# Patient Record
Sex: FEMALE | Race: WHITE | NOT HISPANIC OR LATINO | Employment: OTHER | URBAN - METROPOLITAN AREA
[De-identification: names, ages, dates, MRNs, and addresses within clinical notes are randomized per-mention and may not be internally consistent; named-entity substitution may affect disease eponyms.]

---

## 2017-02-01 ENCOUNTER — TELEPHONE (OUTPATIENT)
Dept: RHEUMATOLOGY | Facility: CLINIC | Age: 72
End: 2017-02-01

## 2017-02-01 NOTE — TELEPHONE ENCOUNTER
----- Message from Loli Escobar sent at 1/30/2017  4:27 PM CST -----  Contact:  call [phone]    Placed a call to the  Pod // pt is calling  Back  To   Speak to  Louisa /please call     2-1-17 Spoke to  and explained that we have faxed Xray orders to [facility] twice- but they have not gone through. Will try faxing again and informed  [facility] will call with appointment. AKILA

## 2017-02-09 ENCOUNTER — OFFICE VISIT (OUTPATIENT)
Dept: RHEUMATOLOGY | Facility: CLINIC | Age: 72
End: 2017-02-09
Payer: MEDICARE

## 2017-02-09 VITALS
HEIGHT: 62 IN | SYSTOLIC BLOOD PRESSURE: 130 MMHG | HEART RATE: 72 BPM | DIASTOLIC BLOOD PRESSURE: 80 MMHG | WEIGHT: 139.13 LBS | BODY MASS INDEX: 25.6 KG/M2

## 2017-02-09 DIAGNOSIS — M05.741 RHEUMATOID ARTHRITIS INVOLVING BOTH HANDS WITH POSITIVE RHEUMATOID FACTOR: Primary | ICD-10-CM

## 2017-02-09 DIAGNOSIS — E03.9 HYPOTHYROIDISM, ADULT: ICD-10-CM

## 2017-02-09 DIAGNOSIS — M06.9 RHEUMATOID ARTHRITIS OF HAND, UNSPECIFIED LATERALITY, UNSPECIFIED RHEUMATOID FACTOR PRESENCE: ICD-10-CM

## 2017-02-09 DIAGNOSIS — D50.9 IRON DEFICIENCY ANEMIA, UNSPECIFIED IRON DEFICIENCY ANEMIA TYPE: ICD-10-CM

## 2017-02-09 DIAGNOSIS — M47.25 OSTEOARTHRITIS OF SPINE WITH RADICULOPATHY, THORACOLUMBAR REGION: ICD-10-CM

## 2017-02-09 DIAGNOSIS — M05.742 RHEUMATOID ARTHRITIS INVOLVING BOTH HANDS WITH POSITIVE RHEUMATOID FACTOR: Primary | ICD-10-CM

## 2017-02-09 PROCEDURE — 99999 PR PBB SHADOW E&M-EST. PATIENT-LVL III: CPT | Mod: PBBFAC,,, | Performed by: INTERNAL MEDICINE

## 2017-02-09 PROCEDURE — 1157F ADVNC CARE PLAN IN RCRD: CPT | Mod: S$GLB,,, | Performed by: INTERNAL MEDICINE

## 2017-02-09 PROCEDURE — 99214 OFFICE O/P EST MOD 30 MIN: CPT | Mod: S$GLB,,, | Performed by: INTERNAL MEDICINE

## 2017-02-09 PROCEDURE — 1126F AMNT PAIN NOTED NONE PRSNT: CPT | Mod: S$GLB,,, | Performed by: INTERNAL MEDICINE

## 2017-02-09 PROCEDURE — 1159F MED LIST DOCD IN RCRD: CPT | Mod: S$GLB,,, | Performed by: INTERNAL MEDICINE

## 2017-02-09 PROCEDURE — 1160F RVW MEDS BY RX/DR IN RCRD: CPT | Mod: S$GLB,,, | Performed by: INTERNAL MEDICINE

## 2017-02-09 RX ORDER — FOLIC ACID 1 MG/1
2 TABLET ORAL DAILY
Qty: 90 TABLET | Refills: 3 | Status: SHIPPED | OUTPATIENT
Start: 2017-02-09 | End: 2017-02-09 | Stop reason: SDUPTHER

## 2017-02-09 RX ORDER — METHOTREXATE 2.5 MG/1
7.5 TABLET ORAL
Qty: 48 TABLET | Refills: 3 | Status: SHIPPED | OUTPATIENT
Start: 2017-02-09 | End: 2017-02-09 | Stop reason: SDUPTHER

## 2017-02-09 RX ORDER — METHOTREXATE 2.5 MG/1
7.5 TABLET ORAL
Qty: 36 TABLET | Refills: 3 | Status: SHIPPED | OUTPATIENT
Start: 2017-02-09 | End: 2017-06-08 | Stop reason: SDUPTHER

## 2017-02-09 RX ORDER — FOLIC ACID 1 MG/1
2 TABLET ORAL DAILY
Qty: 180 TABLET | Refills: 3 | Status: SHIPPED | OUTPATIENT
Start: 2017-02-09 | End: 2018-03-12 | Stop reason: SDUPTHER

## 2017-02-09 ASSESSMENT — ROUTINE ASSESSMENT OF PATIENT INDEX DATA (RAPID3)
PAIN SCORE: 1.5
MDHAQ FUNCTION SCORE: .2
PATIENT GLOBAL ASSESSMENT SCORE: 1.5
PSYCHOLOGICAL DISTRESS SCORE: 0
TOTAL RAPID3 SCORE: 1.22

## 2017-02-09 NOTE — PROGRESS NOTES
Subjective:          Chief Complaint: Fabby Guerrero is a 71 y.o. female who had concerns including Disease Management.    HPI:      Rheumatoid Arthritis  Patient is an 70 y.o. female here for follow up seropostive Rheumatoid Arthritis. Serologies are: RF+, CCP low titer +.   Symptoms have been present for several years. Onset was sudden. Symptoms include eye symptoms, joint pain, joint swelling, sleep difficulties and weakness of hands and are of moderate severity. Morning stiffness: 25 minute Patient denies joint pain, joint swelling and morning stiffness. Symptoms are made worse by: movement, overuse and resting.  Symptoms are helped by arthritis medications.  Associated symptoms include arthralgia and fatigue and secondary Sjogrens. Previously on Restasis, now systane/Refresh. Diclofenac for 1 year with decreased urination and stable creatinine; now off. Starting Using aleve PRN no ASE helping in AM, Tylenol in PM   Seen with Ortho, Dr. Agudelo,  received Euflexxa  Bilateral this did help   Started using some herbals: coconut oil, wintergreen, maxi freeze, aspircream this seems to help  Uses Hyrdocodone only occasionally   Patient having increased anxiety.  She is taking gabapentin only to sleep and not every night. Noting tingling in legs and fingers.   Having some oral ulcerations  Doing well with Tumeric. [facility].   Current medications  Methotrexate - Problems: none 3 tabs weekly doing ok.  Prednisone 5mg PRN flares has not taken any prednisone in 1 month.   Hydrocodone rarely for pain  Aleve PRN-previously using diclofenac but urinary hesitance CReatinine normalized.   Most recent labs WNL from [facility]. 2/2017    Labs from 2-2017: Sedimentation rate 22 normal no leukopenia, anemia.  C-reactive protein normal liver enzymes normal.  [facility] x-ray right hand: No joint space narrowing identified at the MP joints no marginal erosions no articular erosions.  No soft tissue abnormalities.  Currently the left hand was  not done but I did order.  Right foot no erosions noted no joint space narrowing in the MP joints IP joints tarsometatarsal joints or intertarsal joints.  Left foot no bony erosions identified.      REVIEW OF SYSTEMS:    Review of Systems   Constitutional: Positive for malaise/fatigue. Negative for fever and weight loss.   HENT: Negative for sore throat.    Eyes: Positive for discharge and redness. Negative for double vision and photophobia.   Respiratory: Negative for cough, shortness of breath and wheezing.    Cardiovascular: Negative for chest pain, palpitations and orthopnea.   Gastrointestinal: Negative for abdominal pain, constipation and diarrhea.   Genitourinary: Negative for dysuria, hematuria and urgency.   Musculoskeletal: Positive for joint pain. Negative for back pain and myalgias.   Skin: Negative for rash.   Neurological: Negative for dizziness, tingling, focal weakness and headaches.   Endo/Heme/Allergies: Does not bruise/bleed easily.   Psychiatric/Behavioral: Negative for depression, hallucinations and suicidal ideas.               Objective:            Past Medical History   Diagnosis Date    Thyroid disease      hypothyroidism     Family History   Problem Relation Age of Onset    Cancer Mother      lung    Cancer Father      colon and lung    Cancer Daughter      melanoma     Social History   Substance Use Topics    Smoking status: Former Smoker     Quit date: 4/13/1976    Smokeless tobacco: Never Used    Alcohol use 1.8 oz/week     3 Glasses of wine per week         Current Outpatient Prescriptions on File Prior to Visit   Medication Sig Dispense Refill    acetaminophen (TYLENOL ARTHRITIS PAIN) 650 MG TbSR Take 650 mg by mouth every 8 (eight) hours as needed.       acidophilus-pectin, citrus (ACIDOPHILUS PROBIOTIC) 100 million cell-10 mg Cap Take by mouth.      docusate sodium (COLACE) 100 MG capsule Take 100 mg by mouth every evening.      fish oil-omega-3 fatty acids 300-1,000 mg  capsule Take 2 g by mouth once daily.      FLUZONE HIGH-DOSE 2016-17, PF, 180 mcg/0.5 mL Syrg       folic acid (FOLVITE) 1 MG tablet Take 1 tablet (1 mg total) by mouth once daily. 90 tablet 3    GINSENG ORAL Take by mouth.      glucosamine-chondroitin 500-400 mg tablet Take 2 tablets by mouth once daily.       hydrocodone-acetaminophen 10-325mg (NORCO)  mg Tab Take 1 tablet by mouth 3 (three) times daily as needed. 90 tablet 0    levothyroxine (SYNTHROID) 88 MCG tablet Take 1 tablet (88 mcg total) by mouth before breakfast. 90 tablet 3    melatonin 5 mg Cap Take by mouth as needed.       methotrexate 2.5 MG Tab Take 4 tablets (10 mg total) by mouth every 7 days. 48 tablet 3    multivitamin (ONE DAILY MULTIVITAMIN) per tablet Take 1 tablet by mouth once daily.      naproxen (NAPROSYN) 250 MG tablet Take 250 mg by mouth 2 (two) times daily with meals. OTC      omeprazole (PRILOSEC) 20 MG capsule Take 20 mg by mouth as needed.       TURMERIC (CURCUMIN MISC) 750 mg by Misc.(Non-Drug; Combo Route) route 2 (two) times daily.      alprazolam (XANAX) 0.25 MG tablet Take 1 tablet (0.25 mg total) by mouth daily as needed for Anxiety. 20 tablet 2    gabapentin (NEURONTIN) 100 MG capsule Take 1 capsule (100 mg total) by mouth every evening. (Patient taking differently: Take 100 mg by mouth as needed. ) 90 capsule 2    predniSONE (DELTASONE) 5 MG tablet 1 to 2 tabs po qam prn swelling 60 tablet 3     No current facility-administered medications on file prior to visit.        Vitals:    02/09/17 0857   BP: 130/80   Pulse: 72       Physical Exam:    Physical Exam   Constitutional: She appears well-developed and well-nourished.   HENT:   Nose: No septal deviation.   Mouth/Throat: Mucous membranes are normal. No oral lesions.   Eyes: Pupils are equal, round, and reactive to light. Right conjunctiva is not injected. Left conjunctiva is not injected.   Neck: No JVD present. No thyroid mass and no thyromegaly  present.   Cardiovascular: Normal rate, regular rhythm and normal pulses.    No edema   Pulmonary/Chest: Effort normal and breath sounds normal.   Abdominal: Soft. Normal appearance.   Musculoskeletal:        Right shoulder: She exhibits normal range of motion, no tenderness and no swelling.        Left shoulder: She exhibits normal range of motion, no tenderness and no swelling.        Right elbow: She exhibits normal range of motion and no swelling. No tenderness found.        Left elbow: She exhibits normal range of motion and no swelling. No tenderness found.        Right wrist: She exhibits decreased range of motion and tenderness. She exhibits no swelling.        Left wrist: She exhibits decreased range of motion and tenderness. She exhibits no swelling.        Right hip: She exhibits normal range of motion, normal strength and no swelling.        Left hip: She exhibits normal range of motion, no tenderness and no swelling.        Right knee: She exhibits normal range of motion and no swelling. No tenderness found.        Left knee: She exhibits normal range of motion and no swelling. No tenderness found.        Right ankle: She exhibits normal range of motion and no swelling. No tenderness.        Left ankle: She exhibits normal range of motion and no swelling. No tenderness.        Right hand: She exhibits decreased range of motion and tenderness.        Left hand: She exhibits decreased range of motion and tenderness.   5/5 upper and lower extremity bilateral muscle strength   Lymphadenopathy:     She has no cervical adenopathy.     She has no axillary adenopathy.   Neurological: She has normal strength and normal reflexes.   Skin: Skin is dry and intact.   Psychiatric: She has a normal mood and affect.             Assessment:       Encounter Diagnoses   Name Primary?    Rheumatoid arthritis involving both hands with positive rheumatoid factor Yes    Iron deficiency anemia, unspecified iron deficiency  anemia type     Rheumatoid arthritis of hand, unspecified laterality, unspecified rheumatoid factor presence     Osteoarthritis of spine with radiculopathy, thoracolumbar region     Hypothyroidism, adult           Plan:        Rheumatoid arthritis involving both hands with positive rheumatoid factor    Iron deficiency anemia, unspecified iron deficiency anemia type    Rheumatoid arthritis of hand, unspecified laterality, unspecified rheumatoid factor presence  -   -     CBC auto differential; Future; Expected date: 2/9/17  -     Comprehensive metabolic panel; Future; Expected date: 2/9/17  -     Sedimentation rate, manual; Future; Expected date: 2/9/17  -     C-reactive protein; Future; Expected date: 2/9/17  -     methotrexate 2.5 MG Tab; Take 3 tablets (7.5 mg total) by mouth every 7 days.  Dispense: 36 tablet; Refill: 3  -     folic acid (FOLVITE) 1 MG tablet; Take 2 tablets (2 mg total) by mouth once daily.  Dispense: 180 tablet; Refill: 3    Osteoarthritis of spine with radiculopathy, thoracolumbar region  - -     methotrexate 2.5 MG Tab; Take 3 tablets (7.5 mg total) by mouth every 7 days.  Dispense: 36 tablet; Refill: 3  -     folic acid (FOLVITE) 1 MG tablet; Take 2 tablets (2 mg total) by mouth once daily.  Dispense: 180 tablet; Refill: 3    Hypothyroidism, adult  -     Discontinue: methotrexate 2.5 MG Tab; Take 3 tablets (7.5 mg total) by mouth every 7 days.  Dispense: 48 tablet; Refill: 3  -     methotrexate 2.5 MG Tab; Take 3 tablets (7.5 mg total) by mouth every 7 days.  Dispense: 36 tablet; Refill: 3    Continue MTX 3 tabs weekly  Increase folic acid to 2mg daily  Continue new supplement [facility]Med (Tumeric)  Off all Prednisone  Return in about 4 months (around 6/9/2017).          30min consultation with greater than 50% spent in counseling, chart review and coordination of care. All questions answered.

## 2017-03-29 ENCOUNTER — DOCUMENTATION ONLY (OUTPATIENT)
Dept: RHEUMATOLOGY | Facility: CLINIC | Age: 72
End: 2017-03-29

## 2017-03-29 NOTE — PROGRESS NOTES
I have reviewed [facility] with patient at our office visit.  Labs dated 2/3/2017.      sedimentation rate normal, CBC without any anemia or leukopenia.  Creatinine is normal.  CRP is normal no change to medication.

## 2017-05-08 ENCOUNTER — TELEPHONE (OUTPATIENT)
Dept: RHEUMATOLOGY | Facility: CLINIC | Age: 72
End: 2017-05-08

## 2017-05-08 NOTE — TELEPHONE ENCOUNTER
----- Message from Dari Nieto sent at 5/8/2017  9:16 AM CDT -----  Contact: self   [phone]  Patient called asking for orders for labs, patient goes to [facility]. Fax-[phone]. Office [phone].Thanks!    5-8-17 Spoke to patient and let her know lab orders are [facility] to [facility]. AKILA

## 2017-06-08 ENCOUNTER — OFFICE VISIT (OUTPATIENT)
Dept: RHEUMATOLOGY | Facility: CLINIC | Age: 72
End: 2017-06-08
Payer: MEDICARE

## 2017-06-08 VITALS
WEIGHT: 140.63 LBS | BODY MASS INDEX: 25.88 KG/M2 | HEART RATE: 65 BPM | HEIGHT: 62 IN | SYSTOLIC BLOOD PRESSURE: 107 MMHG | DIASTOLIC BLOOD PRESSURE: 66 MMHG

## 2017-06-08 DIAGNOSIS — M75.52 ACUTE SHOULDER BURSITIS, LEFT: Primary | ICD-10-CM

## 2017-06-08 DIAGNOSIS — M65.341 TRIGGER RING FINGER OF RIGHT HAND: ICD-10-CM

## 2017-06-08 DIAGNOSIS — E03.9 HYPOTHYROIDISM, ADULT: ICD-10-CM

## 2017-06-08 DIAGNOSIS — M47.25 OSTEOARTHRITIS OF SPINE WITH RADICULOPATHY, THORACOLUMBAR REGION: ICD-10-CM

## 2017-06-08 DIAGNOSIS — M47.812 SPONDYLOSIS OF CERVICAL REGION WITHOUT MYELOPATHY OR RADICULOPATHY: ICD-10-CM

## 2017-06-08 DIAGNOSIS — M06.9 RHEUMATOID ARTHRITIS OF HAND, UNSPECIFIED LATERALITY, UNSPECIFIED RHEUMATOID FACTOR PRESENCE: ICD-10-CM

## 2017-06-08 PROCEDURE — 1159F MED LIST DOCD IN RCRD: CPT | Mod: S$GLB,,, | Performed by: INTERNAL MEDICINE

## 2017-06-08 PROCEDURE — 20610 DRAIN/INJ JOINT/BURSA W/O US: CPT | Mod: S$GLB,,, | Performed by: INTERNAL MEDICINE

## 2017-06-08 PROCEDURE — 1125F AMNT PAIN NOTED PAIN PRSNT: CPT | Mod: S$GLB,,, | Performed by: INTERNAL MEDICINE

## 2017-06-08 PROCEDURE — 20550 NJX 1 TENDON SHEATH/LIGAMENT: CPT | Mod: 59,S$GLB,, | Performed by: INTERNAL MEDICINE

## 2017-06-08 PROCEDURE — 99214 OFFICE O/P EST MOD 30 MIN: CPT | Mod: 25,S$GLB,, | Performed by: INTERNAL MEDICINE

## 2017-06-08 PROCEDURE — 99999 PR PBB SHADOW E&M-EST. PATIENT-LVL III: CPT | Mod: PBBFAC,,, | Performed by: INTERNAL MEDICINE

## 2017-06-08 RX ORDER — CHOLECALCIFEROL (VITAMIN D3) 125 MCG
CAPSULE ORAL
COMMUNITY
Start: 2017-05-23 | End: 2017-06-08

## 2017-06-08 RX ORDER — HYDROCODONE BITARTRATE AND ACETAMINOPHEN 10; 325 MG/1; MG/1
1 TABLET ORAL 3 TIMES DAILY PRN
Qty: 90 TABLET | Refills: 0 | Status: SHIPPED | OUTPATIENT
Start: 2017-06-08 | End: 2018-04-13 | Stop reason: SDUPTHER

## 2017-06-08 RX ORDER — ACETAMINOPHEN 500MG/15ML
LIQUID (ML) ORAL
COMMUNITY
Start: 2017-05-23 | End: 2017-06-08

## 2017-06-08 RX ORDER — NEOMYCIN/POLYMYXIN B/PRAMOXINE 3.5-10K-1
CREAM (GRAM) TOPICAL
COMMUNITY
Start: 2017-05-23 | End: 2017-06-08

## 2017-06-08 RX ORDER — METHOTREXATE 2.5 MG/1
10 TABLET ORAL
Qty: 48 TABLET | Refills: 3 | Status: SHIPPED | OUTPATIENT
Start: 2017-06-08 | End: 2017-06-08

## 2017-06-08 RX ORDER — METHOTREXATE 2.5 MG/1
10 TABLET ORAL
Qty: 48 TABLET | Refills: 3 | Status: SHIPPED | OUTPATIENT
Start: 2017-06-08 | End: 2017-09-26

## 2017-06-08 RX ADMIN — METHYLPREDNISOLONE ACETATE 40 MG: 40 INJECTION, SUSPENSION INTRA-ARTICULAR; INTRALESIONAL; INTRAMUSCULAR; SOFT TISSUE at 08:06

## 2017-06-08 ASSESSMENT — ROUTINE ASSESSMENT OF PATIENT INDEX DATA (RAPID3)
TOTAL RAPID3 SCORE: 2.83
MDHAQ FUNCTION SCORE: .6
PAIN SCORE: 3.5
PSYCHOLOGICAL DISTRESS SCORE: 1.1
PATIENT GLOBAL ASSESSMENT SCORE: 3

## 2017-06-08 NOTE — PROGRESS NOTES
Subjective:          Chief Complaint: Fabby Guerrero is a 71 y.o. female who had concerns including 4 month follow up.    HPI:      Rheumatoid Arthritis  Patient is an 70 y.o. female here for follow up seropostive Rheumatoid Arthritis. Serologies are: RF+, CCP low titer +.   Symptoms have been present for several years. Onset was sudden. Symptoms include eye symptoms, joint pain, joint swelling, sleep difficulties and weakness of hands and are of moderate severity. Morning stiffness: 25 minute Patient denies joint pain, joint swelling and morning stiffness. Symptoms are made worse by: movement, overuse and resting.  Symptoms are helped by arthritis medications.  Associated symptoms include arthralgia and fatigue and secondary Sjogrens. Previously on Restasis, now systane/Refresh. Diclofenac for 1 year with decreased urination and stable creatinine; now off. Starting Using aleve PRN no ASE helping in AM, Tylenol in PM   Seen with Ortho, Dr. Agudelo,  received Euflexxa  Bilateral this did help   Started using some herbals: coconut oil, wintergreen, maxi freeze, aspircream this seems to help  Uses Hyrdocodone only occasionally   Patient having increased anxiety.  She is taking gabapentin only to sleep and not every night. Noting tingling in legs and fingers.   Having metarsalgia in the AM, shoulders, wrist and fingers more stiff, triggering right 4th, and left 2nd finger. Overal   Doing well with Tumeric. [facility].   Current medications  Methotrexate - Problems: none 3 tabs weekly doing ok.  Prednisone 5mg PRN flares has not taken any prednisone in 1 month.   Hydrocodone rarely for pain  Aleve PRN-previously using diclofenac but urinary hesitance CReatinine normalized.   Most recent labs WNL from [facility]. 2/2017  No hx of HCQ-  Labs from 2-2017: Sedimentation rate 22 normal no leukopenia, anemia.  C-reactive protein normal liver enzymes normal.  [facility] x-ray right hand: No joint space narrowing identified at the MP  joints no marginal erosions no articular erosions.  No soft tissue abnormalities.  Currently the left hand was not done but I did order.  Right foot no erosions noted no joint space narrowing in the MP joints IP joints tarsometatarsal joints or intertarsal joints.  Left foot no bony erosions identified.      REVIEW OF SYSTEMS:    Review of Systems   Constitutional: Positive for malaise/fatigue. Negative for fever and weight loss.   HENT: Negative for sore throat.    Eyes: Positive for discharge and redness. Negative for double vision and photophobia.   Respiratory: Negative for cough, shortness of breath and wheezing.    Cardiovascular: Negative for chest pain, palpitations and orthopnea.   Gastrointestinal: Negative for abdominal pain, constipation and diarrhea.   Genitourinary: Negative for dysuria, hematuria and urgency.   Musculoskeletal: Positive for joint pain. Negative for back pain and myalgias.   Skin: Negative for rash.   Neurological: Negative for dizziness, tingling, focal weakness and headaches.   Endo/Heme/Allergies: Does not bruise/bleed easily.   Psychiatric/Behavioral: Negative for depression, hallucinations and suicidal ideas.               Objective:            Past Medical History:   Diagnosis Date    Thyroid disease     hypothyroidism     Family History   Problem Relation Age of Onset    Cancer Mother      lung    Cancer Father      colon and lung    Cancer Daughter      melanoma     Social History   Substance Use Topics    Smoking status: Former Smoker     Quit date: 4/13/1976    Smokeless tobacco: Never Used    Alcohol use 1.8 oz/week     3 Glasses of wine per week         Current Outpatient Prescriptions on File Prior to Visit   Medication Sig Dispense Refill    acetaminophen (TYLENOL ARTHRITIS PAIN) 650 MG TbSR Take 650 mg by mouth every 8 (eight) hours as needed.       docusate sodium (COLACE) 100 MG capsule Take 100 mg by mouth every evening.      fish oil-omega-3 fatty acids  300-1,000 mg capsule Take 2 g by mouth once daily.      folic acid (FOLVITE) 1 MG tablet Take 2 tablets (2 mg total) by mouth once daily. 180 tablet 3    glucosamine-chondroitin 500-400 mg tablet Take 2 tablets by mouth once daily.       levothyroxine (SYNTHROID) 88 MCG tablet Take 1 tablet (88 mcg total) by mouth before breakfast. 90 tablet 3    melatonin 5 mg Cap Take by mouth as needed.       methotrexate 2.5 MG Tab Take 3 tablets (7.5 mg total) by mouth every 7 days. 36 tablet 3    naproxen (NAPROSYN) 250 MG tablet Take 250 mg by mouth 2 (two) times daily with meals. OTC      omeprazole (PRILOSEC) 20 MG capsule Take 20 mg by mouth as needed.       predniSONE (DELTASONE) 5 MG tablet 1 to 2 tabs po qam prn swelling 60 tablet 3    TURMERIC (CURCUMIN MISC) 750 mg by Misc.(Non-Drug; Combo Route) route 2 (two) times daily.      FLUZONE HIGH-DOSE 2016-17, PF, 180 mcg/0.5 mL Syrg       gabapentin (NEURONTIN) 100 MG capsule Take 1 capsule (100 mg total) by mouth every evening. (Patient taking differently: Take 100 mg by mouth as needed. ) 90 capsule 2    hydrocodone-acetaminophen 10-325mg (NORCO)  mg Tab Take 1 tablet by mouth 3 (three) times daily as needed. 90 tablet 0    multivitamin (ONE DAILY MULTIVITAMIN) per tablet Take 1 tablet by mouth once daily.      [DISCONTINUED] acidophilus-pectin, citrus (ACIDOPHILUS PROBIOTIC) 100 million cell-10 mg Cap Take by mouth.      [DISCONTINUED] alprazolam (XANAX) 0.25 MG tablet Take 1 tablet (0.25 mg total) by mouth daily as needed for Anxiety. 20 tablet 2    [DISCONTINUED] GINSENG ORAL Take by mouth.       No current facility-administered medications on file prior to visit.        Vitals:    06/08/17 1000   BP: 107/66   Pulse: 65       Physical Exam:    Physical Exam   Constitutional: She appears well-developed and well-nourished.   HENT:   Nose: No septal deviation.   Mouth/Throat: Mucous membranes are normal. No oral lesions.   Eyes: Pupils are equal,  round, and reactive to light. Right conjunctiva is not injected. Left conjunctiva is not injected.   Neck: No JVD present. No thyroid mass and no thyromegaly present.   Cardiovascular: Normal rate, regular rhythm and normal pulses.    No edema   Pulmonary/Chest: Effort normal and breath sounds normal.   Abdominal: Soft. Normal appearance.   Musculoskeletal:        Right shoulder: She exhibits normal range of motion, no tenderness and no swelling.        Left shoulder: She exhibits normal range of motion, no tenderness and no swelling.        Right elbow: She exhibits normal range of motion and no swelling. No tenderness found.        Left elbow: She exhibits normal range of motion and no swelling. No tenderness found.        Right wrist: She exhibits decreased range of motion and tenderness. She exhibits no swelling.        Left wrist: She exhibits decreased range of motion and tenderness. She exhibits no swelling.        Right hip: She exhibits normal range of motion, normal strength and no swelling.        Left hip: She exhibits normal range of motion, no tenderness and no swelling.        Right knee: She exhibits normal range of motion and no swelling. No tenderness found.        Left knee: She exhibits normal range of motion and no swelling. No tenderness found.        Right ankle: She exhibits normal range of motion and no swelling. No tenderness.        Left ankle: She exhibits normal range of motion and no swelling. No tenderness.        Right hand: She exhibits decreased range of motion and tenderness.        Left hand: She exhibits decreased range of motion and tenderness.   5/5 upper and lower extremity bilateral muscle strength   Lymphadenopathy:     She has no cervical adenopathy.     She has no axillary adenopathy.   Neurological: She has normal strength and normal reflexes.   Skin: Skin is dry and intact.   Psychiatric: She has a normal mood and affect.             Assessment:       Encounter Diagnoses    Name Primary?    Osteoarthritis of spine with radiculopathy, thoracolumbar region     Spondylosis of cervical region without myelopathy or radiculopathy     Rheumatoid arthritis of hand, unspecified laterality, unspecified rheumatoid factor presence     Hypothyroidism, adult           Plan:        Rheumatoid arthritis involving both hands with positive rheumatoid factor    Iron deficiency anemia, unspecified iron deficiency anemia type    Rheumatoid arthritis of hand, unspecified laterality, unspecified rheumatoid factor presence  -   -     CBC auto differential; Future; Expected date: 2/9/17  -     Comprehensive metabolic panel; Future; Expected date: 2/9/17  -     Sedimentation rate, manual; Future; Expected date: 2/9/17  -     C-reactive protein; Future; Expected date: 2/9/17  -     methotrexate 2.5 MG Tab; Take 3 tablets (7.5 mg total) by mouth every 7 days.  Dispense: 36 tablet; Refill: 3  -     folic acid (FOLVITE) 1 MG tablet; Take 2 tablets (2 mg total) by mouth once daily.  Dispense: 180 tablet; Refill: 3    Osteoarthritis of spine with radiculopathy, thoracolumbar region  - -     methotrexate 2.5 MG Tab; Take 3 tablets (7.5 mg total) by mouth every 7 days.  Dispense: 36 tablet; Refill: 3  -     folic acid (FOLVITE) 1 MG tablet; Take 2 tablets (2 mg total) by mouth once daily.  Dispense: 180 tablet; Refill: 3    Hypothyroidism, adult  -     Discontinue: methotrexate 2.5 MG Tab; Take 3 tablets (7.5 mg total) by mouth every 7 days.  Dispense: 48 tablet; Refill: 3  -     methotrexate 2.5 MG Tab; Take 3 tablets (7.5 mg total) by mouth every 7 days.  Dispense: 36 tablet; Refill: 3    Increase MTX 4 tabs weekly  Increase folic acid to 2mg daily  Continue new supplement [facility] (Tumeric)  Off all Prednisone-ok for PRN 5mg  Ok for Gabapentin PRN at night.  Inject right 4th trigger  Inject left shoulder    May need to consider biologic in the future.     Return in about 3 months (around 9/8/2017).           30min consultation with greater than 50% spent in counseling, chart review and coordination of care. All questions answered.

## 2017-06-19 RX ORDER — METHYLPREDNISOLONE ACETATE 40 MG/ML
40 INJECTION, SUSPENSION INTRA-ARTICULAR; INTRALESIONAL; INTRAMUSCULAR; SOFT TISSUE
Status: DISCONTINUED | OUTPATIENT
Start: 2017-06-08 | End: 2017-06-19 | Stop reason: HOSPADM

## 2017-06-20 NOTE — PROCEDURES
Large Joint Aspiration/Injection  Date/Time: 6/8/2017 8:00 PM  Performed by: VILMA GOODWIN  Authorized by: VILMA GOODWIN.     Consent Done?:  Yes (Verbal)    Location:  Shoulder  Site:  L subacromial bursa  Needle size:  25 G  Approach:  Posterior  Medications:  40 mg methylPREDNISolone acetate 40 mg/mL  Patient tolerance:  Patient tolerated the procedure well with no immediate complications    Additional Comments: Discussed risks, benefits and side effects to CS injection of the shoulder including but not limited to infection, muscle or skin atrophy and ineffectiveness. Patient agreed to proceed with Procedure.

## 2017-06-20 NOTE — PROCEDURES
Tendon Sheath  Date/Time: 6/8/2017 8:04 PM  Performed by: VILMA GOODWIN  Authorized by: VILMA GOODWIN     Consent Done?:  Yes (Verbal)  Timeout: prior to procedure the correct patient, procedure, and site was verified    Indications:  Pain  Site marked: the procedure site was marked    Timeout: prior to procedure the correct patient, procedure, and site was verified    Location:  Ring finger  Site:  R ring MCP  Prep: patient was prepped and draped in usual sterile fashion    Ultrasonic guidance for needle placement?: No    Needle gauge: 30ga 1/2 inche.  Medications:  40 mg methylPREDNISolone acetate 40 mg/mL  Patient tolerance:  Patient tolerated the procedure well with no immediate complications   Discussed risks, benefits and side effects to CS injection of the shoulder including but not limited to infection, muscle or skin atrophy and ineffectiveness. Patient agreed to proceed with Procedure.

## 2017-09-05 ENCOUNTER — TELEPHONE (OUTPATIENT)
Dept: RHEUMATOLOGY | Facility: CLINIC | Age: 72
End: 2017-09-05

## 2017-09-05 NOTE — TELEPHONE ENCOUNTER
----- Message from Nayla Dimas sent at 9/5/2017 10:43 AM CDT -----  Contact: patient  Patient calling in regards to requesting her lab work orders in that she usually have done before her annual appt. She also wants to speak with the Nurse about a possible Xray. Please advise.  Call back  or   Thanks!    Spoke to the patient. Orders faxed to [facility] as requested. Patient will see podiatrist soon for foot pain. Patient is wanting foot xrays . CG

## 2017-09-26 ENCOUNTER — OFFICE VISIT (OUTPATIENT)
Dept: RHEUMATOLOGY | Facility: CLINIC | Age: 72
End: 2017-09-26
Payer: MEDICARE

## 2017-09-26 VITALS
DIASTOLIC BLOOD PRESSURE: 70 MMHG | WEIGHT: 138.44 LBS | SYSTOLIC BLOOD PRESSURE: 120 MMHG | BODY MASS INDEX: 25.48 KG/M2 | HEART RATE: 80 BPM | HEIGHT: 62 IN

## 2017-09-26 DIAGNOSIS — M05.741 RHEUMATOID ARTHRITIS INVOLVING RIGHT HAND WITH POSITIVE RHEUMATOID FACTOR: Primary | ICD-10-CM

## 2017-09-26 DIAGNOSIS — R25.2 LEG CRAMP: ICD-10-CM

## 2017-09-26 DIAGNOSIS — R06.02 SHORTNESS OF BREATH: ICD-10-CM

## 2017-09-26 PROCEDURE — 99214 OFFICE O/P EST MOD 30 MIN: CPT | Mod: S$GLB,,, | Performed by: INTERNAL MEDICINE

## 2017-09-26 PROCEDURE — 99999 PR PBB SHADOW E&M-EST. PATIENT-LVL III: CPT | Mod: PBBFAC,,, | Performed by: INTERNAL MEDICINE

## 2017-09-26 PROCEDURE — 1159F MED LIST DOCD IN RCRD: CPT | Mod: S$GLB,,, | Performed by: INTERNAL MEDICINE

## 2017-09-26 PROCEDURE — 3008F BODY MASS INDEX DOCD: CPT | Mod: S$GLB,,, | Performed by: INTERNAL MEDICINE

## 2017-09-26 PROCEDURE — 1125F AMNT PAIN NOTED PAIN PRSNT: CPT | Mod: S$GLB,,, | Performed by: INTERNAL MEDICINE

## 2017-09-26 RX ORDER — LEFLUNOMIDE 10 MG/1
10 TABLET ORAL DAILY
Qty: 30 TABLET | Refills: 3 | Status: SHIPPED | OUTPATIENT
Start: 2017-09-26 | End: 2018-04-26

## 2017-09-26 NOTE — PROGRESS NOTES
Subjective:          Chief Complaint: Fabby Guerrero is a 71 y.o. female who had concerns including Disease Management.    HPI:    Right foot injections with Dr Arceo and medrol for heel pain. Right foot better but still pain in the left foot. Shoulders and shoulder pain across upper back. Fingers better. Injection of trigger finger right 4th doing great but the 2nd finger having increasing pain.   Night time with dry cough, seen with ENT in past attributed to GERD. Does have PPI uses intermittently. Some ZULUAGA some but can still walk to get trash on long drive. No cough during daytime.     Rheumatoid Arthritis  Patient is an 70 y.o. female here for follow up seropostive Rheumatoid Arthritis. Serologies are: RF+, CCP low titer +.   Symptoms have been present for several years. Onset was sudden. Symptoms include eye symptoms, joint pain, joint swelling, sleep difficulties and weakness of hands and are of moderate severity. Morning stiffness: 25 minute Patient denies joint pain, joint swelling and morning stiffness. Symptoms are made worse by: movement, overuse and resting.  Symptoms are helped by arthritis medications.  Associated symptoms include arthralgia and fatigue and secondary Sjogrens. Previously on Restasis, now systane/Refresh. Diclofenac for 1 year with decreased urination and stable creatinine; now off. Starting Using aleve PRN no ASE helping in AM, Tylenol in PM   Seen with Ortho, Dr. Agudelo,  received Euflexxa  Bilateral this did help   Started using some herbals: coconut oil, wintergreen, maxi freeze, aspircream this seems to help  Uses Hyrdocodone only occasionally   Patient having increased anxiety.  She is taking gabapentin only to sleep and not every night. Noting tingling in legs and fingers.   Having metarsalgia in the AM, shoulders, wrist and fingers more stiff, triggering right 4th, and left 2nd finger. Overal   Doing well with Tumeric. [facility].   Current medications  Methotrexate - Problems:  none 3 tabs weekly doing ok.  Did have ulcer increased folic acid 2mg still with some ulcers.   Prednisone 5mg PRN flares has not taken any prednisone in 1 month.   Hydrocodone rarely for pain  Aleve PRN-previously using diclofenac but urinary hesitance CReatinine normalized.   Most recent labs WNL from [facility]. 2/2017  No hx of HCQ-  Labs from 2-2017: Sedimentation rate 22 normal no leukopenia, anemia.  C-reactive protein normal liver enzymes normal.  DIS x-ray right hand: No joint space narrowing identified at the MP joints no marginal erosions no articular erosions.  No soft tissue abnormalities.  Currently the left hand was not done but I did order.  Right foot no erosions noted no joint space narrowing in the MP joints IP joints tarsometatarsal joints or intertarsal joints.  Left foot no bony erosions identified.      REVIEW OF SYSTEMS:    Review of Systems   Constitutional: Positive for malaise/fatigue. Negative for fever and weight loss.   HENT: Negative for sore throat.    Eyes: Positive for discharge and redness. Negative for double vision and photophobia.   Respiratory: Negative for cough, shortness of breath and wheezing.    Cardiovascular: Negative for chest pain, palpitations and orthopnea.   Gastrointestinal: Negative for abdominal pain, constipation and diarrhea.   Genitourinary: Negative for dysuria, hematuria and urgency.   Musculoskeletal: Positive for joint pain. Negative for back pain and myalgias.   Skin: Negative for rash.   Neurological: Negative for dizziness, tingling, focal weakness and headaches.   Endo/Heme/Allergies: Does not bruise/bleed easily.   Psychiatric/Behavioral: Negative for depression, hallucinations and suicidal ideas.               Objective:            Past Medical History:   Diagnosis Date    Thyroid disease     hypothyroidism     Family History   Problem Relation Age of Onset    Cancer Mother      lung    Cancer Father      colon and lung    Cancer Daughter       melanoma     Social History   Substance Use Topics    Smoking status: Former Smoker     Quit date: 4/13/1976    Smokeless tobacco: Never Used    Alcohol use 1.8 oz/week     3 Glasses of wine per week         Current Outpatient Prescriptions on File Prior to Visit   Medication Sig Dispense Refill    acetaminophen (TYLENOL ARTHRITIS PAIN) 650 MG TbSR Take 650 mg by mouth every 8 (eight) hours as needed.       docusate sodium (COLACE) 100 MG capsule Take 100 mg by mouth every evening.      fish oil-omega-3 fatty acids 300-1,000 mg capsule Take 2 g by mouth once daily.      FLUZONE HIGH-DOSE 2016-17, PF, 180 mcg/0.5 mL Syrg       folic acid (FOLVITE) 1 MG tablet Take 2 tablets (2 mg total) by mouth once daily. 180 tablet 3    glucosamine-chondroitin 500-400 mg tablet Take 2 tablets by mouth once daily.       levothyroxine (SYNTHROID) 88 MCG tablet Take 1 tablet (88 mcg total) by mouth before breakfast. 90 tablet 3    melatonin 5 mg Cap Take by mouth as needed.       methotrexate 2.5 MG Tab Take 4 tablets (10 mg total) by mouth every 7 days. 48 tablet 3    multivitamin (ONE DAILY MULTIVITAMIN) per tablet Take 1 tablet by mouth once daily.      naproxen (NAPROSYN) 250 MG tablet Take 250 mg by mouth 2 (two) times daily with meals. OTC      omeprazole (PRILOSEC) 20 MG capsule Take 20 mg by mouth as needed.       predniSONE (DELTASONE) 5 MG tablet 1 to 2 tabs po qam prn swelling 60 tablet 3    TURMERIC (CURCUMIN MISC) 750 mg by Misc.(Non-Drug; Combo Route) route 2 (two) times daily.      gabapentin (NEURONTIN) 100 MG capsule Take 1 capsule (100 mg total) by mouth every evening. (Patient taking differently: Take 100 mg by mouth as needed. ) 90 capsule 2    hydrocodone-acetaminophen 10-325mg (NORCO)  mg Tab Take 1 tablet by mouth 3 (three) times daily as needed. 90 tablet 0     No current facility-administered medications on file prior to visit.        Vitals:    09/26/17 1423   BP: 120/70   Pulse: 80        Physical Exam:    Physical Exam   Constitutional: She appears well-developed and well-nourished.   HENT:   Nose: No septal deviation.   Mouth/Throat: Mucous membranes are normal. No oral lesions.   Eyes: Pupils are equal, round, and reactive to light. Right conjunctiva is not injected. Left conjunctiva is not injected.   Neck: No JVD present. No thyroid mass and no thyromegaly present.   Cardiovascular: Normal rate, regular rhythm and normal pulses.    No edema   Pulmonary/Chest: Effort normal and breath sounds normal.   Abdominal: Soft. Normal appearance.   Musculoskeletal:        Right shoulder: She exhibits normal range of motion, no tenderness and no swelling.        Left shoulder: She exhibits normal range of motion, no tenderness and no swelling.        Right elbow: She exhibits normal range of motion and no swelling. No tenderness found.        Left elbow: She exhibits normal range of motion and no swelling. No tenderness found.        Right wrist: She exhibits decreased range of motion and tenderness. She exhibits no swelling.        Left wrist: She exhibits decreased range of motion and tenderness. She exhibits no swelling.        Right hip: She exhibits normal range of motion, normal strength and no swelling.        Left hip: She exhibits normal range of motion, no tenderness and no swelling.        Right knee: She exhibits normal range of motion and no swelling. No tenderness found.        Left knee: She exhibits normal range of motion and no swelling. No tenderness found.        Right ankle: She exhibits normal range of motion and no swelling. No tenderness.        Left ankle: She exhibits normal range of motion and no swelling. No tenderness.        Right hand: She exhibits decreased range of motion and tenderness.        Left hand: She exhibits decreased range of motion and tenderness.   5/5 upper and lower extremity bilateral muscle strength   Lymphadenopathy:     She has no cervical adenopathy.      She has no axillary adenopathy.   Neurological: She has normal strength and normal reflexes.   Skin: Skin is dry and intact.   Psychiatric: She has a normal mood and affect.             Assessment:       Encounter Diagnoses   Name Primary?    Rheumatoid arthritis involving right hand with positive rheumatoid factor Yes    Shortness of breath     Leg cramp           Plan:        Rheumatoid arthritis involving both hands with positive rheumatoid factor    Iron deficiency anemia, unspecified iron deficiency anemia type    Rheumatoid arthritis of hand, unspecified laterality, unspecified rheumatoid factor presence  -   -     CBC auto differential; Future; Expected date: 2/9/17  -     Comprehensive metabolic panel; Future; Expected date: 2/9/17  -     Sedimentation rate, manual; Future; Expected date: 2/9/17  -     C-reactive protein; Future; Expected date: 2/9/17  -     methotrexate 2.5 MG Tab; Take 4 tablets (10 mg total) by mouth every 7 days.  Dispense: 36 tablet; Refill: 3  -     folic acid (FOLVITE) 1 MG tablet; Take 2 tablets (2 mg total) by mouth once daily.  Dispense: 180 tablet; Refill: 3    Osteoarthritis of spine with radiculopathy, thoracolumbar region    Hypothyroidism, adult  -       DC MTX 4 tabs weekly  LFA 10mg daily to start this sunday  CXR  Continue new supplement [facility] (Tumeric)  Off all Prednisone-ok for PRN 5mg  Ok for Gabapentin PRN at night.      May need to consider biologic in the future. Very hesitant    Return in about 4 months (around 1/26/2018).          30min consultation with greater than 50% spent in counseling, chart review and coordination of care. All questions answered.

## 2017-10-18 ENCOUNTER — TELEPHONE (OUTPATIENT)
Dept: RHEUMATOLOGY | Facility: CLINIC | Age: 72
End: 2017-10-18

## 2017-10-18 NOTE — TELEPHONE ENCOUNTER
----- Message from Jazmyne Olivera DO sent at 10/12/2017  6:41 AM CDT -----  Please let patient know CXR negative

## 2017-10-18 NOTE — TELEPHONE ENCOUNTER
----- Message from Kori Chambers sent at 10/18/2017  9:19 AM CDT -----  Contact: patient  Patient, Fabby Guerrero, [phone], [phone] (cell) is returning the call from the office.  I called the office, but received no answer.  Patient believes the call is regarding her test results and is a little unhappy that she has not heard back from them.  Patient is going out of town tomorrow 10/18.  Please advise.  Thanks!

## 2018-03-12 DIAGNOSIS — M06.00 RHEUMATOID ARTHRITIS WITH NEGATIVE RHEUMATOID FACTOR, INVOLVING UNSPECIFIED SITE: ICD-10-CM

## 2018-03-12 DIAGNOSIS — M06.9 RHEUMATOID ARTHRITIS OF HAND, UNSPECIFIED LATERALITY, UNSPECIFIED RHEUMATOID FACTOR PRESENCE: ICD-10-CM

## 2018-03-12 DIAGNOSIS — E03.9 HYPOTHYROIDISM, UNSPECIFIED TYPE: ICD-10-CM

## 2018-03-12 DIAGNOSIS — M47.815 SPONDYLOSIS OF THORACOLUMBAR REGION WITHOUT MYELOPATHY OR RADICULOPATHY: ICD-10-CM

## 2018-03-12 DIAGNOSIS — M47.25 OSTEOARTHRITIS OF SPINE WITH RADICULOPATHY, THORACOLUMBAR REGION: ICD-10-CM

## 2018-03-12 RX ORDER — FOLIC ACID 1 MG/1
2 TABLET ORAL DAILY
Qty: 180 TABLET | Refills: 3 | Status: SHIPPED | OUTPATIENT
Start: 2018-03-12 | End: 2018-04-26 | Stop reason: SDUPTHER

## 2018-03-12 RX ORDER — PREDNISONE 5 MG/1
TABLET ORAL
Qty: 60 TABLET | Refills: 3 | Status: SHIPPED | OUTPATIENT
Start: 2018-03-12 | End: 2018-04-26 | Stop reason: SDUPTHER

## 2018-03-12 NOTE — TELEPHONE ENCOUNTER
Take Prednisone 20mg on day #1  Prednisone 15mg on day 2  Prednisone 10mg on day 3  5mg for days 4,5,6 then stop    Dr. LEVIN

## 2018-03-12 NOTE — TELEPHONE ENCOUNTER
"----- Message from Joyce Bermudez sent at 3/12/2018 12:34 PM CDT -----  Contact: self  Patient requesting call back regarding folic acid (FOLVITE) 1 MG tablet   Please call [phone]   She is going out of town Thursday.    [facility]  [id number]  [place] [id number] [phone]   [phone] fax    Spoke to the patient. She states that she is having an extreme flare in both hands and her right hand is "locking up". Left hand is throbbing. Patient will be out of town caring for family in [place] until 3-21-18. Patient does have prednisone 5 mg on hand. Please advise. CG    "

## 2018-04-09 ENCOUNTER — TELEPHONE (OUTPATIENT)
Dept: RHEUMATOLOGY | Facility: CLINIC | Age: 73
End: 2018-04-09

## 2018-04-09 NOTE — TELEPHONE ENCOUNTER
Spoke to pt, advises she is having issue and was waiting on nurse louisa about possible injections and wants to discuss her pain and medications with Louisa. 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 Advised Louisa was out today, but would be back in clinic tomorrow and would have Louisa give her a call. Pt verbalized understanding.

## 2018-04-09 NOTE — TELEPHONE ENCOUNTER
----- Message from Liset Johnson sent at 4/9/2018 10:56 AM CDT -----  Contact: self  Patient requesting a call back from office regarding pain. Please contact her at [phone].    Thanks!

## 2018-04-12 DIAGNOSIS — M47.25 OSTEOARTHRITIS OF SPINE WITH RADICULOPATHY, THORACOLUMBAR REGION: ICD-10-CM

## 2018-04-12 DIAGNOSIS — M47.812 SPONDYLOSIS OF CERVICAL REGION WITHOUT MYELOPATHY OR RADICULOPATHY: ICD-10-CM

## 2018-04-12 DIAGNOSIS — M06.9 RHEUMATOID ARTHRITIS OF HAND, UNSPECIFIED LATERALITY, UNSPECIFIED RHEUMATOID FACTOR PRESENCE: ICD-10-CM

## 2018-04-12 NOTE — TELEPHONE ENCOUNTER
----- Message from Zonia Cook sent at 4/12/2018 10:15 AM CDT -----  Contact: self  Patient called regarding scheduling an appt for injection, lab orders and also questions she has regarding her medications. Stating she left a message previously no response. Please contact [phone]    Patient having a flare of pain and states Arava not as good as MTX. Patient encouraged to take a three day burst of 5 mg Prednisone. Patient asking for lab orders to be faxed and will get recent eye doctor report sent. Patient asking for sooner appointment if possible.    LM to call Ana on 4-13-18 with name of external lab. AKILA

## 2018-04-13 DIAGNOSIS — M06.9 RHEUMATOID ARTHRITIS, INVOLVING UNSPECIFIED SITE, UNSPECIFIED RHEUMATOID FACTOR PRESENCE: Primary | ICD-10-CM

## 2018-04-13 NOTE — TELEPHONE ENCOUNTER
"----- Message from Ashley Wong sent at 4/13/2018  9:12 AM CDT -----  Contact: Fabby Mccrary was told by Louisa to call this morning and speak with Ana regarding "everything". Please call [phone]. Thanks!  "

## 2018-04-13 NOTE — TELEPHONE ENCOUNTER
[facility] lab order to bill ibarra. Patient also asking for hyrdrocodone refill. Patient scheduled for sooner appt with Dr. Olivera due to medication questions.

## 2018-04-13 NOTE — TELEPHONE ENCOUNTER
"----- Message from Ashley Wong sent at 4/13/2018  9:12 AM CDT -----  Contact: Fabby Mccrary was told by Louisa to call this morning and speak with Ana regarding "everything". Please call [phone]. Thanks!  "

## 2018-04-15 RX ORDER — HYDROCODONE BITARTRATE AND ACETAMINOPHEN 10; 325 MG/1; MG/1
1 TABLET ORAL 3 TIMES DAILY PRN
Qty: 90 TABLET | Refills: 0 | Status: SHIPPED | OUTPATIENT
Start: 2018-04-15 | End: 2018-04-26 | Stop reason: SDUPTHER

## 2018-04-19 LAB
ALBUMIN SERPL-MCNC: 4 G/DL (ref 3.6–5.1)
ALBUMIN/GLOB SERPL: 1.5 (CALC) (ref 1–2.5)
ALP SERPL-CCNC: 57 U/L (ref 33–130)
ALT SERPL-CCNC: 15 U/L (ref 6–29)
AST SERPL-CCNC: 19 U/L (ref 10–35)
BASOPHILS # BLD AUTO: 31 CELLS/UL (ref 0–200)
BASOPHILS NFR BLD AUTO: 0.5 %
BILIRUB SERPL-MCNC: 0.3 MG/DL (ref 0.2–1.2)
BUN SERPL-MCNC: 13 MG/DL (ref 7–25)
BUN/CREAT SERPL: ABNORMAL (CALC) (ref 6–22)
CALCIUM SERPL-MCNC: 9.4 MG/DL (ref 8.6–10.4)
CCP IGG SERPL-ACNC: >250 UNITS
CHLORIDE SERPL-SCNC: 102 MMOL/L (ref 98–110)
CO2 SERPL-SCNC: 28 MMOL/L (ref 20–31)
CREAT SERPL-MCNC: 0.66 MG/DL (ref 0.6–0.93)
CRP SERPL-MCNC: 1.4 MG/L
EOSINOPHIL # BLD AUTO: 124 CELLS/UL (ref 15–500)
EOSINOPHIL NFR BLD AUTO: 2 %
ERYTHROCYTE [DISTWIDTH] IN BLOOD BY AUTOMATED COUNT: 12 % (ref 11–15)
ERYTHROCYTE [SEDIMENTATION RATE] IN BLOOD BY WESTERGREN METHOD: 11 MM/H
GFR SERPL CREATININE-BSD FRML MDRD: 88 ML/MIN/1.73M2
GLOBULIN SER CALC-MCNC: 2.7 G/DL (CALC) (ref 1.9–3.7)
GLUCOSE SERPL-MCNC: 139 MG/DL (ref 65–139)
HCT VFR BLD AUTO: 40 % (ref 35–45)
HGB BLD-MCNC: 13.7 G/DL (ref 11.7–15.5)
LYMPHOCYTES # BLD AUTO: 2170 CELLS/UL (ref 850–3900)
LYMPHOCYTES NFR BLD AUTO: 35 %
MCH RBC QN AUTO: 30 PG (ref 27–33)
MCHC RBC AUTO-ENTMCNC: 34.3 G/DL (ref 32–36)
MCV RBC AUTO: 87.7 FL (ref 80–100)
MONOCYTES # BLD AUTO: 577 CELLS/UL (ref 200–950)
MONOCYTES NFR BLD AUTO: 9.3 %
NEUTROPHILS # BLD AUTO: 3298 CELLS/UL (ref 1500–7800)
NEUTROPHILS NFR BLD AUTO: 53.2 %
PLATELET # BLD AUTO: 388 THOUSAND/UL (ref 140–400)
PMV BLD REES-ECKER: 10.1 FL (ref 7.5–12.5)
POTASSIUM SERPL-SCNC: 4.2 MMOL/L (ref 3.5–5.3)
PROT SERPL-MCNC: 6.7 G/DL (ref 6.1–8.1)
RBC # BLD AUTO: 4.56 MILLION/UL (ref 3.8–5.1)
RHEUMATOID FACT SERPL-ACNC: 128 IU/ML
SODIUM SERPL-SCNC: 134 MMOL/L (ref 135–146)
WBC # BLD AUTO: 6.2 THOUSAND/UL (ref 3.8–10.8)

## 2018-04-26 ENCOUNTER — OFFICE VISIT (OUTPATIENT)
Dept: RHEUMATOLOGY | Facility: CLINIC | Age: 73
End: 2018-04-26
Payer: MEDICARE

## 2018-04-26 VITALS
WEIGHT: 132.38 LBS | HEART RATE: 64 BPM | HEIGHT: 62 IN | SYSTOLIC BLOOD PRESSURE: 110 MMHG | BODY MASS INDEX: 24.36 KG/M2 | DIASTOLIC BLOOD PRESSURE: 80 MMHG

## 2018-04-26 DIAGNOSIS — E03.9 HYPOTHYROIDISM, UNSPECIFIED TYPE: ICD-10-CM

## 2018-04-26 DIAGNOSIS — Z79.899 HIGH RISK MEDICATIONS (NOT ANTICOAGULANTS) LONG-TERM USE: Primary | ICD-10-CM

## 2018-04-26 DIAGNOSIS — M47.815 SPONDYLOSIS OF THORACOLUMBAR REGION WITHOUT MYELOPATHY OR RADICULOPATHY: ICD-10-CM

## 2018-04-26 DIAGNOSIS — M06.9 RHEUMATOID ARTHRITIS OF HAND, UNSPECIFIED LATERALITY, UNSPECIFIED RHEUMATOID FACTOR PRESENCE: ICD-10-CM

## 2018-04-26 DIAGNOSIS — M47.812 SPONDYLOSIS OF CERVICAL REGION WITHOUT MYELOPATHY OR RADICULOPATHY: ICD-10-CM

## 2018-04-26 DIAGNOSIS — R53.83 MALAISE AND FATIGUE: ICD-10-CM

## 2018-04-26 DIAGNOSIS — R53.81 MALAISE AND FATIGUE: ICD-10-CM

## 2018-04-26 DIAGNOSIS — M47.25 OSTEOARTHRITIS OF SPINE WITH RADICULOPATHY, THORACOLUMBAR REGION: ICD-10-CM

## 2018-04-26 PROCEDURE — 99214 OFFICE O/P EST MOD 30 MIN: CPT | Mod: 25,S$GLB,, | Performed by: INTERNAL MEDICINE

## 2018-04-26 PROCEDURE — 96372 THER/PROPH/DIAG INJ SC/IM: CPT | Mod: S$GLB,,, | Performed by: INTERNAL MEDICINE

## 2018-04-26 PROCEDURE — 99999 PR PBB SHADOW E&M-EST. PATIENT-LVL III: CPT | Mod: PBBFAC,,, | Performed by: INTERNAL MEDICINE

## 2018-04-26 RX ORDER — METHOTREXATE 2.5 MG/1
10 TABLET ORAL
Qty: 48 TABLET | Refills: 1 | Status: SHIPPED | OUTPATIENT
Start: 2018-04-26 | End: 2018-09-07 | Stop reason: SDUPTHER

## 2018-04-26 RX ORDER — CHOLESTYRAMINE 4 G/9G
4 POWDER, FOR SUSPENSION ORAL
Qty: 33 PACKET | Refills: 0 | Status: SHIPPED | OUTPATIENT
Start: 2018-04-26 | End: 2018-09-21

## 2018-04-26 RX ORDER — HYDROCODONE BITARTRATE AND ACETAMINOPHEN 10; 325 MG/1; MG/1
1 TABLET ORAL 3 TIMES DAILY PRN
Qty: 90 TABLET | Refills: 0 | Status: SHIPPED | OUTPATIENT
Start: 2018-04-26 | End: 2018-09-17 | Stop reason: SDUPTHER

## 2018-04-26 RX ORDER — METHYLPREDNISOLONE ACETATE 80 MG/ML
80 INJECTION, SUSPENSION INTRA-ARTICULAR; INTRALESIONAL; INTRAMUSCULAR; SOFT TISSUE
Status: COMPLETED | OUTPATIENT
Start: 2018-04-26 | End: 2018-04-26

## 2018-04-26 RX ORDER — FOLIC ACID 1 MG/1
2 TABLET ORAL DAILY
Qty: 180 TABLET | Refills: 3 | Status: SHIPPED | OUTPATIENT
Start: 2018-04-26 | End: 2019-02-25 | Stop reason: SDUPTHER

## 2018-04-26 RX ORDER — PREDNISONE 5 MG/1
TABLET ORAL
Qty: 90 TABLET | Refills: 3 | Status: SHIPPED | OUTPATIENT
Start: 2018-04-26 | End: 2019-07-22 | Stop reason: SDUPTHER

## 2018-04-26 RX ADMIN — METHYLPREDNISOLONE ACETATE 80 MG: 80 INJECTION, SUSPENSION INTRA-ARTICULAR; INTRALESIONAL; INTRAMUSCULAR; SOFT TISSUE at 03:04

## 2018-04-26 NOTE — PROGRESS NOTES
Administered 80mg of 80/ml of Depo Medrol to left upper outer quadrant of the gluteus garrett. Patient tolerated well. No acute reaction noted. Instructed patient to report S/S. Patient verbalized understanding.

## 2018-04-26 NOTE — PROGRESS NOTES
Subjective:          Chief Complaint: Fabby Guerrero is a 72 y.o. female who had concerns including Disease Management.    HPI:    Visit we discontinued methotrexate she was still having joint stiffness and pain.  We added Arava 10 mg it seemed to only increase her pain.  Continue with dryness of the mouth with her additional history of probably reflux as well as LPR.    Night time with dry cough, seen with ENT in past attributed to GERD. Does have PPI uses intermittently. Some ZULUAGA some but can still walk to get trash on long drive. No cough during daytime.     Asians having pain in the hands, feet, knees and shoulders  Using prednisone 5 mg daily    Rheumatoid Arthritis  Patient is an 70 y.o. female here for follow up seropostive Rheumatoid Arthritis. Serologies are: RF+, CCP HIGH titer +.     Symptoms have been present for several years. Onset was sudden. Symptoms include eye symptoms, joint pain, joint swelling, sleep difficulties and weakness of hands and are of moderate severity. Morning stiffness: 25 minute Patient denies joint pain, joint swelling and morning stiffness. Symptoms are made worse by: movement, overuse and resting.  Symptoms are helped by arthritis medications.  Associated symptoms include arthralgia and fatigue and secondary Sjogrens. Previously on Restasis, now systane/Refresh. Diclofenac for 1 year with decreased urination and stable creatinine; now off. Starting Using aleve PRN no ASE helping in AM, Tylenol in PM   Seen with Ortho, Dr. Agudelo,  received Euflexxa  Bilateral this did help   Started using some herbals: coconut oil, wintergreen, maxi freeze, aspircream this seems to help  Uses Hyrdocodone only occasionally   Patient having increased anxiety.  She is taking gabapentin only to sleep and not every night. Noting tingling in legs and fingers.   Having metarsalgia in the AM, shoulders, wrist and fingers more stiff, triggering right 4th, and left 2nd finger. Overal   Doing well with  [facility]. [facility].   Current medications  Methotrexate - Problems: none 3 tabs weekly doing ok.  Did have ulcer increased folic acid 2mg still with some ulcers.   Prednisone 5mg PRN flares has not taken any prednisone in 1 month.   Hydrocodone rarely for pain  Aleve PRN-previously using diclofenac but urinary hesitance CReatinine normalized.   Most recent labs WNL from [facility]. 2/2017  No hx of HCQ-  Labs from 2-2017: Sedimentation rate 22 normal no leukopenia, anemia.  C-reactive protein normal liver enzymes normal.  DIS x-ray right hand: No joint space narrowing identified at the MP joints no marginal erosions no articular erosions.  No soft tissue abnormalities.  Currently the left hand was not done but I did order.  Right foot no erosions noted no joint space narrowing in the MP joints IP joints tarsometatarsal joints or intertarsal joints.  Left foot no bony erosions identified.    No personal hx of malignancy.       Component      Latest Ref Rng & Units 4/17/2018 5/26/2015   Anti Sm/RNP Antibody      0.00 - 19.99 EU  0.35   Anti-Sm/RNP Interpretation      Negative  Negative   B27 Testing Date        06/16/2015 11:48 AM   HLA B27 Result        Negative   ds DNA Ab      Negative 1:10  Negative 1:10   CHON Screen      Negative <1:160  Negative <1:160   Rheumatoid Factor      <14 IU/mL 128 (H) 34.0 (H)   CCP Antibodies      <5.0 U/mL  6.1 (H)   Sed Rate      < OR = 30 mm/h 11    Cyclic Citrullinated Peptide (CCP) Ab (IgG)      UNITS >250 (H)    CRP      <8.0 mg/L 1.4      REVIEW OF SYSTEMS:    Review of Systems   Constitutional: Positive for malaise/fatigue. Negative for fever and weight loss.   HENT: Negative for sore throat.    Eyes: Positive for discharge and redness. Negative for double vision and photophobia.   Respiratory: Negative for cough, shortness of breath and wheezing.    Cardiovascular: Negative for chest pain, palpitations and orthopnea.   Gastrointestinal: Negative for abdominal pain, constipation and  diarrhea.   Genitourinary: Negative for dysuria, hematuria and urgency.   Musculoskeletal: Positive for joint pain. Negative for back pain and myalgias.   Skin: Negative for rash.   Neurological: Negative for dizziness, tingling, focal weakness and headaches.   Endo/Heme/Allergies: Does not bruise/bleed easily.   Psychiatric/Behavioral: Negative for depression, hallucinations and suicidal ideas.               Objective:            Past Medical History:   Diagnosis Date    Thyroid disease     hypothyroidism     Family History   Problem Relation Age of Onset    Cancer Mother      lung    Cancer Father      colon and lung    Cancer Daughter      melanoma     Social History   Substance Use Topics    Smoking status: Former Smoker     Quit date: 4/13/1976    Smokeless tobacco: Never Used    Alcohol use 1.8 oz/week     3 Glasses of wine per week         Current Outpatient Prescriptions on File Prior to Visit   Medication Sig Dispense Refill    acetaminophen (TYLENOL ARTHRITIS PAIN) 650 MG TbSR Take 650 mg by mouth every 8 (eight) hours as needed.       docusate sodium (COLACE) 100 MG capsule Take 100 mg by mouth every evening.      fish oil-omega-3 fatty acids 300-1,000 mg capsule Take 2 g by mouth once daily.      FLUZONE HIGH-DOSE 2016-17, PF, 180 mcg/0.5 mL Syrg       folic acid (FOLVITE) 1 MG tablet Take 2 tablets (2 mg total) by mouth once daily. 180 tablet 3    glucosamine-chondroitin 500-400 mg tablet Take 2 tablets by mouth once daily.       hydrocodone-acetaminophen 10-325mg (NORCO)  mg Tab Take 1 tablet by mouth 3 (three) times daily as needed. 90 tablet 0    leflunomide (ARAVA) 10 MG Tab Take 1 tablet (10 mg total) by mouth once daily. 30 tablet 3    levothyroxine (SYNTHROID) 88 MCG tablet Take 1 tablet (88 mcg total) by mouth before breakfast. 90 tablet 3    melatonin 5 mg Cap Take by mouth as needed.       multivitamin (ONE DAILY MULTIVITAMIN) per tablet Take 1 tablet by mouth once  daily.      naproxen (NAPROSYN) 250 MG tablet Take 250 mg by mouth 2 (two) times daily with meals. OTC      omeprazole (PRILOSEC) 20 MG capsule Take 20 mg by mouth as needed.       predniSONE (DELTASONE) 5 MG tablet 1 to 2 tabs po qam prn swelling 60 tablet 3    TURMERIC (CURCUMIN MISC) 750 mg by Misc.(Non-Drug; Combo Route) route 2 (two) times daily.      gabapentin (NEURONTIN) 100 MG capsule Take 1 capsule (100 mg total) by mouth every evening. (Patient taking differently: Take 100 mg by mouth as needed. ) 90 capsule 2     No current facility-administered medications on file prior to visit.        Vitals:    04/26/18 1135   BP: 110/80   Pulse: 64       Physical Exam:    Physical Exam   Constitutional: She appears well-developed and well-nourished.   HENT:   Nose: No septal deviation.   Mouth/Throat: Mucous membranes are normal. No oral lesions.   Eyes: Pupils are equal, round, and reactive to light. Right conjunctiva is not injected. Left conjunctiva is not injected.   Neck: No JVD present. No thyroid mass and no thyromegaly present.   Cardiovascular: Normal rate, regular rhythm and normal pulses.    No edema   Pulmonary/Chest: Effort normal and breath sounds normal.   Abdominal: Soft. Normal appearance.   Musculoskeletal:        Right shoulder: She exhibits normal range of motion, no tenderness and no swelling.        Left shoulder: She exhibits normal range of motion, no tenderness and no swelling.        Right elbow: She exhibits normal range of motion and no swelling. No tenderness found.        Left elbow: She exhibits normal range of motion and no swelling. No tenderness found.        Right wrist: She exhibits decreased range of motion and tenderness. She exhibits no swelling.        Left wrist: She exhibits decreased range of motion and tenderness. She exhibits no swelling.        Right hip: She exhibits normal range of motion, normal strength and no swelling.        Left hip: She exhibits normal range  of motion, no tenderness and no swelling.        Right knee: She exhibits normal range of motion and no swelling. No tenderness found.        Left knee: She exhibits normal range of motion and no swelling. No tenderness found.        Right ankle: She exhibits normal range of motion and no swelling. No tenderness.        Left ankle: She exhibits normal range of motion and no swelling. No tenderness.        Right hand: She exhibits decreased range of motion and tenderness.        Left hand: She exhibits decreased range of motion and tenderness.   5/5 upper and lower extremity bilateral muscle strength   Lymphadenopathy:     She has no cervical adenopathy.     She has no axillary adenopathy.   Neurological: She has normal strength and normal reflexes.   Skin: Skin is dry and intact.   Psychiatric: She has a normal mood and affect.             Assessment:       Encounter Diagnoses   Name Primary?    Rheumatoid arthritis of hand, unspecified laterality, unspecified rheumatoid factor presence     Osteoarthritis of spine with radiculopathy, thoracolumbar region     Spondylosis of cervical region without myelopathy or radiculopathy     Hypothyroidism, unspecified type     Spondylosis of thoracolumbar region without myelopathy or radiculopathy           Plan:        Rheumatoid arthritis involving both hands with positive rheumatoid factor    Iron deficiency anemia, unspecified iron deficiency anemia type    Rheumatoid arthritis of hand, unspecified laterality, unspecified rheumatoid factor presence  -   -     CBC auto differential; Future; Expected date: 2/9/17  -     Comprehensive metabolic panel; Future; Expected date: 2/9/17  -     Sedimentation rate, manual; Future; Expected date: 2/9/17  -     C-reactive protein; Future; Expected date: 2/9/17  -     methotrexate 2.5 MG Tab; Take 4 tablets (10 mg total) by mouth every 7 days.  Dispense: 36 tablet; Refill: 3  -     folic acid (FOLVITE) 1 MG tablet; Take 2 tablets (2  mg total) by mouth once daily.  Dispense: 180 tablet; Refill: 3    Osteoarthritis of spine with radiculopathy, thoracolumbar region    Hypothyroidism, adult  -       MTX restart 4 tabs weekly   Folic acid  DC LFA 10mg   depomedrol 80mg IM  Prednisone 5-10-mg daily PRN  If right 4th trigger finger not improving ok to book for lunch injection anytime.   Labs with [facility] in 12 weeks  [facility] washout    May need to consider biologic in the future. Very hesitant    Follow-up in about 4 months (around 8/26/2018).          30min consultation with greater than 50% spent in counseling, chart review and coordination of care. All questions answered.

## 2018-08-09 LAB
ALBUMIN SERPL-MCNC: 4.3 G/DL (ref 3.6–5.1)
ALBUMIN/GLOB SERPL: 1.8 (CALC) (ref 1–2.5)
ALP SERPL-CCNC: 58 U/L (ref 33–130)
ALT SERPL-CCNC: 18 U/L (ref 6–29)
AST SERPL-CCNC: 20 U/L (ref 10–35)
BASOPHILS # BLD AUTO: 51 CELLS/UL (ref 0–200)
BASOPHILS NFR BLD AUTO: 0.8 %
BILIRUB SERPL-MCNC: 0.5 MG/DL (ref 0.2–1.2)
BUN SERPL-MCNC: 13 MG/DL (ref 7–25)
BUN/CREAT SERPL: NORMAL (CALC) (ref 6–22)
CALCIUM SERPL-MCNC: 9.4 MG/DL (ref 8.6–10.4)
CHLORIDE SERPL-SCNC: 102 MMOL/L (ref 98–110)
CO2 SERPL-SCNC: 25 MMOL/L (ref 20–32)
CREAT SERPL-MCNC: 0.78 MG/DL (ref 0.6–0.93)
CRP SERPL-MCNC: 0.5 MG/L
EOSINOPHIL # BLD AUTO: 128 CELLS/UL (ref 15–500)
EOSINOPHIL NFR BLD AUTO: 2 %
ERYTHROCYTE [DISTWIDTH] IN BLOOD BY AUTOMATED COUNT: 13.1 % (ref 11–15)
ERYTHROCYTE [SEDIMENTATION RATE] IN BLOOD BY WESTERGREN METHOD: 9 MM/H
GFR SERPL CREATININE-BSD FRML MDRD: 75 ML/MIN/1.73M2
GLOBULIN SER CALC-MCNC: 2.4 G/DL (CALC) (ref 1.9–3.7)
GLUCOSE SERPL-MCNC: 110 MG/DL (ref 65–139)
HCT VFR BLD AUTO: 40.2 % (ref 35–45)
HGB BLD-MCNC: 13.7 G/DL (ref 11.7–15.5)
LYMPHOCYTES # BLD AUTO: 2093 CELLS/UL (ref 850–3900)
LYMPHOCYTES NFR BLD AUTO: 32.7 %
MCH RBC QN AUTO: 31.9 PG (ref 27–33)
MCHC RBC AUTO-ENTMCNC: 34.1 G/DL (ref 32–36)
MCV RBC AUTO: 93.5 FL (ref 80–100)
MONOCYTES # BLD AUTO: 403 CELLS/UL (ref 200–950)
MONOCYTES NFR BLD AUTO: 6.3 %
NEUTROPHILS # BLD AUTO: 3725 CELLS/UL (ref 1500–7800)
NEUTROPHILS NFR BLD AUTO: 58.2 %
PLATELET # BLD AUTO: 352 THOUSAND/UL (ref 140–400)
PMV BLD REES-ECKER: 10 FL (ref 7.5–12.5)
POTASSIUM SERPL-SCNC: 4.2 MMOL/L (ref 3.5–5.3)
PROT SERPL-MCNC: 6.7 G/DL (ref 6.1–8.1)
RBC # BLD AUTO: 4.3 MILLION/UL (ref 3.8–5.1)
SODIUM SERPL-SCNC: 137 MMOL/L (ref 135–146)
WBC # BLD AUTO: 6.4 THOUSAND/UL (ref 3.8–10.8)

## 2018-08-11 LAB
M TB IFN-G CD4+ BCKGRND COR BLD-ACNC: 0.02 IU/ML
M TB IFN-G CD4+ T-CELLS BLD-ACNC: 0.08 IU/ML
M TB TUBERC IFN-G BLD QL: NEGATIVE
M TB TUBERC IGNF/MITOGEN IGNF CONTROL: 6.72 IU/ML

## 2018-08-28 ENCOUNTER — TELEPHONE (OUTPATIENT)
Dept: RHEUMATOLOGY | Facility: CLINIC | Age: 73
End: 2018-08-28

## 2018-08-28 NOTE — TELEPHONE ENCOUNTER
8-28-18 Labs from [facility] from two weeks ago are mailed to the patient with instructions from Dr. Olivera:no change in therapy. CG

## 2018-09-10 ENCOUNTER — TELEPHONE (OUTPATIENT)
Dept: RHEUMATOLOGY | Facility: CLINIC | Age: 73
End: 2018-09-10

## 2018-09-10 RX ORDER — METHOTREXATE 2.5 MG/1
TABLET ORAL
Qty: 48 TABLET | Refills: 3 | Status: SHIPPED | OUTPATIENT
Start: 2018-09-10 | End: 2019-06-17 | Stop reason: SDUPTHER

## 2018-09-10 NOTE — TELEPHONE ENCOUNTER
Called patient to reschedule from 9-10-18 to 9-21-18. Patient is asking for a nurse visit for her pain flare. On 4-7-18 she received 40 mg depo-Medrol and patient states this really helped. Please advise. CG

## 2018-09-17 DIAGNOSIS — M06.9 RHEUMATOID ARTHRITIS OF HAND, UNSPECIFIED LATERALITY, UNSPECIFIED RHEUMATOID FACTOR PRESENCE: ICD-10-CM

## 2018-09-17 DIAGNOSIS — M47.25 OSTEOARTHRITIS OF SPINE WITH RADICULOPATHY, THORACOLUMBAR REGION: ICD-10-CM

## 2018-09-17 DIAGNOSIS — M47.812 SPONDYLOSIS OF CERVICAL REGION WITHOUT MYELOPATHY OR RADICULOPATHY: ICD-10-CM

## 2018-09-21 ENCOUNTER — OFFICE VISIT (OUTPATIENT)
Dept: RHEUMATOLOGY | Facility: CLINIC | Age: 73
End: 2018-09-21
Payer: MEDICARE

## 2018-09-21 VITALS
SYSTOLIC BLOOD PRESSURE: 125 MMHG | HEART RATE: 73 BPM | DIASTOLIC BLOOD PRESSURE: 76 MMHG | HEIGHT: 62 IN | BODY MASS INDEX: 26.37 KG/M2 | WEIGHT: 143.31 LBS

## 2018-09-21 DIAGNOSIS — M47.812 SPONDYLOSIS OF CERVICAL REGION WITHOUT MYELOPATHY OR RADICULOPATHY: ICD-10-CM

## 2018-09-21 DIAGNOSIS — M70.61 GREATER TROCHANTERIC BURSITIS OF BOTH HIPS: ICD-10-CM

## 2018-09-21 DIAGNOSIS — M05.741 RHEUMATOID ARTHRITIS INVOLVING RIGHT HAND WITH POSITIVE RHEUMATOID FACTOR: Primary | ICD-10-CM

## 2018-09-21 DIAGNOSIS — M71.9 BURSITIS, UNSPECIFIED SITE: ICD-10-CM

## 2018-09-21 DIAGNOSIS — M70.62 GREATER TROCHANTERIC BURSITIS OF BOTH HIPS: ICD-10-CM

## 2018-09-21 DIAGNOSIS — R41.3 MEMORY CHANGE: ICD-10-CM

## 2018-09-21 PROCEDURE — 99215 OFFICE O/P EST HI 40 MIN: CPT | Mod: S$PBB,,, | Performed by: INTERNAL MEDICINE

## 2018-09-21 PROCEDURE — 96372 THER/PROPH/DIAG INJ SC/IM: CPT | Mod: PBBFAC,PO

## 2018-09-21 PROCEDURE — 99213 OFFICE O/P EST LOW 20 MIN: CPT | Mod: PBBFAC,PO | Performed by: INTERNAL MEDICINE

## 2018-09-21 PROCEDURE — 99999 PR PBB SHADOW E&M-EST. PATIENT-LVL III: CPT | Mod: PBBFAC,,, | Performed by: INTERNAL MEDICINE

## 2018-09-21 RX ORDER — SCOLOPAMINE TRANSDERMAL SYSTEM 1 MG/1
1 PATCH, EXTENDED RELEASE TRANSDERMAL
Qty: 4 PATCH | Refills: 0 | Status: SHIPPED | OUTPATIENT
Start: 2018-09-21 | End: 2018-09-23

## 2018-09-21 RX ORDER — MULTIVITAMIN/IRON/FOLIC ACID 18MG-0.4MG
TABLET ORAL DAILY
COMMUNITY

## 2018-09-21 RX ORDER — HYDROCODONE BITARTRATE AND ACETAMINOPHEN 10; 325 MG/1; MG/1
1 TABLET ORAL 3 TIMES DAILY PRN
Qty: 90 TABLET | Refills: 0 | Status: SHIPPED | OUTPATIENT
Start: 2018-09-21 | End: 2019-01-15 | Stop reason: SDUPTHER

## 2018-09-21 RX ORDER — DICLOFENAC SODIUM 10 MG/G
2 GEL TOPICAL 2 TIMES DAILY
Qty: 100 G | Refills: 3 | Status: SHIPPED | OUTPATIENT
Start: 2018-09-21 | End: 2020-10-07

## 2018-09-21 RX ORDER — METHYLPREDNISOLONE ACETATE 80 MG/ML
80 INJECTION, SUSPENSION INTRA-ARTICULAR; INTRALESIONAL; INTRAMUSCULAR; SOFT TISSUE
Status: COMPLETED | OUTPATIENT
Start: 2018-09-21 | End: 2018-09-21

## 2018-09-21 RX ADMIN — METHYLPREDNISOLONE ACETATE 80 MG: 80 INJECTION, SUSPENSION INTRA-ARTICULAR; INTRALESIONAL; INTRAMUSCULAR; SOFT TISSUE at 01:09

## 2018-09-21 NOTE — PROGRESS NOTES
Subjective:          Chief Complaint: Fabby Guerrero is a 72 y.o. female who had concerns including Rheumatoid Arthritis (4 month).    HPI:    Last visit we discontinued leflunomide restarted her methotrexate she is taking 3 tabs weekly as well as prednisone 5 mg p.r.n..    Doing better with her joints. Was trying to use lowest effective dose.   Concerned with some memory changes, but mostly with details not regularly used. No getting lost not forgetting family member names.     She is having pain in the hands, feet,cervical spine       Having cervical stiffness:  In addition with calves cramping. Uses Gabapentin 100mg  PRN at HS.   No radiation UE or LE. No weakness. Left >right with occasion spasm through the SCM.   Has benefitted from PT in past.   No imaging to date.     Rheumatoid Arthritis  Patient is an 70 y.o. female here for follow up seropostive Rheumatoid Arthritis. Serologies are: RF+, CCP HIGH titer +.     Symptoms have been present for several years. Onset was sudden. Symptoms include eye symptoms, joint pain, joint swelling, sleep difficulties and weakness of hands and are of moderate severity. Morning stiffness: 25 minute Patient denies joint pain, joint swelling and morning stiffness. Symptoms are made worse by: movement, overuse and resting.  Symptoms are helped by arthritis medications.  Associated symptoms include arthralgia and fatigue and secondary Sjogrens. Previously on Restasis, now systane/Refresh. Diclofenac for 1 year with decreased urination and stable creatinine; now off. Starting Using aleve PRN no ASE helping in AM, Tylenol in PM   Seen with Ortho, Dr. Agudelo,  received Euflexxa  Bilateral this did help   Started using some herbals: coconut oil, wintergreen, maxi freeze, aspircream this seems to help  Uses Hyrdocodone only occasionally   Patient having increased anxiety.  She is taking gabapentin only to sleep and not every night. Noting tingling in legs and fingers.   Having  metarsalgia in the AM, shoulders, wrist and fingers more stiff, triggering right 4th, and left 2nd finger. Overal   Doing well with [facility]. [facility].   Current medications  Methotrexate - Problems: none 3 tabs weekly doing ok.  Did have ulcer increased folic acid 2mg still with some ulcers.   Prednisone 5mg PRN flares has not taken any prednisone in 1 month.   Hydrocodone rarely for pain  Aleve PRN-previously using diclofenac but urinary hesitance CReatinine normalized.   Most recent labs WNL from [facility]. 2/2017  No hx of HCQ-  Labs from 2-2017: Sedimentation rate 22 normal no leukopenia, anemia.  C-reactive protein normal liver enzymes normal.  DIS x-ray right hand: No joint space narrowing identified at the MP joints no marginal erosions no articular erosions.  No soft tissue abnormalities.  Currently the left hand was not done but I did order.  Right foot no erosions noted no joint space narrowing in the MP joints IP joints tarsometatarsal joints or intertarsal joints.  Left foot no bony erosions identified.    No personal hx of malignancy.       Component      Latest Ref Rng & Units 4/17/2018 5/26/2015   Anti Sm/RNP Antibody      0.00 - 19.99 EU  0.35   Anti-Sm/RNP Interpretation      Negative  Negative   B27 Testing Date        06/16/2015 11:48 AM   HLA B27 Result        Negative   ds DNA Ab      Negative 1:10  Negative 1:10   CHON Screen      Negative <1:160  Negative <1:160   Rheumatoid Factor      <14 IU/mL 128 (H) 34.0 (H)   CCP Antibodies      <5.0 U/mL  6.1 (H)   Sed Rate      < OR = 30 mm/h 11    Cyclic Citrullinated Peptide (CCP) Ab (IgG)      UNITS >250 (H)    CRP      <8.0 mg/L 1.4      REVIEW OF SYSTEMS:    Review of Systems   Constitutional: Positive for malaise/fatigue. Negative for fever and weight loss.   HENT: Negative for sore throat.    Eyes: Positive for discharge and redness. Negative for double vision and photophobia.   Respiratory: Negative for cough, shortness of breath and wheezing.     Cardiovascular: Negative for chest pain, palpitations and orthopnea.   Gastrointestinal: Negative for abdominal pain, constipation and diarrhea.   Genitourinary: Negative for dysuria, hematuria and urgency.   Musculoskeletal: Positive for joint pain. Negative for back pain and myalgias.   Skin: Negative for rash.   Neurological: Negative for dizziness, tingling, focal weakness and headaches.   Endo/Heme/Allergies: Does not bruise/bleed easily.   Psychiatric/Behavioral: Negative for depression, hallucinations and suicidal ideas.               Objective:            Past Medical History:   Diagnosis Date    Thyroid disease     hypothyroidism     Family History   Problem Relation Age of Onset    Cancer Mother         lung    Cancer Father         colon and lung    Cancer Daughter         melanoma     Social History     Tobacco Use    Smoking status: Former Smoker     Last attempt to quit: 1976     Years since quittin.4    Smokeless tobacco: Never Used   Substance Use Topics    Alcohol use: Yes     Alcohol/week: 1.8 oz     Types: 3 Glasses of wine per week    Drug use: No         Current Outpatient Medications on File Prior to Visit   Medication Sig Dispense Refill    acetaminophen (TYLENOL ARTHRITIS PAIN) 650 MG TbSR Take 650 mg by mouth every 8 (eight) hours as needed.       docusate sodium (COLACE) 100 MG capsule Take 100 mg by mouth every evening.      fish oil-omega-3 fatty acids 300-1,000 mg capsule Take 2 g by mouth once daily.      folic acid (FOLVITE) 1 MG tablet Take 2 tablets (2 mg total) by mouth once daily. 180 tablet 3    glucosamine-chondroitin 500-400 mg tablet Take 2 tablets by mouth once daily.       Lactobacillus acidophilus Cap Take by mouth.      levothyroxine (SYNTHROID) 88 MCG tablet Take 1 tablet (88 mcg total) by mouth before breakfast. 90 tablet 3    melatonin 5 mg Cap Take by mouth as needed.       methotrexate 2.5 MG Tab TAKE 4 TABLETS (10MG) EVERY 7 DAYS (Patient  taking differently: patient taking 3 tablets every 7 days) 48 tablet 3    multivitamin (ONE DAILY MULTIVITAMIN) per tablet Take 1 tablet by mouth once daily.      omeprazole (PRILOSEC) 20 MG capsule Take 20 mg by mouth as needed.       predniSONE (DELTASONE) 5 MG tablet 1 to 2 tabs po qam prn swelling 90 tablet 3    TURMERIC (CURCUMIN MISC) 750 mg by Misc.(Non-Drug; Combo Route) route 2 (two) times daily.      VITAMIN A-C-D3-COD LIVER OIL ORAL Take by mouth.      vitamin B complex-folic acid 0.4 mg Tab Take by mouth.      FLUZONE HIGH-DOSE 2016-17, PF, 180 mcg/0.5 mL Syrg       gabapentin (NEURONTIN) 100 MG capsule Take 1 capsule (100 mg total) by mouth every evening. (Patient taking differently: Take 100 mg by mouth as needed. ) 90 capsule 2    hydrocodone-acetaminophen 10-325mg (NORCO)  mg Tab Take 1 tablet by mouth 3 (three) times daily as needed. 90 tablet 0    [DISCONTINUED] cholestyramine (QUESTRAN) 4 gram packet Take 1 packet (4 g total) by mouth 3 (three) times daily with meals. 33 packet 0    [DISCONTINUED] naproxen (NAPROSYN) 250 MG tablet Take 250 mg by mouth 2 (two) times daily with meals. OTC       No current facility-administered medications on file prior to visit.        Vitals:    09/21/18 1137   BP: 125/76   Pulse: 73       Physical Exam:    Physical Exam   Constitutional: She appears well-developed and well-nourished.   HENT:   Nose: No septal deviation.   Mouth/Throat: Mucous membranes are normal. No oral lesions.   Eyes: Pupils are equal, round, and reactive to light. Right conjunctiva is not injected. Left conjunctiva is not injected.   Neck: No JVD present. No thyroid mass and no thyromegaly present.   Cardiovascular: Normal rate, regular rhythm and normal pulses.   No edema   Pulmonary/Chest: Effort normal and breath sounds normal.   Abdominal: Soft. Normal appearance.   Musculoskeletal:        Right shoulder: She exhibits normal range of motion, no tenderness and no swelling.         Left shoulder: She exhibits normal range of motion, no tenderness and no swelling.        Right elbow: She exhibits normal range of motion and no swelling. No tenderness found.        Left elbow: She exhibits normal range of motion and no swelling. No tenderness found.        Right wrist: She exhibits decreased range of motion and tenderness. She exhibits no swelling.        Left wrist: She exhibits decreased range of motion and tenderness. She exhibits no swelling.        Right hip: She exhibits normal range of motion, normal strength and no swelling.        Left hip: She exhibits normal range of motion, no tenderness and no swelling.        Right knee: She exhibits normal range of motion and no swelling. No tenderness found.        Left knee: She exhibits normal range of motion and no swelling. No tenderness found.        Right ankle: She exhibits normal range of motion and no swelling. No tenderness.        Left ankle: She exhibits normal range of motion and no swelling. No tenderness.        Right hand: She exhibits decreased range of motion and tenderness.        Left hand: She exhibits decreased range of motion and tenderness.   5/5 upper and lower extremity bilateral muscle strength   Lymphadenopathy:     She has no cervical adenopathy.     She has no axillary adenopathy.   Neurological: She has normal strength and normal reflexes.   Skin: Skin is dry and intact.   Psychiatric: She has a normal mood and affect.             Assessment:       Encounter Diagnoses   Name Primary?    Rheumatoid arthritis involving right hand with positive rheumatoid factor Yes    Spondylosis of cervical region without myelopathy or radiculopathy     Greater trochanteric bursitis of both hips     Bursitis, unspecified site     Memory change           Plan:        Rheumatoid arthritis involving both hands with positive rheumatoid factor    Osteoarthritis of spine with radiculopathy, cervical spine.    Greater  trochanteric bursitis  Memory changes:   -       MTX ok continue 4 tabs weekly   Folic acid    depomedrol 80mg IM  Prednisone 5-10-mg daily PRN  If right 4th trigger finger not improving ok to book for lunch injection anytime.   Labs with [facility] in 12 weeks  May need to consider biologic in the future. Very hesitant    Cervical DJD: get xray when back from [place], call it wants PT ([facility]).   Scopolamine for cruise   Memory changes: will monitor for now, did note increase urinary frequency. Minor memory loss but would keep NPH in differential. Gait is stable as of today.    -I am not finding significantly worrisome memory deficit today. Reassurance and we will follow with PCP   -cc note to Dr. AGUILAR.    Follow-up in about 4 months (around 1/21/2019).          40min consultation with greater than 50% spent in counseling, chart review and coordination of care. All questions answered.

## 2018-12-12 ENCOUNTER — TELEPHONE (OUTPATIENT)
Dept: RHEUMATOLOGY | Facility: CLINIC | Age: 73
End: 2018-12-12

## 2018-12-12 NOTE — TELEPHONE ENCOUNTER
----- Message from Gudelia Canchola sent at 12/12/2018 10:25 AM CST -----  Contact: [phone]  Patient is requesting a call back from the nurse inquiring about flu shot and is it safe with her condition?    Please call the patient upon request at phone number [phone].      Flu shot is fine per Dr. Olivera, but not a live virus. Patient tolerated high dose vaccine in 2016. CG

## 2019-01-15 DIAGNOSIS — M47.25 OSTEOARTHRITIS OF SPINE WITH RADICULOPATHY, THORACOLUMBAR REGION: ICD-10-CM

## 2019-01-15 DIAGNOSIS — M06.9 RHEUMATOID ARTHRITIS OF HAND, UNSPECIFIED LATERALITY, UNSPECIFIED RHEUMATOID FACTOR PRESENCE: ICD-10-CM

## 2019-01-15 DIAGNOSIS — M47.812 SPONDYLOSIS OF CERVICAL REGION WITHOUT MYELOPATHY OR RADICULOPATHY: ICD-10-CM

## 2019-01-15 NOTE — TELEPHONE ENCOUNTER
----- Message from Nayla Dimas sent at 1/15/2019  9:21 AM CST -----  Contact: Patient  Type:  RX Refill Request    Who Called:  Patient  Refill or New Rx:  Refill ( it is )  RX Name and Strength: HYDROcodone-acetaminophen (NORCO)  mg per tablet   How is the patient currently taking it? (ex. 1XDay): Take 1 tablet by mouth 3 (three) times daily as needed. - Oral  Is this a 30 day or 90 day RX:  90 tablets  Preferred Pharmacy with phone number:    [facility] [address] [place], [place] - [address]  [address] [facility] [address]  Phone: [phone] Fax: [phone]  Local or Mail Order: Local  Ordering Provider:  Dr Jazmyne London Call Back Number:  [phone]  Additional Information:  Calling to speak with Louisa about how she needs her medication tomorrow. Please advise.     Patient has been in pain more than usual. PCP and ortho have given her injections and she is also taking prednisone orally. AKILA

## 2019-01-16 NOTE — TELEPHONE ENCOUNTER
----- Message from Nayla Dimas sent at 1/16/2019  8:53 AM CST -----  Contact: Patient  Type: Needs Medical Advice    Who Called:  Patient  Best Call Back Number:  or   Additional Information: Calling to speak with Louisa. She said she spoke with Louisa yesterday and the HYDROcodone-acetaminophen (NORCO)  mg per tablet was going to be called in. It wasn't called into the [facility]. Please advise.

## 2019-01-17 ENCOUNTER — TELEPHONE (OUTPATIENT)
Dept: RHEUMATOLOGY | Facility: CLINIC | Age: 74
End: 2019-01-17

## 2019-01-17 RX ORDER — HYDROCODONE BITARTRATE AND ACETAMINOPHEN 10; 325 MG/1; MG/1
1 TABLET ORAL 3 TIMES DAILY PRN
Qty: 90 TABLET | Refills: 0 | Status: SHIPPED | OUTPATIENT
Start: 2019-01-17 | End: 2019-02-19 | Stop reason: SDUPTHER

## 2019-01-17 NOTE — TELEPHONE ENCOUNTER
----- Message from Ashley Foster sent at 1/17/2019  2:48 PM CST -----  Contact: [facility]  Lisa with [facility] [phone] is needing the Diagnosis and start date of therapy for Norco/please advise

## 2019-01-17 NOTE — TELEPHONE ENCOUNTER
----- Message from Ami Hernandez sent at 2019 12:39 PM CST -----  Contact: Patient  Type: Needs Medical Advice    Who Called:  Patient  Pharmacy name and phone #:    [id number] - [place] - [address]  [address]  [place] [address]  Phone: [phone] Fax: [phone]    Best Call Back Number:   Additional Information: Patient is calling to get her HYDROcodone-acetaminophen (NORCO)  mg per tablet () filled and she has been calling all week and it has not been sent in yet. Please call back and advise.

## 2019-01-17 NOTE — TELEPHONE ENCOUNTER
checked. Patient last filled 09/26/2018. Refill request for hydrocodone forwarded to Dr. Olivera.

## 2019-01-24 ENCOUNTER — OFFICE VISIT (OUTPATIENT)
Dept: SPINE | Facility: CLINIC | Age: 74
End: 2019-01-24
Payer: MEDICARE

## 2019-01-24 VITALS
BODY MASS INDEX: 26.21 KG/M2 | WEIGHT: 142.44 LBS | HEIGHT: 62 IN | DIASTOLIC BLOOD PRESSURE: 65 MMHG | HEART RATE: 72 BPM | SYSTOLIC BLOOD PRESSURE: 115 MMHG

## 2019-01-24 DIAGNOSIS — M54.2 CERVICALGIA: Primary | ICD-10-CM

## 2019-01-24 DIAGNOSIS — M54.12 CERVICAL RADICULOPATHY: ICD-10-CM

## 2019-01-24 DIAGNOSIS — M25.511 ACUTE PAIN OF RIGHT SHOULDER: ICD-10-CM

## 2019-01-24 PROCEDURE — 1101F PT FALLS ASSESS-DOCD LE1/YR: CPT | Mod: CPTII,S$GLB,, | Performed by: PHYSICIAN ASSISTANT

## 2019-01-24 PROCEDURE — 99203 PR OFFICE/OUTPT VISIT, NEW, LEVL III, 30-44 MIN: ICD-10-PCS | Mod: S$GLB,,, | Performed by: PHYSICIAN ASSISTANT

## 2019-01-24 PROCEDURE — 99999 PR PBB SHADOW E&M-EST. PATIENT-LVL IV: ICD-10-PCS | Mod: PBBFAC,,, | Performed by: PHYSICIAN ASSISTANT

## 2019-01-24 PROCEDURE — 1101F PR PT FALLS ASSESS DOC 0-1 FALLS W/OUT INJ PAST YR: ICD-10-PCS | Mod: CPTII,S$GLB,, | Performed by: PHYSICIAN ASSISTANT

## 2019-01-24 PROCEDURE — 99999 PR PBB SHADOW E&M-EST. PATIENT-LVL IV: CPT | Mod: PBBFAC,,, | Performed by: PHYSICIAN ASSISTANT

## 2019-01-24 PROCEDURE — 99203 OFFICE O/P NEW LOW 30 MIN: CPT | Mod: S$GLB,,, | Performed by: PHYSICIAN ASSISTANT

## 2019-01-24 RX ORDER — WITCH HAZEL 50 %
2000 PADS, MEDICATED (EA) TOPICAL DAILY
COMMUNITY
End: 2020-10-07

## 2019-01-24 RX ORDER — TIZANIDINE 4 MG/1
4 TABLET ORAL EVERY 8 HOURS PRN
Qty: 40 TABLET | Refills: 0 | Status: SHIPPED | OUTPATIENT
Start: 2019-01-24 | End: 2019-02-03

## 2019-01-24 NOTE — LETTER
January 25, 2019      Konrad Kebede MD  [address] [facility]  [facility] [address]           [facility]  [address] [facility] [address]  [facility] [address]  Phone: [phone]  Fax: [phone]          Patient: Fabby Guerrero   MR Number: [id number]   YOB: 1945   Date of Visit: 1/24/2019       Dear Dr. Konrad Kebede:    Thank you for referring Fabby Guerrero to me for evaluation. Attached you will find relevant portions of my assessment and plan of care.    If you have questions, please do not hesitate to call me. I look forward to following Fabby Guerrero along with you.    Sincerely,    MICH Shahosure  CC:  No Recipients    If you would like to receive this communication electronically, please contact [email or web address] or [phone] to request more information on [employer] access.    For providers and/or their staff who would like to refer a patient to [facility], please contact us through our one-stop-shop provider referral line, [facility], at [phone].    If you feel you have received this communication in error or would no longer like to receive these types of communications, please e-mail [email or web address]

## 2019-01-25 NOTE — PROGRESS NOTES
Neurosurgery History & Physical    Patient ID: Fabby Guerrero is a 73 y.o. female.    Chief Complaint   Patient presents with    Neck Pain     She has had neck pain for months and it has gotten worse in the last 2 weeks.The pain is constant. Pain radiates down into her right shoulder blade, right arm and her right hand is numb. The pain is severe. Hydrocodone helps and muscle relaxers at night helps pain. Lying down makes pain better. Sitting up makes pain worse.       Review of Systems   Constitutional: Negative for activity change, appetite change, chills, fever and unexpected weight change.   HENT: Negative for tinnitus, trouble swallowing and voice change.    Respiratory: Negative for apnea, cough, chest tightness and shortness of breath.    Cardiovascular: Negative for chest pain and palpitations.   Gastrointestinal: Negative for constipation, diarrhea, nausea and vomiting.   Genitourinary: Negative for difficulty urinating, dysuria, frequency and urgency.   Musculoskeletal: Positive for arthralgias, myalgias and neck pain. Negative for back pain, gait problem and neck stiffness.   Skin: Negative for wound.   Neurological: Positive for numbness. Negative for dizziness, tremors, seizures, facial asymmetry, speech difficulty, weakness, light-headedness and headaches.   Psychiatric/Behavioral: Negative for confusion and decreased concentration.       Past Medical History:   Diagnosis Date    Chronic neck pain     Chronic shoulder pain     Rheumatoid arthritis     Thyroid disease     hypothyroidism     Social History     Socioeconomic History    Marital status:      Spouse name: Not on file    Number of children: Not on file    Years of education: Not on file    Highest education level: Not on file   Social Needs    Financial resource strain: Not on file    Food insecurity - worry: Not on file    Food insecurity - inability: Not on file    Transportation needs - medical: Not on file     Transportation needs - non-medical: Not on file   Occupational History    Not on file   Tobacco Use    Smoking status: Former Smoker     Last attempt to quit: 1976     Years since quittin.8    Smokeless tobacco: Never Used   Substance and Sexual Activity    Alcohol use: Yes     Alcohol/week: 1.8 oz     Types: 3 Glasses of wine per week    Drug use: No    Sexual activity: Yes     Partners: Male   Other Topics Concern    Not on file   Social History Narrative    Not on file     Family History   Problem Relation Age of Onset    Cancer Mother         lung    Cancer Father         colon and lung    Cancer Daughter         melanoma     Review of patient's allergies indicates:  No Known Allergies    Current Outpatient Medications:     acetaminophen (TYLENOL ARTHRITIS PAIN) 650 MG TbSR, Take 650 mg by mouth every 8 (eight) hours as needed. , Disp: , Rfl:     cyanocobalamin 2000 MCG tablet, Take 2,000 mcg by mouth once daily., Disp: , Rfl:     docusate sodium (COLACE) 100 MG capsule, Take 100 mg by mouth every evening., Disp: , Rfl:     fish oil-omega-3 fatty acids 300-1,000 mg capsule, Take 2 g by mouth once daily., Disp: , Rfl:     FLUZONE HIGH-DOSE , PF, 180 mcg/0.5 mL Syrg, , Disp: , Rfl:     folic acid (FOLVITE) 1 MG tablet, Take 2 tablets (2 mg total) by mouth once daily., Disp: 180 tablet, Rfl: 3    gabapentin (NEURONTIN) 100 MG capsule, Take 1 capsule (100 mg total) by mouth every evening. After taking 1 cap every night for 7 nights; Then take 2 caps every night for 7 nights until see neurosurgeon for 21 doses, Disp: 21 capsule, Rfl: 0    glucosamine-chondroitin 500-400 mg tablet, Take 2 tablets by mouth once daily. , Disp: , Rfl:     HYDROcodone-acetaminophen (NORCO)  mg per tablet, Take 1 tablet by mouth 3 (three) times daily as needed., Disp: 90 tablet, Rfl: 0    levothyroxine (SYNTHROID) 88 MCG tablet, Take 1 tablet (88 mcg total) by mouth before breakfast., Disp: 90  "tablet, Rfl: 3    melatonin 5 mg Cap, Take by mouth as needed. , Disp: , Rfl:     methotrexate 2.5 MG Tab, TAKE 4 TABLETS (10MG) EVERY 7 DAYS (Patient taking differently: patient taking 3 tablets every 7 days), Disp: 48 tablet, Rfl: 3    omeprazole (PRILOSEC) 20 MG capsule, Take 20 mg by mouth as needed. , Disp: , Rfl:     predniSONE (DELTASONE) 5 MG tablet, 1 to 2 tabs po qam prn swelling, Disp: 90 tablet, Rfl: 3    tiZANidine (ZANAFLEX) 4 MG tablet, Take 1 tablet (4 mg total) by mouth every 8 (eight) hours as needed (muscle spasms)., Disp: 40 tablet, Rfl: 0    TURMERIC (CURCUMIN MISC), 750 mg by Misc.(Non-Drug; Combo Route) route 2 (two) times daily., Disp: , Rfl:     VITAMIN A-C-D3-COD LIVER OIL ORAL, Take by mouth., Disp: , Rfl:     vitamin B complex-folic acid 0.4 mg Tab, Take by mouth., Disp: , Rfl:     diclofenac sodium (VOLTAREN) 1 % Gel, Apply 2 g topically 2 (two) times daily., Disp: 100 g, Rfl: 3    Lactobacillus acidophilus Cap, Take by mouth., Disp: , Rfl:     multivitamin (ONE DAILY MULTIVITAMIN) per tablet, Take 1 tablet by mouth once daily., Disp: , Rfl:     Vitals:    01/24/19 1410   BP: 115/65   BP Location: Left arm   Patient Position: Sitting   BP Method: Medium (Automatic)   Pulse: 72   Weight: 64.6 kg (142 lb 6.7 oz)   Height: 5' 2" (1.575 m)       Physical Exam   Constitutional: She is oriented to person, place, and time. She appears well-developed and well-nourished.   HENT:   Head: Normocephalic and atraumatic.   Eyes: Pupils are equal, round, and reactive to light.   Neck: Normal range of motion. Neck supple.   Cardiovascular: Normal rate.   Pulmonary/Chest: Effort normal.   Musculoskeletal: She exhibits no edema.        Right shoulder: She exhibits decreased range of motion.   Pain with internal and external rotation of the right shoulder.   Neurological: She is alert and oriented to person, place, and time. She has a normal Finger-Nose-Finger Test, a normal Heel to Shin Test, a " normal Romberg Test and a normal Tandem Gait Test. Gait normal.   Reflex Scores:       Tricep reflexes are 2+ on the right side and 2+ on the left side.       Bicep reflexes are 2+ on the right side and 2+ on the left side.       Brachioradialis reflexes are 2+ on the right side and 2+ on the left side.       Patellar reflexes are 2+ on the right side and 2+ on the left side.       Achilles reflexes are 2+ on the right side and 2+ on the left side.  Skin: Skin is warm, dry and intact.   Psychiatric: She has a normal mood and affect. Her speech is normal and behavior is normal. Judgment and thought content normal.   Nursing note and vitals reviewed.      Neurologic Exam     Mental Status   Oriented to person, place, and time.   Oriented to person.   Oriented to place.   Oriented to time.   Follows 3 step commands.   Attention: normal. Concentration: normal.   Speech: speech is normal   Level of consciousness: alert  Knowledge: consistent with education.   Able to name object. Able to read. Able to repeat. Able to write. Normal comprehension.     Cranial Nerves     CN II   Visual acuity: normal  Right visual field deficit: none  Left visual field deficit: none     CN III, IV, VI   Pupils are equal, round, and reactive to light.  Right pupil: Size: 3 mm. Shape: regular. Reactivity: brisk. Consensual response: intact.   Left pupil: Size: 3 mm. Shape: regular. Reactivity: brisk. Consensual response: intact.   CN III: no CN III palsy  CN VI: no CN VI palsy  Nystagmus: none   Diplopia: none  Ophthalmoparesis: none  Conjugate gaze: present    CN V   Right facial sensation deficit: none  Left facial sensation deficit: none    CN VII   Right facial weakness: none  Left facial weakness: none    CN VIII   Hearing: intact    CN IX, X   CN IX normal.   CN X normal.     CN XI   Right sternocleidomastoid strength: normal  Left sternocleidomastoid strength: normal  Right trapezius strength: normal  Left trapezius strength:  normal    CN XII   Fasciculations: absent  Tongue deviation: none    Motor Exam   Muscle bulk: normal  Overall muscle tone: normal  Right arm pronator drift: absent  Left arm pronator drift: absent    Strength   Right neck flexion: 5/5  Left neck flexion: 5/5  Right neck extension: 5/5  Left neck extension: 5/5  Right deltoid: 5/5  Left deltoid: 5/5  Right biceps: 5/5  Left biceps: 5/5  Right triceps: 5/5  Left triceps: 5/5  Right wrist flexion: 5/5  Left wrist flexion: 5/5  Right wrist extension: 5/5  Left wrist extension: 5/5  Right interossei: 5/5  Left interossei: 5/5  Right abdominals: 5/5  Left abdominals: 5/5  Right iliopsoas: 5/  Left iliopsoas:   Right quadriceps: 5/5  Left quadriceps: 5/  Right hamstrin/5  Left hamstrin/5  Right glutei:   Left glutei:   Right anterior tibial:   Left anterior tibial:   Right posterior tibial:   Left posterior tibial: 5  Right peroneal: 55  Left peroneal: 5/  Right gastroc: 5/  Left gastroc:     Sensory Exam   Right arm light touch: normal  Left arm light touch: normal  Right leg light touch: normal  Left leg light touch: normal  Right arm vibration: normal  Left arm vibration: normal  Right arm pinprick: normal  Left arm pinprick: normal    Gait, Coordination, and Reflexes     Gait  Gait: normal    Coordination   Romberg: negative  Finger to nose coordination: normal  Heel to shin coordination: normal  Tandem walking coordination: normal    Tremor   Resting tremor: absent  Intention tremor: absent  Action tremor: absent    Reflexes   Right brachioradialis: 2+  Left brachioradialis: 2+  Right biceps: 2+  Left biceps: 2+  Right triceps: 2+  Left triceps: 2+  Right patellar: 2+  Left patellar: 2+  Right achilles: 2+  Left achilles: 2+  Right El: absent  Left El: absent  Right ankle clonus: absent  Left ankle clonus: absent      Provider dictation:  73 year old female with rheumatoid arthritis and chronic neck pain is referred by  Dr. Kebede for evaluation of possible cervical radiculopathy.  She has had prior bilateral carpal tunnel release.  She describes chronic intermittent neck pain with limited range of motion at times.  For a few months, particularly the last 2 weeks, she has had pain in the right interscapular region and shoulder radiating to the arm and hand.  She feels numbness in the right 4th and 5th digit.  She has difficulty and pain with rotation of the right arm at the shoulder.  She has been taking hydrocodone (prescribed for RA), tizanidine, tylenol and gabapentin for symtoms.  She has seen a neurosurgeon and orthopedist already, who ordered MRIs of the cervical spine and right shoulder.  She has not had these tests yet and has not followed up with any of the prior physicians because she was confused on what she needed to do.  NDI:  30%.  PHQ:  11.    She has pain with and limited range of motion with internal and external rotation of the right shoulder.  She has full 5/5 strength in the upper and lower extremities, but she does express pain with right shoulder abduction testing.  She has 2+ muscle stretch reflexes throughout.    She has not had any imaging thus far, but as stated above MRI of the cervical spine and shoulder has been ordered.    Ms. Guerrero has right rotator cuff tendonitis vs cervicalgia and right arm radiculopathy in a C7 distribution.  We will schedule her for the MRI's that were previously ordered.  I will see her back in clinic after both studies are complete and we will be able to determine the cause of pain - neck or shoulder.  She voiced understanding of the plan today. I will also send in a refill of tizanidine to her pharmacy as well.    Visit Diagnosis:  Cervicalgia  -     tiZANidine (ZANAFLEX) 4 MG tablet; Take 1 tablet (4 mg total) by mouth every 8 (eight) hours as needed (muscle spasms).  Dispense: 40 tablet; Refill: 0    Cervical radiculopathy  -     tiZANidine (ZANAFLEX) 4 MG tablet; Take  1 tablet (4 mg total) by mouth every 8 (eight) hours as needed (muscle spasms).  Dispense: 40 tablet; Refill: 0    Acute pain of right shoulder  -     tiZANidine (ZANAFLEX) 4 MG tablet; Take 1 tablet (4 mg total) by mouth every 8 (eight) hours as needed (muscle spasms).  Dispense: 40 tablet; Refill: 0        Total time spent counseling greater than fifty percent of total visit time.  Counseling included discussion regarding imaging findings, diagnosis possibilities, treatment options, risks and benefits.   The patient had many questions regarding the options and long-term effects.

## 2019-01-30 ENCOUNTER — TELEPHONE (OUTPATIENT)
Dept: NEUROSURGERY | Facility: CLINIC | Age: 74
End: 2019-01-30

## 2019-01-30 NOTE — TELEPHONE ENCOUNTER
Patient called requesting appointment with Dr. Cary. Mirnauled at this time. Date, time, and location confirmed.

## 2019-02-05 ENCOUNTER — TELEPHONE (OUTPATIENT)
Dept: RHEUMATOLOGY | Facility: CLINIC | Age: 74
End: 2019-02-05

## 2019-02-05 NOTE — TELEPHONE ENCOUNTER
Patient arrives in [facility] to describe her struggles with shoulder pain. She has been to multiple(5) doctors and the ER lately and now on Gabapentin 300 mg 2-3 times a day. She continues with PT three times a week. Patient has had MRIs on 2-1-19. Patient is taking Norco 3 times a day and feels she could take a fourth except she would run out. Patient asking for next fill to be 120 tablets to cover this extra pain.     Dr. Huizar did inject her shoulder with steroid two weeks ago. She will have her 4 month follow up 2-19-19. Please advise. CG

## 2019-02-19 ENCOUNTER — OFFICE VISIT (OUTPATIENT)
Dept: RHEUMATOLOGY | Facility: CLINIC | Age: 74
End: 2019-02-19
Payer: MEDICARE

## 2019-02-19 VITALS
DIASTOLIC BLOOD PRESSURE: 67 MMHG | HEIGHT: 62 IN | BODY MASS INDEX: 26.25 KG/M2 | WEIGHT: 142.63 LBS | HEART RATE: 70 BPM | SYSTOLIC BLOOD PRESSURE: 109 MMHG

## 2019-02-19 DIAGNOSIS — M47.25 OSTEOARTHRITIS OF SPINE WITH RADICULOPATHY, THORACOLUMBAR REGION: ICD-10-CM

## 2019-02-19 DIAGNOSIS — M06.9 RHEUMATOID ARTHRITIS OF HAND, UNSPECIFIED LATERALITY, UNSPECIFIED RHEUMATOID FACTOR PRESENCE: ICD-10-CM

## 2019-02-19 DIAGNOSIS — M05.742 RHEUMATOID ARTHRITIS INVOLVING BOTH HANDS WITH POSITIVE RHEUMATOID FACTOR: Primary | ICD-10-CM

## 2019-02-19 DIAGNOSIS — M54.10 RADICULOPATHY, UNSPECIFIED SPINAL REGION: ICD-10-CM

## 2019-02-19 DIAGNOSIS — M05.741 RHEUMATOID ARTHRITIS INVOLVING BOTH HANDS WITH POSITIVE RHEUMATOID FACTOR: Primary | ICD-10-CM

## 2019-02-19 DIAGNOSIS — M47.812 SPONDYLOSIS OF CERVICAL REGION WITHOUT MYELOPATHY OR RADICULOPATHY: ICD-10-CM

## 2019-02-19 PROCEDURE — 99999 PR PBB SHADOW E&M-EST. PATIENT-LVL III: ICD-10-PCS | Mod: PBBFAC,,, | Performed by: INTERNAL MEDICINE

## 2019-02-19 PROCEDURE — 99999 PR PBB SHADOW E&M-EST. PATIENT-LVL III: CPT | Mod: PBBFAC,,, | Performed by: INTERNAL MEDICINE

## 2019-02-19 PROCEDURE — 1101F PT FALLS ASSESS-DOCD LE1/YR: CPT | Mod: CPTII,S$GLB,, | Performed by: INTERNAL MEDICINE

## 2019-02-19 PROCEDURE — 99215 OFFICE O/P EST HI 40 MIN: CPT | Mod: S$GLB,,, | Performed by: INTERNAL MEDICINE

## 2019-02-19 PROCEDURE — 99215 PR OFFICE/OUTPT VISIT, EST, LEVL V, 40-54 MIN: ICD-10-PCS | Mod: S$GLB,,, | Performed by: INTERNAL MEDICINE

## 2019-02-19 PROCEDURE — 1101F PR PT FALLS ASSESS DOC 0-1 FALLS W/OUT INJ PAST YR: ICD-10-PCS | Mod: CPTII,S$GLB,, | Performed by: INTERNAL MEDICINE

## 2019-02-19 RX ORDER — CELECOXIB 100 MG/1
CAPSULE ORAL
COMMUNITY
Start: 2019-02-12 | End: 2020-03-05 | Stop reason: SDUPTHER

## 2019-02-19 RX ORDER — HYDROCODONE BITARTRATE AND ACETAMINOPHEN 10; 325 MG/1; MG/1
1 TABLET ORAL EVERY 6 HOURS PRN
Qty: 120 TABLET | Refills: 0 | Status: SHIPPED | OUTPATIENT
Start: 2019-02-19 | End: 2019-07-22 | Stop reason: SDUPTHER

## 2019-02-19 NOTE — Clinical Note
I printed lab order for MTX next due 3 months not sure if she has new rx so may have to mail these.

## 2019-02-19 NOTE — PROGRESS NOTES
Subjective:          Chief Complaint: Fabby Guerrero is a 73 y.o. female who had concerns including Rheumatoid Arthritis (4 month).    HPI:      Rheumatoid Arthritis  Patient is an 70 y.o. female here for follow up seropostive Rheumatoid Arthritis. Serologies are: RF+, CCP HIGH titer +.         Symptoms have been present for several years. Onset was sudden. Symptoms include eye symptoms, joint pain, joint swelling, sleep difficulties and weakness of hands and are of moderate severity. Morning stiffness: 25 minute Patient denies joint pain, joint swelling and morning stiffness. Symptoms are made worse by: movement, overuse and resting.  Symptoms are helped by arthritis medications.  Associated symptoms include arthralgia and fatigue and secondary Sjogrens. Previously on Restasis, now systane/Refresh. Diclofenac for 1 year with decreased urination and stable creatinine; now off. Starting Using aleve PRN no ASE helping in AM, Tylenol in PM   Seen with Ortho, Dr. Agudelo,  received Euflexxa  Bilateral this did help   Started using some herbals: coconut oil, wintergreen, maxi freeze, aspircream this seems to help  Uses Hyrdocodone only occasionally   Patient having increased anxiety.  She is taking gabapentin only to sleep and not every night. Noting tingling in legs and fingers.   Having metarsalgia in the AM, shoulders, wrist and fingers more stiff, triggering right 4th, and left 2nd finger. Overal   Doing well with [facility]. [facility].   Current medications  Methotrexate - Problems: none 4 tabs weekly doing ok.  Did have ulcer increased folic acid 2mg still with some ulcers.   Prednisone 5mg PRN flares has not taken any prednisone in 1 month.   Hydrocodone rarely for pain-  Aleve PRN-previously using diclofenac but urinary hesitance     Started Clelebrex 100mg BID.   Using Gabapentin for cervical radiculopathy.  No hx of HCQ-  No personal hx of malignancy.    C/o feet /toes painful more but improved with recent celebrex.      Labs dated 02/15/2019 at [facility] for methotrexate monitoring show everything is within normal limits.    Interval events:   Patient interval events with exacerbation of cervical pain-hx of cervical arthritis that flared significantly 1 months ago. Pain down the right arm, electric, stinging, constant, feels some numbness and tingling in arms.    she did have a shoulder injection a few weeks ago with Orthopedics persistent pain   had no evaluation and MRI of the cervical spine.   [facility]    MRI of the shoulder some musculature is of the rotator cuff is grossly intact mild tendinopathy subacromial and glenohumeral narrowing but no active bursitis was noticed she has some hypertrophic changes at the AC joint    MRI of her cervical  spine however showed multilevel foraminal stenosis most prominent at C5-6 moderate to severe left > than right.  She also had a few disc osteophyte complexes which could even Shanice rate Um a facet mediated pain causing muscle spasms radiating along the shoulder girdle in the upper trapezius region.  Patient presented to my office asking questions about this extensive workup she had with other providers which i reviewed all her imaging.   She has started with PT which is helping and plans for injection with Mac Edmonds from pain management    Component      Latest Ref Rng & Units 4/17/2018 5/26/2015   Anti Sm/RNP Antibody      0.00 - 19.99 EU  0.35   Anti-Sm/RNP Interpretation      Negative  Negative   B27 Testing Date        06/16/2015 11:48 AM   HLA B27 Result        Negative   ds DNA Ab      Negative 1:10  Negative 1:10   CHON Screen      Negative <1:160  Negative <1:160   Rheumatoid Factor      <14 IU/mL 128 (H) 34.0 (H)   CCP Antibodies      <5.0 U/mL  6.1 (H)   Sed Rate      < OR = 30 mm/h 11    Cyclic Citrullinated Peptide (CCP) Ab (IgG)      UNITS >250 (H)    CRP      <8.0 mg/L 1.4      REVIEW OF SYSTEMS:    Review of Systems   Constitutional: Positive  for malaise/fatigue. Negative for fever and weight loss.   HENT: Negative for sore throat.    Eyes: Positive for discharge and redness. Negative for double vision and photophobia.   Respiratory: Negative for cough, shortness of breath and wheezing.    Cardiovascular: Negative for chest pain, palpitations and orthopnea.   Gastrointestinal: Negative for abdominal pain, constipation and diarrhea.   Genitourinary: Negative for dysuria, hematuria and urgency.   Musculoskeletal: Positive for joint pain. Negative for back pain and myalgias.   Skin: Negative for rash.   Neurological: Negative for dizziness, tingling, focal weakness and headaches.   Endo/Heme/Allergies: Does not bruise/bleed easily.   Psychiatric/Behavioral: Negative for depression, hallucinations and suicidal ideas.               Objective:            Past Medical History:   Diagnosis Date    Chronic neck pain     Chronic shoulder pain     Rheumatoid arthritis     Thyroid disease     hypothyroidism     Family History   Problem Relation Age of Onset    Cancer Mother         lung    Cancer Father         colon and lung    Cancer Daughter         melanoma     Social History     Tobacco Use    Smoking status: Former Smoker     Last attempt to quit: 1976     Years since quittin.8    Smokeless tobacco: Never Used   Substance Use Topics    Alcohol use: Yes     Alcohol/week: 1.8 oz     Types: 3 Glasses of wine per week    Drug use: No         Current Outpatient Medications on File Prior to Visit   Medication Sig Dispense Refill    acetaminophen (TYLENOL ARTHRITIS PAIN) 650 MG TbSR Take 650 mg by mouth every 8 (eight) hours as needed.       celecoxib (CELEBREX) 100 MG capsule       cyanocobalamin 2000 MCG tablet Take 2,000 mcg by mouth once daily.      docusate sodium (COLACE) 100 MG capsule Take 100 mg by mouth every evening.      fish oil-omega-3 fatty acids 300-1,000 mg capsule Take 2 g by mouth once daily.      folic acid (FOLVITE) 1  MG tablet Take 2 tablets (2 mg total) by mouth once daily. 180 tablet 3    glucosamine-chondroitin 500-400 mg tablet Take 2 tablets by mouth once daily.       levothyroxine (SYNTHROID) 88 MCG tablet Take 1 tablet (88 mcg total) by mouth before breakfast. 90 tablet 3    melatonin 5 mg Cap Take by mouth as needed.       methotrexate 2.5 MG Tab TAKE 4 TABLETS (10MG) EVERY 7 DAYS (Patient taking differently: patient taking 4 tablets every 7 days) 48 tablet 3    multivitamin (ONE DAILY MULTIVITAMIN) per tablet Take 1 tablet by mouth once daily.      TURMERIC (CURCUMIN MISC) 750 mg by Misc.(Non-Drug; Combo Route) route 2 (two) times daily.      VITAMIN A-C-D3-COD LIVER OIL ORAL Take by mouth.      vitamin B complex-folic acid 0.4 mg Tab Take by mouth.      diclofenac sodium (VOLTAREN) 1 % Gel Apply 2 g topically 2 (two) times daily. 100 g 3    FLUZONE HIGH-DOSE 2016-17, PF, 180 mcg/0.5 mL Syrg       gabapentin (NEURONTIN) 100 MG capsule Take 1 capsule (100 mg total) by mouth every evening. After taking 1 cap every night for 7 nights; Then take 2 caps every night for 7 nights until see neurosurgeon for 21 doses 21 capsule 0    HYDROcodone-acetaminophen (NORCO)  mg per tablet Take 1 tablet by mouth 3 (three) times daily as needed. 90 tablet 0    Lactobacillus acidophilus Cap Take by mouth.      omeprazole (PRILOSEC) 20 MG capsule Take 20 mg by mouth as needed.       predniSONE (DELTASONE) 5 MG tablet 1 to 2 tabs po qam prn swelling 90 tablet 3     No current facility-administered medications on file prior to visit.        Vitals:    02/19/19 1023   BP: 109/67   Pulse: 70       Physical Exam:    Physical Exam   Constitutional: She appears well-developed and well-nourished.   HENT:   Nose: No septal deviation.   Mouth/Throat: Mucous membranes are normal. No oral lesions.   Eyes: Pupils are equal, round, and reactive to light. Right conjunctiva is not injected. Left conjunctiva is not injected.   Neck: No  JVD present. No thyroid mass and no thyromegaly present.   Cardiovascular: Normal rate, regular rhythm and normal pulses.   No edema   Pulmonary/Chest: Effort normal and breath sounds normal.   Abdominal: Soft. Normal appearance.   Musculoskeletal:        Right shoulder: She exhibits normal range of motion, no tenderness and no swelling.        Left shoulder: She exhibits normal range of motion, no tenderness and no swelling.        Right elbow: She exhibits normal range of motion and no swelling. No tenderness found.        Left elbow: She exhibits normal range of motion and no swelling. No tenderness found.        Right wrist: She exhibits decreased range of motion and tenderness. She exhibits no swelling.        Left wrist: She exhibits decreased range of motion and tenderness. She exhibits no swelling.        Right hip: She exhibits normal range of motion, normal strength and no swelling.        Left hip: She exhibits normal range of motion, no tenderness and no swelling.        Right knee: She exhibits normal range of motion and no swelling. No tenderness found.        Left knee: She exhibits normal range of motion and no swelling. No tenderness found.        Right ankle: She exhibits normal range of motion and no swelling. No tenderness.        Left ankle: She exhibits normal range of motion and no swelling. No tenderness.        Right hand: She exhibits decreased range of motion and tenderness.        Left hand: She exhibits decreased range of motion and tenderness.   5/5 upper and lower extremity bilateral muscle strength   Lymphadenopathy:     She has no cervical adenopathy.     She has no axillary adenopathy.   Neurological: She has normal strength and normal reflexes.   Skin: Skin is dry and intact.   Psychiatric: She has a normal mood and affect.             Assessment:       Encounter Diagnoses   Name Primary?    Rheumatoid arthritis involving both hands with positive rheumatoid factor Yes     Osteoarthritis of spine with radiculopathy, thoracolumbar region     Radiculopathy, unspecified spinal region     Spondylosis of cervical region without myelopathy or radiculopathy     Rheumatoid arthritis of hand, unspecified laterality, unspecified rheumatoid factor presence           Plan:        Rheumatoid arthritis involving both hands with positive rheumatoid factor    Osteoarthritis of spine with radiculopathy, cervical spine.      MTX ok continue 4 tabs weekly   Folic acid  Celebrex 100mg BID will likely help with her joints as well-I see very little synovitis at this time and suspect this is residual damage that is causing bulk of pain in toes.   Prednisone 5-10-mg daily PRN    Labs with [facility] every 12 weeks-printed new order.   May need to consider biologic in the future. patient Very hesitant. I am not seeing much acitve synovitis.     Cervical DJD:    Working with Dr. Edmonds.       Follow-up in about 4 months (around 6/19/2019).          40min consultation with greater than 50% spent in counseling, chart review and coordination of care. All questions answered.

## 2019-02-20 ENCOUNTER — TELEPHONE (OUTPATIENT)
Dept: RHEUMATOLOGY | Facility: CLINIC | Age: 74
End: 2019-02-20

## 2019-02-20 NOTE — TELEPHONE ENCOUNTER
----- Message from Jazmyne Olivera, DO sent at 2/19/2019  6:11 PM CST -----  I printed lab order for MTX next due 3 months not sure if she has new rx so may have to mail these.

## 2019-02-25 DIAGNOSIS — M06.9 RHEUMATOID ARTHRITIS OF HAND, UNSPECIFIED LATERALITY, UNSPECIFIED RHEUMATOID FACTOR PRESENCE: ICD-10-CM

## 2019-02-25 DIAGNOSIS — M47.25 OSTEOARTHRITIS OF SPINE WITH RADICULOPATHY, THORACOLUMBAR REGION: ICD-10-CM

## 2019-02-27 RX ORDER — FOLIC ACID 1 MG/1
TABLET ORAL
Qty: 180 TABLET | Refills: 3 | Status: SHIPPED | OUTPATIENT
Start: 2019-02-27 | End: 2019-07-22 | Stop reason: SDUPTHER

## 2019-06-17 NOTE — TELEPHONE ENCOUNTER
----- Message from Margie Leigh sent at 6/17/2019 11:58 AM CDT -----  Contact: self   Patient need a refill on methotrexate 2.5 MG 30 day supply Please send to [facility], any questions please call back at [phone] (home)       [facility] Mail Delivery - [facility] [address]  [address] [address]  Phone: [phone] Fax: [phone]

## 2019-06-20 RX ORDER — METHOTREXATE 2.5 MG/1
TABLET ORAL
Qty: 48 TABLET | Refills: 3 | Status: SHIPPED | OUTPATIENT
Start: 2019-06-20 | End: 2019-07-22 | Stop reason: SDUPTHER

## 2019-06-20 RX ORDER — METHOTREXATE 2.5 MG/1
TABLET ORAL
Qty: 48 TABLET | Refills: 3 | Status: SHIPPED | OUTPATIENT
Start: 2019-06-20 | End: 2020-10-07 | Stop reason: SDUPTHER

## 2019-07-17 LAB
ALBUMIN SERPL-MCNC: 4.4 G/DL (ref 3.6–5.1)
ALBUMIN/GLOB SERPL: 1.6 (CALC) (ref 1–2.5)
ALP SERPL-CCNC: 60 U/L (ref 33–130)
ALT SERPL-CCNC: 14 U/L (ref 6–29)
AST SERPL-CCNC: 16 U/L (ref 10–35)
BASOPHILS # BLD AUTO: 53 CELLS/UL (ref 0–200)
BASOPHILS NFR BLD AUTO: 0.7 %
BILIRUB SERPL-MCNC: 0.5 MG/DL (ref 0.2–1.2)
BUN SERPL-MCNC: 20 MG/DL (ref 7–25)
BUN/CREAT SERPL: NORMAL (CALC) (ref 6–22)
CALCIUM SERPL-MCNC: 9.6 MG/DL (ref 8.6–10.4)
CHLORIDE SERPL-SCNC: 104 MMOL/L (ref 98–110)
CO2 SERPL-SCNC: 29 MMOL/L (ref 20–32)
CREAT SERPL-MCNC: 0.81 MG/DL (ref 0.6–0.93)
CRP SERPL-MCNC: 0.6 MG/L
EOSINOPHIL # BLD AUTO: 129 CELLS/UL (ref 15–500)
EOSINOPHIL NFR BLD AUTO: 1.7 %
ERYTHROCYTE [DISTWIDTH] IN BLOOD BY AUTOMATED COUNT: 13 % (ref 11–15)
ERYTHROCYTE [SEDIMENTATION RATE] IN BLOOD BY WESTERGREN METHOD: 2 MM/H
GFRSERPLBLD MDRD-ARVRAT: 72 ML/MIN/1.73M2
GLOBULIN SER CALC-MCNC: 2.8 G/DL (CALC) (ref 1.9–3.7)
GLUCOSE SERPL-MCNC: 100 MG/DL (ref 65–139)
HCT VFR BLD AUTO: 43.4 % (ref 35–45)
HGB BLD-MCNC: 14.5 G/DL (ref 11.7–15.5)
LYMPHOCYTES # BLD AUTO: 2090 CELLS/UL (ref 850–3900)
LYMPHOCYTES NFR BLD AUTO: 27.5 %
MCH RBC QN AUTO: 31 PG (ref 27–33)
MCHC RBC AUTO-ENTMCNC: 33.4 G/DL (ref 32–36)
MCV RBC AUTO: 92.9 FL (ref 80–100)
MONOCYTES # BLD AUTO: 593 CELLS/UL (ref 200–950)
MONOCYTES NFR BLD AUTO: 7.8 %
NEUTROPHILS # BLD AUTO: 4735 CELLS/UL (ref 1500–7800)
NEUTROPHILS NFR BLD AUTO: 62.3 %
PLATELET # BLD AUTO: 377 THOUSAND/UL (ref 140–400)
PMV BLD REES-ECKER: 9.8 FL (ref 7.5–12.5)
POTASSIUM SERPL-SCNC: 4.5 MMOL/L (ref 3.5–5.3)
PROT SERPL-MCNC: 7.2 G/DL (ref 6.1–8.1)
RBC # BLD AUTO: 4.67 MILLION/UL (ref 3.8–5.1)
SODIUM SERPL-SCNC: 139 MMOL/L (ref 135–146)
WBC # BLD AUTO: 7.6 THOUSAND/UL (ref 3.8–10.8)

## 2019-07-22 ENCOUNTER — OFFICE VISIT (OUTPATIENT)
Dept: RHEUMATOLOGY | Facility: CLINIC | Age: 74
End: 2019-07-22
Payer: MEDICARE

## 2019-07-22 VITALS
WEIGHT: 142.94 LBS | HEART RATE: 70 BPM | BODY MASS INDEX: 26.31 KG/M2 | SYSTOLIC BLOOD PRESSURE: 107 MMHG | DIASTOLIC BLOOD PRESSURE: 64 MMHG | HEIGHT: 62 IN

## 2019-07-22 DIAGNOSIS — M47.815 SPONDYLOSIS OF THORACOLUMBAR REGION WITHOUT MYELOPATHY OR RADICULOPATHY: ICD-10-CM

## 2019-07-22 DIAGNOSIS — E03.9 HYPOTHYROIDISM, UNSPECIFIED TYPE: ICD-10-CM

## 2019-07-22 DIAGNOSIS — M47.812 SPONDYLOSIS OF CERVICAL REGION WITHOUT MYELOPATHY OR RADICULOPATHY: ICD-10-CM

## 2019-07-22 DIAGNOSIS — M06.9 RHEUMATOID ARTHRITIS OF HAND, UNSPECIFIED LATERALITY, UNSPECIFIED RHEUMATOID FACTOR PRESENCE: ICD-10-CM

## 2019-07-22 DIAGNOSIS — M47.25 OSTEOARTHRITIS OF SPINE WITH RADICULOPATHY, THORACOLUMBAR REGION: ICD-10-CM

## 2019-07-22 DIAGNOSIS — M54.10 RADICULOPATHY, UNSPECIFIED SPINAL REGION: ICD-10-CM

## 2019-07-22 PROCEDURE — 99214 PR OFFICE/OUTPT VISIT, EST, LEVL IV, 30-39 MIN: ICD-10-PCS | Mod: S$GLB,,, | Performed by: INTERNAL MEDICINE

## 2019-07-22 PROCEDURE — 99214 OFFICE O/P EST MOD 30 MIN: CPT | Mod: S$GLB,,, | Performed by: INTERNAL MEDICINE

## 2019-07-22 PROCEDURE — 1101F PR PT FALLS ASSESS DOC 0-1 FALLS W/OUT INJ PAST YR: ICD-10-PCS | Mod: CPTII,S$GLB,, | Performed by: INTERNAL MEDICINE

## 2019-07-22 PROCEDURE — 99999 PR PBB SHADOW E&M-EST. PATIENT-LVL III: ICD-10-PCS | Mod: PBBFAC,,, | Performed by: INTERNAL MEDICINE

## 2019-07-22 PROCEDURE — 99999 PR PBB SHADOW E&M-EST. PATIENT-LVL III: CPT | Mod: PBBFAC,,, | Performed by: INTERNAL MEDICINE

## 2019-07-22 PROCEDURE — 1101F PT FALLS ASSESS-DOCD LE1/YR: CPT | Mod: CPTII,S$GLB,, | Performed by: INTERNAL MEDICINE

## 2019-07-22 RX ORDER — GABAPENTIN 300 MG/1
600 CAPSULE ORAL NIGHTLY
Qty: 180 CAPSULE | Refills: 3 | Status: SHIPPED | OUTPATIENT
Start: 2019-07-22 | End: 2020-10-07

## 2019-07-22 RX ORDER — METHOTREXATE 2.5 MG/1
TABLET ORAL
Qty: 48 TABLET | Refills: 3 | Status: SHIPPED | OUTPATIENT
Start: 2019-07-22 | End: 2020-10-07 | Stop reason: SDUPTHER

## 2019-07-22 RX ORDER — FOLIC ACID 1 MG/1
TABLET ORAL
Qty: 180 TABLET | Refills: 3 | Status: SHIPPED | OUTPATIENT
Start: 2019-07-22 | End: 2020-10-01 | Stop reason: SDUPTHER

## 2019-07-22 RX ORDER — GABAPENTIN 100 MG/1
300 CAPSULE ORAL NIGHTLY
Qty: 60 CAPSULE | Refills: 6 | Status: SHIPPED | OUTPATIENT
Start: 2019-07-22 | End: 2019-07-22

## 2019-07-22 RX ORDER — PREDNISONE 5 MG/1
TABLET ORAL
Qty: 90 TABLET | Refills: 3 | Status: ON HOLD | OUTPATIENT
Start: 2019-07-22 | End: 2021-12-09 | Stop reason: HOSPADM

## 2019-07-22 RX ORDER — HYDROCODONE BITARTRATE AND ACETAMINOPHEN 10; 325 MG/1; MG/1
1 TABLET ORAL EVERY 6 HOURS PRN
Qty: 90 TABLET | Refills: 0 | Status: SHIPPED | OUTPATIENT
Start: 2019-07-22 | End: 2020-03-10 | Stop reason: SDUPTHER

## 2019-07-22 NOTE — PROGRESS NOTES
Subjective:          Chief Complaint: Fabby Guerrero is a 73 y.o. female who had concerns including Rheumatoid Arthritis (4 month).    HPI:      Rheumatoid Arthritis  Patient is an 70 y.o. female here for follow up seropostive Rheumatoid Arthritis. Serologies are: RF+, CCP HIGH titer +.        Symptoms have been present for several years.   Symptoms include eye symptoms, joint pain, joint swelling, sleep difficulties and weakness of hands and are of moderate severity. Morning stiffness: 25 minute Patient denies joint pain, joint swelling and morning stiffness. Symptoms are made worse by: movement, overuse and resting.  Symptoms are helped by arthritis medications.  Associated symptoms include arthralgia and fatigue and secondary Sjogrens. Previously on Restasis, now systane/Refresh. Diclofenac for 1 year with decreased urination and stable creatinine; now off.     Seen with Ortho, Dr. Agudelo,  received Euflexxa  Bilateral this did help     Uses Hyrdocodone only occasionally     She is taking gabapentin only to sleep and not every night. Noting tingling in legs and fingers.   Having metarsalgia in the AM, shoulders, wrist and fingers more stiff, triggering right 4th, and left 2nd finger. Overal   Doing well with Tumeric. [facility].   Current medications  Methotrexate - Problems: none 4 tabs weekly doing ok.  Did have ulcer increased folic acid 2mg still with some ulcers.   Prednisone 5mg PRN flares has not taken any prednisone in 1 month.   Hydrocodone rarely for pain-  Aleve PRN-previously using diclofenac but urinary hesitance     Using Gabapentin for cervical radiculopathy. 600mg hs.   No hx of HCQ-  No personal hx of malignancy.    C/o feet /toes painful more but improved with recent celebrex.     Labs dated 07/16/19 at [facility] for methotrexate monitoring show everything is within normal limits.    Interval events:   MRI of the shoulder some musculature is of the rotator cuff is grossly intact mild tendinopathy  subacromial and glenohumeral narrowing but no active bursitis was noticed she has some hypertrophic changes at the AC joint    MRI of her cervical  spine however showed multilevel foraminal stenosis most prominent at C5-6 moderate to severe left > than right.  She also had a few disc osteophyte complexes which could even  a facet mediated pain causing muscle spasms radiating along the shoulder girdle in the upper trapezius region.  She has started with PT which is helping and plans for injection with Mac Emdonds from pain management    Component      Latest Ref Rng & Units 4/17/2018 5/26/2015   Anti Sm/RNP Antibody      0.00 - 19.99 EU  0.35   Anti-Sm/RNP Interpretation      Negative  Negative   B27 Testing Date        06/16/2015 11:48 AM   HLA B27 Result        Negative   ds DNA Ab      Negative 1:10  Negative 1:10   CHON Screen      Negative <1:160  Negative <1:160   Rheumatoid Factor      <14 IU/mL 128 (H) 34.0 (H)   CCP Antibodies      <5.0 U/mL  6.1 (H)   Sed Rate      < OR = 30 mm/h 11    Cyclic Citrullinated Peptide (CCP) Ab (IgG)      UNITS >250 (H)    CRP      <8.0 mg/L 1.4      REVIEW OF SYSTEMS:    Review of Systems   Constitutional: Positive for malaise/fatigue. Negative for fever and weight loss.   HENT: Negative for sore throat.    Eyes: Positive for discharge and redness. Negative for double vision and photophobia.   Respiratory: Negative for cough, shortness of breath and wheezing.    Cardiovascular: Negative for chest pain, palpitations and orthopnea.   Gastrointestinal: Negative for abdominal pain, constipation and diarrhea.   Genitourinary: Negative for dysuria, hematuria and urgency.   Musculoskeletal: Positive for joint pain. Negative for back pain and myalgias.   Skin: Negative for rash.   Neurological: Negative for dizziness, tingling, focal weakness and headaches.   Endo/Heme/Allergies: Does not bruise/bleed easily.   Psychiatric/Behavioral: Negative for depression, hallucinations and  suicidal ideas.               Objective:            Past Medical History:   Diagnosis Date    Chronic neck pain     Chronic shoulder pain     Rheumatoid arthritis     Thyroid disease     hypothyroidism     Family History   Problem Relation Age of Onset    Cancer Mother         lung    Cancer Father         colon and lung    Cancer Daughter         melanoma     Social History     Tobacco Use    Smoking status: Former Smoker     Last attempt to quit: 1976     Years since quittin.3    Smokeless tobacco: Never Used   Substance Use Topics    Alcohol use: Yes     Alcohol/week: 1.8 oz     Types: 3 Glasses of wine per week    Drug use: No         Current Outpatient Medications on File Prior to Visit   Medication Sig Dispense Refill    acetaminophen (TYLENOL ARTHRITIS PAIN) 650 MG TbSR Take 650 mg by mouth every 8 (eight) hours as needed.       celecoxib (CELEBREX) 100 MG capsule       cyanocobalamin 2000 MCG tablet Take 2,000 mcg by mouth once daily.      docusate sodium (COLACE) 100 MG capsule Take 100 mg by mouth every evening.      fish oil-omega-3 fatty acids 300-1,000 mg capsule Take 2 g by mouth once daily.      folic acid (FOLVITE) 1 MG tablet TAKE 2 TABLETS ONCE DAILY. 180 tablet 3    glucosamine-chondroitin 500-400 mg tablet Take 2 tablets by mouth once daily.       levothyroxine (SYNTHROID) 88 MCG tablet Take 1 tablet (88 mcg total) by mouth before breakfast. 90 tablet 3    melatonin 5 mg Cap Take by mouth as needed.       methotrexate 2.5 MG Tab patient taking 4 tablets every 7 days 48 tablet 3    methotrexate 2.5 MG Tab TAKE 4 TABLETS (10MG) EVERY 7 DAYS 48 tablet 3    omeprazole (PRILOSEC) 20 MG capsule Take 20 mg by mouth as needed.       TURMERIC (CURCUMIN MISC) 750 mg by Misc.(Non-Drug; Combo Route) route 2 (two) times daily.      vitamin B complex-folic acid 0.4 mg Tab Take by mouth.      diclofenac sodium (VOLTAREN) 1 % Gel Apply 2 g topically 2 (two) times daily. 100 g  3    FLUZONE HIGH-DOSE 2016-17, PF, 180 mcg/0.5 mL Syrg       gabapentin (NEURONTIN) 100 MG capsule Take 1 capsule (100 mg total) by mouth every evening. After taking 1 cap every night for 7 nights; Then take 2 caps every night for 7 nights until see neurosurgeon for 21 doses (Patient taking differently: Take 300 mg by mouth every evening. 2 capsules at bedtime) 21 capsule 0    HYDROcodone-acetaminophen (NORCO)  mg per tablet Take 1 tablet by mouth every 6 (six) hours as needed. 120 tablet 0    Lactobacillus acidophilus Cap Take by mouth.      multivitamin (ONE DAILY MULTIVITAMIN) per tablet Take 1 tablet by mouth once daily.      predniSONE (DELTASONE) 5 MG tablet 1 to 2 tabs po qam prn swelling 90 tablet 3    VITAMIN A-C-D3-COD LIVER OIL ORAL Take by mouth.       No current facility-administered medications on file prior to visit.        Vitals:    07/22/19 1022   BP: 107/64   Pulse: 70       Physical Exam:    Physical Exam   Constitutional: She appears well-developed and well-nourished.   HENT:   Nose: No septal deviation.   Mouth/Throat: Mucous membranes are normal. No oral lesions.   Eyes: Pupils are equal, round, and reactive to light. Right conjunctiva is not injected. Left conjunctiva is not injected.   Neck: No JVD present. No thyroid mass and no thyromegaly present.   Cardiovascular: Normal rate, regular rhythm and normal pulses.   No edema   Pulmonary/Chest: Effort normal and breath sounds normal.   Abdominal: Soft. Normal appearance.   Musculoskeletal:        Right shoulder: She exhibits normal range of motion, no tenderness and no swelling.        Left shoulder: She exhibits normal range of motion, no tenderness and no swelling.        Right elbow: She exhibits normal range of motion and no swelling. No tenderness found.        Left elbow: She exhibits normal range of motion and no swelling. No tenderness found.        Right wrist: She exhibits decreased range of motion and tenderness. She  exhibits no swelling.        Left wrist: She exhibits decreased range of motion and tenderness. She exhibits no swelling.        Right hip: She exhibits normal range of motion, normal strength and no swelling.        Left hip: She exhibits normal range of motion, no tenderness and no swelling.        Right knee: She exhibits normal range of motion and no swelling. No tenderness found.        Left knee: She exhibits normal range of motion and no swelling. No tenderness found.        Right ankle: She exhibits normal range of motion and no swelling. No tenderness.        Left ankle: She exhibits normal range of motion and no swelling. No tenderness.        Right hand: She exhibits decreased range of motion and tenderness.        Left hand: She exhibits decreased range of motion and tenderness.   5/5 upper and lower extremity bilateral muscle strength   Lymphadenopathy:     She has no cervical adenopathy.     She has no axillary adenopathy.   Neurological: She has normal strength and normal reflexes.   Skin: Skin is dry and intact.   Psychiatric: She has a normal mood and affect.             Assessment:       Encounter Diagnoses   Name Primary?    Osteoarthritis of spine with radiculopathy, thoracolumbar region     Rheumatoid arthritis of hand, unspecified laterality, unspecified rheumatoid factor presence     Spondylosis of cervical region without myelopathy or radiculopathy     Radiculopathy, unspecified spinal region     Hypothyroidism, unspecified type     Spondylosis of thoracolumbar region without myelopathy or radiculopathy           Plan:        Rheumatoid arthritis involving both hands with positive rheumatoid factor    Osteoarthritis of spine with radiculopathy, cervical spine.      MTX ok continue 4 tabs weekly   Folic acid  Celebrex 100mg BID will likely help with her joints as well-I see very little synovitis at this time and suspect this is residual damage that is causing bulk of pain in toes.    Prednisone 5-10-mg daily PRN    Labs with [facility] every 12 weeks-printed new order.   May need to consider biologic in the future. patient Very hesitant. I am not seeing much acitve synovitis.     Cervical DJD:    Working with Dr. Edmonds. Doing better   Gabapentin up to 600mg at HS-noting some memory changes. Neck pain stabilized. Ok to try cutting back to 300mg at HS>   Hydrocodone PRN TID uses infrequently     Follow up in about 4 months (around 11/22/2019).          30min consultation with greater than 50% spent in counseling, chart review and coordination of care. All questions answered.

## 2019-10-22 ENCOUNTER — TELEPHONE (OUTPATIENT)
Dept: RHEUMATOLOGY | Facility: CLINIC | Age: 74
End: 2019-10-22

## 2019-10-22 NOTE — TELEPHONE ENCOUNTER
Spoke to pt and notified her that she would need to contact her insurance company. Pt verbalized understanding, but wanted to know if the pt access could help her. Advised pt that I would ask. Asked Deanne Arriola and she referred me to [place] in financial assistance. Pt verbalized understanding and thanked me.

## 2019-10-22 NOTE — TELEPHONE ENCOUNTER
----- Message from Katiana Radford sent at 10/22/2019 10:33 AM CDT -----  Type: Needs Medical Advice    Who Called:  Patient - Fabby London Call Back Number: [phone]  Additional Information: please contact pt to make sure Dr Olivera is in her [facility] [employer] - referred her to contact her plan would like a call nurse

## 2019-12-20 ENCOUNTER — TELEPHONE (OUTPATIENT)
Dept: RHEUMATOLOGY | Facility: CLINIC | Age: 74
End: 2019-12-20

## 2019-12-20 NOTE — TELEPHONE ENCOUNTER
----- Message from Joey Rush sent at 12/20/2019  2:59 PM CST -----  Type: Needs Medical Advice    Who Called:  Patient    Best Call Back Number: [phone]  Additional Information: Patient states that she would orders for blood work faxed to [facility] in [place] in ([facility]) prior to her 01/06 appointment.  Please call to confirm

## 2019-12-23 ENCOUNTER — TELEPHONE (OUTPATIENT)
Dept: RHEUMATOLOGY | Facility: CLINIC | Age: 74
End: 2019-12-23

## 2019-12-31 LAB
ALBUMIN SERPL-MCNC: 4.2 G/DL (ref 3.6–5.1)
ALBUMIN/GLOB SERPL: 1.5 (CALC) (ref 1–2.5)
ALP SERPL-CCNC: 78 U/L (ref 33–130)
ALT SERPL-CCNC: 19 U/L (ref 6–29)
AST SERPL-CCNC: 22 U/L (ref 10–35)
BASOPHILS # BLD AUTO: 42 CELLS/UL (ref 0–200)
BASOPHILS NFR BLD AUTO: 0.4 %
BILIRUB SERPL-MCNC: 0.4 MG/DL (ref 0.2–1.2)
BUN SERPL-MCNC: 11 MG/DL (ref 7–25)
BUN/CREAT SERPL: NORMAL (CALC) (ref 6–22)
CALCIUM SERPL-MCNC: 9.3 MG/DL (ref 8.6–10.4)
CHLORIDE SERPL-SCNC: 100 MMOL/L (ref 98–110)
CO2 SERPL-SCNC: 26 MMOL/L (ref 20–32)
CREAT SERPL-MCNC: 0.76 MG/DL (ref 0.6–0.93)
CRP SERPL-MCNC: 35.6 MG/L
EOSINOPHIL # BLD AUTO: 138 CELLS/UL (ref 15–500)
EOSINOPHIL NFR BLD AUTO: 1.3 %
ERYTHROCYTE [DISTWIDTH] IN BLOOD BY AUTOMATED COUNT: 12.6 % (ref 11–15)
ERYTHROCYTE [SEDIMENTATION RATE] IN BLOOD BY WESTERGREN METHOD: 36 MM/H
GFRSERPLBLD MDRD-ARVRAT: 77 ML/MIN/1.73M2
GLOBULIN SER CALC-MCNC: 2.8 G/DL (CALC) (ref 1.9–3.7)
GLUCOSE SERPL-MCNC: 120 MG/DL (ref 65–139)
HCT VFR BLD AUTO: 40.8 % (ref 35–45)
HGB BLD-MCNC: 13.4 G/DL (ref 11.7–15.5)
LYMPHOCYTES # BLD AUTO: 1760 CELLS/UL (ref 850–3900)
LYMPHOCYTES NFR BLD AUTO: 16.6 %
MCH RBC QN AUTO: 30.5 PG (ref 27–33)
MCHC RBC AUTO-ENTMCNC: 32.8 G/DL (ref 32–36)
MCV RBC AUTO: 92.9 FL (ref 80–100)
MONOCYTES # BLD AUTO: 700 CELLS/UL (ref 200–950)
MONOCYTES NFR BLD AUTO: 6.6 %
NEUTROPHILS # BLD AUTO: 7961 CELLS/UL (ref 1500–7800)
NEUTROPHILS NFR BLD AUTO: 75.1 %
PLATELET # BLD AUTO: 329 THOUSAND/UL (ref 140–400)
PMV BLD REES-ECKER: 10.1 FL (ref 7.5–12.5)
POTASSIUM SERPL-SCNC: 4.2 MMOL/L (ref 3.5–5.3)
PROT SERPL-MCNC: 7 G/DL (ref 6.1–8.1)
RBC # BLD AUTO: 4.39 MILLION/UL (ref 3.8–5.1)
SODIUM SERPL-SCNC: 136 MMOL/L (ref 135–146)
WBC # BLD AUTO: 10.6 THOUSAND/UL (ref 3.8–10.8)

## 2020-01-06 ENCOUNTER — OFFICE VISIT (OUTPATIENT)
Dept: RHEUMATOLOGY | Facility: CLINIC | Age: 75
End: 2020-01-06
Payer: MEDICARE

## 2020-01-06 VITALS
DIASTOLIC BLOOD PRESSURE: 82 MMHG | SYSTOLIC BLOOD PRESSURE: 133 MMHG | HEART RATE: 72 BPM | HEIGHT: 62 IN | BODY MASS INDEX: 26.29 KG/M2 | WEIGHT: 142.88 LBS

## 2020-01-06 DIAGNOSIS — M06.9 RHEUMATOID ARTHRITIS OF HAND, UNSPECIFIED LATERALITY, UNSPECIFIED RHEUMATOID FACTOR PRESENCE: Primary | ICD-10-CM

## 2020-01-06 DIAGNOSIS — Z79.899 HIGH RISK MEDICATIONS (NOT ANTICOAGULANTS) LONG-TERM USE: ICD-10-CM

## 2020-01-06 DIAGNOSIS — M47.25 OSTEOARTHRITIS OF SPINE WITH RADICULOPATHY, THORACOLUMBAR REGION: ICD-10-CM

## 2020-01-06 PROCEDURE — 96372 PR INJECTION,THERAP/PROPH/DIAG2ST, IM OR SUBCUT: ICD-10-PCS | Mod: S$GLB,,, | Performed by: INTERNAL MEDICINE

## 2020-01-06 PROCEDURE — 99999 PR PBB SHADOW E&M-EST. PATIENT-LVL III: CPT | Mod: PBBFAC,,, | Performed by: INTERNAL MEDICINE

## 2020-01-06 PROCEDURE — 96372 THER/PROPH/DIAG INJ SC/IM: CPT | Mod: S$GLB,,, | Performed by: INTERNAL MEDICINE

## 2020-01-06 PROCEDURE — 1101F PR PT FALLS ASSESS DOC 0-1 FALLS W/OUT INJ PAST YR: ICD-10-PCS | Mod: S$GLB,,, | Performed by: INTERNAL MEDICINE

## 2020-01-06 PROCEDURE — 1159F MED LIST DOCD IN RCRD: CPT | Mod: S$GLB,,, | Performed by: INTERNAL MEDICINE

## 2020-01-06 PROCEDURE — 1159F PR MEDICATION LIST DOCUMENTED IN MEDICAL RECORD: ICD-10-PCS | Mod: S$GLB,,, | Performed by: INTERNAL MEDICINE

## 2020-01-06 PROCEDURE — 1101F PT FALLS ASSESS-DOCD LE1/YR: CPT | Mod: S$GLB,,, | Performed by: INTERNAL MEDICINE

## 2020-01-06 PROCEDURE — 99214 OFFICE O/P EST MOD 30 MIN: CPT | Mod: 25,S$GLB,, | Performed by: INTERNAL MEDICINE

## 2020-01-06 PROCEDURE — 1125F PR PAIN SEVERITY QUANTIFIED, PAIN PRESENT: ICD-10-PCS | Mod: S$GLB,,, | Performed by: INTERNAL MEDICINE

## 2020-01-06 PROCEDURE — 1125F AMNT PAIN NOTED PAIN PRSNT: CPT | Mod: S$GLB,,, | Performed by: INTERNAL MEDICINE

## 2020-01-06 PROCEDURE — 99214 PR OFFICE/OUTPT VISIT, EST, LEVL IV, 30-39 MIN: ICD-10-PCS | Mod: 25,S$GLB,, | Performed by: INTERNAL MEDICINE

## 2020-01-06 PROCEDURE — 99999 PR PBB SHADOW E&M-EST. PATIENT-LVL III: ICD-10-PCS | Mod: PBBFAC,,, | Performed by: INTERNAL MEDICINE

## 2020-01-06 RX ORDER — GABAPENTIN 100 MG/1
200 CAPSULE ORAL NIGHTLY
Qty: 60 CAPSULE | Refills: 0 | Status: SHIPPED | OUTPATIENT
Start: 2020-01-06 | End: 2020-02-05

## 2020-01-06 RX ORDER — METHYLPREDNISOLONE ACETATE 40 MG/ML
40 INJECTION, SUSPENSION INTRA-ARTICULAR; INTRALESIONAL; INTRAMUSCULAR; SOFT TISSUE
Status: COMPLETED | OUTPATIENT
Start: 2020-01-06 | End: 2020-01-06

## 2020-01-06 RX ADMIN — METHYLPREDNISOLONE ACETATE 40 MG: 40 INJECTION, SUSPENSION INTRA-ARTICULAR; INTRALESIONAL; INTRAMUSCULAR; SOFT TISSUE at 11:01

## 2020-01-06 ASSESSMENT — ROUTINE ASSESSMENT OF PATIENT INDEX DATA (RAPID3)
MDHAQ FUNCTION SCORE: .4
PATIENT GLOBAL ASSESSMENT SCORE: 3.5
PSYCHOLOGICAL DISTRESS SCORE: 1.1
TOTAL RAPID3 SCORE: 2.78
PAIN SCORE: 3.5

## 2020-01-06 NOTE — PROGRESS NOTES
Subjective:          Chief Complaint: Fabby Guerrero is a 74 y.o. female who had concerns including Rheumatoid Arthritis.    HPI:      Rheumatoid Arthritis  Patient is an 74 y.o. female here for follow up seropostive Rheumatoid Arthritis. Serologies are: RF+, CCP HIGH titer +.        Symptoms have been present for several years.   Symptoms include eye symptoms, joint pain, joint swelling, sleep difficulties and weakness of hands and are of moderate severity. Morning stiffness: 25 minute Patient denies joint pain, joint swelling and morning stiffness. Symptoms are made worse by: movement, overuse and resting.  Symptoms are helped by arthritis medications.  Associated symptoms include arthralgia and fatigue and secondary Sjogrens. Previously on Restasis, now systane/Refresh. Diclofenac for 1 year with decreased urination and stable creatinine; now off.     Seen with Ortho, Dr. Agudelo,  received Euflexxa in past.   Bilateral this did help     Uses Hyrdocodone only occasionally     She is taking gabapentin only to sleep and not every night. Noting tingling in legs and fingers.   Having metarsalgia in the AM, shoulders, wrist and fingers more stiff, triggering right 4th, and left 2nd finger. Overal   Doing well with Tumeric. [facility].   Current medications  Methotrexate - Problems: none 4 tabs weekly doing ok.  Did have ulcer increased folic acid 2mg still with some ulcers.   Prednisone 5mg PRN flares has not taken any prednisone in 1 year.   Hydrocodone rarely for pain-  Aleve PRN-previously using diclofenac but urinary hesitance     Using Gabapentin for cervical radiculopathy. 600mg hs.   No hx of HCQ-  No personal hx of malignancy.    C/o feet /toes painful more but improved with recent celebrex.     Labs dated 12/2019 at [facility] for methotrexate monitoring show everything is within normal limits.    Interval events:   MRI of the shoulder some musculature is of the rotator cuff is grossly intact mild tendinopathy  subacromial and glenohumeral narrowing but no active bursitis was noticed she has some hypertrophic changes at the AC joint    MRI of her cervical  spine however showed multilevel foraminal stenosis most prominent at C5-6 moderate to severe left > than right.  She also had a few disc osteophyte complexes which could even  a facet mediated pain causing muscle spasms radiating along the shoulder girdle in the upper trapezius region.     Dr Edmonds from pain management    Component      Latest Ref Rng & Units 4/17/2018 5/26/2015   Anti Sm/RNP Antibody      0.00 - 19.99 EU  0.35   Anti-Sm/RNP Interpretation      Negative  Negative   B27 Testing Date        06/16/2015 11:48 AM   HLA B27 Result        Negative   ds DNA Ab      Negative 1:10  Negative 1:10   CHON Screen      Negative <1:160  Negative <1:160   Rheumatoid Factor      <14 IU/mL 128 (H) 34.0 (H)   CCP Antibodies      <5.0 U/mL  6.1 (H)   Sed Rate      < OR = 30 mm/h 11    Cyclic Citrullinated Peptide (CCP) Ab (IgG)      UNITS >250 (H)    CRP      <8.0 mg/L 1.4      REVIEW OF SYSTEMS:    Review of Systems   Constitutional: Positive for malaise/fatigue. Negative for fever and weight loss.   HENT: Negative for sore throat.    Eyes: Positive for discharge and redness. Negative for double vision and photophobia.   Respiratory: Negative for cough, shortness of breath and wheezing.    Cardiovascular: Negative for chest pain, palpitations and orthopnea.   Gastrointestinal: Negative for abdominal pain, constipation and diarrhea.   Genitourinary: Negative for dysuria, hematuria and urgency.   Musculoskeletal: Positive for joint pain. Negative for back pain and myalgias.   Skin: Negative for rash.   Neurological: Negative for dizziness, tingling, focal weakness and headaches.   Endo/Heme/Allergies: Does not bruise/bleed easily.   Psychiatric/Behavioral: Negative for depression, hallucinations and suicidal ideas.               Objective:            Past Medical History:    Diagnosis Date    Chronic neck pain     Chronic shoulder pain     Rheumatoid arthritis     Thyroid disease     hypothyroidism     Family History   Problem Relation Age of Onset    Cancer Mother         lung    Cancer Father         colon and lung    Cancer Daughter         melanoma     Social History     Tobacco Use    Smoking status: Former Smoker     Last attempt to quit: 1976     Years since quittin.7    Smokeless tobacco: Never Used   Substance Use Topics    Alcohol use: Yes     Alcohol/week: 3.0 standard drinks     Types: 3 Glasses of wine per week    Drug use: No         Current Outpatient Medications on File Prior to Visit   Medication Sig Dispense Refill    acetaminophen (TYLENOL ARTHRITIS PAIN) 650 MG TbSR Take 650 mg by mouth every 8 (eight) hours as needed.       celecoxib (CELEBREX) 100 MG capsule       cyanocobalamin 2000 MCG tablet Take 2,000 mcg by mouth once daily.      docusate sodium (COLACE) 100 MG capsule Take 100 mg by mouth every evening.      fish oil-omega-3 fatty acids 300-1,000 mg capsule Take 2 g by mouth once daily.      FLUZONE HIGH-DOSE , PF, 180 mcg/0.5 mL Syrg       folic acid (FOLVITE) 1 MG tablet TAKE 2 TABLETS ONCE DAILY. 180 tablet 3    gabapentin (NEURONTIN) 300 MG capsule Take 2 capsules (600 mg total) by mouth every evening. 180 capsule 3    glucosamine-chondroitin 500-400 mg tablet Take 2 tablets by mouth once daily.       Lactobacillus acidophilus Cap Take by mouth.      levothyroxine (SYNTHROID) 88 MCG tablet Take 1 tablet (88 mcg total) by mouth before breakfast. 90 tablet 3    melatonin 5 mg Cap Take by mouth as needed.       methotrexate 2.5 MG Tab TAKE 4 TABLETS (10MG) EVERY 7 DAYS 48 tablet 3    methotrexate 2.5 MG Tab patient taking 4 tablets every 7 days 48 tablet 3    multivitamin (ONE DAILY MULTIVITAMIN) per tablet Take 1 tablet by mouth once daily.      omeprazole (PRILOSEC) 20 MG capsule Take 20 mg by mouth as needed.        predniSONE (DELTASONE) 5 MG tablet 1 to 2 tabs po qam prn swelling 90 tablet 3    TURMERIC (CURCUMIN MISC) 750 mg by Misc.(Non-Drug; Combo Route) route 2 (two) times daily.      VITAMIN A-C-D3-COD LIVER OIL ORAL Take by mouth.      vitamin B complex-folic acid 0.4 mg Tab Take by mouth.      diclofenac sodium (VOLTAREN) 1 % Gel Apply 2 g topically 2 (two) times daily. 100 g 3    HYDROcodone-acetaminophen (NORCO)  mg per tablet Take 1 tablet by mouth every 6 (six) hours as needed. 90 tablet 0     No current facility-administered medications on file prior to visit.        Vitals:    01/06/20 0948   BP: 133/82   Pulse: 72       Physical Exam:    Physical Exam   Constitutional: She appears well-developed and well-nourished.   HENT:   Nose: No septal deviation.   Mouth/Throat: Mucous membranes are normal. No oral lesions.   Eyes: Pupils are equal, round, and reactive to light. Right conjunctiva is not injected. Left conjunctiva is not injected.   Neck: No JVD present. No thyroid mass and no thyromegaly present.   Cardiovascular: Normal rate, regular rhythm and normal pulses.   No edema   Pulmonary/Chest: Effort normal and breath sounds normal.   Abdominal: Soft. Normal appearance.   Musculoskeletal:        Right shoulder: She exhibits normal range of motion, no tenderness and no swelling.        Left shoulder: She exhibits normal range of motion, no tenderness and no swelling.        Right elbow: She exhibits normal range of motion and no swelling. No tenderness found.        Left elbow: She exhibits normal range of motion and no swelling. No tenderness found.        Right wrist: She exhibits decreased range of motion and tenderness. She exhibits no swelling.        Left wrist: She exhibits decreased range of motion and tenderness. She exhibits no swelling.        Right hip: She exhibits normal range of motion, normal strength and no swelling.        Left hip: She exhibits normal range of motion, no  tenderness and no swelling.        Right knee: She exhibits normal range of motion and no swelling. No tenderness found.        Left knee: She exhibits normal range of motion and no swelling. No tenderness found.        Right ankle: She exhibits normal range of motion and no swelling. No tenderness.        Left ankle: She exhibits normal range of motion and no swelling. No tenderness.        Right hand: She exhibits decreased range of motion and tenderness.        Left hand: She exhibits decreased range of motion and tenderness.   5/5 upper and lower extremity bilateral muscle strength   Lymphadenopathy:     She has no cervical adenopathy.     She has no axillary adenopathy.   Neurological: She has normal strength and normal reflexes.   Skin: Skin is dry and intact.   Psychiatric: She has a normal mood and affect.             Assessment:       Encounter Diagnoses   Name Primary?    Rheumatoid arthritis of hand, unspecified laterality, unspecified rheumatoid factor presence Yes    High risk medications (not anticoagulants) long-term use     Osteoarthritis of spine with radiculopathy, thoracolumbar region           Plan:        Rheumatoid arthritis involving both hands with positive rheumatoid factor    Osteoarthritis of spine with radiculopathy, cervical spine.      MTX ok continue 4 tabs weekly   Folic acid  Celebrex 100mg BID will likely help with her joints as well-I see very little synovitis at this time and suspect this is residual damage that is causing bulk of pain in toes.   Prednisone 5-10-mg daily PRN-    Labs with [facility] every 12 weeks-printed new order.   . I am not seeing much acitve synovitis.     Cervical DJD:       Gabapentin up to 600mg at HS-noting some memory changes. Down to 300mg at HS.    Trial weaning per AVS.    If pain escalates try Lyrica at 25mg at HS>   1. Keep Methotrexate for RA      2. Hand (right) if triggering worsens I would speak with Dr. Anthony      3. Try Depomedrol 40mg IM  see if this helps with elbows and Upper arms which may be from the neck.       4. Gabapentin: to wean (for memory concern).    200mg at night for 1 week   100mg at night for 1 week then can try stopping  Follow up in about 4 months (around 5/6/2020).          30min consultation with greater than 50% spent in counseling, chart review and coordination of care. All questions answered.

## 2020-01-06 NOTE — PATIENT INSTRUCTIONS
1. Keep Methotrexate for RA      2. Hand (right) if triggering worsens I would speak with Dr. Anthony      3. Try Depomedrol 40mg IM see if this helps with elbows and Upper arms which may be from the neck.       4. Gabapentin: to wean (for memory concern).    200mg at night for 1 week   100mg at night for 1 week then can try stopping

## 2020-03-05 DIAGNOSIS — M47.812 SPONDYLOSIS OF CERVICAL REGION WITHOUT MYELOPATHY OR RADICULOPATHY: ICD-10-CM

## 2020-03-05 DIAGNOSIS — M54.10 RADICULOPATHY, UNSPECIFIED SPINAL REGION: ICD-10-CM

## 2020-03-05 DIAGNOSIS — M47.25 OSTEOARTHRITIS OF SPINE WITH RADICULOPATHY, THORACOLUMBAR REGION: ICD-10-CM

## 2020-03-06 NOTE — TELEPHONE ENCOUNTER
----- Message from Billie Trujillo sent at 3/6/2020  2:18 PM CST -----  Contact: patient  Type:  RX Refill Request    Who Called:  patient  Refill or New Rx:  refill  RX Name and Strength: HYDROcodone-acetaminophen (NORCO)  mg per tablet  How is the patient currently taking it? (ex. 1XDay): as directed  Is this a 30 day or 90 day RX: 90   Preferred Pharmacy with phone number:    [id number] - [place] - [address]  [place] [address]  Phone: [phone] Fax: [phone]  Local or Mail Order:  Local   Ordering Provider:  Jarod London Call Back Number: [phone] or    Additional Information:  Please advise thank you

## 2020-03-06 NOTE — TELEPHONE ENCOUNTER
----- Message from Fariba Sampson sent at 3/5/2020 11:10 AM CST -----  Contact: Fabby lewis  Type:  RX Refill Request    Who Called:  Fabby  Refill or New Rx:  refill  RX Name and Strength:  celecoxib (CELEBREX) 100 MG capsule  How is the patient currently taking it? (ex. 1XDay):  1 cap twice a day  Is this a 30 day or 90 day RX:  90  Preferred Pharmacy with phone number:    [facility]  [phone]    Local or Mail Order:  Mail order  Ordering Provider:  Jarod London Call Back Number:  [phone]  Additional Information:  Pls call pt if any issues w/ refill

## 2020-03-10 RX ORDER — CELECOXIB 100 MG/1
100 CAPSULE ORAL 2 TIMES DAILY
Qty: 180 CAPSULE | Refills: 3 | Status: SHIPPED | OUTPATIENT
Start: 2020-03-10 | End: 2020-03-17 | Stop reason: SDUPTHER

## 2020-03-10 RX ORDER — HYDROCODONE BITARTRATE AND ACETAMINOPHEN 10; 325 MG/1; MG/1
1 TABLET ORAL EVERY 6 HOURS PRN
Qty: 90 TABLET | Refills: 0 | Status: SHIPPED | OUTPATIENT
Start: 2020-03-10 | End: 2020-03-23 | Stop reason: SDUPTHER

## 2020-03-17 RX ORDER — CELECOXIB 100 MG/1
100 CAPSULE ORAL 2 TIMES DAILY
Qty: 180 CAPSULE | Refills: 3 | Status: SHIPPED | OUTPATIENT
Start: 2020-03-17 | End: 2021-10-25

## 2020-03-19 DIAGNOSIS — M54.10 RADICULOPATHY, UNSPECIFIED SPINAL REGION: ICD-10-CM

## 2020-03-19 DIAGNOSIS — M47.812 SPONDYLOSIS OF CERVICAL REGION WITHOUT MYELOPATHY OR RADICULOPATHY: ICD-10-CM

## 2020-03-19 DIAGNOSIS — M47.25 OSTEOARTHRITIS OF SPINE WITH RADICULOPATHY, THORACOLUMBAR REGION: ICD-10-CM

## 2020-03-19 RX ORDER — HYDROCODONE BITARTRATE AND ACETAMINOPHEN 10; 325 MG/1; MG/1
1 TABLET ORAL EVERY 6 HOURS PRN
Qty: 90 TABLET | Refills: 0 | Status: CANCELLED | OUTPATIENT
Start: 2020-03-19 | End: 2020-04-18

## 2020-03-19 NOTE — TELEPHONE ENCOUNTER
----- Message from Riddhi Terri sent at 3/19/2020  9:04 AM CDT -----  Contact: pt  Type: Needs Medical Advice    Who Called:      Best Call Back Number:     Additional Information: Requesting a call back from Nurse Leda , regarding Rx HYDROcodone-acetaminophen (NORCO)  mg per tablet 90 tablet  [facility] [place] [facility] [address] not [facility] mail order ,pt stated she has call many times to get this correct ,please call in today pt is out and has been waiting

## 2020-03-20 NOTE — TELEPHONE ENCOUNTER
----- Message from Dee Hart sent at 3/20/2020  9:16 AM CDT -----  Contact: patient  Type:  RX Refill Request  Who Called:  patient  Refill or New Rx:  Refill  RX Name and Strength:  HYDROcodone-acetaminophen (NORCO)  mg per tablet  How is the patient currently taking it? (ex. 1XDay):  na  Is this a 30 day or 90 day RX:  30  Preferred Pharmacy with phone number:    [facility]  [place] [address] [facility]  [address] [place] [address]  Phone: [phone] Fax: [phone]  Local or Mail Order:  Local  Ordering Provider:  Jarod London Call Back Number:  [phone]  Additional Information:  Patient states that this medication was sent to [facility] by mistake and needs [facility] to [facility].  Please call to advise.  Sent message to the pod.  Thanks!

## 2020-03-23 DIAGNOSIS — M54.10 RADICULOPATHY, UNSPECIFIED SPINAL REGION: ICD-10-CM

## 2020-03-23 DIAGNOSIS — M47.812 SPONDYLOSIS OF CERVICAL REGION WITHOUT MYELOPATHY OR RADICULOPATHY: ICD-10-CM

## 2020-03-23 DIAGNOSIS — M47.25 OSTEOARTHRITIS OF SPINE WITH RADICULOPATHY, THORACOLUMBAR REGION: ICD-10-CM

## 2020-03-23 RX ORDER — HYDROCODONE BITARTRATE AND ACETAMINOPHEN 10; 325 MG/1; MG/1
1 TABLET ORAL EVERY 6 HOURS PRN
Qty: 90 TABLET | Refills: 0 | Status: SHIPPED | OUTPATIENT
Start: 2020-03-23 | End: 2020-08-31 | Stop reason: SDUPTHER

## 2020-03-23 NOTE — TELEPHONE ENCOUNTER
----- Message from Gudelia Canchola sent at 3/21/2020  9:46 AM CDT -----  Contact: [phone]  Patient requesting a refill on HYDROcodone-acetaminophen (NORCO)  mg per tablet.  Please send this to the local pharmacy listed.      Patient will be using   [facility]  [place] [address]   [place] [address]  Phone: [phone] Fax: [phone]    Please call patient at [phone].    Thanks!

## 2020-04-30 ENCOUNTER — TELEPHONE (OUTPATIENT)
Dept: RHEUMATOLOGY | Facility: CLINIC | Age: 75
End: 2020-04-30

## 2020-04-30 NOTE — TELEPHONE ENCOUNTER
Spoke with pt about virtual exam, pt expressed she really wanted to be seen so she can get her injection. Pt expressed discomfort and really wanted to be seen. Notified pt I would leave a message for staff to get in touch with her about appointment. Pt agreed

## 2020-05-01 LAB
ALBUMIN SERPL-MCNC: 4.1 G/DL (ref 3.6–5.1)
ALBUMIN/GLOB SERPL: 1.6 (CALC) (ref 1–2.5)
ALP SERPL-CCNC: 60 U/L (ref 37–153)
ALT SERPL-CCNC: 18 U/L (ref 6–29)
AST SERPL-CCNC: 21 U/L (ref 10–35)
BASOPHILS # BLD AUTO: 38 CELLS/UL (ref 0–200)
BASOPHILS NFR BLD AUTO: 0.6 %
BILIRUB SERPL-MCNC: 0.5 MG/DL (ref 0.2–1.2)
BUN SERPL-MCNC: 13 MG/DL (ref 7–25)
BUN/CREAT SERPL: NORMAL (CALC) (ref 6–22)
CALCIUM SERPL-MCNC: 9.4 MG/DL (ref 8.6–10.4)
CHLORIDE SERPL-SCNC: 103 MMOL/L (ref 98–110)
CO2 SERPL-SCNC: 30 MMOL/L (ref 20–32)
CREAT SERPL-MCNC: 0.8 MG/DL (ref 0.6–0.93)
CRP SERPL-MCNC: 0.6 MG/L
EOSINOPHIL # BLD AUTO: 128 CELLS/UL (ref 15–500)
EOSINOPHIL NFR BLD AUTO: 2 %
ERYTHROCYTE [DISTWIDTH] IN BLOOD BY AUTOMATED COUNT: 12.7 % (ref 11–15)
ERYTHROCYTE [SEDIMENTATION RATE] IN BLOOD BY WESTERGREN METHOD: 6 MM/H
GFRSERPLBLD MDRD-ARVRAT: 72 ML/MIN/1.73M2
GLOBULIN SER CALC-MCNC: 2.5 G/DL (CALC) (ref 1.9–3.7)
GLUCOSE SERPL-MCNC: 89 MG/DL (ref 65–99)
HCT VFR BLD AUTO: 38.6 % (ref 35–45)
HGB BLD-MCNC: 12.9 G/DL (ref 11.7–15.5)
LYMPHOCYTES # BLD AUTO: 1990 CELLS/UL (ref 850–3900)
LYMPHOCYTES NFR BLD AUTO: 31.1 %
MCH RBC QN AUTO: 31.5 PG (ref 27–33)
MCHC RBC AUTO-ENTMCNC: 33.4 G/DL (ref 32–36)
MCV RBC AUTO: 94.1 FL (ref 80–100)
MONOCYTES # BLD AUTO: 531 CELLS/UL (ref 200–950)
MONOCYTES NFR BLD AUTO: 8.3 %
NEUTROPHILS # BLD AUTO: 3712 CELLS/UL (ref 1500–7800)
NEUTROPHILS NFR BLD AUTO: 58 %
PLATELET # BLD AUTO: 322 THOUSAND/UL (ref 140–400)
PMV BLD REES-ECKER: 10.3 FL (ref 7.5–12.5)
POTASSIUM SERPL-SCNC: 4.3 MMOL/L (ref 3.5–5.3)
PROT SERPL-MCNC: 6.6 G/DL (ref 6.1–8.1)
RBC # BLD AUTO: 4.1 MILLION/UL (ref 3.8–5.1)
SODIUM SERPL-SCNC: 140 MMOL/L (ref 135–146)
WBC # BLD AUTO: 6.4 THOUSAND/UL (ref 3.8–10.8)

## 2020-05-04 ENCOUNTER — TELEPHONE (OUTPATIENT)
Dept: RHEUMATOLOGY | Facility: CLINIC | Age: 75
End: 2020-05-04

## 2020-05-04 DIAGNOSIS — M06.9 RHEUMATOID ARTHRITIS OF HAND, UNSPECIFIED LATERALITY, UNSPECIFIED RHEUMATOID FACTOR PRESENCE: Primary | ICD-10-CM

## 2020-05-04 NOTE — TELEPHONE ENCOUNTER
----- Message from Jazmyne Olivera, DO sent at 5/3/2020  8:26 PM CDT -----  Call patient labs ok no change in therapy.    Spoke to the patient and gave her lab results. Patient initially wanted an injection this week during visit, but has changed her mind and will be happy to get a Medrol dose pack instead. Patient also states that she can only do audio this week. She prefers not to come in due to age/ risk factors. Please advise.     Nurse's note: LVM that Medrol has been called in. Audio visit cancelled by Elisabeth. Rescheduled for Sept but on wait list. AKILA

## 2020-05-05 RX ORDER — METHYLPREDNISOLONE 4 MG/1
TABLET ORAL
Qty: 1 PACKAGE | Refills: 0 | Status: SHIPPED | OUTPATIENT
Start: 2020-05-05 | End: 2020-10-07

## 2020-05-27 ENCOUNTER — TELEPHONE (OUTPATIENT)
Dept: RHEUMATOLOGY | Facility: CLINIC | Age: 75
End: 2020-05-27

## 2020-05-27 NOTE — TELEPHONE ENCOUNTER
Spoke to pt and she would like to know if she could come in for nurse injections. States she picked up the medrol dose pack but she has not started it because of how it makes her feel. Pt states also the shots make her feel better longer. States that would help her until her next appointment. Please advise. Thanks.

## 2020-06-01 ENCOUNTER — CLINICAL SUPPORT (OUTPATIENT)
Dept: RHEUMATOLOGY | Facility: CLINIC | Age: 75
End: 2020-06-01
Payer: MEDICARE

## 2020-06-01 VITALS
DIASTOLIC BLOOD PRESSURE: 67 MMHG | BODY MASS INDEX: 25.71 KG/M2 | HEART RATE: 71 BPM | SYSTOLIC BLOOD PRESSURE: 124 MMHG | WEIGHT: 140.56 LBS

## 2020-06-01 DIAGNOSIS — M06.9 RHEUMATOID ARTHRITIS OF HAND, UNSPECIFIED LATERALITY, UNSPECIFIED RHEUMATOID FACTOR PRESENCE: Primary | ICD-10-CM

## 2020-06-01 PROCEDURE — 99999 PR PBB SHADOW E&M-EST. PATIENT-LVL II: CPT | Mod: PBBFAC,,,

## 2020-06-01 PROCEDURE — 99499 UNLISTED E&M SERVICE: CPT | Mod: S$GLB,,, | Performed by: INTERNAL MEDICINE

## 2020-06-01 PROCEDURE — 96372 PR INJECTION,THERAP/PROPH/DIAG2ST, IM OR SUBCUT: ICD-10-PCS | Mod: S$GLB,,, | Performed by: INTERNAL MEDICINE

## 2020-06-01 PROCEDURE — 99999 PR PBB SHADOW E&M-EST. PATIENT-LVL II: ICD-10-PCS | Mod: PBBFAC,,,

## 2020-06-01 PROCEDURE — 96372 THER/PROPH/DIAG INJ SC/IM: CPT | Mod: S$GLB,,, | Performed by: INTERNAL MEDICINE

## 2020-06-01 PROCEDURE — 99499 NO LOS: ICD-10-PCS | Mod: S$GLB,,, | Performed by: INTERNAL MEDICINE

## 2020-06-01 RX ORDER — METHYLPREDNISOLONE ACETATE 40 MG/ML
40 INJECTION, SUSPENSION INTRA-ARTICULAR; INTRALESIONAL; INTRAMUSCULAR; SOFT TISSUE
Status: COMPLETED | OUTPATIENT
Start: 2020-06-01 | End: 2020-06-01

## 2020-06-01 RX ADMIN — METHYLPREDNISOLONE ACETATE 40 MG: 40 INJECTION, SUSPENSION INTRA-ARTICULAR; INTRALESIONAL; INTRAMUSCULAR; SOFT TISSUE at 11:06

## 2020-06-01 NOTE — PROGRESS NOTES
Patient arrives for nurse visit for RA flare. Per Dr. Olivera's written order: 40 mg depomedrol given, Elaina WILBURN

## 2020-08-31 DIAGNOSIS — M54.10 RADICULOPATHY, UNSPECIFIED SPINAL REGION: ICD-10-CM

## 2020-08-31 DIAGNOSIS — M47.25 OSTEOARTHRITIS OF SPINE WITH RADICULOPATHY, THORACOLUMBAR REGION: ICD-10-CM

## 2020-08-31 DIAGNOSIS — M47.812 SPONDYLOSIS OF CERVICAL REGION WITHOUT MYELOPATHY OR RADICULOPATHY: ICD-10-CM

## 2020-08-31 RX ORDER — HYDROCODONE BITARTRATE AND ACETAMINOPHEN 10; 325 MG/1; MG/1
1 TABLET ORAL EVERY 6 HOURS PRN
Qty: 90 TABLET | Refills: 0 | Status: SHIPPED | OUTPATIENT
Start: 2020-08-31 | End: 2020-09-03 | Stop reason: SDUPTHER

## 2020-08-31 NOTE — TELEPHONE ENCOUNTER
----- Message from Bertha Bills MA sent at 8/31/2020 10:48 AM CDT -----  Regarding: call back  Refill request  Medication:HYDROcodone-acetaminophen (NORCO)  mg per tablet  Pharmacy and Phone   Additional info: would like a call first. / [facility]   Best call Back [phone]

## 2020-09-03 ENCOUNTER — TELEPHONE (OUTPATIENT)
Dept: RHEUMATOLOGY | Facility: CLINIC | Age: 75
End: 2020-09-03

## 2020-09-03 DIAGNOSIS — M47.25 OSTEOARTHRITIS OF SPINE WITH RADICULOPATHY, THORACOLUMBAR REGION: ICD-10-CM

## 2020-09-03 DIAGNOSIS — M54.10 RADICULOPATHY, UNSPECIFIED SPINAL REGION: ICD-10-CM

## 2020-09-03 DIAGNOSIS — M47.812 SPONDYLOSIS OF CERVICAL REGION WITHOUT MYELOPATHY OR RADICULOPATHY: ICD-10-CM

## 2020-09-03 NOTE — TELEPHONE ENCOUNTER
----- Message from Jonelle Claire sent at 9/3/2020  3:38 PM CDT -----  Regarding: returning a call  Contact: pt [phone]  Type:  Patient Returning Call    Who Called:Eliana  Who Left Message for Patient:Louisa   Does the patient know what this is regarding?:yes  Would the patient rather a call back or a response via [facility]?  Call back   Best Call Back Number:[phone]  Additional Information:

## 2020-09-03 NOTE — TELEPHONE ENCOUNTER
Pt requesting refill to be sent [facility]     LOV 1/6/20  NOV 9/21/20  Last filled in March    checked  Medication pending

## 2020-09-03 NOTE — TELEPHONE ENCOUNTER
----- Message from Joey Rush sent at 9/3/2020  2:31 PM CDT -----  Type: Needs Medical Advice  Who Called:  Patient    Pharmacy name and phone #:    [facility]  [address] [place] [id number] [phone]  Please add pharmacy to patient's file    Best Call Back Number: [phone]    Additional Information: Patient states that her refill was sent to the [facility] in [place] when it should have gone to the [facility]    HYDROcodone-acetaminophen (NORCO)  mg per tablet    LVM asking if she can ask for transfer from one [facility] to another. Asking if she wants to take the [place] pharmacy out of her chart. Asking for call back. If unable to transfer, will need to resubmit to the doctor. AKILA

## 2020-09-03 NOTE — TELEPHONE ENCOUNTER
LVM saying that we received a fax from [facility] saying only 7 days of opioid pain medications are  allowed. Asking for call back to let us know if pharmacy gave her only 7 days worth. CG

## 2020-09-03 NOTE — TELEPHONE ENCOUNTER
Spoke with pt regarding her NORCO refill. It was sent to the [place] pharmacy on accident. Pt states it always needs to be sent to the [facility] pharmacy, I explained to the pt that I would get it fixed for her. Pt verbalized understanding.

## 2020-09-04 RX ORDER — HYDROCODONE BITARTRATE AND ACETAMINOPHEN 10; 325 MG/1; MG/1
1 TABLET ORAL EVERY 6 HOURS PRN
Qty: 90 TABLET | Refills: 0 | Status: SHIPPED | OUTPATIENT
Start: 2020-09-04 | End: 2021-06-21 | Stop reason: SDUPTHER

## 2020-10-01 DIAGNOSIS — M47.25 OSTEOARTHRITIS OF SPINE WITH RADICULOPATHY, THORACOLUMBAR REGION: ICD-10-CM

## 2020-10-01 DIAGNOSIS — M05.742 RHEUMATOID ARTHRITIS INVOLVING BOTH HANDS WITH POSITIVE RHEUMATOID FACTOR: ICD-10-CM

## 2020-10-01 DIAGNOSIS — M06.9 RHEUMATOID ARTHRITIS OF HAND: ICD-10-CM

## 2020-10-01 DIAGNOSIS — M05.741 RHEUMATOID ARTHRITIS INVOLVING BOTH HANDS WITH POSITIVE RHEUMATOID FACTOR: ICD-10-CM

## 2020-10-01 RX ORDER — FOLIC ACID 1 MG/1
TABLET ORAL
Qty: 180 TABLET | Refills: 3 | Status: SHIPPED | OUTPATIENT
Start: 2020-10-01 | End: 2021-06-21 | Stop reason: SDUPTHER

## 2020-10-01 NOTE — TELEPHONE ENCOUNTER
----- Message from Simona Cameron sent at 10/1/2020  9:47 AM CDT -----  Regarding: Appt access/refill  Contact: Pt  Type:  Patient Returning Call    Who Called:  pt  Does the patient know what this is regarding?:  please follow up with pt regarding appt reschedule. Stated needs to be seen as soon as possible. And refill on  folic acid (FOLVITE) 1 MG tablet   Please sent to [facility] Phone: [phone]  Best Call Back Number:  [phone]  Additional Information:  Thank you      Patient booked for 10-7-20 at 11 am and refill submitted. CG

## 2020-10-07 ENCOUNTER — OFFICE VISIT (OUTPATIENT)
Dept: RHEUMATOLOGY | Facility: CLINIC | Age: 75
End: 2020-10-07
Payer: MEDICARE

## 2020-10-07 VITALS
BODY MASS INDEX: 26.03 KG/M2 | HEART RATE: 69 BPM | SYSTOLIC BLOOD PRESSURE: 156 MMHG | WEIGHT: 141.44 LBS | HEIGHT: 62 IN | DIASTOLIC BLOOD PRESSURE: 76 MMHG

## 2020-10-07 DIAGNOSIS — M47.812 SPONDYLOSIS OF CERVICAL REGION WITHOUT MYELOPATHY OR RADICULOPATHY: ICD-10-CM

## 2020-10-07 DIAGNOSIS — F41.9 ANXIETY: Primary | ICD-10-CM

## 2020-10-07 DIAGNOSIS — M06.042 RHEUMATOID ARTHRITIS INVOLVING BOTH HANDS WITH NEGATIVE RHEUMATOID FACTOR: ICD-10-CM

## 2020-10-07 DIAGNOSIS — M06.041 RHEUMATOID ARTHRITIS INVOLVING BOTH HANDS WITH NEGATIVE RHEUMATOID FACTOR: ICD-10-CM

## 2020-10-07 DIAGNOSIS — E03.9 HYPOTHYROIDISM, ADULT: ICD-10-CM

## 2020-10-07 DIAGNOSIS — M47.25 OSTEOARTHRITIS OF SPINE WITH RADICULOPATHY, THORACOLUMBAR REGION: ICD-10-CM

## 2020-10-07 PROCEDURE — 99999 PR PBB SHADOW E&M-EST. PATIENT-LVL III: CPT | Mod: PBBFAC,,, | Performed by: INTERNAL MEDICINE

## 2020-10-07 PROCEDURE — 99214 OFFICE O/P EST MOD 30 MIN: CPT | Mod: S$GLB,,, | Performed by: INTERNAL MEDICINE

## 2020-10-07 PROCEDURE — 99214 PR OFFICE/OUTPT VISIT, EST, LEVL IV, 30-39 MIN: ICD-10-PCS | Mod: S$GLB,,, | Performed by: INTERNAL MEDICINE

## 2020-10-07 PROCEDURE — 1159F PR MEDICATION LIST DOCUMENTED IN MEDICAL RECORD: ICD-10-PCS | Mod: S$GLB,,, | Performed by: INTERNAL MEDICINE

## 2020-10-07 PROCEDURE — 1159F MED LIST DOCD IN RCRD: CPT | Mod: S$GLB,,, | Performed by: INTERNAL MEDICINE

## 2020-10-07 PROCEDURE — 99999 PR PBB SHADOW E&M-EST. PATIENT-LVL III: ICD-10-PCS | Mod: PBBFAC,,, | Performed by: INTERNAL MEDICINE

## 2020-10-07 PROCEDURE — 1125F AMNT PAIN NOTED PAIN PRSNT: CPT | Mod: S$GLB,,, | Performed by: INTERNAL MEDICINE

## 2020-10-07 PROCEDURE — 1125F PR PAIN SEVERITY QUANTIFIED, PAIN PRESENT: ICD-10-PCS | Mod: S$GLB,,, | Performed by: INTERNAL MEDICINE

## 2020-10-07 RX ORDER — GUANFACINE 1 MG/1
1 TABLET ORAL 2 TIMES DAILY PRN
Qty: 30 TABLET | Refills: 6 | Status: SHIPPED | OUTPATIENT
Start: 2020-10-07 | End: 2020-10-07 | Stop reason: SDUPTHER

## 2020-10-07 RX ORDER — GUANFACINE 1 MG/1
1 TABLET ORAL 2 TIMES DAILY PRN
Qty: 30 TABLET | Refills: 6 | Status: SHIPPED | OUTPATIENT
Start: 2020-10-07 | End: 2021-10-25

## 2020-10-07 RX ORDER — METHOTREXATE 2.5 MG/1
TABLET ORAL
Qty: 48 TABLET | Refills: 3 | Status: SHIPPED | OUTPATIENT
Start: 2020-10-07 | End: 2021-06-21 | Stop reason: SDUPTHER

## 2020-10-07 RX ORDER — LEVOTHYROXINE SODIUM 88 UG/1
88 TABLET ORAL
Qty: 30 TABLET | Refills: 1 | Status: SHIPPED | OUTPATIENT
Start: 2020-10-07 | End: 2020-11-02

## 2020-10-07 NOTE — PROGRESS NOTES
Subjective:          Chief Complaint: Fabby Guerrero is a 75 y.o. female who had concerns including Rheumatoid Arthritis.    HPI:      Rheumatoid Arthritis  Patient is an 74 y.o. female here for follow up seropostive Rheumatoid Arthritis. Serologies are: RF+, CCP HIGH titer +.        Symptoms have been present for several years.   Symptoms include eye symptoms, joint pain, joint swelling, sleep difficulties and weakness of hands and are of moderate severity. Morning stiffness: 25 minute Patient denies joint pain, joint swelling and morning stiffness. Symptoms are made worse by: movement, overuse and resting.  Symptoms are helped by arthritis medications.  Associated symptoms include arthralgia and fatigue and secondary Sjogrens. Previously on Restasis, now systane/Refresh. Diclofenac for 1 year with decreased urination and stable creatinine; now off.     Seen with Ortho, Dr. Agudelo,  received Euflexxa in past.   Bilateral this did help     Uses Hyrdocodone only occasionally     She is taking gabapentin only to sleep and not every night. Noting tingling in legs and fingers.   Having metarsalgia in the AM, shoulders, wrist and fingers more stiff, triggering right 4th, and left 2nd finger. Overal   Doing well with [facility]. [facility].   Current medications  Methotrexate - Problems: none-- 4 tabs weekly doing ok.  Did have ulcer increased folic acid 2mg still with some ulcers.   Prednisone 5mg PRN flares has not taken any prednisone in 1 year.   Hydrocodone rarely for pain-  Aleve PRN-previously using diclofenac but urinary hesitance     Using Gabapentin for cervical radiculopathy. 600mg hs.   No hx of HCQ-  No personal hx of malignancy.    C/o feet /toes painful more but improved with recent celebrex.     Labs dated 12/2019 at "[facility] for methotrexate monitoring show everything is within normal limits.    Interval events:   MRI of the shoulder some musculature is of the rotator cuff is grossly intact mild tendinopathy  subacromial and glenohumeral narrowing but no active bursitis was noticed she has some hypertrophic changes at the AC joint    MRI of her cervical  spine however showed multilevel foraminal stenosis most prominent at C5-6 moderate to severe left > than right.  She also had a few disc osteophyte complexes which could even  a facet mediated pain causing muscle spasms radiating along the shoulder girdle in the upper trapezius region.     Dr Edmonds from pain management    Component      Latest Ref Rng & Units 4/17/2018 5/26/2015   Anti Sm/RNP Antibody      0.00 - 19.99 EU  0.35   Anti-Sm/RNP Interpretation      Negative  Negative   B27 Testing Date        06/16/2015 11:48 AM   HLA B27 Result        Negative   ds DNA Ab      Negative 1:10  Negative 1:10   CHON Screen      Negative <1:160  Negative <1:160   Rheumatoid Factor      <14 IU/mL 128 (H) 34.0 (H)   CCP Antibodies      <5.0 U/mL  6.1 (H)   Sed Rate      < OR = 30 mm/h 11    Cyclic Citrullinated Peptide (CCP) Ab (IgG)      UNITS >250 (H)    CRP      <8.0 mg/L 1.4      REVIEW OF SYSTEMS:    Review of Systems   Constitutional: Positive for malaise/fatigue. Negative for fever and weight loss.   HENT: Negative for sore throat.    Eyes: Positive for discharge and redness. Negative for double vision and photophobia.   Respiratory: Negative for cough, shortness of breath and wheezing.    Cardiovascular: Negative for chest pain, palpitations and orthopnea.   Gastrointestinal: Negative for abdominal pain, constipation and diarrhea.   Genitourinary: Negative for dysuria, hematuria and urgency.   Musculoskeletal: Positive for joint pain. Negative for back pain and myalgias.   Skin: Negative for rash.   Neurological: Negative for dizziness, tingling, focal weakness and headaches.   Endo/Heme/Allergies: Does not bruise/bleed easily.   Psychiatric/Behavioral: Negative for depression, hallucinations and suicidal ideas.         Objective:        Past Medical History:    Diagnosis Date    Chronic neck pain     Chronic shoulder pain     Rheumatoid arthritis     Thyroid disease     hypothyroidism     Family History   Problem Relation Age of Onset    Cancer Mother         lung    Cancer Father         colon and lung    Cancer Daughter         melanoma     Social History     Tobacco Use    Smoking status: Former Smoker     Quit date: 1976     Years since quittin.5    Smokeless tobacco: Never Used   Substance Use Topics    Alcohol use: Yes     Alcohol/week: 3.0 standard drinks     Types: 3 Glasses of wine per week    Drug use: No         Current Outpatient Medications on File Prior to Visit   Medication Sig Dispense Refill    acetaminophen (TYLENOL ARTHRITIS PAIN) 650 MG TbSR Take 650 mg by mouth every 8 (eight) hours as needed.       celecoxib (CELEBREX) 100 MG capsule Take 1 capsule (100 mg total) by mouth 2 (two) times daily. 180 capsule 3    cyanocobalamin 2000 MCG tablet Take 2,000 mcg by mouth once daily.      fish oil-omega-3 fatty acids 300-1,000 mg capsule Take 2 g by mouth once daily.      folic acid (FOLVITE) 1 MG tablet TAKE 2 TABLETS ONCE DAILY. 180 tablet 3    glucosamine-chondroitin 500-400 mg tablet Take 2 tablets by mouth once daily.       levothyroxine (SYNTHROID) 88 MCG tablet Take 1 tablet (88 mcg total) by mouth before breakfast. 90 tablet 3    methotrexate 2.5 MG Tab TAKE 4 TABLETS (10MG) EVERY 7 DAYS 48 tablet 3    multivitamin (ONE DAILY MULTIVITAMIN) per tablet Take 1 tablet by mouth once daily.      omeprazole (PRILOSEC) 20 MG capsule Take 20 mg by mouth as needed.       TURMERIC (CURCUMIN MISC) 750 mg by Misc.(Non-Drug; Combo Route) route 2 (two) times daily.      VITAMIN A-C-D3-COD LIVER OIL ORAL Take by mouth.      vitamin B complex-folic acid 0.4 mg Tab Take by mouth.      diclofenac sodium (VOLTAREN) 1 % Gel Apply 2 g topically 2 (two) times daily. 100 g 3    docusate sodium (COLACE) 100 MG capsule Take 100 mg by mouth  every evening.      FLUZONE HIGH-DOSE 2016-17, PF, 180 mcg/0.5 mL Syrg       gabapentin (NEURONTIN) 300 MG capsule Take 2 capsules (600 mg total) by mouth every evening. 180 capsule 3    HYDROcodone-acetaminophen (NORCO)  mg per tablet Take 1 tablet by mouth every 6 (six) hours as needed. 90 tablet 0    Lactobacillus acidophilus Cap Take by mouth.      melatonin 5 mg Cap Take by mouth as needed.       methotrexate 2.5 MG Tab patient taking 4 tablets every 7 days 48 tablet 3    methylPREDNISolone (MEDROL DOSEPACK) 4 mg tablet use as directed 1 Package 0    predniSONE (DELTASONE) 5 MG tablet 1 to 2 tabs po qam prn swelling (Patient not taking: Reported on 10/7/2020) 90 tablet 3     No current facility-administered medications on file prior to visit.        Vitals:    10/07/20 1119   BP: (!) 156/76   Pulse: 69       Physical Exam:    Physical Exam  Constitutional:       Appearance: Normal appearance. She is well-developed.   HENT:      Nose: No septal deviation.      Mouth/Throat:      Mouth: No oral lesions.   Eyes:      Conjunctiva/sclera:      Right eye: Right conjunctiva is not injected.      Left eye: Left conjunctiva is not injected.      Pupils: Pupils are equal, round, and reactive to light.   Neck:      Thyroid: No thyroid mass or thyromegaly.      Vascular: No JVD.   Cardiovascular:      Rate and Rhythm: Normal rate and regular rhythm.      Pulses: Normal pulses.      Comments: No edema  Pulmonary:      Effort: Pulmonary effort is normal.      Breath sounds: Normal breath sounds.   Abdominal:      Palpations: Abdomen is soft.   Musculoskeletal:      Right shoulder: She exhibits normal range of motion, no tenderness and no swelling.      Left shoulder: She exhibits normal range of motion, no tenderness and no swelling.      Right elbow: She exhibits normal range of motion and no swelling. No tenderness found.      Left elbow: She exhibits normal range of motion and no swelling. No tenderness  found.      Right wrist: She exhibits decreased range of motion and tenderness. She exhibits no swelling.      Left wrist: She exhibits decreased range of motion and tenderness. She exhibits no swelling.      Right hip: She exhibits normal range of motion, normal strength and no swelling.      Left hip: She exhibits normal range of motion, no tenderness and no swelling.      Right knee: She exhibits normal range of motion and no swelling. No tenderness found.      Left knee: She exhibits normal range of motion and no swelling. No tenderness found.      Right ankle: She exhibits normal range of motion and no swelling. No tenderness.      Left ankle: She exhibits normal range of motion and no swelling. No tenderness.      Right hand: She exhibits decreased range of motion and tenderness.      Left hand: She exhibits decreased range of motion and tenderness.      Comments: 5/5 upper and lower extremity bilateral muscle strength   Lymphadenopathy:      Cervical: No cervical adenopathy.   Skin:     General: Skin is dry.   Neurological:      Deep Tendon Reflexes: Reflexes are normal and symmetric.               Assessment:       Encounter Diagnoses   Name Primary?    Rheumatoid arthritis of hand     Osteoarthritis of spine with radiculopathy, thoracolumbar region     Osteoarthritis of neck     Hypothyroidism, adult     Anxiety Yes          Plan:        Rheumatoid arthritis involving both hands with positive rheumatoid factor    Osteoarthritis of spine with radiculopathy, cervical spine.      MTX continue 4 tabs weekly   Folic acid  Celebrex 100mg BID will likely help with her joints as well-I see very little synovitis at this time and suspect this is residual damage that is causing bulk of pain in toes.   Prednisone 5-10-mg daily PRN-    Labs with [facility] every 12 weeks-printed new order.   . I am not seeing much acitve synovitis.     Cervical DJD:       Gabapentin too many cognitive ASE off entirely   Hydrocodone PRN  using very rarely   If pain escalates try Lyrica at 25mg at HS>   1. Keep Methotrexate for RA      2. Hand (right) if triggering worsens I would speak with Dr. Anthony      3. Try Depomedrol 40mg IM see if this helps with elbows and Upper arms which may be from the neck.       4. Gabapentin: to wean (for memory concern).    200mg at night for 1 week   100mg at night for 1 week then can try stopping    5. Anxiety: waiting for new PCP    Trial with Tenex PRN for now.   No follow-ups on file.          30min consultation with greater than 50% spent in counseling, chart review and coordination of care. All questions answered.

## 2020-10-08 ENCOUNTER — TELEPHONE (OUTPATIENT)
Dept: RHEUMATOLOGY | Facility: CLINIC | Age: 75
End: 2020-10-08

## 2020-10-08 NOTE — TELEPHONE ENCOUNTER
----- Message from Bertha Bills MA sent at 10/8/2020  9:54 AM CDT -----  Regarding: call back  PT is requesting a call back in regards to a mix up with her new medication  Guanfacine 1mg  folic acid (FOLVITE) 1 MG tablet ( was told she can get this OTC   Pharms: [facility] - [place], [place] - [address] [place] [address] [phone] (Phone)   [id number] [place] ( Auth ) Needs Refill  CAll Back [phone] - Pts cell     Patient is told that sometimes insurance does not cover Folic acid as it is over the counter. Patient was able to get new medicine with a coupon, but will need PA soon. CG

## 2020-10-13 LAB
ALBUMIN SERPL-MCNC: 4.3 G/DL (ref 3.6–5.1)
ALBUMIN/GLOB SERPL: 1.7 (CALC) (ref 1–2.5)
ALP SERPL-CCNC: 61 U/L (ref 37–153)
ALT SERPL-CCNC: 13 U/L (ref 6–29)
AST SERPL-CCNC: 17 U/L (ref 10–35)
BASOPHILS # BLD AUTO: 20 CELLS/UL (ref 0–200)
BASOPHILS NFR BLD AUTO: 0.3 %
BILIRUB SERPL-MCNC: 0.5 MG/DL (ref 0.2–1.2)
BUN SERPL-MCNC: 11 MG/DL (ref 7–25)
BUN/CREAT SERPL: ABNORMAL (CALC) (ref 6–22)
CALCIUM SERPL-MCNC: 9.6 MG/DL (ref 8.6–10.4)
CHLORIDE SERPL-SCNC: 103 MMOL/L (ref 98–110)
CO2 SERPL-SCNC: 29 MMOL/L (ref 20–32)
CREAT SERPL-MCNC: 0.76 MG/DL (ref 0.6–0.93)
CRP SERPL-MCNC: 0.8 MG/L
EOSINOPHIL # BLD AUTO: 88 CELLS/UL (ref 15–500)
EOSINOPHIL NFR BLD AUTO: 1.3 %
ERYTHROCYTE [DISTWIDTH] IN BLOOD BY AUTOMATED COUNT: 12.3 % (ref 11–15)
ERYTHROCYTE [SEDIMENTATION RATE] IN BLOOD BY WESTERGREN METHOD: 6 MM/H
GFRSERPLBLD MDRD-ARVRAT: 76 ML/MIN/1.73M2
GLOBULIN SER CALC-MCNC: 2.6 G/DL (CALC) (ref 1.9–3.7)
GLUCOSE SERPL-MCNC: 103 MG/DL (ref 65–99)
HCT VFR BLD AUTO: 42 % (ref 35–45)
HGB BLD-MCNC: 13.7 G/DL (ref 11.7–15.5)
LYMPHOCYTES # BLD AUTO: 1340 CELLS/UL (ref 850–3900)
LYMPHOCYTES NFR BLD AUTO: 19.7 %
MCH RBC QN AUTO: 31.1 PG (ref 27–33)
MCHC RBC AUTO-ENTMCNC: 32.6 G/DL (ref 32–36)
MCV RBC AUTO: 95.2 FL (ref 80–100)
MONOCYTES # BLD AUTO: 428 CELLS/UL (ref 200–950)
MONOCYTES NFR BLD AUTO: 6.3 %
NEUTROPHILS # BLD AUTO: 4923 CELLS/UL (ref 1500–7800)
NEUTROPHILS NFR BLD AUTO: 72.4 %
PLATELET # BLD AUTO: 332 THOUSAND/UL (ref 140–400)
PMV BLD REES-ECKER: 9.8 FL (ref 7.5–12.5)
POTASSIUM SERPL-SCNC: 4.7 MMOL/L (ref 3.5–5.3)
PROT SERPL-MCNC: 6.9 G/DL (ref 6.1–8.1)
RBC # BLD AUTO: 4.41 MILLION/UL (ref 3.8–5.1)
SODIUM SERPL-SCNC: 139 MMOL/L (ref 135–146)
WBC # BLD AUTO: 6.8 THOUSAND/UL (ref 3.8–10.8)

## 2020-10-21 ENCOUNTER — TELEPHONE (OUTPATIENT)
Dept: RHEUMATOLOGY | Facility: CLINIC | Age: 75
End: 2020-10-21

## 2020-10-21 NOTE — TELEPHONE ENCOUNTER
This was for anxiety if not covered will need to speak with PCP or pay cash. Clonidine not applicable for this use.   CYNDY LEVIN

## 2020-10-21 NOTE — TELEPHONE ENCOUNTER
Received call from pharmacy stating Guanfacine 1 mg tablets are  not covered by her plan. They are asking for Clonidine to be approved as a substitute. Pharmacy notified that the doctor is out of the office until 10-26-20. Please advise. CG

## 2020-10-22 ENCOUNTER — TELEPHONE (OUTPATIENT)
Dept: RHEUMATOLOGY | Facility: CLINIC | Age: 75
End: 2020-10-22

## 2020-10-22 NOTE — TELEPHONE ENCOUNTER
----- Message from Jazmyne Olivera DO sent at 10/19/2020  4:05 PM CDT -----  Call patient labs ok no change in therapy.    Patient informed as above. AKILA

## 2020-10-22 NOTE — TELEPHONE ENCOUNTER
----- Message from Sonia Sales sent at 10/22/2020  9:39 AM CDT -----  Contact: self  Type:  Patient Returning Call    Who Called:  patient  Who Left Message for Patient:  Louisa   Does the patient know what this is regarding?:  med and results of labs from 10/7  Best Call Back Number:  [phone]  Additional Information:  Thanks      Reviewed lab results and discussed Guanfacine. Patient was able to get with a coupon even though mot covered by insurance. She is aware to discuss with PCP. AKILA

## 2020-11-12 ENCOUNTER — OFFICE VISIT (OUTPATIENT)
Dept: FAMILY MEDICINE | Facility: CLINIC | Age: 75
End: 2020-11-12
Payer: MEDICARE

## 2020-11-12 VITALS
HEART RATE: 75 BPM | SYSTOLIC BLOOD PRESSURE: 118 MMHG | TEMPERATURE: 98 F | DIASTOLIC BLOOD PRESSURE: 72 MMHG | HEIGHT: 62 IN | OXYGEN SATURATION: 97 % | BODY MASS INDEX: 25.88 KG/M2 | WEIGHT: 140.63 LBS

## 2020-11-12 DIAGNOSIS — N32.81 OVERACTIVE BLADDER: ICD-10-CM

## 2020-11-12 DIAGNOSIS — R35.89 POLYURIA: ICD-10-CM

## 2020-11-12 DIAGNOSIS — M06.042 RHEUMATOID ARTHRITIS INVOLVING BOTH HANDS WITH NEGATIVE RHEUMATOID FACTOR: Primary | ICD-10-CM

## 2020-11-12 DIAGNOSIS — Z11.59 ENCOUNTER FOR HEPATITIS C SCREENING TEST FOR LOW RISK PATIENT: ICD-10-CM

## 2020-11-12 DIAGNOSIS — M06.041 RHEUMATOID ARTHRITIS INVOLVING BOTH HANDS WITH NEGATIVE RHEUMATOID FACTOR: Primary | ICD-10-CM

## 2020-11-12 DIAGNOSIS — Z78.0 POST-MENOPAUSAL: ICD-10-CM

## 2020-11-12 DIAGNOSIS — E03.9 ACQUIRED HYPOTHYROIDISM: ICD-10-CM

## 2020-11-12 DIAGNOSIS — Z13.6 ENCOUNTER FOR SCREENING FOR CARDIOVASCULAR DISORDERS: ICD-10-CM

## 2020-11-12 DIAGNOSIS — Z13.220 SCREENING FOR LIPID DISORDERS: ICD-10-CM

## 2020-11-12 LAB
BILIRUB SERPL-MCNC: ABNORMAL MG/DL
BLOOD, POC UA: ABNORMAL
GLUCOSE UR QL STRIP: NORMAL
KETONES UR QL STRIP: ABNORMAL
LEUKOCYTE ESTERASE URINE, POC: ABNORMAL
NITRITE, POC UA: ABNORMAL
PH, POC UA: 6
PROTEIN, POC: ABNORMAL
SPECIFIC GRAVITY, POC UA: 1.01
UROBILINOGEN, POC UA: NORMAL

## 2020-11-12 PROCEDURE — 81003 POCT URINALYSIS: ICD-10-PCS | Mod: QW,S$GLB,, | Performed by: INTERNAL MEDICINE

## 2020-11-12 PROCEDURE — G0008 ADMIN INFLUENZA VIRUS VAC: HCPCS | Mod: S$GLB,,, | Performed by: INTERNAL MEDICINE

## 2020-11-12 PROCEDURE — 1101F PR PT FALLS ASSESS DOC 0-1 FALLS W/OUT INJ PAST YR: ICD-10-PCS | Mod: S$GLB,,, | Performed by: INTERNAL MEDICINE

## 2020-11-12 PROCEDURE — 1159F PR MEDICATION LIST DOCUMENTED IN MEDICAL RECORD: ICD-10-PCS | Mod: S$GLB,,, | Performed by: INTERNAL MEDICINE

## 2020-11-12 PROCEDURE — 1126F PR PAIN SEVERITY QUANTIFIED, NO PAIN PRESENT: ICD-10-PCS | Mod: S$GLB,,, | Performed by: INTERNAL MEDICINE

## 2020-11-12 PROCEDURE — 99204 PR OFFICE/OUTPT VISIT, NEW, LEVL IV, 45-59 MIN: ICD-10-PCS | Mod: 25,S$GLB,, | Performed by: INTERNAL MEDICINE

## 2020-11-12 PROCEDURE — 1101F PT FALLS ASSESS-DOCD LE1/YR: CPT | Mod: S$GLB,,, | Performed by: INTERNAL MEDICINE

## 2020-11-12 PROCEDURE — 90694 FLU VACCINE - QUADRIVALENT - ADJUVANTED: ICD-10-PCS | Mod: S$GLB,,, | Performed by: INTERNAL MEDICINE

## 2020-11-12 PROCEDURE — 99204 OFFICE O/P NEW MOD 45 MIN: CPT | Mod: 25,S$GLB,, | Performed by: INTERNAL MEDICINE

## 2020-11-12 PROCEDURE — 1159F MED LIST DOCD IN RCRD: CPT | Mod: S$GLB,,, | Performed by: INTERNAL MEDICINE

## 2020-11-12 PROCEDURE — 81003 URINALYSIS AUTO W/O SCOPE: CPT | Mod: QW,S$GLB,, | Performed by: INTERNAL MEDICINE

## 2020-11-12 PROCEDURE — 3288F PR FALLS RISK ASSESSMENT DOCUMENTED: ICD-10-PCS | Mod: S$GLB,,, | Performed by: INTERNAL MEDICINE

## 2020-11-12 PROCEDURE — G0008 FLU VACCINE - QUADRIVALENT - ADJUVANTED: ICD-10-PCS | Mod: S$GLB,,, | Performed by: INTERNAL MEDICINE

## 2020-11-12 PROCEDURE — 99999 PR PBB SHADOW E&M-EST. PATIENT-LVL IV: ICD-10-PCS | Mod: PBBFAC,,, | Performed by: INTERNAL MEDICINE

## 2020-11-12 PROCEDURE — 90694 VACC AIIV4 NO PRSRV 0.5ML IM: CPT | Mod: S$GLB,,, | Performed by: INTERNAL MEDICINE

## 2020-11-12 PROCEDURE — 3288F FALL RISK ASSESSMENT DOCD: CPT | Mod: S$GLB,,, | Performed by: INTERNAL MEDICINE

## 2020-11-12 PROCEDURE — 99999 PR PBB SHADOW E&M-EST. PATIENT-LVL IV: CPT | Mod: PBBFAC,,, | Performed by: INTERNAL MEDICINE

## 2020-11-12 PROCEDURE — 1126F AMNT PAIN NOTED NONE PRSNT: CPT | Mod: S$GLB,,, | Performed by: INTERNAL MEDICINE

## 2020-11-12 NOTE — PROGRESS NOTES
Patient ID: Fabby Guerrero is a 75 y.o. female.    Chief Complaint: Establish Care      Social Hx:  From [place]. Now living [place]. . 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     PMH:  1.  Hypothyroidism  2.  Rheumatoid arthritis:  Methotrexate with folic acid.  Followed by Dr. Olivera.   3.  DIMITRIS: CPAP   4.  GERD:     HPI:   She is here to [facility]. She takes MTX and folic acid and doing well. Has not taken prednisone in a while. celebrex BID. She states that she has frequent urination x 2-3 wks. No dysuria or odor. She is taking supplement that turns urine dark yellow. No new flank pain. No fevers. No recent UTIs.     A/P:   Likely not diabetes as CMP showed 103 glucose (non fasting) Check UA  -UA clear, no signs of UTI, could be incomplete bladder emptying or over active bladder.   -With ref Urology  Check TSH prior to refill of med  Check lipid.         Health Maintenance:       PMH:   Past Medical History:   Diagnosis Date    Chronic neck pain     Chronic shoulder pain     Rheumatoid arthritis      Surg Hx:   Past Surgical History:   Procedure Laterality Date    CARPAL TUNNEL RELEASE      bilateral     SECTION      x3    EYE SURGERY      bilat cataracts with implant    HYSTERECTOMY      TUBAL LIGATION       Fhx:   Family History   Problem Relation Age of Onset    Cancer Mother         lung    Cancer Father         colon and lung    Cancer Daughter         melanoma     Social Hx:   Social History     Socioeconomic History    Marital status:      Spouse name: Not on file    Number of children: Not on file    Years of education: Not on file    Highest education level: Not on file   Occupational History    Not on file   Social Needs    Financial resource strain: Not on file    Food insecurity     Worry: Not on file     Inability: Not on file    Transportation needs     Medical: Not on file     Non-medical: Not on file   Tobacco Use    Smoking status: Former Smoker      Quit date: 1976     Years since quittin.6    Smokeless tobacco: Never Used   Substance and Sexual Activity    Alcohol use: Yes     Alcohol/week: 3.0 standard drinks     Types: 3 Glasses of wine per week    Drug use: No    Sexual activity: Yes     Partners: Male   Lifestyle    Physical activity     Days per week: Not on file     Minutes per session: Not on file    Stress: Not on file   Relationships    Social connections     Talks on phone: Not on file     Gets together: Not on file     Attends Baptism service: Not on file     Active member of club or organization: Not on file     Attends meetings of clubs or organizations: Not on file     Relationship status: Not on file   Other Topics Concern    Not on file   Social History Narrative    Not on file       Current Outpatient Medications on File Prior to Visit   Medication Sig Dispense Refill    acetaminophen (TYLENOL ARTHRITIS PAIN) 650 MG TbSR Take 650 mg by mouth every 8 (eight) hours as needed.       celecoxib (CELEBREX) 100 MG capsule Take 1 capsule (100 mg total) by mouth 2 (two) times daily. 180 capsule 3    fish oil-omega-3 fatty acids 300-1,000 mg capsule Take 2 g by mouth once daily.      folic acid (FOLVITE) 1 MG tablet TAKE 2 TABLETS ONCE DAILY. 180 tablet 3    glucosamine-chondroitin 500-400 mg tablet Take 2 tablets by mouth once daily.       HYDROcodone-acetaminophen (NORCO)  mg per tablet Take 1 tablet by mouth every 6 (six) hours as needed. 90 tablet 0    levothyroxine (SYNTHROID) 88 MCG tablet TAKE 1 TABLET BY MOUTH EVERY MORNING BEFORE BREAKFAST 90 tablet 1    melatonin 5 mg Cap Take by mouth as needed.       methotrexate 2.5 MG Tab TAKE 4 TABLETS (10MG) EVERY 7 DAYS 48 tablet 3    omeprazole (PRILOSEC) 20 MG capsule Take 20 mg by mouth as needed.       TURMERIC (CURCUMIN MISC) 750 mg by Misc.(Non-Drug; Combo Route) route 2 (two) times daily.      VITAMIN A-C-D3-COD LIVER OIL ORAL Take by mouth.       vitamin B complex-folic acid 0.4 mg Tab Take by mouth.      docusate sodium (COLACE) 100 MG capsule Take 100 mg by mouth every evening.      guanFACINE (TENEX) 1 MG Tab Take 1 tablet (1 mg total) by mouth 2 (two) times daily as needed (anxiety). (Patient not taking: Reported on 11/12/2020) 30 tablet 6    Lactobacillus acidophilus Cap Take by mouth.      multivitamin (ONE DAILY MULTIVITAMIN) per tablet Take 1 tablet by mouth once daily.      predniSONE (DELTASONE) 5 MG tablet 1 to 2 tabs po qam prn swelling (Patient not taking: Reported on 10/7/2020) 90 tablet 3     No current facility-administered medications on file prior to visit.      I personally reviewed past medical, family and social history.  Review of Systems   Constitutional: Negative for activity change and fever.   HENT: Negative for sore throat and trouble swallowing.    Eyes: Negative for pain and visual disturbance.   Respiratory: Negative for cough, shortness of breath and wheezing.    Cardiovascular: Negative for chest pain, palpitations and leg swelling.   Gastrointestinal: Negative for abdominal pain, blood in stool, diarrhea, nausea and vomiting.   Endocrine: Negative for cold intolerance and polyuria.   Genitourinary: Negative for decreased urine volume and dysuria.        Polyuria    Musculoskeletal: Negative for gait problem and neck pain.   Skin: Negative for rash.   Neurological: Negative for dizziness, syncope and light-headedness.   Psychiatric/Behavioral: Negative for dysphoric mood. The patient is not nervous/anxious.        Objective:     Vitals:    11/12/20 1453   BP: 118/72   Pulse: 75   Temp: 98.1 °F (36.7 °C)        Physical Exam  Constitutional:       General: She is not in acute distress.     Appearance: She is well-developed.   HENT:      Head: Normocephalic and atraumatic.   Eyes:      Pupils: Pupils are equal, round, and reactive to light.   Neck:      Musculoskeletal: Neck supple.      Thyroid: No thyromegaly.    Cardiovascular:      Rate and Rhythm: Normal rate and regular rhythm.      Heart sounds: Normal heart sounds. No murmur. No friction rub. No gallop.    Pulmonary:      Effort: Pulmonary effort is normal. No respiratory distress.      Breath sounds: Normal breath sounds. No wheezing.   Abdominal:      General: Bowel sounds are normal.      Palpations: Abdomen is soft.      Tenderness: There is no abdominal tenderness.   Skin:     General: Skin is warm.      Findings: No rash.   Neurological:      Mental Status: She is alert and oriented to person, place, and time.      Cranial Nerves: No cranial nerve deficit.   Psychiatric:         Behavior: Behavior normal.           Assessment/Plan   Fabby was seen today for establish care.    Diagnoses and all orders for this visit:    Rheumatoid arthritis involving both hands with negative rheumatoid factor    Acquired hypothyroidism  -     Cancel: TSH; Future  -     TSH; Future  -     TSH    Screening for lipid disorders  -     Cancel: Lipid Panel; Future    Post-menopausal  -     DXA Bone Density Spine And Hip; Future    Encounter for hepatitis C screening test for low risk patient  -     Cancel: Hepatitis C Antibody; Future  -     Hepatitis C Antibody; Future  -     Hepatitis C Antibody    Encounter for screening for cardiovascular disorders   -     Cancel: Lipid Panel; Future  -     Lipid Panel; Future  -     Lipid Panel    Polyuria  -     POCT URINALYSIS        No follow-ups on file.

## 2020-11-14 LAB
CHOLEST SERPL-MCNC: 278 MG/DL
CHOLEST/HDLC SERPL: 5.6 (CALC)
HCV AB S/CO SERPL IA: 0.01
HCV AB SERPL QL IA: NORMAL
HDLC SERPL-MCNC: 50 MG/DL
LDLC SERPL CALC-MCNC: 188 MG/DL (CALC)
NONHDLC SERPL-MCNC: 228 MG/DL (CALC)
TRIGL SERPL-MCNC: 214 MG/DL
TSH SERPL-ACNC: 0.46 MIU/L (ref 0.4–4.5)

## 2020-11-16 ENCOUNTER — OFFICE VISIT (OUTPATIENT)
Dept: UROLOGY | Facility: CLINIC | Age: 75
End: 2020-11-16
Payer: MEDICARE

## 2020-11-16 VITALS
HEIGHT: 62 IN | BODY MASS INDEX: 25.88 KG/M2 | SYSTOLIC BLOOD PRESSURE: 117 MMHG | WEIGHT: 140.63 LBS | DIASTOLIC BLOOD PRESSURE: 70 MMHG | HEART RATE: 76 BPM

## 2020-11-16 DIAGNOSIS — N32.81 OVERACTIVE BLADDER: ICD-10-CM

## 2020-11-16 DIAGNOSIS — R35.1 NOCTURIA MORE THAN TWICE PER NIGHT: ICD-10-CM

## 2020-11-16 DIAGNOSIS — R35.0 URINARY FREQUENCY: Primary | ICD-10-CM

## 2020-11-16 LAB
BILIRUB SERPL-MCNC: ABNORMAL MG/DL
BLOOD URINE, POC: ABNORMAL
CLARITY, POC UA: CLEAR
COLOR, POC UA: YELLOW
GLUCOSE UR QL STRIP: ABNORMAL
KETONES UR QL STRIP: ABNORMAL
LEUKOCYTE ESTERASE URINE, POC: ABNORMAL
NITRITE, POC UA: ABNORMAL
PH, POC UA: 6.5
POC RESIDUAL URINE VOLUME: 0 ML (ref 0–100)
PROTEIN, POC: ABNORMAL
UROBILINOGEN, POC UA: 0.2

## 2020-11-16 PROCEDURE — 1101F PR PT FALLS ASSESS DOC 0-1 FALLS W/OUT INJ PAST YR: ICD-10-PCS | Mod: S$GLB,,, | Performed by: UROLOGY

## 2020-11-16 PROCEDURE — 3288F FALL RISK ASSESSMENT DOCD: CPT | Mod: S$GLB,,, | Performed by: UROLOGY

## 2020-11-16 PROCEDURE — 99204 PR OFFICE/OUTPT VISIT, NEW, LEVL IV, 45-59 MIN: ICD-10-PCS | Mod: 25,S$GLB,, | Performed by: UROLOGY

## 2020-11-16 PROCEDURE — 1101F PT FALLS ASSESS-DOCD LE1/YR: CPT | Mod: S$GLB,,, | Performed by: UROLOGY

## 2020-11-16 PROCEDURE — 51798 US URINE CAPACITY MEASURE: CPT | Mod: S$GLB,,, | Performed by: UROLOGY

## 2020-11-16 PROCEDURE — 3288F PR FALLS RISK ASSESSMENT DOCUMENTED: ICD-10-PCS | Mod: S$GLB,,, | Performed by: UROLOGY

## 2020-11-16 PROCEDURE — 81002 URINALYSIS NONAUTO W/O SCOPE: CPT | Mod: S$GLB,,, | Performed by: UROLOGY

## 2020-11-16 PROCEDURE — 51798 POCT BLADDER SCAN: ICD-10-PCS | Mod: S$GLB,,, | Performed by: UROLOGY

## 2020-11-16 PROCEDURE — 1159F MED LIST DOCD IN RCRD: CPT | Mod: S$GLB,,, | Performed by: UROLOGY

## 2020-11-16 PROCEDURE — 1159F PR MEDICATION LIST DOCUMENTED IN MEDICAL RECORD: ICD-10-PCS | Mod: S$GLB,,, | Performed by: UROLOGY

## 2020-11-16 PROCEDURE — 1126F AMNT PAIN NOTED NONE PRSNT: CPT | Mod: S$GLB,,, | Performed by: UROLOGY

## 2020-11-16 PROCEDURE — 1126F PR PAIN SEVERITY QUANTIFIED, NO PAIN PRESENT: ICD-10-PCS | Mod: S$GLB,,, | Performed by: UROLOGY

## 2020-11-16 PROCEDURE — 99204 OFFICE O/P NEW MOD 45 MIN: CPT | Mod: 25,S$GLB,, | Performed by: UROLOGY

## 2020-11-16 PROCEDURE — 99999 PR PBB SHADOW E&M-EST. PATIENT-LVL IV: ICD-10-PCS | Mod: PBBFAC,,, | Performed by: UROLOGY

## 2020-11-16 PROCEDURE — 81002 POCT URINE DIPSTICK WITHOUT MICROSCOPE: ICD-10-PCS | Mod: S$GLB,,, | Performed by: UROLOGY

## 2020-11-16 PROCEDURE — 99999 PR PBB SHADOW E&M-EST. PATIENT-LVL IV: CPT | Mod: PBBFAC,,, | Performed by: UROLOGY

## 2020-11-16 RX ORDER — MIRABEGRON 25 MG/1
25 TABLET, FILM COATED, EXTENDED RELEASE ORAL DAILY
Qty: 30 TABLET | Refills: 11 | Status: SHIPPED | OUTPATIENT
Start: 2020-11-16 | End: 2020-11-19

## 2020-11-16 NOTE — LETTER
November 16, 2020      Terry Gordon, DO  [address] [facility] [address] [place] [address]           [facility]  [address] [place] [address]  Phone: [phone]  Fax: [phone]          Patient: Fabby Guerrero   MR Number: [id number]   YOB: 1945   Date of Visit: 11/16/2020       Dear Dr. Terry Gordon:    Thank you for referring Fabby Guerrero to me for evaluation. Attached you will find relevant portions of my assessment and plan of care.    If you have questions, please do not hesitate to call me. I look forward to following Fabby Guerrero along with you.    Sincerely,    JOHANNE Doan MD    Enclosure  CC:  No Recipients    If you would like to receive this communication electronically, please contact [email or web address] or [phone] to request more information on [employer] access.    For providers and/or their staff who would like to refer a patient to [facility], please contact us through our one-stop-shop provider referral line, [facility], at [phone].    If you feel you have received this communication in error or would no longer like to receive these types of communications, please e-mail [email or web address]

## 2020-11-16 NOTE — PROGRESS NOTES
Subjective:       Patient ID: Fabby Guerrero is a 75 y.o. female.    Chief Complaint: Other (over active bladder)    HPI     75-year-old with urinary frequency and nocturia.  This has been an ongoing problem but has been intermittent.  She also has abdominal discomfort.  She has constant urge to void.  She has to sleep positional in order to decrease this urge.  She denies hematuria and dysuria.  She does have unusual GI routine.  She says she has 3-4 bowel movements per day although for noon and then has none throughout the course of the day.  She says her stools are soft and done formed.  She denies constipation.  She does not take regular stool softeners.  She has not tried any previous overactive bladder medications.  Urinalysis is clear except for trace blood.    Urine dipstick shows negative for all components except trace blood.  PVR 0 ml    Past Medical History:   Diagnosis Date    Chronic neck pain     Chronic shoulder pain     Rheumatoid arthritis      Past Surgical History:   Procedure Laterality Date    CARPAL TUNNEL RELEASE      bilateral     SECTION      x3    EYE SURGERY      bilat cataracts with implant    HYSTERECTOMY      TUBAL LIGATION         Current Outpatient Medications:     acetaminophen (TYLENOL ARTHRITIS PAIN) 650 MG TbSR, Take 650 mg by mouth every 8 (eight) hours as needed. , Disp: , Rfl:     celecoxib (CELEBREX) 100 MG capsule, Take 1 capsule (100 mg total) by mouth 2 (two) times daily., Disp: 180 capsule, Rfl: 3    docusate sodium (COLACE) 100 MG capsule, Take 100 mg by mouth every evening., Disp: , Rfl:     fish oil-omega-3 fatty acids 300-1,000 mg capsule, Take 2 g by mouth once daily., Disp: , Rfl:     folic acid (FOLVITE) 1 MG tablet, TAKE 2 TABLETS ONCE DAILY., Disp: 180 tablet, Rfl: 3    glucosamine-chondroitin 500-400 mg tablet, Take 2 tablets by mouth once daily. , Disp: , Rfl:     Lactobacillus acidophilus Cap, Take by mouth., Disp: , Rfl:      levothyroxine (SYNTHROID) 88 MCG tablet, TAKE 1 TABLET BY MOUTH EVERY MORNING BEFORE BREAKFAST, Disp: 90 tablet, Rfl: 1    melatonin 5 mg Cap, Take by mouth as needed. , Disp: , Rfl:     methotrexate 2.5 MG Tab, TAKE 4 TABLETS (10MG) EVERY 7 DAYS, Disp: 48 tablet, Rfl: 3    multivitamin (ONE DAILY MULTIVITAMIN) per tablet, Take 1 tablet by mouth once daily., Disp: , Rfl:     omeprazole (PRILOSEC) 20 MG capsule, Take 20 mg by mouth as needed. , Disp: , Rfl:     predniSONE (DELTASONE) 5 MG tablet, 1 to 2 tabs po qam prn swelling, Disp: 90 tablet, Rfl: 3    TURMERIC (CURCUMIN MISC), 750 mg by Misc.(Non-Drug; Combo Route) route 2 (two) times daily., Disp: , Rfl:     VITAMIN A-C-D3-COD LIVER OIL ORAL, Take by mouth., Disp: , Rfl:     vitamin B complex-folic acid 0.4 mg Tab, Take by mouth., Disp: , Rfl:     guanFACINE (TENEX) 1 MG Tab, Take 1 tablet (1 mg total) by mouth 2 (two) times daily as needed (anxiety). (Patient not taking: Reported on 11/16/2020), Disp: 30 tablet, Rfl: 6    HYDROcodone-acetaminophen (NORCO)  mg per tablet, Take 1 tablet by mouth every 6 (six) hours as needed., Disp: 90 tablet, Rfl: 0    mirabegron (MYRBETRIQ) 25 mg Tb24 ER tablet, Take 1 tablet (25 mg total) by mouth once daily., Disp: 30 tablet, Rfl: 11      Review of Systems   Constitutional: Negative for fever.   Eyes: Negative for visual disturbance.   Respiratory: Negative for shortness of breath.    Cardiovascular: Negative for chest pain.   Gastrointestinal: Positive for abdominal pain and diarrhea. Negative for nausea.   Genitourinary: Positive for frequency and urgency. Negative for dysuria, flank pain and hematuria.   Musculoskeletal: Negative for gait problem.   Skin: Negative for rash.   Neurological: Negative for seizures.   Psychiatric/Behavioral: Negative for confusion.       Objective:      Physical Exam  Vitals signs reviewed.   Constitutional:       Appearance: She is well-developed.   HENT:      Head:  Normocephalic.   Eyes:      Conjunctiva/sclera: Conjunctivae normal.   Neck:      Musculoskeletal: Normal range of motion.   Cardiovascular:      Rate and Rhythm: Normal rate.   Pulmonary:      Effort: Pulmonary effort is normal.   Abdominal:      General: There is no distension.      Palpations: Abdomen is soft. There is no mass.      Tenderness: There is abdominal tenderness. There is no right CVA tenderness or left CVA tenderness.   Genitourinary:     Comments: Bladder non-tender and nondistended  No CVA tenderness  Skin:     General: Skin is warm and dry.      Findings: No erythema or rash.   Neurological:      Mental Status: She is alert and oriented to person, place, and time.   Psychiatric:         Behavior: Behavior normal.         Assessment:       1. Urinary frequency    2. Overactive bladder    3. Nocturia more than twice per night        Plan:       Urinary frequency  -     Ambulatory referral/consult to Urology  -     POCT URINE DIPSTICK WITHOUT MICROSCOPE  -     US Retroperitoneal Complete (Kidney and; Future; Expected date: 11/16/2020  -     Cystoscopy; Future  -     POCT Bladder Scan    Overactive bladder  -     Ambulatory referral/consult to Urology  -     POCT URINE DIPSTICK WITHOUT MICROSCOPE    Nocturia more than twice per night    Other orders  -     mirabegron (MYRBETRIQ) 25 mg Tb24 ER tablet; Take 1 tablet (25 mg total) by mouth once daily.  Dispense: 30 tablet; Refill: 11      trial Myrbetriq.  Follow up for cystoscopy and kidney ultrasound

## 2020-11-18 DIAGNOSIS — E78.5 HYPERLIPIDEMIA, UNSPECIFIED HYPERLIPIDEMIA TYPE: Primary | ICD-10-CM

## 2020-11-18 DIAGNOSIS — E78.5 HYPERLIPIDEMIA, UNSPECIFIED HYPERLIPIDEMIA TYPE: ICD-10-CM

## 2020-11-18 RX ORDER — ROSUVASTATIN CALCIUM 10 MG/1
10 TABLET, COATED ORAL DAILY
Qty: 30 TABLET | Refills: 3 | Status: SHIPPED | OUTPATIENT
Start: 2020-11-18 | End: 2020-11-18 | Stop reason: SDUPTHER

## 2020-11-18 RX ORDER — ROSUVASTATIN CALCIUM 10 MG/1
10 TABLET, COATED ORAL DAILY
Qty: 30 TABLET | Refills: 3 | Status: SHIPPED | OUTPATIENT
Start: 2020-11-18 | End: 2021-01-12 | Stop reason: SDUPTHER

## 2020-11-19 ENCOUNTER — OFFICE VISIT (OUTPATIENT)
Dept: FAMILY MEDICINE | Facility: CLINIC | Age: 75
End: 2020-11-19
Payer: MEDICARE

## 2020-11-19 ENCOUNTER — HOSPITAL ENCOUNTER (OUTPATIENT)
Dept: RADIOLOGY | Facility: HOSPITAL | Age: 75
Discharge: HOME | End: 2020-11-19
Attending: INTERNAL MEDICINE
Payer: MEDICARE

## 2020-11-19 VITALS
TEMPERATURE: 98 F | OXYGEN SATURATION: 97 % | HEART RATE: 72 BPM | BODY MASS INDEX: 25.88 KG/M2 | DIASTOLIC BLOOD PRESSURE: 68 MMHG | HEIGHT: 62 IN | WEIGHT: 140.63 LBS | SYSTOLIC BLOOD PRESSURE: 132 MMHG

## 2020-11-19 DIAGNOSIS — N39.41 URGE INCONTINENCE: Primary | ICD-10-CM

## 2020-11-19 DIAGNOSIS — Z78.0 POST-MENOPAUSAL: ICD-10-CM

## 2020-11-19 DIAGNOSIS — M47.25 OSTEOARTHRITIS OF SPINE WITH RADICULOPATHY, THORACOLUMBAR REGION: ICD-10-CM

## 2020-11-19 DIAGNOSIS — M06.042 RHEUMATOID ARTHRITIS INVOLVING BOTH HANDS WITH NEGATIVE RHEUMATOID FACTOR: ICD-10-CM

## 2020-11-19 DIAGNOSIS — R94.31 ABNORMAL EKG: ICD-10-CM

## 2020-11-19 DIAGNOSIS — M06.041 RHEUMATOID ARTHRITIS INVOLVING BOTH HANDS WITH NEGATIVE RHEUMATOID FACTOR: ICD-10-CM

## 2020-11-19 DIAGNOSIS — E78.49 OTHER HYPERLIPIDEMIA: ICD-10-CM

## 2020-11-19 DIAGNOSIS — M47.812 SPONDYLOSIS OF CERVICAL REGION WITHOUT MYELOPATHY OR RADICULOPATHY: ICD-10-CM

## 2020-11-19 DIAGNOSIS — E03.9 HYPOTHYROIDISM, ADULT: ICD-10-CM

## 2020-11-19 DIAGNOSIS — R07.89 ATYPICAL CHEST PAIN: ICD-10-CM

## 2020-11-19 PROCEDURE — 93010 ELECTROCARDIOGRAM REPORT: CPT | Mod: S$GLB,,, | Performed by: INTERNAL MEDICINE

## 2020-11-19 PROCEDURE — 1159F MED LIST DOCD IN RCRD: CPT | Mod: S$GLB,,, | Performed by: INTERNAL MEDICINE

## 2020-11-19 PROCEDURE — 93010 EKG 12-LEAD: ICD-10-PCS | Mod: S$GLB,,, | Performed by: INTERNAL MEDICINE

## 2020-11-19 PROCEDURE — 3288F FALL RISK ASSESSMENT DOCD: CPT | Mod: S$GLB,,, | Performed by: INTERNAL MEDICINE

## 2020-11-19 PROCEDURE — 99214 OFFICE O/P EST MOD 30 MIN: CPT | Mod: S$GLB,,, | Performed by: INTERNAL MEDICINE

## 2020-11-19 PROCEDURE — 1125F AMNT PAIN NOTED PAIN PRSNT: CPT | Mod: S$GLB,,, | Performed by: INTERNAL MEDICINE

## 2020-11-19 PROCEDURE — 99999 PR PBB SHADOW E&M-EST. PATIENT-LVL IV: CPT | Mod: PBBFAC,,, | Performed by: INTERNAL MEDICINE

## 2020-11-19 PROCEDURE — 3288F PR FALLS RISK ASSESSMENT DOCUMENTED: ICD-10-PCS | Mod: S$GLB,,, | Performed by: INTERNAL MEDICINE

## 2020-11-19 PROCEDURE — 1125F PR PAIN SEVERITY QUANTIFIED, PAIN PRESENT: ICD-10-PCS | Mod: S$GLB,,, | Performed by: INTERNAL MEDICINE

## 2020-11-19 PROCEDURE — 1101F PT FALLS ASSESS-DOCD LE1/YR: CPT | Mod: S$GLB,,, | Performed by: INTERNAL MEDICINE

## 2020-11-19 PROCEDURE — 99999 PR PBB SHADOW E&M-EST. PATIENT-LVL IV: ICD-10-PCS | Mod: PBBFAC,,, | Performed by: INTERNAL MEDICINE

## 2020-11-19 PROCEDURE — 77080 DXA BONE DENSITY AXIAL: CPT | Mod: TC,PO

## 2020-11-19 PROCEDURE — 93005 EKG 12-LEAD: ICD-10-PCS | Mod: S$GLB,,, | Performed by: INTERNAL MEDICINE

## 2020-11-19 PROCEDURE — 77080 DEXA BONE DENSITY SPINE HIP: ICD-10-PCS | Mod: 26,,, | Performed by: RADIOLOGY

## 2020-11-19 PROCEDURE — 99214 PR OFFICE/OUTPT VISIT, EST, LEVL IV, 30-39 MIN: ICD-10-PCS | Mod: S$GLB,,, | Performed by: INTERNAL MEDICINE

## 2020-11-19 PROCEDURE — 93005 ELECTROCARDIOGRAM TRACING: CPT | Mod: S$GLB,,, | Performed by: INTERNAL MEDICINE

## 2020-11-19 PROCEDURE — 77080 DXA BONE DENSITY AXIAL: CPT | Mod: 26,,, | Performed by: RADIOLOGY

## 2020-11-19 PROCEDURE — 1159F PR MEDICATION LIST DOCUMENTED IN MEDICAL RECORD: ICD-10-PCS | Mod: S$GLB,,, | Performed by: INTERNAL MEDICINE

## 2020-11-19 PROCEDURE — 1101F PR PT FALLS ASSESS DOC 0-1 FALLS W/OUT INJ PAST YR: ICD-10-PCS | Mod: S$GLB,,, | Performed by: INTERNAL MEDICINE

## 2020-11-19 RX ORDER — LEVOTHYROXINE SODIUM 88 UG/1
88 TABLET ORAL EVERY MORNING
Qty: 90 TABLET | Refills: 3 | Status: SHIPPED | OUTPATIENT
Start: 2020-11-19 | End: 2021-01-12 | Stop reason: SDUPTHER

## 2020-11-19 RX ORDER — OXYBUTYNIN CHLORIDE 5 MG/1
5 TABLET, EXTENDED RELEASE ORAL DAILY
Qty: 30 TABLET | Refills: 2 | Status: SHIPPED | OUTPATIENT
Start: 2020-11-19 | End: 2021-01-11 | Stop reason: SDUPTHER

## 2020-11-19 RX ORDER — OXYBUTYNIN CHLORIDE 5 MG/1
5 TABLET, EXTENDED RELEASE ORAL DAILY
Qty: 30 TABLET | Refills: 2 | Status: SHIPPED | OUTPATIENT
Start: 2020-11-19 | End: 2020-11-19 | Stop reason: SDUPTHER

## 2020-11-19 NOTE — PROGRESS NOTES
Subjective:       Patient ID: Fabby Guerrero is a 75 y.o. female.    Chief Complaint: Follow-up      Social Hx:  From [place]. Now living [place]. . 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      PMH:  1.  HLD  2.  Hypothyroidism  3.  Rheumatoid arthritis:  Methotrexate with folic acid.  Followed by Dr. Olivera.   4.  DIMITRIS: CPAP   5.  GERD:     HPI:   Here for follow-up.  Recently started rosuvastatin for LDL of 188 and total cholesterol 278. Urology put her on myrbetriq. She was given samples but cost would be around $140 per month after they run out. She has urge incontinence and urinary freq. Also having chest tightness (substernal) and dyspnea with anxiety.  Has been going on for years. Not really getting worse.     A/P:   Atypical chest pain- check EKG.   Repeat lipids in 1 month   Trial of oxybutynin  Thyroid controlled      Health Maintenance:         Current Outpatient Medications on File Prior to Visit   Medication Sig Dispense Refill    acetaminophen (TYLENOL ARTHRITIS PAIN) 650 MG TbSR Take 650 mg by mouth every 8 (eight) hours as needed.       celecoxib (CELEBREX) 100 MG capsule Take 1 capsule (100 mg total) by mouth 2 (two) times daily. 180 capsule 3    fish oil-omega-3 fatty acids 300-1,000 mg capsule Take 2 g by mouth once daily.      folic acid (FOLVITE) 1 MG tablet TAKE 2 TABLETS ONCE DAILY. 180 tablet 3    glucosamine-chondroitin 500-400 mg tablet Take 2 tablets by mouth once daily.       guanFACINE (TENEX) 1 MG Tab Take 1 tablet (1 mg total) by mouth 2 (two) times daily as needed (anxiety). 30 tablet 6    HYDROcodone-acetaminophen (NORCO)  mg per tablet Take 1 tablet by mouth every 6 (six) hours as needed. 90 tablet 0    levothyroxine (SYNTHROID) 88 MCG tablet TAKE 1 TABLET BY MOUTH EVERY MORNING BEFORE BREAKFAST 90 tablet 1    melatonin 5 mg Cap Take by mouth as needed.       methotrexate 2.5 MG Tab TAKE 4 TABLETS (10MG) EVERY 7 DAYS 48 tablet 3    mirabegron  (MYRBETRIQ) 25 mg Tb24 ER tablet Take 1 tablet (25 mg total) by mouth once daily. 30 tablet 11    omeprazole (PRILOSEC) 20 MG capsule Take 20 mg by mouth as needed.       rosuvastatin (CRESTOR) 10 MG tablet Take 1 tablet (10 mg total) by mouth once daily. 30 tablet 3    TURMERIC (CURCUMIN MISC) 750 mg by Misc.(Non-Drug; Combo Route) route 2 (two) times daily.      VITAMIN A-C-D3-COD LIVER OIL ORAL Take by mouth.      vitamin B complex-folic acid 0.4 mg Tab Take by mouth.      docusate sodium (COLACE) 100 MG capsule Take 100 mg by mouth every evening.      Lactobacillus acidophilus Cap Take by mouth.      multivitamin (ONE DAILY MULTIVITAMIN) per tablet Take 1 tablet by mouth once daily.      predniSONE (DELTASONE) 5 MG tablet 1 to 2 tabs po qam prn swelling (Patient not taking: Reported on 11/19/2020) 90 tablet 3     No current facility-administered medications on file prior to visit.      I personally reviewed past medical, family and social history.  Review of Systems   Constitutional: Negative for activity change and fever.   HENT: Negative for sore throat and trouble swallowing.    Eyes: Negative for pain and visual disturbance.   Respiratory: Negative for cough, shortness of breath and wheezing.    Cardiovascular: Negative for chest pain, palpitations and leg swelling.   Gastrointestinal: Negative for abdominal pain, blood in stool, diarrhea, nausea and vomiting.   Endocrine: Negative for cold intolerance and polyuria.   Genitourinary: Negative for decreased urine volume and dysuria.   Musculoskeletal: Negative for gait problem and neck pain.   Skin: Negative for rash.   Neurological: Negative for dizziness, syncope and light-headedness.   Psychiatric/Behavioral: Negative for dysphoric mood. The patient is not nervous/anxious.          Objective:     Vitals:    11/19/20 1323   BP: 132/68   Pulse: 72   Temp: 98.2 °F (36.8 °C)        Physical Exam  Constitutional:       General: She is not in acute  distress.     Appearance: She is well-developed.   HENT:      Head: Normocephalic and atraumatic.   Eyes:      Pupils: Pupils are equal, round, and reactive to light.   Neck:      Musculoskeletal: Neck supple.      Thyroid: No thyromegaly.   Cardiovascular:      Rate and Rhythm: Normal rate and regular rhythm.      Heart sounds: Normal heart sounds. No murmur. No friction rub. No gallop.    Pulmonary:      Effort: Pulmonary effort is normal. No respiratory distress.      Breath sounds: Normal breath sounds. No wheezing.   Abdominal:      General: Bowel sounds are normal.      Palpations: Abdomen is soft.      Tenderness: There is no abdominal tenderness.   Skin:     General: Skin is warm.      Findings: No rash.   Neurological:      Mental Status: She is alert and oriented to person, place, and time.      Cranial Nerves: No cranial nerve deficit.   Psychiatric:         Behavior: Behavior normal.           Assessment/Plan   Fabby was seen today for follow-up.    Diagnoses and all orders for this visit:    Urge incontinence  -     oxybutynin (DITROPAN-XL) 5 MG TR24; Take 1 tablet (5 mg total) by mouth once daily.    Other hyperlipidemia    Atypical chest pain  -     IN OFFICE EKG 12-LEAD (to [place])  -     Ambulatory referral/consult to Cardiology; Future    Abnormal EKG  -     Ambulatory referral/consult to Cardiology; Future

## 2020-11-23 ENCOUNTER — PATIENT OUTREACH (OUTPATIENT)
Dept: ADMINISTRATIVE | Facility: OTHER | Age: 75
End: 2020-11-23

## 2020-11-23 NOTE — PROGRESS NOTES
LINKS immunization registry not responding  Care Everywhere updated  Health Maintenance updated  Chart reviewed for overdue Proactive [facility] Encounters ([facility]) health maintenance testing (CRS, Breast Ca, Diabetic Eye Exam)   Orders entered:N/A

## 2020-11-24 ENCOUNTER — OFFICE VISIT (OUTPATIENT)
Dept: CARDIOLOGY | Facility: CLINIC | Age: 75
End: 2020-11-24
Payer: MEDICARE

## 2020-11-24 VITALS
HEIGHT: 62 IN | HEART RATE: 63 BPM | WEIGHT: 142 LBS | SYSTOLIC BLOOD PRESSURE: 128 MMHG | DIASTOLIC BLOOD PRESSURE: 72 MMHG | BODY MASS INDEX: 26.13 KG/M2

## 2020-11-24 DIAGNOSIS — E78.49 OTHER HYPERLIPIDEMIA: Primary | ICD-10-CM

## 2020-11-24 DIAGNOSIS — I25.9 CHEST PAIN DUE TO MYOCARDIAL ISCHEMIA, UNSPECIFIED ISCHEMIC CHEST PAIN TYPE: ICD-10-CM

## 2020-11-24 DIAGNOSIS — R07.89 ATYPICAL CHEST PAIN: ICD-10-CM

## 2020-11-24 DIAGNOSIS — R94.31 ABNORMAL ELECTROCARDIOGRAM (ECG) (EKG): ICD-10-CM

## 2020-11-24 DIAGNOSIS — R94.31 ABNORMAL EKG: ICD-10-CM

## 2020-11-24 PROCEDURE — 99204 OFFICE O/P NEW MOD 45 MIN: CPT | Mod: S$GLB,,, | Performed by: INTERNAL MEDICINE

## 2020-11-24 PROCEDURE — 1126F AMNT PAIN NOTED NONE PRSNT: CPT | Mod: S$GLB,,, | Performed by: INTERNAL MEDICINE

## 2020-11-24 PROCEDURE — 1159F PR MEDICATION LIST DOCUMENTED IN MEDICAL RECORD: ICD-10-PCS | Mod: S$GLB,,, | Performed by: INTERNAL MEDICINE

## 2020-11-24 PROCEDURE — 99999 PR PBB SHADOW E&M-EST. PATIENT-LVL IV: CPT | Mod: PBBFAC,,, | Performed by: INTERNAL MEDICINE

## 2020-11-24 PROCEDURE — 1159F MED LIST DOCD IN RCRD: CPT | Mod: S$GLB,,, | Performed by: INTERNAL MEDICINE

## 2020-11-24 PROCEDURE — 3288F PR FALLS RISK ASSESSMENT DOCUMENTED: ICD-10-PCS | Mod: S$GLB,,, | Performed by: INTERNAL MEDICINE

## 2020-11-24 PROCEDURE — 3288F FALL RISK ASSESSMENT DOCD: CPT | Mod: S$GLB,,, | Performed by: INTERNAL MEDICINE

## 2020-11-24 PROCEDURE — 1126F PR PAIN SEVERITY QUANTIFIED, NO PAIN PRESENT: ICD-10-PCS | Mod: S$GLB,,, | Performed by: INTERNAL MEDICINE

## 2020-11-24 PROCEDURE — 99204 PR OFFICE/OUTPT VISIT, NEW, LEVL IV, 45-59 MIN: ICD-10-PCS | Mod: S$GLB,,, | Performed by: INTERNAL MEDICINE

## 2020-11-24 PROCEDURE — 1101F PR PT FALLS ASSESS DOC 0-1 FALLS W/OUT INJ PAST YR: ICD-10-PCS | Mod: S$GLB,,, | Performed by: INTERNAL MEDICINE

## 2020-11-24 PROCEDURE — 1101F PT FALLS ASSESS-DOCD LE1/YR: CPT | Mod: S$GLB,,, | Performed by: INTERNAL MEDICINE

## 2020-11-24 PROCEDURE — 99999 PR PBB SHADOW E&M-EST. PATIENT-LVL IV: ICD-10-PCS | Mod: PBBFAC,,, | Performed by: INTERNAL MEDICINE

## 2020-11-24 NOTE — PROGRESS NOTES
Subjective:    Patient ID:  Fabby Guerrero is a 75 y.o. female who presents for evaluation of Dr Gordon Ref Abnormal EKG and Shortness of Breath      HPI 76 yo WF with HLD just started on statis whose resting EKG shows T wave inversions in V1-3 that has not changed sin the past year. She is active but states she has feelings of not being able to get a deep breath and has a focal pain in the chest.     Review of Systems   Constitution: Negative for decreased appetite, fever, malaise/fatigue, weight gain and weight loss.   HENT: Negative for hearing loss and nosebleeds.    Eyes: Negative for visual disturbance.   Cardiovascular: Positive for chest pain and dyspnea on exertion. Negative for claudication, cyanosis, irregular heartbeat, leg swelling, near-syncope, orthopnea, palpitations, paroxysmal nocturnal dyspnea and syncope.   Respiratory: Positive for shortness of breath. Negative for cough, hemoptysis, sleep disturbances due to breathing, snoring and wheezing.    Endocrine: Negative for cold intolerance, heat intolerance, polydipsia and polyuria.   Hematologic/Lymphatic: Negative for adenopathy and bleeding problem. Does not bruise/bleed easily.   Skin: Negative for color change, itching, poor wound healing, rash and suspicious lesions.   Musculoskeletal: Negative for arthritis, back pain, falls, joint pain, joint swelling, muscle cramps, muscle weakness and myalgias.   Gastrointestinal: Negative for bloating, abdominal pain, change in bowel habit, constipation, flatus, heartburn, hematemesis, hematochezia, hemorrhoids, jaundice, melena, nausea and vomiting.   Genitourinary: Negative for bladder incontinence, decreased libido, frequency, hematuria, hesitancy and urgency.   Neurological: Negative for brief paralysis, difficulty with concentration, excessive daytime sleepiness, dizziness, focal weakness, headaches, light-headedness, loss of balance, numbness, vertigo and weakness.   Psychiatric/Behavioral:  "Negative for altered mental status, depression and memory loss. The patient does not have insomnia and is not nervous/anxious.    Allergic/Immunologic: Negative for environmental allergies, hives and persistent infections.        Objective:    Physical Exam   Constitutional: She is oriented to person, place, and time. She appears well-developed and well-nourished.   /72 (BP Location: Left arm, Patient Position: Sitting, BP Method: Small (Automatic))   Pulse 63   Ht 5' 2" (1.575 m)   Wt 64.4 kg (141 lb 15.6 oz)   BMI 25.97 kg/m²      HENT:   Head: Normocephalic and atraumatic.   Right Ear: External ear normal.   Left Ear: External ear normal.   Nose: Nose normal.   Mouth/Throat: Oropharynx is clear and moist.   Eyes: Pupils are equal, round, and reactive to light. Conjunctivae, EOM and lids are normal. Right eye exhibits no discharge. Left eye exhibits no discharge. Right conjunctiva has no hemorrhage. No scleral icterus.   Neck: Normal range of motion. Neck supple. No JVD present. No tracheal deviation present. No thyromegaly present.   Cardiovascular: Normal rate, regular rhythm, normal heart sounds and intact distal pulses. Exam reveals no gallop and no friction rub.   No murmur heard.  Pulmonary/Chest: Effort normal and breath sounds normal. No respiratory distress. She has no wheezes. She has no rales. She exhibits no tenderness. Breasts are symmetrical.   Abdominal: Soft. Bowel sounds are normal. She exhibits no distension and no mass. There is no hepatosplenomegaly or hepatomegaly. There is no abdominal tenderness. There is no rebound and no guarding.   Musculoskeletal: Normal range of motion.         General: No tenderness or edema.   Lymphadenopathy:     She has no cervical adenopathy.   Neurological: She is alert and oriented to person, place, and time. She displays normal reflexes. No cranial nerve deficit. Coordination normal.   Skin: Skin is warm and dry. No rash noted. No erythema. No pallor. "   Psychiatric: She has a normal mood and affect. Her behavior is normal. Judgment and thought content normal.   Nursing note and vitals reviewed.        Assessment:       1. Other hyperlipidemia    2. Atypical chest pain    3. Abnormal EKG    4. Chest pain due to myocardial ischemia, unspecified ischemic chest pain type    5. Abnormal electrocardiogram (ECG) (EKG)         Plan:     Abnormal resting EKG with HLD and SOB.      Orders Placed This Encounter   Procedures    Nuclear Stress - Cardiology Interpreted     Follow up in about 3 months (around 2/24/2021).

## 2020-11-24 NOTE — LETTER
November 24, 2020      Terry Gordon, DO  [address] [facility] [address] [place] [address]           [facility]  [address] [place] [address]  Phone: [phone]          Patient: Fabby Guerrero   MR Number: [id number]   YOB: 1945   Date of Visit: 11/24/2020       Dear Dr. Terry Gordon:    Thank you for referring Fabby Guerrero to me for evaluation. Attached you will find relevant portions of my assessment and plan of care.    If you have questions, please do not hesitate to call me. I look forward to following Fabby Guerrero along with you.    Sincerely,    Matthias Decker Jr., MD    Enclosure  CC:  No Recipients    If you would like to receive this communication electronically, please contact [email or web address] or [phone] to request more information on [employer] access.    For providers and/or their staff who would like to refer a patient to [facility], please contact us through our one-stop-shop provider referral line, [facility] , at [phone].    If you feel you have received this communication in error or would no longer like to receive these types of communications, please e-mail [email or web address]

## 2020-11-27 ENCOUNTER — TELEPHONE (OUTPATIENT)
Dept: FAMILY MEDICINE | Facility: CLINIC | Age: 75
End: 2020-11-27

## 2020-11-27 DIAGNOSIS — M06.041 RHEUMATOID ARTHRITIS INVOLVING BOTH HANDS WITH NEGATIVE RHEUMATOID FACTOR: ICD-10-CM

## 2020-11-27 DIAGNOSIS — M47.812 SPONDYLOSIS OF CERVICAL REGION WITHOUT MYELOPATHY OR RADICULOPATHY: ICD-10-CM

## 2020-11-27 DIAGNOSIS — M47.25 OSTEOARTHRITIS OF SPINE WITH RADICULOPATHY, THORACOLUMBAR REGION: ICD-10-CM

## 2020-11-27 DIAGNOSIS — M06.042 RHEUMATOID ARTHRITIS INVOLVING BOTH HANDS WITH NEGATIVE RHEUMATOID FACTOR: ICD-10-CM

## 2020-11-27 DIAGNOSIS — E03.9 HYPOTHYROIDISM, ADULT: ICD-10-CM

## 2020-11-27 NOTE — TELEPHONE ENCOUNTER
Spoke with patient. [email or web address]. Spoke with [email or web address] received her last refill on 11/4/20 via mail order so refill is too soon. Notified patient. Verbalized understanding

## 2020-11-27 NOTE — TELEPHONE ENCOUNTER
----- Message from Henrietta Suazo sent at 11/27/2020 12:37 PM CST -----  Regarding: Medication  Contact: Patient  Type: Needs Medical Advice  Who Called:  Patient  Pharmacy name and phone #:    [id number]  [place], [place] - [address]  [address]  [place] [address]  Phone: [phone] Fax: [phone]  Best Call Back Number: [phone]  Additional Information: Patient called and stated she went to get her medication at the pharmacy and they told her she needed to get in touch with the office but isn't sure why. Patient would please like a call back.

## 2020-12-11 ENCOUNTER — TELEPHONE (OUTPATIENT)
Dept: RADIOLOGY | Facility: HOSPITAL | Age: 75
End: 2020-12-11

## 2020-12-18 ENCOUNTER — OFFICE VISIT (OUTPATIENT)
Dept: FAMILY MEDICINE | Facility: CLINIC | Age: 75
End: 2020-12-18
Payer: MEDICARE

## 2020-12-18 VITALS
DIASTOLIC BLOOD PRESSURE: 78 MMHG | WEIGHT: 140.63 LBS | BODY MASS INDEX: 25.88 KG/M2 | TEMPERATURE: 98 F | OXYGEN SATURATION: 96 % | HEIGHT: 62 IN | SYSTOLIC BLOOD PRESSURE: 136 MMHG | HEART RATE: 66 BPM

## 2020-12-18 DIAGNOSIS — R13.10 DYSPHAGIA, UNSPECIFIED TYPE: Primary | ICD-10-CM

## 2020-12-18 DIAGNOSIS — R41.3 MEMORY PROBLEM: ICD-10-CM

## 2020-12-18 DIAGNOSIS — E78.2 MIXED HYPERLIPIDEMIA: ICD-10-CM

## 2020-12-18 DIAGNOSIS — Z23 NEED FOR PNEUMOCOCCAL VACCINATION: ICD-10-CM

## 2020-12-18 PROCEDURE — 1101F PT FALLS ASSESS-DOCD LE1/YR: CPT | Mod: S$GLB,,, | Performed by: INTERNAL MEDICINE

## 2020-12-18 PROCEDURE — 1126F AMNT PAIN NOTED NONE PRSNT: CPT | Mod: S$GLB,,, | Performed by: INTERNAL MEDICINE

## 2020-12-18 PROCEDURE — G0009 PNEUMOCOCCAL CONJUGATE VACCINE 13-VALENT LESS THAN 5YO & GREATER THAN: ICD-10-PCS | Mod: S$GLB,,, | Performed by: INTERNAL MEDICINE

## 2020-12-18 PROCEDURE — 1126F PR PAIN SEVERITY QUANTIFIED, NO PAIN PRESENT: ICD-10-PCS | Mod: S$GLB,,, | Performed by: INTERNAL MEDICINE

## 2020-12-18 PROCEDURE — 1159F MED LIST DOCD IN RCRD: CPT | Mod: S$GLB,,, | Performed by: INTERNAL MEDICINE

## 2020-12-18 PROCEDURE — 99999 PR PBB SHADOW E&M-EST. PATIENT-LVL V: CPT | Mod: PBBFAC,,, | Performed by: INTERNAL MEDICINE

## 2020-12-18 PROCEDURE — 99999 PR PBB SHADOW E&M-EST. PATIENT-LVL V: ICD-10-PCS | Mod: PBBFAC,,, | Performed by: INTERNAL MEDICINE

## 2020-12-18 PROCEDURE — 99214 OFFICE O/P EST MOD 30 MIN: CPT | Mod: 25,S$GLB,, | Performed by: INTERNAL MEDICINE

## 2020-12-18 PROCEDURE — 3288F FALL RISK ASSESSMENT DOCD: CPT | Mod: S$GLB,,, | Performed by: INTERNAL MEDICINE

## 2020-12-18 PROCEDURE — 1101F PR PT FALLS ASSESS DOC 0-1 FALLS W/OUT INJ PAST YR: ICD-10-PCS | Mod: S$GLB,,, | Performed by: INTERNAL MEDICINE

## 2020-12-18 PROCEDURE — G0009 ADMIN PNEUMOCOCCAL VACCINE: HCPCS | Mod: S$GLB,,, | Performed by: INTERNAL MEDICINE

## 2020-12-18 PROCEDURE — 99214 PR OFFICE/OUTPT VISIT, EST, LEVL IV, 30-39 MIN: ICD-10-PCS | Mod: 25,S$GLB,, | Performed by: INTERNAL MEDICINE

## 2020-12-18 PROCEDURE — 3288F PR FALLS RISK ASSESSMENT DOCUMENTED: ICD-10-PCS | Mod: S$GLB,,, | Performed by: INTERNAL MEDICINE

## 2020-12-18 PROCEDURE — 90670 PCV13 VACCINE IM: CPT | Mod: S$GLB,,, | Performed by: INTERNAL MEDICINE

## 2020-12-18 PROCEDURE — 90670 PNEUMOCOCCAL CONJUGATE VACCINE 13-VALENT LESS THAN 5YO & GREATER THAN: ICD-10-PCS | Mod: S$GLB,,, | Performed by: INTERNAL MEDICINE

## 2020-12-18 PROCEDURE — 1159F PR MEDICATION LIST DOCUMENTED IN MEDICAL RECORD: ICD-10-PCS | Mod: S$GLB,,, | Performed by: INTERNAL MEDICINE

## 2020-12-18 NOTE — PROGRESS NOTES
Subjective:       Patient ID: Fabby Guerrero is a 75 y.o. female.    Chief Complaint: Memory test      Social Hx:  From [place]. Now living [place]. . 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     Active Problem List with Overview Notes    Diagnosis Date Noted    Chest pain due to myocardial ischemia 11/24/2020    Abnormal electrocardiogram (ECG) (EKG) 11/24/2020    Mixed hyperlipidemia 11/19/2020    Rheumatoid arthritis of hand 04/21/2016    Osteoarthritis of spine with radiculopathy, thoracolumbar region 04/21/2016    Osteoarthritis of neck 04/21/2016    Acquired hypothyroidism 04/13/2012    Family history of colon cancer 04/13/2012     Father age 70          HPI:   Here to check memory and for acid reflux. 2 wks ago started with difficulty swallowing and increased mucus. In the past this is how it presented. She sees Dr. Ortiz at . She would like to stick with him.     Assessment/Plan   Fabby was seen today for memory test.    Diagnoses and all orders for this visit:    Dysphagia, unspecified type  Comments:  Likely needs esophageal dilation. Will increase omeprazole to BID until EGD. She will see her GI for test.     Memory problem    Mixed hyperlipidemia  Comments:  added statin in nov, due for repeat           Current Outpatient Medications on File Prior to Visit   Medication Sig Dispense Refill    acetaminophen (TYLENOL ARTHRITIS PAIN) 650 MG TbSR Take 650 mg by mouth every 8 (eight) hours as needed.       celecoxib (CELEBREX) 100 MG capsule Take 1 capsule (100 mg total) by mouth 2 (two) times daily. 180 capsule 3    fish oil-omega-3 fatty acids 300-1,000 mg capsule Take 2 g by mouth once daily.      folic acid (FOLVITE) 1 MG tablet TAKE 2 TABLETS ONCE DAILY. 180 tablet 3    glucosamine-chondroitin 500-400 mg tablet Take 2 tablets by mouth once daily.       guanFACINE (TENEX) 1 MG Tab Take 1 tablet (1 mg total) by mouth 2 (two) times daily as needed (anxiety). 30 tablet 6     HYDROcodone-acetaminophen (NORCO)  mg per tablet Take 1 tablet by mouth every 6 (six) hours as needed. 90 tablet 0    Lactobacillus acidophilus Cap Take by mouth.      levothyroxine (SYNTHROID) 88 MCG tablet Take 1 tablet (88 mcg total) by mouth every morning. 90 tablet 3    melatonin 5 mg Cap Take by mouth as needed.       methotrexate 2.5 MG Tab TAKE 4 TABLETS (10MG) EVERY 7 DAYS 48 tablet 3    omeprazole (PRILOSEC) 20 MG capsule Take 20 mg by mouth as needed.       oxybutynin (DITROPAN-XL) 5 MG TR24 Take 1 tablet (5 mg total) by mouth once daily. 30 tablet 2    rosuvastatin (CRESTOR) 10 MG tablet Take 1 tablet (10 mg total) by mouth once daily. 30 tablet 3    VITAMIN A-C-D3-COD LIVER OIL ORAL Take by mouth.      vitamin B complex-folic acid 0.4 mg Tab Take by mouth.      docusate sodium (COLACE) 100 MG capsule Take 100 mg by mouth every evening.      multivitamin (ONE DAILY MULTIVITAMIN) per tablet Take 1 tablet by mouth once daily.      predniSONE (DELTASONE) 5 MG tablet 1 to 2 tabs po qam prn swelling (Patient not taking: Reported on 11/24/2020) 90 tablet 3    TURMERIC (CURCUMIN MISC) 750 mg by Misc.(Non-Drug; Combo Route) route 2 (two) times daily.       No current facility-administered medications on file prior to visit.      I personally reviewed past medical, family and social history.  Review of Systems   Constitutional: Negative for activity change and fever.   HENT: Negative for sore throat and trouble swallowing.    Eyes: Negative for pain and visual disturbance.   Respiratory: Negative for cough, shortness of breath and wheezing.    Cardiovascular: Negative for chest pain, palpitations and leg swelling.   Gastrointestinal: Negative for abdominal pain, blood in stool, diarrhea, nausea and vomiting.        Dysphagia    Endocrine: Negative for cold intolerance and polyuria.   Genitourinary: Negative for decreased urine volume and dysuria.   Musculoskeletal: Negative for gait problem and  neck pain.   Skin: Negative for rash.   Neurological: Negative for dizziness, syncope and light-headedness.   Psychiatric/Behavioral: Negative for dysphoric mood. The patient is not nervous/anxious.          Objective:     Vitals:    12/18/20 0901   BP: 136/78   Pulse: 66   Temp: 98.4 °F (36.9 °C)        Physical Exam  Constitutional:       General: She is not in acute distress.     Appearance: She is well-developed.   HENT:      Head: Normocephalic and atraumatic.   Eyes:      Pupils: Pupils are equal, round, and reactive to light.   Neck:      Musculoskeletal: Neck supple.      Thyroid: No thyromegaly.   Cardiovascular:      Rate and Rhythm: Normal rate and regular rhythm.      Heart sounds: Normal heart sounds. No murmur. No friction rub. No gallop.    Pulmonary:      Effort: Pulmonary effort is normal. No respiratory distress.      Breath sounds: Normal breath sounds. No wheezing.   Abdominal:      General: Bowel sounds are normal.      Palpations: Abdomen is soft.      Tenderness: There is no abdominal tenderness.   Skin:     General: Skin is warm.      Findings: No rash.   Neurological:      Mental Status: She is alert and oriented to person, place, and time.      Cranial Nerves: No cranial nerve deficit.   Psychiatric:         Behavior: Behavior normal.

## 2020-12-18 NOTE — PATIENT INSTRUCTIONS
Eating Heart-Healthy Food: Using the DASH Plan    Eating for your heart doesnt have to be hard or boring. You just need to know how to make healthier choices. The DASH eating plan has been developed to help you do just that. DASH stands for Dietary Approaches to Stop Hypertension. It is a plan that has been proven to be healthier for your heart and to lower your risk for high blood pressure. It can also help lower your risk for cancer, heart disease, osteoporosis, and diabetes.  Choosing from each food group  Choose foods from each of the food groups below each day. Try to get the recommended number of servings for each food group. The serving numbers are based on a diet of 2,000 calories a day. Talk to your doctor if youre unsure about your calorie needs. Along with getting the correct servings, the DASH plan also recommends a sodium intake less than 2,300 mg per day.        Grains  Servings: 6 to 8 a day  A serving is:  · 1 slice bread  · 1 ounce dry cereal  · Half a cup cooked rice, pasta or cereal  Best choices: Whole grains and any grains high in fiber. Vegetables  Servings: 4 to 5 a day  A serving is:  · 1 cup raw leafy vegetable  · Half a cup cut-up raw or cooked vegetable  · Half a cup vegetable juice  Best choices: Fresh or frozen vegetables prepared without added salt or fat.   Fruits  Servings: 4 to 5 a day  A serving is:  · 1 medium fruit  · One-quarter cup dried fruit  · Half a cup fresh, frozen, or canned fruit  · Half a cup of 100% fruit juices  Best choices: A variety of fresh fruits of different colors. Whole fruits are a better choice than fruit juices. Low-fat or fat-free dairy  Servings: 2 to 3 a day  A serving is:  · 1 cup milk  · 1 cup yogurt  · One and a half ounces cheese  Best choices: Skim or 1% milk, low-fat or fat-free yogurt or buttermilk, and low-fat cheeses.         Lean meats, poultry, fish  Servings: 6 or fewer a day  A serving is:  · 1 ounce cooked meats, poultry, or fish  · 1  egg  Best choices: Lean poultry and fish. Trim away visible fat. Broil, grill, roast, or boil instead of frying. Remove skin from poultry before eating. Limit how much red meat you eat.  Nuts, seeds, beans  Servings: 4 to 5 a week  A serving is:  · One-third cup nuts (one and a half ounces)  · 2 tablespoons nut butter or seeds  · Half a cup cooked dry beans or legumes  Best choices: Dry roasted nuts with no salt added, lentils, kidney beans, garbanzo beans, and whole tate beans.   Fats and oils  Servings: 2 to 3 a day  A serving is:  · 1 teaspoon vegetable oil  · 1 teaspoon soft margarine  · 1 tablespoon mayonnaise  · 2 tablespoons salad dressing  Best choices: Nut and vegetable oils (nontropical vegetable oils), such as olive and canola oil. Sweets  Servings: 5 a week or fewer  A serving is:  · 1 tablespoon sugar, maple syrup, or honey  · 1 tablespoon jam or jelly  · 1 half-ounce jelly beans (about 15)  · 1 cup lemonade  Best choices: Dried fruit can be a satisfying sweet. Choose low-fat sweets. And watch your serving sizes!      For more on the DASH eating plan, visit:  www.nhlbi.nih.gov/health/health-topics/topics/dash   Date Last Reviewed: 6/1/2016  © 0539-5386 [employer]. [address] [place] [address], [place] [address]. All rights reserved. This information is not intended as a substitute for professional medical care. Always follow your healthcare professional's instructions.

## 2020-12-21 ENCOUNTER — TELEPHONE (OUTPATIENT)
Dept: CARDIOLOGY | Facility: CLINIC | Age: 75
End: 2020-12-21

## 2020-12-21 NOTE — TELEPHONE ENCOUNTER
Spoke with pt regarding test----- Message from Cristy Trujillo sent at 12/21/2020 11:04 AM CST -----  Patient would like a return call regarding her upcoming stress test.  Also, she said that her knees may not be able to handle the exercise stress test.  Her number is [phone].  Thank you!

## 2020-12-28 ENCOUNTER — HOSPITAL ENCOUNTER (OUTPATIENT)
Dept: RADIOLOGY | Facility: HOSPITAL | Age: 75
Discharge: HOME | End: 2020-12-28
Attending: INTERNAL MEDICINE
Payer: MEDICARE

## 2020-12-28 ENCOUNTER — CLINICAL SUPPORT (OUTPATIENT)
Dept: CARDIOLOGY | Facility: CLINIC | Age: 75
End: 2020-12-28
Attending: INTERNAL MEDICINE
Payer: MEDICARE

## 2020-12-28 VITALS — WEIGHT: 140 LBS | BODY MASS INDEX: 25.76 KG/M2 | HEIGHT: 62 IN

## 2020-12-28 DIAGNOSIS — R94.31 ABNORMAL EKG: ICD-10-CM

## 2020-12-28 DIAGNOSIS — I25.9 CHEST PAIN DUE TO MYOCARDIAL ISCHEMIA, UNSPECIFIED ISCHEMIC CHEST PAIN TYPE: ICD-10-CM

## 2020-12-28 DIAGNOSIS — R94.31 ABNORMAL ELECTROCARDIOGRAM (ECG) (EKG): ICD-10-CM

## 2020-12-28 DIAGNOSIS — E78.49 OTHER HYPERLIPIDEMIA: ICD-10-CM

## 2020-12-28 DIAGNOSIS — R07.89 ATYPICAL CHEST PAIN: ICD-10-CM

## 2020-12-28 PROCEDURE — 93017 CV STRESS TEST TRACING ONLY: CPT

## 2020-12-28 PROCEDURE — 99999 PR PBB SHADOW E&M-EST. PATIENT-LVL I: CPT | Mod: PBBFAC,,,

## 2020-12-28 PROCEDURE — 78452 STRESS TEST WITH MYOCARDIAL PERFUSION (CUPID ONLY): ICD-10-PCS | Mod: 26,,, | Performed by: INTERNAL MEDICINE

## 2020-12-28 PROCEDURE — 78452 HT MUSCLE IMAGE SPECT MULT: CPT | Mod: 26,,, | Performed by: INTERNAL MEDICINE

## 2020-12-28 PROCEDURE — 99999 PR PBB SHADOW E&M-EST. PATIENT-LVL I: ICD-10-PCS | Mod: PBBFAC,,,

## 2020-12-28 PROCEDURE — 93016 CV STRESS TEST SUPVJ ONLY: CPT | Mod: ,,, | Performed by: INTERNAL MEDICINE

## 2020-12-28 PROCEDURE — 93018 CV STRESS TEST I&R ONLY: CPT | Mod: ,,, | Performed by: INTERNAL MEDICINE

## 2020-12-28 PROCEDURE — 93016 STRESS TEST WITH MYOCARDIAL PERFUSION (CUPID ONLY): ICD-10-PCS | Mod: ,,, | Performed by: INTERNAL MEDICINE

## 2020-12-28 PROCEDURE — 78452 HT MUSCLE IMAGE SPECT MULT: CPT | Mod: PO

## 2020-12-28 PROCEDURE — A9502 TC99M TETROFOSMIN: HCPCS | Mod: PO

## 2020-12-28 PROCEDURE — 93018 PR CARDIAC STRESS TST,INTERP/REPT ONLY: ICD-10-PCS | Mod: ,,, | Performed by: INTERNAL MEDICINE

## 2020-12-31 LAB
CHOLEST SERPL-MCNC: 183 MG/DL
CHOLEST/HDLC SERPL: 3.7 (CALC)
HDLC SERPL-MCNC: 50 MG/DL
LDLC SERPL CALC-MCNC: 102 MG/DL (CALC)
NONHDLC SERPL-MCNC: 133 MG/DL (CALC)
TRIGL SERPL-MCNC: 193 MG/DL

## 2021-01-01 LAB
CV STRESS BASE HR: 75 BPM
DIASTOLIC BLOOD PRESSURE: 71 MMHG
OHS CV CPX 1 MINUTE RECOVERY HEART RATE: 82 BPM
OHS CV CPX 85 PERCENT MAX PREDICTED HEART RATE MALE: 118
OHS CV CPX ESTIMATED METS: 7
OHS CV CPX MAX PREDICTED HEART RATE: 139
OHS CV CPX PATIENT IS FEMALE: 1
OHS CV CPX PATIENT IS MALE: 0
OHS CV CPX PEAK DIASTOLIC BLOOD PRESSURE: 56 MMHG
OHS CV CPX PEAK HEAR RATE: 133 BPM
OHS CV CPX PEAK RATE PRESSURE PRODUCT: NORMAL
OHS CV CPX PEAK SYSTOLIC BLOOD PRESSURE: 147 MMHG
OHS CV CPX PERCENT MAX PREDICTED HEART RATE ACHIEVED: 96
OHS CV CPX RATE PRESSURE PRODUCT PRESENTING: 9825
STRESS ECHO POST EXERCISE DUR MIN: 6 MINUTES
STRESS ECHO POST EXERCISE DUR SEC: 2 SECONDS
SYSTOLIC BLOOD PRESSURE: 131 MMHG

## 2021-01-01 NOTE — MR AVS SNAPSHOT
[facility]  [address] [facility] [address] [place] [address]  Phone: [phone]  Fax: [phone]                  Fabby Guerrero   2017 9:00 AM   Office Visit    Description:  Female : 1945   Provider:  Jazmyne Olivera DO   Department:  [facility]           Reason for Visit     Disease Management           Diagnoses this Visit        Comments    Rheumatoid arthritis involving both hands with positive rheumatoid factor    -  Primary     Iron deficiency anemia, unspecified iron deficiency anemia type         Rheumatoid arthritis of hand, unspecified laterality, unspecified rheumatoid factor presence         Osteoarthritis of spine with radiculopathy, thoracolumbar region         Hypothyroidism, adult                To Do List           Future Appointments        Provider Department Dept Phone    2017 10:00 AM Jazmyne Olivera DO [facility] [phone]      Goals (5 Years of Data)     None      Follow-Up and Disposition     Return in about 4 months (around 2017).       These Medications        Disp Refills Start End    methotrexate 2.5 MG Tab 36 tablet 3 2017    Take 3 tablets (7.5 mg total) by mouth every 7 days. - Oral    Pharmacy: [facility] [address] Ph #: [phone]       folic acid (FOLVITE) 1 MG tablet 180 tablet 3 2017    Take 2 tablets (2 mg total) by mouth once daily. - Oral    Pharmacy: [facility] [address] Ph #: [phone]         [facility]     [facility] On Call Nurse Care Line -  Assistance  Registered nurses in the [facility] provide clinical advisement, health education, appointment booking, and other advisory services.  Call for this free service at [phone].             Medications           Message regarding Medications     Verify the changes and/or additions to your  medication regime listed below are the same as discussed with your clinician today.  If any of these changes or additions are incorrect, please notify your healthcare provider.        CHANGE how you are taking these medications     Start Taking Instead of    methotrexate 2.5 MG Tab methotrexate 2.5 MG Tab    Dosage:  Take 3 tablets (7.5 mg total) by mouth every 7 days. Dosage:  Take 4 tablets (10 mg total) by mouth every 7 days.    Reason for Change:  Reorder     folic acid (FOLVITE) 1 MG tablet folic acid (FOLVITE) 1 MG tablet    Dosage:  Take 2 tablets (2 mg total) by mouth once daily. Dosage:  Take 1 tablet (1 mg total) by mouth once daily.    Reason for Change:  Reorder            Verify that the below list of medications is an accurate representation of the medications you are currently taking.  If none reported, the list may be blank. If incorrect, please contact your healthcare provider. Carry this list with you in case of emergency.           Current Medications     acetaminophen (TYLENOL ARTHRITIS PAIN) 650 MG TbSR Take 650 mg by mouth every 8 (eight) hours as needed.     acidophilus-pectin, citrus (ACIDOPHILUS PROBIOTIC) 100 million cell-10 mg Cap Take by mouth.    docusate sodium (COLACE) 100 MG capsule Take 100 mg by mouth every evening.    fish oil-omega-3 fatty acids 300-1,000 mg capsule Take 2 g by mouth once daily.    FLUZONE HIGH-DOSE 2016-17, PF, 180 mcg/0.5 mL Syrg     folic acid (FOLVITE) 1 MG tablet Take 2 tablets (2 mg total) by mouth once daily.    GINSENG ORAL Take by mouth.    glucosamine-chondroitin 500-400 mg tablet Take 2 tablets by mouth once daily.     hydrocodone-acetaminophen 10-325mg (NORCO)  mg Tab Take 1 tablet by mouth 3 (three) times daily as needed.    levothyroxine (SYNTHROID) 88 MCG tablet Take 1 tablet (88 mcg total) by mouth before breakfast.    melatonin 5 mg Cap Take by mouth as needed.     methotrexate 2.5 MG Tab Take 3 tablets (7.5 mg total) by mouth every 7 days.  "   multivitamin (ONE DAILY MULTIVITAMIN) per tablet Take 1 tablet by mouth once daily.    naproxen (NAPROSYN) 250 MG tablet Take 250 mg by mouth 2 (two) times daily with meals. OTC    omeprazole (PRILOSEC) 20 MG capsule Take 20 mg by mouth as needed.     TURMERIC (CURCUMIN MISC) 750 mg by Misc.(Non-Drug; Combo Route) route 2 (two) times daily.    alprazolam (XANAX) 0.25 MG tablet Take 1 tablet (0.25 mg total) by mouth daily as needed for Anxiety.    gabapentin (NEURONTIN) 100 MG capsule Take 1 capsule (100 mg total) by mouth every evening.    predniSONE (DELTASONE) 5 MG tablet 1 to 2 tabs po qam prn swelling           Clinical Reference Information           Your Vitals Were     BP Pulse Height Weight BMI    130/80 72 5' 2" (1.575 m) 63.1 kg (139 lb 1.8 oz) 25.44 kg/m2      Blood Pressure          Most Recent Value    BP  130/80      Allergies as of 2/9/2017     No Known Allergies      Immunizations Administered on Date of Encounter - 2/9/2017     None      Orders Placed During Today's Visit     Future Labs/Procedures Expected by Expires    C-reactive protein  2/9/2017 4/10/2018    CBC auto differential  2/9/2017 4/10/2018    Comprehensive metabolic panel  2/9/2017 4/10/2018    Sedimentation rate, manual  2/9/2017 4/10/2018      Language Assistance Services     ATTENTION: Language assistance services are available, free of charge. Please call [phone].      ATENCIÓN: Si habla español, tiene a singh disposición servicios gratuitos de asistencia lingüística. Llame al [phone].     [facility] Ý: N?u b?n nói Ti?ng Vi?t, có các d?ch v? h? tr? ngôn ng? mi?n phí dành cho b?n. G?i s? [phone].         [facility] complies with applicable Federal civil rights laws and does not discriminate on the basis of race, color, national origin, age, disability, or sex.        " DISPLAY PLAN FREE TEXT

## 2021-01-04 ENCOUNTER — TELEPHONE (OUTPATIENT)
Dept: CARDIOLOGY | Facility: CLINIC | Age: 76
End: 2021-01-04

## 2021-01-06 ENCOUNTER — TELEPHONE (OUTPATIENT)
Dept: UROLOGY | Facility: CLINIC | Age: 76
End: 2021-01-06

## 2021-01-11 DIAGNOSIS — N39.41 URGE INCONTINENCE: ICD-10-CM

## 2021-01-11 RX ORDER — OXYBUTYNIN CHLORIDE 5 MG/1
5 TABLET, EXTENDED RELEASE ORAL DAILY
Qty: 90 TABLET | Refills: 3 | Status: ON HOLD | OUTPATIENT
Start: 2021-01-11 | End: 2021-12-09 | Stop reason: HOSPADM

## 2021-02-08 ENCOUNTER — PATIENT OUTREACH (OUTPATIENT)
Dept: ADMINISTRATIVE | Facility: OTHER | Age: 76
End: 2021-02-08

## 2021-02-10 ENCOUNTER — OFFICE VISIT (OUTPATIENT)
Dept: RHEUMATOLOGY | Facility: CLINIC | Age: 76
End: 2021-02-10
Payer: MEDICARE

## 2021-02-10 VITALS
HEIGHT: 62 IN | SYSTOLIC BLOOD PRESSURE: 112 MMHG | DIASTOLIC BLOOD PRESSURE: 64 MMHG | BODY MASS INDEX: 26.14 KG/M2 | HEART RATE: 73 BPM | WEIGHT: 142.06 LBS

## 2021-02-10 DIAGNOSIS — D84.9 IMMUNOSUPPRESSION: ICD-10-CM

## 2021-02-10 DIAGNOSIS — M06.041 RHEUMATOID ARTHRITIS INVOLVING BOTH HANDS WITH NEGATIVE RHEUMATOID FACTOR: ICD-10-CM

## 2021-02-10 DIAGNOSIS — M06.042 RHEUMATOID ARTHRITIS INVOLVING BOTH HANDS WITH NEGATIVE RHEUMATOID FACTOR: ICD-10-CM

## 2021-02-10 DIAGNOSIS — Z79.899 HIGH RISK MEDICATIONS (NOT ANTICOAGULANTS) LONG-TERM USE: Primary | ICD-10-CM

## 2021-02-10 PROCEDURE — 1159F MED LIST DOCD IN RCRD: CPT | Mod: S$GLB,,, | Performed by: INTERNAL MEDICINE

## 2021-02-10 PROCEDURE — 1126F AMNT PAIN NOTED NONE PRSNT: CPT | Mod: S$GLB,,, | Performed by: INTERNAL MEDICINE

## 2021-02-10 PROCEDURE — 1101F PT FALLS ASSESS-DOCD LE1/YR: CPT | Mod: S$GLB,,, | Performed by: INTERNAL MEDICINE

## 2021-02-10 PROCEDURE — 99999 PR PBB SHADOW E&M-EST. PATIENT-LVL V: ICD-10-PCS | Mod: PBBFAC,,, | Performed by: INTERNAL MEDICINE

## 2021-02-10 PROCEDURE — 99214 PR OFFICE/OUTPT VISIT, EST, LEVL IV, 30-39 MIN: ICD-10-PCS | Mod: S$GLB,,, | Performed by: INTERNAL MEDICINE

## 2021-02-10 PROCEDURE — 3288F FALL RISK ASSESSMENT DOCD: CPT | Mod: S$GLB,,, | Performed by: INTERNAL MEDICINE

## 2021-02-10 PROCEDURE — 1126F PR PAIN SEVERITY QUANTIFIED, NO PAIN PRESENT: ICD-10-PCS | Mod: S$GLB,,, | Performed by: INTERNAL MEDICINE

## 2021-02-10 PROCEDURE — 1159F PR MEDICATION LIST DOCUMENTED IN MEDICAL RECORD: ICD-10-PCS | Mod: S$GLB,,, | Performed by: INTERNAL MEDICINE

## 2021-02-10 PROCEDURE — 99214 OFFICE O/P EST MOD 30 MIN: CPT | Mod: S$GLB,,, | Performed by: INTERNAL MEDICINE

## 2021-02-10 PROCEDURE — 3288F PR FALLS RISK ASSESSMENT DOCUMENTED: ICD-10-PCS | Mod: S$GLB,,, | Performed by: INTERNAL MEDICINE

## 2021-02-10 PROCEDURE — 1101F PR PT FALLS ASSESS DOC 0-1 FALLS W/OUT INJ PAST YR: ICD-10-PCS | Mod: S$GLB,,, | Performed by: INTERNAL MEDICINE

## 2021-02-10 PROCEDURE — 99999 PR PBB SHADOW E&M-EST. PATIENT-LVL V: CPT | Mod: PBBFAC,,, | Performed by: INTERNAL MEDICINE

## 2021-02-10 RX ORDER — PNV NO.95/FERROUS FUM/FOLIC AC 28MG-0.8MG
100 TABLET ORAL DAILY
COMMUNITY

## 2021-02-10 ASSESSMENT — ROUTINE ASSESSMENT OF PATIENT INDEX DATA (RAPID3)
MDHAQ FUNCTION SCORE: 0.5
PAIN SCORE: 4
PATIENT GLOBAL ASSESSMENT SCORE: 4
PSYCHOLOGICAL DISTRESS SCORE: 1.1
TOTAL RAPID3 SCORE: 3.22
FATIGUE SCORE: 1.1

## 2021-02-12 LAB
ALBUMIN SERPL-MCNC: 4 G/DL (ref 3.6–5.1)
ALBUMIN/GLOB SERPL: 1.7 (CALC) (ref 1–2.5)
ALP SERPL-CCNC: 73 U/L (ref 37–153)
ALT SERPL-CCNC: 21 U/L (ref 6–29)
AST SERPL-CCNC: 22 U/L (ref 10–35)
BASOPHILS # BLD AUTO: 32 CELLS/UL (ref 0–200)
BASOPHILS NFR BLD AUTO: 0.5 %
BILIRUB SERPL-MCNC: 0.4 MG/DL (ref 0.2–1.2)
BUN SERPL-MCNC: 12 MG/DL (ref 7–25)
BUN/CREAT SERPL: NORMAL (CALC) (ref 6–22)
CALCIUM SERPL-MCNC: 9.4 MG/DL (ref 8.6–10.4)
CHLORIDE SERPL-SCNC: 106 MMOL/L (ref 98–110)
CO2 SERPL-SCNC: 28 MMOL/L (ref 20–32)
CREAT SERPL-MCNC: 0.84 MG/DL (ref 0.6–0.93)
CRP SERPL-MCNC: 0.5 MG/L
EOSINOPHIL # BLD AUTO: 88 CELLS/UL (ref 15–500)
EOSINOPHIL NFR BLD AUTO: 1.4 %
ERYTHROCYTE [DISTWIDTH] IN BLOOD BY AUTOMATED COUNT: 12.5 % (ref 11–15)
ERYTHROCYTE [SEDIMENTATION RATE] IN BLOOD BY WESTERGREN METHOD: 2 MM/H
GFRSERPLBLD MDRD-ARVRAT: 68 ML/MIN/1.73M2
GLOBULIN SER CALC-MCNC: 2.4 G/DL (CALC) (ref 1.9–3.7)
GLUCOSE SERPL-MCNC: 89 MG/DL (ref 65–99)
HCT VFR BLD AUTO: 38.5 % (ref 35–45)
HGB BLD-MCNC: 12.9 G/DL (ref 11.7–15.5)
LYMPHOCYTES # BLD AUTO: 1418 CELLS/UL (ref 850–3900)
LYMPHOCYTES NFR BLD AUTO: 22.5 %
MCH RBC QN AUTO: 31.7 PG (ref 27–33)
MCHC RBC AUTO-ENTMCNC: 33.5 G/DL (ref 32–36)
MCV RBC AUTO: 94.6 FL (ref 80–100)
MONOCYTES # BLD AUTO: 592 CELLS/UL (ref 200–950)
MONOCYTES NFR BLD AUTO: 9.4 %
NEUTROPHILS # BLD AUTO: 4171 CELLS/UL (ref 1500–7800)
NEUTROPHILS NFR BLD AUTO: 66.2 %
PLATELET # BLD AUTO: 302 THOUSAND/UL (ref 140–400)
PMV BLD REES-ECKER: 10.4 FL (ref 7.5–12.5)
POTASSIUM SERPL-SCNC: 4.3 MMOL/L (ref 3.5–5.3)
PROT SERPL-MCNC: 6.4 G/DL (ref 6.1–8.1)
RBC # BLD AUTO: 4.07 MILLION/UL (ref 3.8–5.1)
SODIUM SERPL-SCNC: 142 MMOL/L (ref 135–146)
WBC # BLD AUTO: 6.3 THOUSAND/UL (ref 3.8–10.8)

## 2021-05-12 ENCOUNTER — TELEPHONE (OUTPATIENT)
Dept: RHEUMATOLOGY | Facility: CLINIC | Age: 76
End: 2021-05-12

## 2021-05-12 ENCOUNTER — TELEPHONE (OUTPATIENT)
Dept: FAMILY MEDICINE | Facility: CLINIC | Age: 76
End: 2021-05-12

## 2021-05-13 ENCOUNTER — CLINICAL SUPPORT (OUTPATIENT)
Dept: RHEUMATOLOGY | Facility: CLINIC | Age: 76
End: 2021-05-13
Payer: MEDICARE

## 2021-05-13 DIAGNOSIS — M06.042 RHEUMATOID ARTHRITIS INVOLVING BOTH HANDS WITH NEGATIVE RHEUMATOID FACTOR: Primary | ICD-10-CM

## 2021-05-13 DIAGNOSIS — M06.041 RHEUMATOID ARTHRITIS INVOLVING BOTH HANDS WITH NEGATIVE RHEUMATOID FACTOR: Primary | ICD-10-CM

## 2021-05-13 PROCEDURE — 96372 THER/PROPH/DIAG INJ SC/IM: CPT | Mod: S$GLB,,, | Performed by: INTERNAL MEDICINE

## 2021-05-13 PROCEDURE — 96372 PR INJECTION,THERAP/PROPH/DIAG2ST, IM OR SUBCUT: ICD-10-PCS | Mod: S$GLB,,, | Performed by: INTERNAL MEDICINE

## 2021-05-13 RX ORDER — METHYLPREDNISOLONE ACETATE 40 MG/ML
40 INJECTION, SUSPENSION INTRA-ARTICULAR; INTRALESIONAL; INTRAMUSCULAR; SOFT TISSUE
Status: COMPLETED | OUTPATIENT
Start: 2021-05-13 | End: 2021-05-13

## 2021-05-13 RX ADMIN — METHYLPREDNISOLONE ACETATE 40 MG: 40 INJECTION, SUSPENSION INTRA-ARTICULAR; INTRALESIONAL; INTRAMUSCULAR; SOFT TISSUE at 12:05

## 2021-06-02 LAB
ALBUMIN SERPL-MCNC: 3.9 G/DL (ref 3.6–5.1)
ALBUMIN/GLOB SERPL: 1.5 (CALC) (ref 1–2.5)
ALP SERPL-CCNC: 62 U/L (ref 37–153)
ALT SERPL-CCNC: 16 U/L (ref 6–29)
AST SERPL-CCNC: 18 U/L (ref 10–35)
BASOPHILS # BLD AUTO: 45 CELLS/UL (ref 0–200)
BASOPHILS NFR BLD AUTO: 0.4 %
BILIRUB SERPL-MCNC: 0.4 MG/DL (ref 0.2–1.2)
BUN SERPL-MCNC: 16 MG/DL (ref 7–25)
BUN/CREAT SERPL: NORMAL (CALC) (ref 6–22)
CALCIUM SERPL-MCNC: 9.6 MG/DL (ref 8.6–10.4)
CHLORIDE SERPL-SCNC: 101 MMOL/L (ref 98–110)
CO2 SERPL-SCNC: 24 MMOL/L (ref 20–32)
CREAT SERPL-MCNC: 0.73 MG/DL (ref 0.6–0.93)
CRP SERPL-MCNC: 8.4 MG/L
EOSINOPHIL # BLD AUTO: 136 CELLS/UL (ref 15–500)
EOSINOPHIL NFR BLD AUTO: 1.2 %
ERYTHROCYTE [DISTWIDTH] IN BLOOD BY AUTOMATED COUNT: 12.7 % (ref 11–15)
ERYTHROCYTE [SEDIMENTATION RATE] IN BLOOD BY WESTERGREN METHOD: 11 MM/H
GLOBULIN SER CALC-MCNC: 2.6 G/DL (CALC) (ref 1.9–3.7)
GLUCOSE SERPL-MCNC: 100 MG/DL (ref 65–139)
HCT VFR BLD AUTO: 40 % (ref 35–45)
HGB BLD-MCNC: 13.5 G/DL (ref 11.7–15.5)
LYMPHOCYTES # BLD AUTO: 1446 CELLS/UL (ref 850–3900)
LYMPHOCYTES NFR BLD AUTO: 12.8 %
MCH RBC QN AUTO: 31.1 PG (ref 27–33)
MCHC RBC AUTO-ENTMCNC: 33.8 G/DL (ref 32–36)
MCV RBC AUTO: 92.2 FL (ref 80–100)
MONOCYTES # BLD AUTO: 633 CELLS/UL (ref 200–950)
MONOCYTES NFR BLD AUTO: 5.6 %
NEUTROPHILS # BLD AUTO: 9040 CELLS/UL (ref 1500–7800)
NEUTROPHILS NFR BLD AUTO: 80 %
PLATELET # BLD AUTO: 352 THOUSAND/UL (ref 140–400)
PMV BLD REES-ECKER: 9.7 FL (ref 7.5–12.5)
POTASSIUM SERPL-SCNC: 4.3 MMOL/L (ref 3.5–5.3)
PROT SERPL-MCNC: 6.5 G/DL (ref 6.1–8.1)
RBC # BLD AUTO: 4.34 MILLION/UL (ref 3.8–5.1)
SODIUM SERPL-SCNC: 136 MMOL/L (ref 135–146)
WBC # BLD AUTO: 11.3 THOUSAND/UL (ref 3.8–10.8)

## 2021-06-15 ENCOUNTER — TELEPHONE (OUTPATIENT)
Dept: RHEUMATOLOGY | Facility: CLINIC | Age: 76
End: 2021-06-15

## 2021-06-17 ENCOUNTER — PATIENT OUTREACH (OUTPATIENT)
Dept: ADMINISTRATIVE | Facility: OTHER | Age: 76
End: 2021-06-17

## 2021-06-18 ENCOUNTER — OFFICE VISIT (OUTPATIENT)
Dept: FAMILY MEDICINE | Facility: CLINIC | Age: 76
End: 2021-06-18
Payer: MEDICARE

## 2021-06-18 VITALS
OXYGEN SATURATION: 98 % | HEART RATE: 73 BPM | HEIGHT: 62 IN | SYSTOLIC BLOOD PRESSURE: 122 MMHG | DIASTOLIC BLOOD PRESSURE: 64 MMHG | BODY MASS INDEX: 25.52 KG/M2 | WEIGHT: 138.69 LBS

## 2021-06-18 DIAGNOSIS — E03.9 ACQUIRED HYPOTHYROIDISM: ICD-10-CM

## 2021-06-18 DIAGNOSIS — E78.2 MIXED HYPERLIPIDEMIA: Primary | ICD-10-CM

## 2021-06-18 PROCEDURE — 99999 PR PBB SHADOW E&M-EST. PATIENT-LVL III: CPT | Mod: PBBFAC,,, | Performed by: INTERNAL MEDICINE

## 2021-06-18 PROCEDURE — 1101F PT FALLS ASSESS-DOCD LE1/YR: CPT | Mod: S$GLB,,, | Performed by: INTERNAL MEDICINE

## 2021-06-18 PROCEDURE — 99213 PR OFFICE/OUTPT VISIT, EST, LEVL III, 20-29 MIN: ICD-10-PCS | Mod: S$GLB,,, | Performed by: INTERNAL MEDICINE

## 2021-06-18 PROCEDURE — 3288F FALL RISK ASSESSMENT DOCD: CPT | Mod: S$GLB,,, | Performed by: INTERNAL MEDICINE

## 2021-06-18 PROCEDURE — 3288F PR FALLS RISK ASSESSMENT DOCUMENTED: ICD-10-PCS | Mod: S$GLB,,, | Performed by: INTERNAL MEDICINE

## 2021-06-18 PROCEDURE — 99213 OFFICE O/P EST LOW 20 MIN: CPT | Mod: S$GLB,,, | Performed by: INTERNAL MEDICINE

## 2021-06-18 PROCEDURE — 1159F PR MEDICATION LIST DOCUMENTED IN MEDICAL RECORD: ICD-10-PCS | Mod: S$GLB,,, | Performed by: INTERNAL MEDICINE

## 2021-06-18 PROCEDURE — 1126F AMNT PAIN NOTED NONE PRSNT: CPT | Mod: S$GLB,,, | Performed by: INTERNAL MEDICINE

## 2021-06-18 PROCEDURE — 1159F MED LIST DOCD IN RCRD: CPT | Mod: S$GLB,,, | Performed by: INTERNAL MEDICINE

## 2021-06-18 PROCEDURE — 1126F PR PAIN SEVERITY QUANTIFIED, NO PAIN PRESENT: ICD-10-PCS | Mod: S$GLB,,, | Performed by: INTERNAL MEDICINE

## 2021-06-18 PROCEDURE — 1101F PR PT FALLS ASSESS DOC 0-1 FALLS W/OUT INJ PAST YR: ICD-10-PCS | Mod: S$GLB,,, | Performed by: INTERNAL MEDICINE

## 2021-06-18 PROCEDURE — 99999 PR PBB SHADOW E&M-EST. PATIENT-LVL III: ICD-10-PCS | Mod: PBBFAC,,, | Performed by: INTERNAL MEDICINE

## 2021-06-21 ENCOUNTER — OFFICE VISIT (OUTPATIENT)
Dept: RHEUMATOLOGY | Facility: CLINIC | Age: 76
End: 2021-06-21
Payer: MEDICARE

## 2021-06-21 VITALS
WEIGHT: 140.19 LBS | DIASTOLIC BLOOD PRESSURE: 65 MMHG | BODY MASS INDEX: 25.8 KG/M2 | SYSTOLIC BLOOD PRESSURE: 106 MMHG | HEIGHT: 62 IN

## 2021-06-21 DIAGNOSIS — M06.042 RHEUMATOID ARTHRITIS INVOLVING BOTH HANDS WITH NEGATIVE RHEUMATOID FACTOR: ICD-10-CM

## 2021-06-21 DIAGNOSIS — M54.10 RADICULOPATHY, UNSPECIFIED SPINAL REGION: ICD-10-CM

## 2021-06-21 DIAGNOSIS — M17.0 PRIMARY OSTEOARTHRITIS OF BOTH KNEES: ICD-10-CM

## 2021-06-21 DIAGNOSIS — M47.25 OSTEOARTHRITIS OF SPINE WITH RADICULOPATHY, THORACOLUMBAR REGION: ICD-10-CM

## 2021-06-21 DIAGNOSIS — M06.041 RHEUMATOID ARTHRITIS INVOLVING BOTH HANDS WITH NEGATIVE RHEUMATOID FACTOR: ICD-10-CM

## 2021-06-21 DIAGNOSIS — M05.741 RHEUMATOID ARTHRITIS INVOLVING BOTH HANDS WITH POSITIVE RHEUMATOID FACTOR: Primary | ICD-10-CM

## 2021-06-21 DIAGNOSIS — M05.742 RHEUMATOID ARTHRITIS INVOLVING BOTH HANDS WITH POSITIVE RHEUMATOID FACTOR: Primary | ICD-10-CM

## 2021-06-21 DIAGNOSIS — D84.9 IMMUNOSUPPRESSION: ICD-10-CM

## 2021-06-21 DIAGNOSIS — M47.812 SPONDYLOSIS OF CERVICAL REGION WITHOUT MYELOPATHY OR RADICULOPATHY: ICD-10-CM

## 2021-06-21 PROCEDURE — 1101F PR PT FALLS ASSESS DOC 0-1 FALLS W/OUT INJ PAST YR: ICD-10-PCS | Mod: S$GLB,,, | Performed by: INTERNAL MEDICINE

## 2021-06-21 PROCEDURE — 1159F MED LIST DOCD IN RCRD: CPT | Mod: S$GLB,,, | Performed by: INTERNAL MEDICINE

## 2021-06-21 PROCEDURE — 1101F PT FALLS ASSESS-DOCD LE1/YR: CPT | Mod: S$GLB,,, | Performed by: INTERNAL MEDICINE

## 2021-06-21 PROCEDURE — 99999 PR PBB SHADOW E&M-EST. PATIENT-LVL IV: ICD-10-PCS | Mod: PBBFAC,,, | Performed by: INTERNAL MEDICINE

## 2021-06-21 PROCEDURE — 99214 OFFICE O/P EST MOD 30 MIN: CPT | Mod: S$GLB,,, | Performed by: INTERNAL MEDICINE

## 2021-06-21 PROCEDURE — 99999 PR PBB SHADOW E&M-EST. PATIENT-LVL IV: CPT | Mod: PBBFAC,,, | Performed by: INTERNAL MEDICINE

## 2021-06-21 PROCEDURE — 1159F PR MEDICATION LIST DOCUMENTED IN MEDICAL RECORD: ICD-10-PCS | Mod: S$GLB,,, | Performed by: INTERNAL MEDICINE

## 2021-06-21 PROCEDURE — 3288F FALL RISK ASSESSMENT DOCD: CPT | Mod: S$GLB,,, | Performed by: INTERNAL MEDICINE

## 2021-06-21 PROCEDURE — 3288F PR FALLS RISK ASSESSMENT DOCUMENTED: ICD-10-PCS | Mod: S$GLB,,, | Performed by: INTERNAL MEDICINE

## 2021-06-21 PROCEDURE — 99214 PR OFFICE/OUTPT VISIT, EST, LEVL IV, 30-39 MIN: ICD-10-PCS | Mod: S$GLB,,, | Performed by: INTERNAL MEDICINE

## 2021-06-21 RX ORDER — HYDROCODONE BITARTRATE AND ACETAMINOPHEN 10; 325 MG/1; MG/1
1 TABLET ORAL EVERY 6 HOURS PRN
Qty: 90 TABLET | Refills: 0 | Status: SHIPPED | OUTPATIENT
Start: 2021-06-21 | End: 2021-09-23 | Stop reason: SDUPTHER

## 2021-06-21 RX ORDER — FOLIC ACID 1 MG/1
TABLET ORAL
Qty: 180 TABLET | Refills: 3 | Status: SHIPPED | OUTPATIENT
Start: 2021-06-21 | End: 2022-06-30

## 2021-06-21 RX ORDER — METHOTREXATE 2.5 MG/1
TABLET ORAL
Qty: 48 TABLET | Refills: 3 | Status: SHIPPED | OUTPATIENT
Start: 2021-06-21 | End: 2022-07-12 | Stop reason: SDUPTHER

## 2021-06-30 LAB
ALBUMIN SERPL-MCNC: 4.2 G/DL (ref 3.6–5.1)
ALBUMIN/GLOB SERPL: 1.6 (CALC) (ref 1–2.5)
ALP SERPL-CCNC: 69 U/L (ref 37–153)
ALT SERPL-CCNC: 19 U/L (ref 6–29)
AST SERPL-CCNC: 21 U/L (ref 10–35)
BASOPHILS # BLD AUTO: 41 CELLS/UL (ref 0–200)
BASOPHILS NFR BLD AUTO: 0.5 %
BILIRUB SERPL-MCNC: 0.4 MG/DL (ref 0.2–1.2)
BUN SERPL-MCNC: 12 MG/DL (ref 7–25)
BUN/CREAT SERPL: ABNORMAL (CALC) (ref 6–22)
CALCIUM SERPL-MCNC: 9.3 MG/DL (ref 8.6–10.4)
CHLORIDE SERPL-SCNC: 97 MMOL/L (ref 98–110)
CO2 SERPL-SCNC: 29 MMOL/L (ref 20–32)
CREAT SERPL-MCNC: 0.76 MG/DL (ref 0.6–0.93)
CRP SERPL-MCNC: 2.7 MG/L
EOSINOPHIL # BLD AUTO: 162 CELLS/UL (ref 15–500)
EOSINOPHIL NFR BLD AUTO: 2 %
ERYTHROCYTE [DISTWIDTH] IN BLOOD BY AUTOMATED COUNT: 13 % (ref 11–15)
ERYTHROCYTE [SEDIMENTATION RATE] IN BLOOD BY WESTERGREN METHOD: 25 MM/H
GLOBULIN SER CALC-MCNC: 2.7 G/DL (CALC) (ref 1.9–3.7)
GLUCOSE SERPL-MCNC: 85 MG/DL (ref 65–99)
HCT VFR BLD AUTO: 39.3 % (ref 35–45)
HGB BLD-MCNC: 13.4 G/DL (ref 11.7–15.5)
LYMPHOCYTES # BLD AUTO: 1393 CELLS/UL (ref 850–3900)
LYMPHOCYTES NFR BLD AUTO: 17.2 %
MCH RBC QN AUTO: 31.9 PG (ref 27–33)
MCHC RBC AUTO-ENTMCNC: 34.1 G/DL (ref 32–36)
MCV RBC AUTO: 93.6 FL (ref 80–100)
MONOCYTES # BLD AUTO: 656 CELLS/UL (ref 200–950)
MONOCYTES NFR BLD AUTO: 8.1 %
NEUTROPHILS # BLD AUTO: 5848 CELLS/UL (ref 1500–7800)
NEUTROPHILS NFR BLD AUTO: 72.2 %
PLATELET # BLD AUTO: 355 THOUSAND/UL (ref 140–400)
PMV BLD REES-ECKER: 9.7 FL (ref 7.5–12.5)
POTASSIUM SERPL-SCNC: 4.4 MMOL/L (ref 3.5–5.3)
PROT SERPL-MCNC: 6.9 G/DL (ref 6.1–8.1)
RBC # BLD AUTO: 4.2 MILLION/UL (ref 3.8–5.1)
SODIUM SERPL-SCNC: 136 MMOL/L (ref 135–146)
WBC # BLD AUTO: 8.1 THOUSAND/UL (ref 3.8–10.8)

## 2021-08-19 ENCOUNTER — OFFICE VISIT (OUTPATIENT)
Dept: FAMILY MEDICINE | Facility: CLINIC | Age: 76
End: 2021-08-19
Payer: MEDICARE

## 2021-08-19 ENCOUNTER — TELEPHONE (OUTPATIENT)
Dept: FAMILY MEDICINE | Facility: CLINIC | Age: 76
End: 2021-08-19

## 2021-08-19 VITALS
OXYGEN SATURATION: 97 % | SYSTOLIC BLOOD PRESSURE: 118 MMHG | HEART RATE: 70 BPM | WEIGHT: 133.63 LBS | DIASTOLIC BLOOD PRESSURE: 78 MMHG | BODY MASS INDEX: 24.59 KG/M2 | HEIGHT: 62 IN

## 2021-08-19 DIAGNOSIS — M25.562 ACUTE PAIN OF BOTH KNEES: ICD-10-CM

## 2021-08-19 DIAGNOSIS — M25.512 ACUTE PAIN OF LEFT SHOULDER: Primary | ICD-10-CM

## 2021-08-19 DIAGNOSIS — Z20.822 EXPOSURE TO COVID-19 VIRUS: Primary | ICD-10-CM

## 2021-08-19 DIAGNOSIS — M25.561 ACUTE PAIN OF BOTH KNEES: ICD-10-CM

## 2021-08-19 PROCEDURE — 1125F PR PAIN SEVERITY QUANTIFIED, PAIN PRESENT: ICD-10-PCS | Mod: S$GLB,,, | Performed by: INTERNAL MEDICINE

## 2021-08-19 PROCEDURE — 1160F RVW MEDS BY RX/DR IN RCRD: CPT | Mod: S$GLB,,, | Performed by: INTERNAL MEDICINE

## 2021-08-19 PROCEDURE — 3078F PR MOST RECENT DIASTOLIC BLOOD PRESSURE < 80 MM HG: ICD-10-PCS | Mod: S$GLB,,, | Performed by: INTERNAL MEDICINE

## 2021-08-19 PROCEDURE — 1125F AMNT PAIN NOTED PAIN PRSNT: CPT | Mod: S$GLB,,, | Performed by: INTERNAL MEDICINE

## 2021-08-19 PROCEDURE — 1160F PR REVIEW ALL MEDS BY PRESCRIBER/CLIN PHARMACIST DOCUMENTED: ICD-10-PCS | Mod: S$GLB,,, | Performed by: INTERNAL MEDICINE

## 2021-08-19 PROCEDURE — 3288F FALL RISK ASSESSMENT DOCD: CPT | Mod: S$GLB,,, | Performed by: INTERNAL MEDICINE

## 2021-08-19 PROCEDURE — 3074F SYST BP LT 130 MM HG: CPT | Mod: S$GLB,,, | Performed by: INTERNAL MEDICINE

## 2021-08-19 PROCEDURE — 1101F PT FALLS ASSESS-DOCD LE1/YR: CPT | Mod: S$GLB,,, | Performed by: INTERNAL MEDICINE

## 2021-08-19 PROCEDURE — 1159F MED LIST DOCD IN RCRD: CPT | Mod: S$GLB,,, | Performed by: INTERNAL MEDICINE

## 2021-08-19 PROCEDURE — 3078F DIAST BP <80 MM HG: CPT | Mod: S$GLB,,, | Performed by: INTERNAL MEDICINE

## 2021-08-19 PROCEDURE — 99214 PR OFFICE/OUTPT VISIT, EST, LEVL IV, 30-39 MIN: ICD-10-PCS | Mod: S$GLB,,, | Performed by: INTERNAL MEDICINE

## 2021-08-19 PROCEDURE — 3288F PR FALLS RISK ASSESSMENT DOCUMENTED: ICD-10-PCS | Mod: S$GLB,,, | Performed by: INTERNAL MEDICINE

## 2021-08-19 PROCEDURE — 1101F PR PT FALLS ASSESS DOC 0-1 FALLS W/OUT INJ PAST YR: ICD-10-PCS | Mod: S$GLB,,, | Performed by: INTERNAL MEDICINE

## 2021-08-19 PROCEDURE — 99999 PR PBB SHADOW E&M-EST. PATIENT-LVL V: ICD-10-PCS | Mod: PBBFAC,,, | Performed by: INTERNAL MEDICINE

## 2021-08-19 PROCEDURE — 1159F PR MEDICATION LIST DOCUMENTED IN MEDICAL RECORD: ICD-10-PCS | Mod: S$GLB,,, | Performed by: INTERNAL MEDICINE

## 2021-08-19 PROCEDURE — 99214 OFFICE O/P EST MOD 30 MIN: CPT | Mod: S$GLB,,, | Performed by: INTERNAL MEDICINE

## 2021-08-19 PROCEDURE — 99999 PR PBB SHADOW E&M-EST. PATIENT-LVL V: CPT | Mod: PBBFAC,,, | Performed by: INTERNAL MEDICINE

## 2021-08-19 PROCEDURE — 3074F PR MOST RECENT SYSTOLIC BLOOD PRESSURE < 130 MM HG: ICD-10-PCS | Mod: S$GLB,,, | Performed by: INTERNAL MEDICINE

## 2021-08-19 RX ORDER — DICLOFENAC SODIUM 100 MG/1
TABLET, EXTENDED RELEASE ORAL
COMMUNITY
End: 2021-09-23

## 2021-08-24 ENCOUNTER — OFFICE VISIT (OUTPATIENT)
Dept: PHYSICAL MEDICINE AND REHAB | Facility: CLINIC | Age: 76
End: 2021-08-24
Payer: MEDICARE

## 2021-08-24 VITALS — WEIGHT: 134.69 LBS | HEIGHT: 62 IN | BODY MASS INDEX: 24.78 KG/M2

## 2021-08-24 DIAGNOSIS — M25.512 CHRONIC LEFT SHOULDER PAIN: ICD-10-CM

## 2021-08-24 DIAGNOSIS — G89.29 CHRONIC LEFT SHOULDER PAIN: ICD-10-CM

## 2021-08-24 DIAGNOSIS — M67.912 TENDINOPATHY OF LEFT ROTATOR CUFF: ICD-10-CM

## 2021-08-24 LAB — SARS-COV-2 IGG SERPL IA-ACNC: 8.37 INDEX

## 2021-08-24 PROCEDURE — 1159F PR MEDICATION LIST DOCUMENTED IN MEDICAL RECORD: ICD-10-PCS | Mod: S$GLB,,, | Performed by: PHYSICAL MEDICINE & REHABILITATION

## 2021-08-24 PROCEDURE — 1101F PR PT FALLS ASSESS DOC 0-1 FALLS W/OUT INJ PAST YR: ICD-10-PCS | Mod: S$GLB,,, | Performed by: PHYSICAL MEDICINE & REHABILITATION

## 2021-08-24 PROCEDURE — 1125F PR PAIN SEVERITY QUANTIFIED, PAIN PRESENT: ICD-10-PCS | Mod: S$GLB,,, | Performed by: PHYSICAL MEDICINE & REHABILITATION

## 2021-08-24 PROCEDURE — 1125F AMNT PAIN NOTED PAIN PRSNT: CPT | Mod: S$GLB,,, | Performed by: PHYSICAL MEDICINE & REHABILITATION

## 2021-08-24 PROCEDURE — 1159F MED LIST DOCD IN RCRD: CPT | Mod: S$GLB,,, | Performed by: PHYSICAL MEDICINE & REHABILITATION

## 2021-08-24 PROCEDURE — 1160F RVW MEDS BY RX/DR IN RCRD: CPT | Mod: S$GLB,,, | Performed by: PHYSICAL MEDICINE & REHABILITATION

## 2021-08-24 PROCEDURE — 1101F PT FALLS ASSESS-DOCD LE1/YR: CPT | Mod: S$GLB,,, | Performed by: PHYSICAL MEDICINE & REHABILITATION

## 2021-08-24 PROCEDURE — 99999 PR PBB SHADOW E&M-EST. PATIENT-LVL IV: ICD-10-PCS | Mod: PBBFAC,,, | Performed by: PHYSICAL MEDICINE & REHABILITATION

## 2021-08-24 PROCEDURE — 1160F PR REVIEW ALL MEDS BY PRESCRIBER/CLIN PHARMACIST DOCUMENTED: ICD-10-PCS | Mod: S$GLB,,, | Performed by: PHYSICAL MEDICINE & REHABILITATION

## 2021-08-24 PROCEDURE — 20611 DRAIN/INJ JOINT/BURSA W/US: CPT | Mod: LT,S$GLB,, | Performed by: PHYSICAL MEDICINE & REHABILITATION

## 2021-08-24 PROCEDURE — 3288F PR FALLS RISK ASSESSMENT DOCUMENTED: ICD-10-PCS | Mod: S$GLB,,, | Performed by: PHYSICAL MEDICINE & REHABILITATION

## 2021-08-24 PROCEDURE — 99999 PR PBB SHADOW E&M-EST. PATIENT-LVL IV: CPT | Mod: PBBFAC,,, | Performed by: PHYSICAL MEDICINE & REHABILITATION

## 2021-08-24 PROCEDURE — 99204 OFFICE O/P NEW MOD 45 MIN: CPT | Mod: 25,GC,S$GLB, | Performed by: PHYSICAL MEDICINE & REHABILITATION

## 2021-08-24 PROCEDURE — 3288F FALL RISK ASSESSMENT DOCD: CPT | Mod: S$GLB,,, | Performed by: PHYSICAL MEDICINE & REHABILITATION

## 2021-08-24 PROCEDURE — 99204 PR OFFICE/OUTPT VISIT, NEW, LEVL IV, 45-59 MIN: ICD-10-PCS | Mod: 25,GC,S$GLB, | Performed by: PHYSICAL MEDICINE & REHABILITATION

## 2021-08-24 PROCEDURE — 20611 LARGE JOINT ASPIRATION/INJECTION: L SUBACROMIAL BURSA: ICD-10-PCS | Mod: LT,S$GLB,, | Performed by: PHYSICAL MEDICINE & REHABILITATION

## 2021-08-24 RX ORDER — BETAMETHASONE SODIUM PHOSPHATE AND BETAMETHASONE ACETATE 3; 3 MG/ML; MG/ML
6 INJECTION, SUSPENSION INTRA-ARTICULAR; INTRALESIONAL; INTRAMUSCULAR; SOFT TISSUE
Status: DISCONTINUED | OUTPATIENT
Start: 2021-08-24 | End: 2021-08-24 | Stop reason: HOSPADM

## 2021-08-24 RX ADMIN — BETAMETHASONE SODIUM PHOSPHATE AND BETAMETHASONE ACETATE 6 MG: 3; 3 INJECTION, SUSPENSION INTRA-ARTICULAR; INTRALESIONAL; INTRAMUSCULAR; SOFT TISSUE at 11:08

## 2021-09-23 ENCOUNTER — OFFICE VISIT (OUTPATIENT)
Dept: FAMILY MEDICINE | Facility: CLINIC | Age: 76
End: 2021-09-23
Payer: MEDICARE

## 2021-09-23 VITALS
BODY MASS INDEX: 24.83 KG/M2 | OXYGEN SATURATION: 98 % | HEIGHT: 62 IN | DIASTOLIC BLOOD PRESSURE: 80 MMHG | HEART RATE: 73 BPM | WEIGHT: 134.94 LBS | SYSTOLIC BLOOD PRESSURE: 120 MMHG

## 2021-09-23 DIAGNOSIS — M54.12 CERVICAL RADICULOPATHY: Primary | ICD-10-CM

## 2021-09-23 DIAGNOSIS — M47.25 OSTEOARTHRITIS OF SPINE WITH RADICULOPATHY, THORACOLUMBAR REGION: ICD-10-CM

## 2021-09-23 DIAGNOSIS — M47.812 SPONDYLOSIS OF CERVICAL REGION WITHOUT MYELOPATHY OR RADICULOPATHY: ICD-10-CM

## 2021-09-23 DIAGNOSIS — M05.741 RHEUMATOID ARTHRITIS INVOLVING BOTH HANDS WITH POSITIVE RHEUMATOID FACTOR: ICD-10-CM

## 2021-09-23 DIAGNOSIS — R29.898 WEAKNESS OF BOTH HANDS: ICD-10-CM

## 2021-09-23 DIAGNOSIS — M54.10 RADICULOPATHY, UNSPECIFIED SPINAL REGION: ICD-10-CM

## 2021-09-23 DIAGNOSIS — M05.742 RHEUMATOID ARTHRITIS INVOLVING BOTH HANDS WITH POSITIVE RHEUMATOID FACTOR: ICD-10-CM

## 2021-09-23 PROCEDURE — 1159F PR MEDICATION LIST DOCUMENTED IN MEDICAL RECORD: ICD-10-PCS | Mod: S$GLB,,, | Performed by: INTERNAL MEDICINE

## 2021-09-23 PROCEDURE — 99214 OFFICE O/P EST MOD 30 MIN: CPT | Mod: S$GLB,,, | Performed by: INTERNAL MEDICINE

## 2021-09-23 PROCEDURE — 3079F DIAST BP 80-89 MM HG: CPT | Mod: S$GLB,,, | Performed by: INTERNAL MEDICINE

## 2021-09-23 PROCEDURE — 1160F PR REVIEW ALL MEDS BY PRESCRIBER/CLIN PHARMACIST DOCUMENTED: ICD-10-PCS | Mod: S$GLB,,, | Performed by: INTERNAL MEDICINE

## 2021-09-23 PROCEDURE — 1125F AMNT PAIN NOTED PAIN PRSNT: CPT | Mod: S$GLB,,, | Performed by: INTERNAL MEDICINE

## 2021-09-23 PROCEDURE — 1125F PR PAIN SEVERITY QUANTIFIED, PAIN PRESENT: ICD-10-PCS | Mod: S$GLB,,, | Performed by: INTERNAL MEDICINE

## 2021-09-23 PROCEDURE — 3074F PR MOST RECENT SYSTOLIC BLOOD PRESSURE < 130 MM HG: ICD-10-PCS | Mod: S$GLB,,, | Performed by: INTERNAL MEDICINE

## 2021-09-23 PROCEDURE — 3288F PR FALLS RISK ASSESSMENT DOCUMENTED: ICD-10-PCS | Mod: S$GLB,,, | Performed by: INTERNAL MEDICINE

## 2021-09-23 PROCEDURE — 3288F FALL RISK ASSESSMENT DOCD: CPT | Mod: S$GLB,,, | Performed by: INTERNAL MEDICINE

## 2021-09-23 PROCEDURE — 1101F PR PT FALLS ASSESS DOC 0-1 FALLS W/OUT INJ PAST YR: ICD-10-PCS | Mod: S$GLB,,, | Performed by: INTERNAL MEDICINE

## 2021-09-23 PROCEDURE — 1101F PT FALLS ASSESS-DOCD LE1/YR: CPT | Mod: S$GLB,,, | Performed by: INTERNAL MEDICINE

## 2021-09-23 PROCEDURE — 99999 PR PBB SHADOW E&M-EST. PATIENT-LVL V: CPT | Mod: PBBFAC,,, | Performed by: INTERNAL MEDICINE

## 2021-09-23 PROCEDURE — 99214 PR OFFICE/OUTPT VISIT, EST, LEVL IV, 30-39 MIN: ICD-10-PCS | Mod: S$GLB,,, | Performed by: INTERNAL MEDICINE

## 2021-09-23 PROCEDURE — 3079F PR MOST RECENT DIASTOLIC BLOOD PRESSURE 80-89 MM HG: ICD-10-PCS | Mod: S$GLB,,, | Performed by: INTERNAL MEDICINE

## 2021-09-23 PROCEDURE — 99999 PR PBB SHADOW E&M-EST. PATIENT-LVL V: ICD-10-PCS | Mod: PBBFAC,,, | Performed by: INTERNAL MEDICINE

## 2021-09-23 PROCEDURE — 1159F MED LIST DOCD IN RCRD: CPT | Mod: S$GLB,,, | Performed by: INTERNAL MEDICINE

## 2021-09-23 PROCEDURE — 3074F SYST BP LT 130 MM HG: CPT | Mod: S$GLB,,, | Performed by: INTERNAL MEDICINE

## 2021-09-23 PROCEDURE — 1160F RVW MEDS BY RX/DR IN RCRD: CPT | Mod: S$GLB,,, | Performed by: INTERNAL MEDICINE

## 2021-09-23 RX ORDER — HYDROCODONE BITARTRATE AND ACETAMINOPHEN 10; 325 MG/1; MG/1
1 TABLET ORAL EVERY 8 HOURS PRN
Qty: 90 TABLET | Refills: 0 | Status: SHIPPED | OUTPATIENT
Start: 2021-09-23 | End: 2021-12-13 | Stop reason: SDUPTHER

## 2021-09-23 RX ORDER — DICLOFENAC SODIUM 50 MG/1
50 TABLET, DELAYED RELEASE ORAL 2 TIMES DAILY PRN
Qty: 60 TABLET | Refills: 1 | Status: ON HOLD | OUTPATIENT
Start: 2021-09-23 | End: 2021-11-04 | Stop reason: HOSPADM

## 2021-10-05 ENCOUNTER — TELEPHONE (OUTPATIENT)
Dept: FAMILY MEDICINE | Facility: CLINIC | Age: 76
End: 2021-10-05

## 2021-10-15 ENCOUNTER — HOSPITAL ENCOUNTER (OUTPATIENT)
Dept: RADIOLOGY | Facility: HOSPITAL | Age: 76
Discharge: HOME | End: 2021-10-15
Attending: INTERNAL MEDICINE
Payer: MEDICARE

## 2021-10-15 DIAGNOSIS — M54.12 CERVICAL RADICULOPATHY: ICD-10-CM

## 2021-10-15 DIAGNOSIS — R29.898 WEAKNESS OF BOTH HANDS: ICD-10-CM

## 2021-10-15 PROCEDURE — 72141 MRI NECK SPINE W/O DYE: CPT | Mod: TC,PO

## 2021-10-15 PROCEDURE — 72141 MRI NECK SPINE W/O DYE: CPT | Mod: 26,,, | Performed by: RADIOLOGY

## 2021-10-15 PROCEDURE — 72141 MRI CERVICAL SPINE WITHOUT CONTRAST: ICD-10-PCS | Mod: 26,,, | Performed by: RADIOLOGY

## 2021-10-18 ENCOUNTER — TELEPHONE (OUTPATIENT)
Dept: FAMILY MEDICINE | Facility: CLINIC | Age: 76
End: 2021-10-18

## 2021-10-20 ENCOUNTER — OFFICE VISIT (OUTPATIENT)
Dept: PAIN MEDICINE | Facility: CLINIC | Age: 76
End: 2021-10-20
Payer: MEDICARE

## 2021-10-20 VITALS
WEIGHT: 131.19 LBS | RESPIRATION RATE: 18 BRPM | HEART RATE: 74 BPM | OXYGEN SATURATION: 99 % | DIASTOLIC BLOOD PRESSURE: 61 MMHG | SYSTOLIC BLOOD PRESSURE: 130 MMHG | TEMPERATURE: 97 F | BODY MASS INDEX: 24.14 KG/M2 | HEIGHT: 62 IN

## 2021-10-20 DIAGNOSIS — M25.562 CHRONIC PAIN OF LEFT KNEE: Primary | ICD-10-CM

## 2021-10-20 DIAGNOSIS — M54.16 BILATERAL LUMBAR RADICULOPATHY: ICD-10-CM

## 2021-10-20 DIAGNOSIS — R29.898 WEAKNESS OF BOTH HANDS: ICD-10-CM

## 2021-10-20 DIAGNOSIS — M48.02 CERVICAL SPINAL STENOSIS: ICD-10-CM

## 2021-10-20 DIAGNOSIS — M54.9 DORSALGIA, UNSPECIFIED: ICD-10-CM

## 2021-10-20 DIAGNOSIS — M54.12 CERVICAL RADICULOPATHY: ICD-10-CM

## 2021-10-20 DIAGNOSIS — G89.29 CHRONIC PAIN OF LEFT KNEE: Primary | ICD-10-CM

## 2021-10-20 PROCEDURE — 3288F PR FALLS RISK ASSESSMENT DOCUMENTED: ICD-10-PCS | Mod: S$GLB,,, | Performed by: ANESTHESIOLOGY

## 2021-10-20 PROCEDURE — 3075F PR MOST RECENT SYSTOLIC BLOOD PRESS GE 130-139MM HG: ICD-10-PCS | Mod: S$GLB,,, | Performed by: ANESTHESIOLOGY

## 2021-10-20 PROCEDURE — 99204 PR OFFICE/OUTPT VISIT, NEW, LEVL IV, 45-59 MIN: ICD-10-PCS | Mod: S$GLB,,, | Performed by: ANESTHESIOLOGY

## 2021-10-20 PROCEDURE — 99204 OFFICE O/P NEW MOD 45 MIN: CPT | Mod: S$GLB,,, | Performed by: ANESTHESIOLOGY

## 2021-10-20 PROCEDURE — 1159F PR MEDICATION LIST DOCUMENTED IN MEDICAL RECORD: ICD-10-PCS | Mod: S$GLB,,, | Performed by: ANESTHESIOLOGY

## 2021-10-20 PROCEDURE — 3075F SYST BP GE 130 - 139MM HG: CPT | Mod: S$GLB,,, | Performed by: ANESTHESIOLOGY

## 2021-10-20 PROCEDURE — 3078F PR MOST RECENT DIASTOLIC BLOOD PRESSURE < 80 MM HG: ICD-10-PCS | Mod: S$GLB,,, | Performed by: ANESTHESIOLOGY

## 2021-10-20 PROCEDURE — 1101F PT FALLS ASSESS-DOCD LE1/YR: CPT | Mod: S$GLB,,, | Performed by: ANESTHESIOLOGY

## 2021-10-20 PROCEDURE — 99999 PR PBB SHADOW E&M-EST. PATIENT-LVL V: ICD-10-PCS | Mod: PBBFAC,,, | Performed by: ANESTHESIOLOGY

## 2021-10-20 PROCEDURE — 1101F PR PT FALLS ASSESS DOC 0-1 FALLS W/OUT INJ PAST YR: ICD-10-PCS | Mod: S$GLB,,, | Performed by: ANESTHESIOLOGY

## 2021-10-20 PROCEDURE — 3288F FALL RISK ASSESSMENT DOCD: CPT | Mod: S$GLB,,, | Performed by: ANESTHESIOLOGY

## 2021-10-20 PROCEDURE — 99999 PR PBB SHADOW E&M-EST. PATIENT-LVL V: CPT | Mod: PBBFAC,,, | Performed by: ANESTHESIOLOGY

## 2021-10-20 PROCEDURE — 3078F DIAST BP <80 MM HG: CPT | Mod: S$GLB,,, | Performed by: ANESTHESIOLOGY

## 2021-10-20 PROCEDURE — 1125F PR PAIN SEVERITY QUANTIFIED, PAIN PRESENT: ICD-10-PCS | Mod: S$GLB,,, | Performed by: ANESTHESIOLOGY

## 2021-10-20 PROCEDURE — 1125F AMNT PAIN NOTED PAIN PRSNT: CPT | Mod: S$GLB,,, | Performed by: ANESTHESIOLOGY

## 2021-10-20 PROCEDURE — 1159F MED LIST DOCD IN RCRD: CPT | Mod: S$GLB,,, | Performed by: ANESTHESIOLOGY

## 2021-10-20 RX ORDER — SODIUM CHLORIDE, SODIUM LACTATE, POTASSIUM CHLORIDE, CALCIUM CHLORIDE 600; 310; 30; 20 MG/100ML; MG/100ML; MG/100ML; MG/100ML
INJECTION, SOLUTION INTRAVENOUS CONTINUOUS
Status: CANCELLED | OUTPATIENT
Start: 2021-11-04

## 2021-10-22 ENCOUNTER — TELEPHONE (OUTPATIENT)
Dept: FAMILY MEDICINE | Facility: CLINIC | Age: 76
End: 2021-10-22

## 2021-10-25 ENCOUNTER — OFFICE VISIT (OUTPATIENT)
Dept: FAMILY MEDICINE | Facility: CLINIC | Age: 76
End: 2021-10-25
Payer: MEDICARE

## 2021-10-25 ENCOUNTER — OFFICE VISIT (OUTPATIENT)
Dept: RHEUMATOLOGY | Facility: CLINIC | Age: 76
End: 2021-10-25
Payer: MEDICARE

## 2021-10-25 VITALS
SYSTOLIC BLOOD PRESSURE: 124 MMHG | DIASTOLIC BLOOD PRESSURE: 78 MMHG | HEIGHT: 62 IN | WEIGHT: 132.94 LBS | BODY MASS INDEX: 24.46 KG/M2 | HEART RATE: 69 BPM

## 2021-10-25 VITALS
SYSTOLIC BLOOD PRESSURE: 128 MMHG | OXYGEN SATURATION: 67 % | HEART RATE: 68 BPM | DIASTOLIC BLOOD PRESSURE: 72 MMHG | BODY MASS INDEX: 24.51 KG/M2 | WEIGHT: 133.19 LBS | TEMPERATURE: 98 F | HEIGHT: 62 IN

## 2021-10-25 DIAGNOSIS — D84.9 IMMUNOSUPPRESSION: ICD-10-CM

## 2021-10-25 DIAGNOSIS — M17.0 PRIMARY OSTEOARTHRITIS OF BOTH KNEES: ICD-10-CM

## 2021-10-25 DIAGNOSIS — M05.742 RHEUMATOID ARTHRITIS INVOLVING BOTH HANDS WITH POSITIVE RHEUMATOID FACTOR: Primary | ICD-10-CM

## 2021-10-25 DIAGNOSIS — M05.741 RHEUMATOID ARTHRITIS INVOLVING BOTH HANDS WITH POSITIVE RHEUMATOID FACTOR: Primary | ICD-10-CM

## 2021-10-25 DIAGNOSIS — G62.89 OTHER POLYNEUROPATHY: ICD-10-CM

## 2021-10-25 DIAGNOSIS — M47.25 OSTEOARTHRITIS OF SPINE WITH RADICULOPATHY, THORACOLUMBAR REGION: Primary | ICD-10-CM

## 2021-10-25 DIAGNOSIS — M54.12 CERVICAL RADICULOPATHY: ICD-10-CM

## 2021-10-25 PROCEDURE — 1125F PR PAIN SEVERITY QUANTIFIED, PAIN PRESENT: ICD-10-PCS | Mod: S$GLB,,, | Performed by: INTERNAL MEDICINE

## 2021-10-25 PROCEDURE — 3078F PR MOST RECENT DIASTOLIC BLOOD PRESSURE < 80 MM HG: ICD-10-PCS | Mod: S$GLB,,, | Performed by: INTERNAL MEDICINE

## 2021-10-25 PROCEDURE — 99999 PR PBB SHADOW E&M-EST. PATIENT-LVL IV: CPT | Mod: PBBFAC,,, | Performed by: INTERNAL MEDICINE

## 2021-10-25 PROCEDURE — 99999 PR PBB SHADOW E&M-EST. PATIENT-LVL V: CPT | Mod: PBBFAC,,, | Performed by: INTERNAL MEDICINE

## 2021-10-25 PROCEDURE — 99213 OFFICE O/P EST LOW 20 MIN: CPT | Mod: S$GLB,,, | Performed by: INTERNAL MEDICINE

## 2021-10-25 PROCEDURE — 1126F PR PAIN SEVERITY QUANTIFIED, NO PAIN PRESENT: ICD-10-PCS | Mod: S$GLB,,, | Performed by: INTERNAL MEDICINE

## 2021-10-25 PROCEDURE — 1160F RVW MEDS BY RX/DR IN RCRD: CPT | Mod: S$GLB,,, | Performed by: INTERNAL MEDICINE

## 2021-10-25 PROCEDURE — 3078F DIAST BP <80 MM HG: CPT | Mod: S$GLB,,, | Performed by: INTERNAL MEDICINE

## 2021-10-25 PROCEDURE — 1159F PR MEDICATION LIST DOCUMENTED IN MEDICAL RECORD: ICD-10-PCS | Mod: S$GLB,,, | Performed by: INTERNAL MEDICINE

## 2021-10-25 PROCEDURE — 1125F AMNT PAIN NOTED PAIN PRSNT: CPT | Mod: S$GLB,,, | Performed by: INTERNAL MEDICINE

## 2021-10-25 PROCEDURE — 3074F PR MOST RECENT SYSTOLIC BLOOD PRESSURE < 130 MM HG: ICD-10-PCS | Mod: S$GLB,,, | Performed by: INTERNAL MEDICINE

## 2021-10-25 PROCEDURE — 99999 PR PBB SHADOW E&M-EST. PATIENT-LVL V: ICD-10-PCS | Mod: PBBFAC,,, | Performed by: INTERNAL MEDICINE

## 2021-10-25 PROCEDURE — 1126F AMNT PAIN NOTED NONE PRSNT: CPT | Mod: S$GLB,,, | Performed by: INTERNAL MEDICINE

## 2021-10-25 PROCEDURE — 3074F SYST BP LT 130 MM HG: CPT | Mod: S$GLB,,, | Performed by: INTERNAL MEDICINE

## 2021-10-25 PROCEDURE — 99214 PR OFFICE/OUTPT VISIT, EST, LEVL IV, 30-39 MIN: ICD-10-PCS | Mod: S$GLB,,, | Performed by: INTERNAL MEDICINE

## 2021-10-25 PROCEDURE — 99213 PR OFFICE/OUTPT VISIT, EST, LEVL III, 20-29 MIN: ICD-10-PCS | Mod: S$GLB,,, | Performed by: INTERNAL MEDICINE

## 2021-10-25 PROCEDURE — 99214 OFFICE O/P EST MOD 30 MIN: CPT | Mod: S$GLB,,, | Performed by: INTERNAL MEDICINE

## 2021-10-25 PROCEDURE — 1159F MED LIST DOCD IN RCRD: CPT | Mod: S$GLB,,, | Performed by: INTERNAL MEDICINE

## 2021-10-25 PROCEDURE — 1160F PR REVIEW ALL MEDS BY PRESCRIBER/CLIN PHARMACIST DOCUMENTED: ICD-10-PCS | Mod: S$GLB,,, | Performed by: INTERNAL MEDICINE

## 2021-10-25 PROCEDURE — 99999 PR PBB SHADOW E&M-EST. PATIENT-LVL IV: ICD-10-PCS | Mod: PBBFAC,,, | Performed by: INTERNAL MEDICINE

## 2021-10-26 ASSESSMENT — ROUTINE ASSESSMENT OF PATIENT INDEX DATA (RAPID3)
PAIN SCORE: 2.2
PATIENT GLOBAL ASSESSMENT SCORE: 3
PSYCHOLOGICAL DISTRESS SCORE: 1.1
MDHAQ FUNCTION SCORE: 0.8
TOTAL RAPID3 SCORE: 2.62
FATIGUE SCORE: 2.2

## 2021-11-02 ENCOUNTER — TELEPHONE (OUTPATIENT)
Dept: SURGERY | Facility: HOSPITAL | Age: 76
End: 2021-11-02
Payer: MEDICARE

## 2021-11-03 ENCOUNTER — TELEPHONE (OUTPATIENT)
Dept: PAIN MEDICINE | Facility: CLINIC | Age: 76
End: 2021-11-03

## 2021-11-03 ENCOUNTER — TELEPHONE (OUTPATIENT)
Dept: PAIN MEDICINE | Facility: CLINIC | Age: 76
End: 2021-11-03
Payer: MEDICARE

## 2021-11-03 NOTE — TELEPHONE ENCOUNTER
----- Message from Josephine Hernandez RN sent at 11/3/2021 10:07 AM CDT -----  Patient has been taking celebrex and fish oil. Please call patient and advise if this will interfere with her procedure tomorrow. Thanks!

## 2021-11-04 ENCOUNTER — HOSPITAL ENCOUNTER (OUTPATIENT)
Facility: HOSPITAL | Age: 76
Discharge: HOME | End: 2021-11-04
Attending: ANESTHESIOLOGY | Admitting: ANESTHESIOLOGY
Payer: MEDICARE

## 2021-11-04 ENCOUNTER — HOSPITAL ENCOUNTER (OUTPATIENT)
Dept: RADIOLOGY | Facility: HOSPITAL | Age: 76
Discharge: HOME | End: 2021-11-04
Attending: ANESTHESIOLOGY
Payer: MEDICARE

## 2021-11-04 VITALS
HEIGHT: 62 IN | OXYGEN SATURATION: 98 % | TEMPERATURE: 97 F | RESPIRATION RATE: 18 BRPM | BODY MASS INDEX: 24.11 KG/M2 | SYSTOLIC BLOOD PRESSURE: 140 MMHG | HEART RATE: 87 BPM | WEIGHT: 131 LBS | DIASTOLIC BLOOD PRESSURE: 62 MMHG

## 2021-11-04 DIAGNOSIS — M54.12 CERVICAL RADICULOPATHY: ICD-10-CM

## 2021-11-04 LAB — SARS-COV-2 RDRP RESP QL NAA+PROBE: NEGATIVE

## 2021-11-04 PROCEDURE — 76000 FLUOROSCOPY <1 HR PHYS/QHP: CPT | Mod: TC,PO

## 2021-11-04 PROCEDURE — 63600175 PHARM REV CODE 636 W HCPCS: Mod: PO | Performed by: ANESTHESIOLOGY

## 2021-11-04 PROCEDURE — 62321 PR INJ CERV/THORAC, W/GUIDANCE: ICD-10-PCS | Mod: ,,, | Performed by: ANESTHESIOLOGY

## 2021-11-04 PROCEDURE — U0002 COVID-19 LAB TEST NON-CDC: HCPCS | Mod: PO | Performed by: ANESTHESIOLOGY

## 2021-11-04 PROCEDURE — 25000003 PHARM REV CODE 250: Mod: PO | Performed by: ANESTHESIOLOGY

## 2021-11-04 PROCEDURE — 62321 NJX INTERLAMINAR CRV/THRC: CPT | Mod: ,,, | Performed by: ANESTHESIOLOGY

## 2021-11-04 PROCEDURE — 25500020 PHARM REV CODE 255: Mod: PO | Performed by: ANESTHESIOLOGY

## 2021-11-04 PROCEDURE — 62321 NJX INTERLAMINAR CRV/THRC: CPT | Mod: PO | Performed by: ANESTHESIOLOGY

## 2021-11-04 RX ORDER — LIDOCAINE HYDROCHLORIDE 10 MG/ML
INJECTION, SOLUTION EPIDURAL; INFILTRATION; INTRACAUDAL; PERINEURAL
Status: DISCONTINUED | OUTPATIENT
Start: 2021-11-04 | End: 2021-11-04 | Stop reason: HOSPADM

## 2021-11-04 RX ORDER — MIDAZOLAM HYDROCHLORIDE 2 MG/2ML
INJECTION, SOLUTION INTRAMUSCULAR; INTRAVENOUS
Status: DISCONTINUED | OUTPATIENT
Start: 2021-11-04 | End: 2021-11-04 | Stop reason: HOSPADM

## 2021-11-04 RX ORDER — SODIUM CHLORIDE, SODIUM LACTATE, POTASSIUM CHLORIDE, CALCIUM CHLORIDE 600; 310; 30; 20 MG/100ML; MG/100ML; MG/100ML; MG/100ML
INJECTION, SOLUTION INTRAVENOUS CONTINUOUS
Status: DISCONTINUED | OUTPATIENT
Start: 2021-11-04 | End: 2021-11-04 | Stop reason: HOSPADM

## 2021-11-04 RX ORDER — METHYLPREDNISOLONE ACETATE 80 MG/ML
INJECTION, SUSPENSION INTRA-ARTICULAR; INTRALESIONAL; INTRAMUSCULAR; SOFT TISSUE
Status: DISCONTINUED | OUTPATIENT
Start: 2021-11-04 | End: 2021-11-04 | Stop reason: HOSPADM

## 2021-11-04 RX ADMIN — SODIUM CHLORIDE, SODIUM LACTATE, POTASSIUM CHLORIDE, AND CALCIUM CHLORIDE: .6; .31; .03; .02 INJECTION, SOLUTION INTRAVENOUS at 02:11

## 2021-11-12 ENCOUNTER — HOSPITAL ENCOUNTER (OUTPATIENT)
Dept: RADIOLOGY | Facility: HOSPITAL | Age: 76
Discharge: HOME | End: 2021-11-12
Attending: ANESTHESIOLOGY
Payer: MEDICARE

## 2021-11-12 DIAGNOSIS — M54.16 BILATERAL LUMBAR RADICULOPATHY: ICD-10-CM

## 2021-11-12 DIAGNOSIS — M54.9 DORSALGIA, UNSPECIFIED: ICD-10-CM

## 2021-11-12 PROCEDURE — 72148 MRI LUMBAR SPINE W/O DYE: CPT | Mod: 26,,, | Performed by: RADIOLOGY

## 2021-11-12 PROCEDURE — 72148 MRI LUMBAR SPINE WITHOUT CONTRAST: ICD-10-PCS | Mod: 26,,, | Performed by: RADIOLOGY

## 2021-11-12 PROCEDURE — 72148 MRI LUMBAR SPINE W/O DYE: CPT | Mod: TC,PO

## 2021-11-28 ENCOUNTER — PATIENT OUTREACH (OUTPATIENT)
Dept: ADMINISTRATIVE | Facility: OTHER | Age: 76
End: 2021-11-28
Payer: MEDICARE

## 2021-11-29 ENCOUNTER — OFFICE VISIT (OUTPATIENT)
Dept: PAIN MEDICINE | Facility: CLINIC | Age: 76
End: 2021-11-29
Payer: MEDICARE

## 2021-11-29 VITALS
DIASTOLIC BLOOD PRESSURE: 63 MMHG | BODY MASS INDEX: 24.09 KG/M2 | HEART RATE: 73 BPM | WEIGHT: 130.94 LBS | SYSTOLIC BLOOD PRESSURE: 141 MMHG | HEIGHT: 62 IN

## 2021-11-29 DIAGNOSIS — Z01.818 PRE-OP TESTING: ICD-10-CM

## 2021-11-29 DIAGNOSIS — M54.16 LUMBAR RADICULOPATHY: Primary | ICD-10-CM

## 2021-11-29 DIAGNOSIS — M54.12 CERVICAL RADICULOPATHY: ICD-10-CM

## 2021-11-29 DIAGNOSIS — M51.36 DDD (DEGENERATIVE DISC DISEASE), LUMBAR: ICD-10-CM

## 2021-11-29 DIAGNOSIS — M50.30 DDD (DEGENERATIVE DISC DISEASE), CERVICAL: ICD-10-CM

## 2021-11-29 PROCEDURE — 99214 PR OFFICE/OUTPT VISIT, EST, LEVL IV, 30-39 MIN: ICD-10-PCS | Mod: S$GLB,,, | Performed by: PHYSICIAN ASSISTANT

## 2021-11-29 PROCEDURE — 99214 OFFICE O/P EST MOD 30 MIN: CPT | Mod: S$GLB,,, | Performed by: PHYSICIAN ASSISTANT

## 2021-11-29 PROCEDURE — 99999 PR PBB SHADOW E&M-EST. PATIENT-LVL V: CPT | Mod: PBBFAC,,, | Performed by: PHYSICIAN ASSISTANT

## 2021-11-29 PROCEDURE — 99999 PR PBB SHADOW E&M-EST. PATIENT-LVL V: ICD-10-PCS | Mod: PBBFAC,,, | Performed by: PHYSICIAN ASSISTANT

## 2021-11-29 RX ORDER — ALPRAZOLAM 0.5 MG/1
1 TABLET, ORALLY DISINTEGRATING ORAL ONCE AS NEEDED
Status: CANCELLED | OUTPATIENT
Start: 2021-11-29 | End: 2033-04-27

## 2021-12-06 ENCOUNTER — LAB VISIT (OUTPATIENT)
Dept: FAMILY MEDICINE | Facility: CLINIC | Age: 76
End: 2021-12-06
Payer: MEDICARE

## 2021-12-06 DIAGNOSIS — Z01.818 PRE-OP TESTING: ICD-10-CM

## 2021-12-06 LAB
SARS-COV-2 RNA RESP QL NAA+PROBE: NOT DETECTED
SARS-COV-2- CYCLE NUMBER: NORMAL

## 2021-12-06 PROCEDURE — U0003 INFECTIOUS AGENT DETECTION BY NUCLEIC ACID (DNA OR RNA); SEVERE ACUTE RESPIRATORY SYNDROME CORONAVIRUS 2 (SARS-COV-2) (CORONAVIRUS DISEASE [COVID-19]), AMPLIFIED PROBE TECHNIQUE, MAKING USE OF HIGH THROUGHPUT TECHNOLOGIES AS DESCRIBED BY CMS-2020-01-R: HCPCS | Performed by: PHYSICIAN ASSISTANT

## 2021-12-06 PROCEDURE — U0005 INFEC AGEN DETEC AMPLI PROBE: HCPCS | Performed by: PHYSICIAN ASSISTANT

## 2021-12-08 ENCOUNTER — TELEPHONE (OUTPATIENT)
Dept: PAIN MEDICINE | Facility: CLINIC | Age: 76
End: 2021-12-08
Payer: MEDICARE

## 2021-12-09 ENCOUNTER — HOSPITAL ENCOUNTER (OUTPATIENT)
Facility: HOSPITAL | Age: 76
Discharge: HOME | End: 2021-12-09
Attending: ANESTHESIOLOGY | Admitting: ANESTHESIOLOGY
Payer: MEDICARE

## 2021-12-09 ENCOUNTER — HOSPITAL ENCOUNTER (OUTPATIENT)
Dept: RADIOLOGY | Facility: HOSPITAL | Age: 76
Discharge: HOME | End: 2021-12-09
Attending: ANESTHESIOLOGY
Payer: MEDICARE

## 2021-12-09 DIAGNOSIS — M54.16 LUMBAR RADICULOPATHY: ICD-10-CM

## 2021-12-09 DIAGNOSIS — M54.50 LOWER BACK PAIN: ICD-10-CM

## 2021-12-09 PROCEDURE — 64483 PR EPIDURAL INJ, ANES/STEROID, TRANSFORAMINAL, LUMB/SACR, SNGL LEVL: ICD-10-PCS | Mod: 50,,, | Performed by: ANESTHESIOLOGY

## 2021-12-09 PROCEDURE — 25000003 PHARM REV CODE 250: Mod: PO | Performed by: ANESTHESIOLOGY

## 2021-12-09 PROCEDURE — 63600175 PHARM REV CODE 636 W HCPCS: Mod: PO | Performed by: ANESTHESIOLOGY

## 2021-12-09 PROCEDURE — 64483 NJX AA&/STRD TFRM EPI L/S 1: CPT | Mod: 50,,, | Performed by: ANESTHESIOLOGY

## 2021-12-09 PROCEDURE — 64483 NJX AA&/STRD TFRM EPI L/S 1: CPT | Mod: 50,PO | Performed by: ANESTHESIOLOGY

## 2021-12-09 PROCEDURE — 76000 FLUOROSCOPY <1 HR PHYS/QHP: CPT | Mod: TC,PO

## 2021-12-09 PROCEDURE — 25500020 PHARM REV CODE 255: Mod: PO | Performed by: ANESTHESIOLOGY

## 2021-12-09 RX ORDER — LIDOCAINE HYDROCHLORIDE 10 MG/ML
INJECTION, SOLUTION EPIDURAL; INFILTRATION; INTRACAUDAL; PERINEURAL
Status: DISCONTINUED | OUTPATIENT
Start: 2021-12-09 | End: 2021-12-09 | Stop reason: HOSPADM

## 2021-12-09 RX ORDER — ALPRAZOLAM 0.5 MG/1
1 TABLET, ORALLY DISINTEGRATING ORAL ONCE AS NEEDED
Status: COMPLETED | OUTPATIENT
Start: 2021-12-09 | End: 2021-12-09

## 2021-12-09 RX ORDER — METHYLPREDNISOLONE ACETATE 40 MG/ML
INJECTION, SUSPENSION INTRA-ARTICULAR; INTRALESIONAL; INTRAMUSCULAR; SOFT TISSUE
Status: DISCONTINUED | OUTPATIENT
Start: 2021-12-09 | End: 2021-12-09 | Stop reason: HOSPADM

## 2021-12-09 RX ORDER — BUPIVACAINE HYDROCHLORIDE 2.5 MG/ML
INJECTION, SOLUTION EPIDURAL; INFILTRATION; INTRACAUDAL
Status: DISCONTINUED | OUTPATIENT
Start: 2021-12-09 | End: 2021-12-09 | Stop reason: HOSPADM

## 2021-12-09 RX ADMIN — ALPRAZOLAM 1 MG: 0.5 TABLET, ORALLY DISINTEGRATING ORAL at 03:12

## 2021-12-10 VITALS
HEIGHT: 62 IN | WEIGHT: 130 LBS | TEMPERATURE: 98 F | DIASTOLIC BLOOD PRESSURE: 78 MMHG | SYSTOLIC BLOOD PRESSURE: 135 MMHG | HEART RATE: 74 BPM | RESPIRATION RATE: 16 BRPM | OXYGEN SATURATION: 98 % | BODY MASS INDEX: 23.92 KG/M2

## 2021-12-13 DIAGNOSIS — M05.742 RHEUMATOID ARTHRITIS INVOLVING BOTH HANDS WITH POSITIVE RHEUMATOID FACTOR: ICD-10-CM

## 2021-12-13 DIAGNOSIS — M47.25 OSTEOARTHRITIS OF SPINE WITH RADICULOPATHY, THORACOLUMBAR REGION: ICD-10-CM

## 2021-12-13 DIAGNOSIS — M54.10 RADICULOPATHY, UNSPECIFIED SPINAL REGION: ICD-10-CM

## 2021-12-13 DIAGNOSIS — M47.812 SPONDYLOSIS OF CERVICAL REGION WITHOUT MYELOPATHY OR RADICULOPATHY: ICD-10-CM

## 2021-12-13 DIAGNOSIS — M05.741 RHEUMATOID ARTHRITIS INVOLVING BOTH HANDS WITH POSITIVE RHEUMATOID FACTOR: ICD-10-CM

## 2021-12-13 RX ORDER — HYDROCODONE BITARTRATE AND ACETAMINOPHEN 10; 325 MG/1; MG/1
1 TABLET ORAL EVERY 8 HOURS PRN
Qty: 90 TABLET | Refills: 0 | Status: SHIPPED | OUTPATIENT
Start: 2021-12-13 | End: 2022-02-28 | Stop reason: SDUPTHER

## 2022-01-07 ENCOUNTER — OFFICE VISIT (OUTPATIENT)
Dept: PAIN MEDICINE | Facility: CLINIC | Age: 77
End: 2022-01-07
Payer: MEDICARE

## 2022-01-07 VITALS
HEIGHT: 62 IN | HEART RATE: 70 BPM | OXYGEN SATURATION: 99 % | BODY MASS INDEX: 24.63 KG/M2 | DIASTOLIC BLOOD PRESSURE: 59 MMHG | WEIGHT: 133.81 LBS | SYSTOLIC BLOOD PRESSURE: 117 MMHG | TEMPERATURE: 98 F

## 2022-01-07 DIAGNOSIS — M48.061 SPINAL STENOSIS OF LUMBAR REGION WITHOUT NEUROGENIC CLAUDICATION: ICD-10-CM

## 2022-01-07 DIAGNOSIS — M62.838 MUSCLE SPASM: ICD-10-CM

## 2022-01-07 DIAGNOSIS — M51.36 DDD (DEGENERATIVE DISC DISEASE), LUMBAR: ICD-10-CM

## 2022-01-07 DIAGNOSIS — M54.16 LUMBAR RADICULOPATHY: Primary | ICD-10-CM

## 2022-01-07 PROCEDURE — 1160F RVW MEDS BY RX/DR IN RCRD: CPT | Mod: CPTII,S$GLB,, | Performed by: PHYSICIAN ASSISTANT

## 2022-01-07 PROCEDURE — 1159F MED LIST DOCD IN RCRD: CPT | Mod: CPTII,S$GLB,, | Performed by: PHYSICIAN ASSISTANT

## 2022-01-07 PROCEDURE — 1125F PR PAIN SEVERITY QUANTIFIED, PAIN PRESENT: ICD-10-PCS | Mod: CPTII,S$GLB,, | Performed by: PHYSICIAN ASSISTANT

## 2022-01-07 PROCEDURE — 3288F PR FALLS RISK ASSESSMENT DOCUMENTED: ICD-10-PCS | Mod: CPTII,S$GLB,, | Performed by: PHYSICIAN ASSISTANT

## 2022-01-07 PROCEDURE — 1160F PR REVIEW ALL MEDS BY PRESCRIBER/CLIN PHARMACIST DOCUMENTED: ICD-10-PCS | Mod: CPTII,S$GLB,, | Performed by: PHYSICIAN ASSISTANT

## 2022-01-07 PROCEDURE — 3078F DIAST BP <80 MM HG: CPT | Mod: CPTII,S$GLB,, | Performed by: PHYSICIAN ASSISTANT

## 2022-01-07 PROCEDURE — 99999 PR PBB SHADOW E&M-EST. PATIENT-LVL IV: ICD-10-PCS | Mod: PBBFAC,,, | Performed by: PHYSICIAN ASSISTANT

## 2022-01-07 PROCEDURE — 3078F PR MOST RECENT DIASTOLIC BLOOD PRESSURE < 80 MM HG: ICD-10-PCS | Mod: CPTII,S$GLB,, | Performed by: PHYSICIAN ASSISTANT

## 2022-01-07 PROCEDURE — 1125F AMNT PAIN NOTED PAIN PRSNT: CPT | Mod: CPTII,S$GLB,, | Performed by: PHYSICIAN ASSISTANT

## 2022-01-07 PROCEDURE — 1159F PR MEDICATION LIST DOCUMENTED IN MEDICAL RECORD: ICD-10-PCS | Mod: CPTII,S$GLB,, | Performed by: PHYSICIAN ASSISTANT

## 2022-01-07 PROCEDURE — 3074F PR MOST RECENT SYSTOLIC BLOOD PRESSURE < 130 MM HG: ICD-10-PCS | Mod: CPTII,S$GLB,, | Performed by: PHYSICIAN ASSISTANT

## 2022-01-07 PROCEDURE — 99213 PR OFFICE/OUTPT VISIT, EST, LEVL III, 20-29 MIN: ICD-10-PCS | Mod: S$GLB,,, | Performed by: PHYSICIAN ASSISTANT

## 2022-01-07 PROCEDURE — 3074F SYST BP LT 130 MM HG: CPT | Mod: CPTII,S$GLB,, | Performed by: PHYSICIAN ASSISTANT

## 2022-01-07 PROCEDURE — 1101F PT FALLS ASSESS-DOCD LE1/YR: CPT | Mod: CPTII,S$GLB,, | Performed by: PHYSICIAN ASSISTANT

## 2022-01-07 PROCEDURE — 3288F FALL RISK ASSESSMENT DOCD: CPT | Mod: CPTII,S$GLB,, | Performed by: PHYSICIAN ASSISTANT

## 2022-01-07 PROCEDURE — 99213 OFFICE O/P EST LOW 20 MIN: CPT | Mod: S$GLB,,, | Performed by: PHYSICIAN ASSISTANT

## 2022-01-07 PROCEDURE — 99999 PR PBB SHADOW E&M-EST. PATIENT-LVL IV: CPT | Mod: PBBFAC,,, | Performed by: PHYSICIAN ASSISTANT

## 2022-01-07 PROCEDURE — 1101F PR PT FALLS ASSESS DOC 0-1 FALLS W/OUT INJ PAST YR: ICD-10-PCS | Mod: CPTII,S$GLB,, | Performed by: PHYSICIAN ASSISTANT

## 2022-01-11 NOTE — PROGRESS NOTES
This note was completed with dictation software and grammatical errors may exist.    CC: Neck pain, back pain, bilateral leg pain, left knee pain    HPI:  The patient is a 76-year-old woman with a history of memory difficulties, hypothyroidism, rheumatoid arthritis who presents in referral from Dr. Gordon for neck pain.  She is status post bilateral L4/5 transforaminal epidural steroid injections on 12/09/2021 with 50% relief.  She does continue to have some low back pain and pain in her calves as well as spasms at night  and sometimes during the day.  She does feel that her numbness and tingling is improving since the injection.  She denies changes to her incontinence.  She continues to report weakness in her arms and legs.    Prior HPI:  The patient was accompanied by her  who helps with the history as she has somewhat poor recall.  She states that she has had pain in her left shoulder for years, has been seen by several physicians regarding this and even had an injection several months ago in her left shoulder with Dr. Guerrero that provided some relief.  However over the last several months she has reported having pain across the neck, upper back that radiates into her forearms and into her hands with numbness and tingling.  She states that she continues to have pain when trying to raise her left arm overhead, otherwise the pain in her arms is somewhat constant.  She describes it as burning, throbbing, tingling, deep, sharp, electric and shooting.  Is worse at night when lying down, worse in the morning and worse with standing and walking.  She  does get some relief with medications, activity, heat and cold.  She reports that she has had some strength issues with her hands but it sounds like this is more related to pain.    Her other complaint is bilateral lower leg pain and foot pain with numbness and burning, cramping in her legs, worse at night.  She does have some back pain as well, also reports  chronic left knee pain has had some injections for this in the past as well.  She denies any weakness in the legs.  She does report having some difficulty with balance when turning.    Pain intervention history:  Patient had undergone a left shoulder joint injection on 2021 with Dr. Guerrero.  She had apparently undergone some injections in her knees in the past with Dr. Agudelo and Dr. Walters.  She is status post C7-T1 interlaminar epidural steroid injection on 2021 with 100% relief of her upper extremity symptoms.   She is status post bilateral L4/5 transforaminal epidural steroid injections on 2021 with 50% relief.    Spine surgeries:  None    Antineuropathics:  Tried gabapentin but caused memory issues  NSAIDs:  Celebrex 100, Tylenol 650  Physical therapy:  Antidepressants:  Muscle relaxers:  Opioids:  Hydrocodone 10/325  Antiplatelets/Anticoagulants:  Rheumatology:  Methotrexate 2.5, prednisone 5 mg      ROS:  She reports itching, vision change, diarrhea, joint stiffness, urinary frequency, joint swelling and memory loss.  Balance of review of systems is negative.    Lab Results   Component Value Date    HGBA1C 5.3 2019       Lab Results   Component Value Date    WBC 6.6 10/27/2021    HGB 14.0 10/27/2021    HCT 42.7 10/27/2021    MCV 92.8 10/27/2021     10/27/2021             Past Medical History:   Diagnosis Date    Chronic neck pain     Chronic shoulder pain     Rheumatoid arthritis        Past Surgical History:   Procedure Laterality Date    CARPAL TUNNEL RELEASE      bilateral     SECTION      x3    EPIDURAL STEROID INJECTION INTO CERVICAL SPINE N/A 2021    Procedure: Injection-steroid-epidural-cervical;  Surgeon: Ernesto Loya MD;  Location: [facility];  Service: Pain Management;  Laterality: N/A;    EYE SURGERY      bilat cataracts with implant    HYSTERECTOMY      TRANSFORAMINAL EPIDURAL INJECTION OF STEROID Bilateral 2021    Procedure:  "Injection,steroid,epidural,transforaminal approach L4/5;  Surgeon: Ernesto Loya MD;  Location: [facility] OR;  Service: Pain Management;  Laterality: Bilateral;    TUBAL LIGATION         Social History     Socioeconomic History    Marital status:    Tobacco Use    Smoking status: Former Smoker     Quit date: 1976     Years since quittin.7    Smokeless tobacco: Never Used   Substance and Sexual Activity    Alcohol use: Yes     Alcohol/week: 3.0 standard drinks     Types: 3 Glasses of wine per week    Drug use: No    Sexual activity: Yes     Partners: Male         Medications/Allergies: See med card    Vitals:    22 1021   BP: (!) 117/59   Pulse: 70   Temp: 97.5 °F (36.4 °C)   SpO2: 99%   Weight: 60.7 kg (133 lb 13.1 oz)   Height: 5' 2" (1.575 m)   PainSc:   2   PainLoc: Back         Physical exam:  Gen: A and O x3, pleasant, well-groomed  Skin: No rashes or obvious lesions  HEENT: PERRLA, no obvious deformities on ears or in canals.Trachea midline.  CVS: Regular rate and rhythm, normal palpable pulses.  Resp: Clear to auscultation bilaterally, no wheezes or rales.  Abdomen: Soft, NT/ND.  Musculoskeletal:  No antalgic gait.     Neuro:  Upper extremities: 5/5 strength bilaterally, except for left hand 4/5 interossei  Lower extremities: 5/5 strength bilaterally  Reflexes: Brachioradialis 3+, Bicep 3+, Tricep 3+.  Patellar 3+, Achilles 2+ bilaterally.  Sensory: Intact and symmetrical to light touch and pinprick in C2-T1 dermatomes bilaterally.  Intact and symmetrical to light touch and pinprick in L2-S1 dermatomes bilaterally.  Tandem gait test positive for imbalance, Romberg slightly positive for imbalance, negative El's    Cervical Spine:  Cervical spine: ROM is mildly reduced in flexion, extension and lateral rotation with increased pain in the upper back with extension and flexion.  Spurling's maneuver causes neck pain to either side.  Myofascial exam: No Tenderness to palpation " across cervical paraspinous region bilaterally.    Lumbar spine:  Lumbar spine: ROM is full with flexion, moderately reduced with extension and oblique extension with increased pain in the low back.  Matthias's test causes no increased pain on either side.    Supine straight leg raise is negative bilaterally.    Internal and external rotation of the hip causes no increased pain on either side.  Myofascial exam: No tenderness to palpation across lumbar paraspinous muscles.      Imaging:  10/15/21 MRI C-spine:  Discs: Multilevel moderate to severe disc degeneration with diffuse disc desiccation, intervertebral disc space narrowing and posterior disc osteophyte complexes, most pronounced at C3-4 through C6-7.   Cord: Mild flattening of the ventral aspect of the cord at C3-4.  Remainder of the cord demonstrates normal caliber and signal.   Skull base and craniocervical junction: Normal.   Degenerative findings:   C2-C3: Minimal posterior disc osteophyte complex.  There is no facet arthropathy. There is no neural foraminal stenosis.  There is no spinal canal stenosis.   C3-C4: Posterior disc osteophyte complex, which effaces the ventral thecal sac and mildly flattens the ventral cord surface.  Ligamentum flavum infolding.  Marked left and mild right facet arthropathy.  Mild left uncovertebral joint spurring.  Mild left neural foraminal stenosis.  Moderate spinal canal stenosis.   C4-C5: Posterior disc osteophyte complex.  Moderate bilateral facet arthropathy.  Ligamentum flavum infolding.  Moderate left and mild right uncovertebral joint spurring.  Moderate left and mild right neural foraminal stenosis.  Moderate spinal canal stenosis.   C5-C6: Posterior disc osteophyte complex.  Ligamentum flavum infolding.  Moderate bilateral facet arthropathy.  Moderate bilateral uncovertebral joint spurring.  Moderate to severe bilateral neural foraminal stenosis.  Mild-to-moderate spinal canal stenosis.   C6-C7: Posterior disc  osteophyte complex.  Ligamentum flavum infolding.  Moderate bilateral facet arthropathy.  Moderate left and mild right uncovertebral joint spurring.  Moderate left and mild right neural foraminal stenosis.  Mild spinal canal stenosis.   C7-T1: Posterior disc osteophyte complex.  Mild bilateral facet arthropathy.  Moderate bilateral uncovertebral joint spurring.  Moderate bilateral neural foraminal stenosis.  Ligamentum flavum infolding.  Mild spinal canal stenosis.    11/19/20 DEXA scan:  The L1 to L4 vertebral bone mineral density is equal to 0.962 g/cm squared with a T score of -0.8.   The left femoral neck bone mineral density is equal to 0.694 g/cm squared with a T score of -1.4.  There is a 10% risk of a major osteoporotic fracture and a 1.8% risk of hip fracture in the next 10 years (FRAX).   Impression:   Osteopenia--There is a 10% risk of a major osteoporotic fracture and a 1.8% risk of hip fracture in the next 10 years (FRAX).     11/12/2021 MRI lumbar spine  Alignment: Lumbar lordosis is maintained.  Minimal grade 1 anterolisthesis of L4 on L5.     Vertebrae: Marrow signal shows a diffusely heterogeneous somewhat mottled decreased T1 and T2 signal intensity. This is a nonspecific finding and may suggest red marrow reconversion, as can be seen in chronic anemic states, hypoxia, obesity or smoking.  Note is made of a nonspecific T1/T2 hypointense and STIR hyperintense lesion within the S1 vertebral body, measuring approximately 1.2 cm, nonspecific.  Vertebral body heights are maintained without evidence of acute fracture.  Multilevel degenerative endplate change with Schmorl's node formation and marginal osteophyte formation.     Discs: Multilevel disc degeneration with diffuse disc desiccation, intervertebral disc space narrowing and disc bulges, most pronounced at L4-5 with moderate disc space narrowing.     Cord: Normal.  Conus terminates at L2.     Degenerative findings:     T12-L1: Mild circumferential  disc bulge with posterior osteophytic ridging.  Mild bilateral facet arthropathy and ligamentum flavum infolding.  No neural foraminal stenosis.  No spinal canal stenosis.     L1-L2: Mild circumferential disc bulge.  Mild bilateral facet arthropathy and ligamentum flavum infolding.  There is no neural foraminal stenosis.  There is no spinal canal stenosis.     L2-L3: Mild circumferential disc bulge.  .  Mild bilateral facet arthropathy and ligamentum flavum infolding.  There is no neural foraminal stenosis.  There is no spinal canal stenosis.     L3-L4: Mild disc space narrowing.  Circumferential disc bulge with posterior osteophytic ridging.  Mild bilateral facet arthropathy and ligamentum flavum infolding.  There is no neural foraminal stenosis.  Mild spinal canal stenosis.     L4-L5: Moderate disc space narrowing.  Anterolisthesis sinal uncovering the disc.  Circumduct Jigar disc bulge with posterior osteophytic ridging, asymmetric to the left, with central annular fissure.  Moderate bilateral facet arthropathy.  Ligamentum flavum infolding.  Mild-to-moderate left and mild right neural foraminal stenosis.  Moderate spinal canal stenosis.     L5-S1: Mild circumferential disc bulge.  Moderate bilateral facet arthropathy.  Ligamentum flavum infolding.  There is no neural foraminal stenosis.  There is no spinal canal stenosis.    Assessment:  The patient is a 76-year-old woman with a history of memory difficulties, hypothyroidism, rheumatoid arthritis who presents in referral from Dr. Gordon for neck pain.   1. Lumbar radiculopathy  Ambulatory referral/consult to Physical/Occupational Therapy   2. Spinal stenosis of lumbar region without neurogenic claudication  Ambulatory referral/consult to Physical/Occupational Therapy   3. Muscle spasm  Ambulatory referral/consult to Physical/Occupational Therapy   4. DDD (degenerative disc disease), lumbar           Plan:  1. The patient did well following the bilateral L4/5  transforaminal epidural steroid injections.  This can be repeated in the future if necessary.  2. I completed orders for physical therapy at our facility here.  3. Follow-up in 2 months or sooner as needed.

## 2022-01-27 ENCOUNTER — CLINICAL SUPPORT (OUTPATIENT)
Dept: REHABILITATION | Facility: HOSPITAL | Age: 77
End: 2022-01-27
Payer: MEDICARE

## 2022-01-27 DIAGNOSIS — M54.16 LUMBAR RADICULOPATHY: ICD-10-CM

## 2022-01-27 DIAGNOSIS — M48.061 SPINAL STENOSIS OF LUMBAR REGION WITHOUT NEUROGENIC CLAUDICATION: ICD-10-CM

## 2022-01-27 DIAGNOSIS — M54.40 CHRONIC BILATERAL LOW BACK PAIN WITH SCIATICA, SCIATICA LATERALITY UNSPECIFIED: ICD-10-CM

## 2022-01-27 DIAGNOSIS — G89.29 CHRONIC BILATERAL LOW BACK PAIN WITH SCIATICA, SCIATICA LATERALITY UNSPECIFIED: ICD-10-CM

## 2022-01-27 DIAGNOSIS — M62.838 MUSCLE SPASM: ICD-10-CM

## 2022-01-27 PROBLEM — M54.42 CHRONIC BILATERAL LOW BACK PAIN WITH SCIATICA: Status: ACTIVE | Noted: 2022-01-27

## 2022-01-27 PROBLEM — M54.41 CHRONIC BILATERAL LOW BACK PAIN WITH SCIATICA: Status: ACTIVE | Noted: 2022-01-27

## 2022-01-27 PROCEDURE — 97161 PT EVAL LOW COMPLEX 20 MIN: CPT | Mod: PO | Performed by: PHYSICAL THERAPIST

## 2022-01-27 PROCEDURE — 97110 THERAPEUTIC EXERCISES: CPT | Mod: PO | Performed by: PHYSICAL THERAPIST

## 2022-01-27 NOTE — PLAN OF CARE
"[facility]   Physical Therapy Initial Evaluation     Date: 1/27/2022   Name: Lalo Guerrero  Clinic Number: [id number]    Therapy Diagnosis:   Encounter Diagnoses   Name Primary?    Lumbar radiculopathy     Spinal stenosis of lumbar region without neurogenic claudication     Muscle spasm     Chronic bilateral low back pain with sciatica, sciatica laterality unspecified      Physician: Doyle Young *    Physician Orders: PT Eval and Treat   Medical Diagnosis from Referral: Lumbar radiculopathy [M54.16], Spinal stenosis of lumbar region without neurogenic claudication [M48.061], Muscle spasm [M62.838]  Evaluation Date: 1/27/2022  Authorization Period Expiration: 12/31/22  Plan of Care Expiration: 3/10/22  Progress Note Due: 6th visit  Visit # / Visits authorized: 1/ 1       Precautions: RA     Time In: 930am  Time Out: 1020 am  Total Appointment Time (timed & untimed codes): 50 minutes      SUBJECTIVE   Date of onset: several years    History of current condition - LALO reports: Mid back and low back pain during the day if she "over does it". Pt states that she has been having a lot of B knee pain, post knee pain, and tenderness/pain in calves. Had severe R shoulder/upper arm pain, Dr. Goyeneche injection helped with that. Continues to get pain in hands, neck, mid back, and lower back. R knee gets a a sharp pain in med joint that causes the knee to buckle. Denies shooting pain in the legs. Reports memory issues.     Falls: no falls    Imaging, MRI studies:     Lumbar, 11/12/21  Impression:     1. Multilevel degenerative change of the lumbar spine, as described in detail above, most pronounced at L4-5 and result in mild-to-moderate left and mild right neural foraminal narrowing and moderate spinal canal stenosis.  2. Heterogeneous marrow signal, nonspecific and may be seen with red marrow reconversion and chronic anemic states, hypoxia, obesity, or smoking.  Note is made of a " 1.2 cm STIR hyperintense lesion within the S1 vertebral body, nonspecific and may reflect an atypical hemangioma, degenerative change, insufficiency fracture, or possibly metastasis.     Cervical, 10/15/21  Impression:     Multilevel degenerative change of the cervical spine, as described in detail above, mildly progressed since 2019.  Findings are most pronounced at C3-4 and C4-5 with moderate spinal canal stenosis.  Multilevel neural foraminal stenosis, most pronounced at C5-6 with moderate to severe bilateral neural foraminal narrowing.    Prior Therapy: [facility], 2-3 years for lower back  Social History: lives with  58 years   Occupation: retired  Prior Level of Function: walked daily, no issues with yardwork  Current Level of Function: cannot do yardwork, must do everything slower, cannot walk as much as used to, must rest midday due to strain from being on her feet all morning    Pain:  Current 3/10, worst 10/10, best 1/10   Location: bilateral knees, lower back L>R, across shoulders   Description: Aching, Dull and Sharp  Aggravating Factors: Lifting and on her feet for too long  Easing Factors: rest    Patients goals: less pain     Medical History:   Past Medical History:   Diagnosis Date    Chronic neck pain     Chronic shoulder pain     Rheumatoid arthritis        Surgical History:   Fabby Guerrero  has a past surgical history that includes Hysterectomy; Tubal ligation; Eye surgery; Carpal tunnel release;  section; Epidural steroid injection into cervical spine (N/A, 2021); and Transforaminal epidural injection of steroid (Bilateral, 2021).    Medications:   Fabby has a current medication list which includes the following prescription(s): acetaminophen, celecoxib, cyanocobalamin, fish oil-omega-3 fatty acids, folic acid, glucosamine-chondroitin, hydrocodone-acetaminophen, levothyroxine, melatonin, methotrexate, multivitamin, omeprazole, turmeric, vitamin  a/c/d3/cod liver oil, and vitamin b complex-folic acid.    Allergies:   Review of patient's allergies indicates:  No Known Allergies       OBJECTIVE     Posture: rounded shoulders, slumped, excess thoracic kyphosis  Sensation: light touch sensation intact in lumbar dermatomes  DTR: patellar and achilles 2+ B  Range of Motion/Strength:   Lumbar AROM: Pain/Dysfunction with Movement:   Flexion Min loss    Extension Min loss    Right side bending WFL    Left side bending WFL    Right rotation Mod loss    Left rotation Mod loss      MMT RIGHT LEFT   Hip flex 4/5 4/5   Hip ext 4-/5 4-/5   Hip abd 4/5 4/5   Hip add 4+/5 4+/5   Knee flex 4-5 4-/5   Knee ext 4/5 P! 4/5 P!   DF 4/5 4/5       Neg SLR  Neg Slump test    Decreased HS flexibility B      Limitation/Restriction for FOTO lumbar Survey    Therapist reviewed FOTO scores for Lalo Guerrero on 1/27/2022.   FOTO documents entered into EPIC - see Media section.    Limitation Score: 53%         TREATMENT     Total Treatment time (time-based codes) separate from Evaluation: 10 minutes      LALO received the treatments listed below:      therapeutic exercises to develop ROM and flexibility for 10 minutes including:    Seated HSS, 3x30s  LTR 20x  PPT 20x        PATIENT EDUCATION AND HOME EXERCISES     Education provided:   - postural education    Written Home Exercises Provided: yes. Exercises were reviewed and LALO was able to demonstrate them prior to the end of the session.  LALO demonstrated good  understanding of the education provided. See EMR under Patient Instructions for exercises provided during therapy sessions.    ASSESSMENT     Lalo is a 76 y.o. female referred to outpatient Physical Therapy with a medical diagnosis of Lumbar radiculopathy, Spinal stenosis of lumbar region without neurogenic claudication, Muscle spasm. Patient presents with decreased AROM of the lumbar spine, decreased strength of lumbopelvic musculature as evidenced by MMT and  difficulty with bed mobility, postural dysfunction, and decreased LE flexibility. Lumbar MRI states there may be a possible metastisis in the L/S area. I will discuss this with the ordering physician.     Patient prognosis is Excellent.   Patientt will benefit from skilled outpatient Physical Therapy to address the deficits stated above and in the chart below, provide patient /family education, and to maximize patientt's level of independence.     Plan of care discussed with patient: Yes  Patient's spiritual, cultural and educational needs considered and patient is agreeable to the plan of care and goals as stated below:     Anticipated Barriers for therapy: none    Medical Necessity is demonstrated by the following  History  Co-morbidities and personal factors that may impact the plan of care Co-morbidities:   RA, chronic neck and back pain, memory issues    Personal Factors:   lifestyle     moderate   Examination  Body Structures and Functions, activity limitations and participation restrictions that may impact the plan of care Body Regions:   neck  back  lower extremities    Body Systems:    ROM  strength  transfers  motor control    Participation Restrictions:   none    Activity limitations:   Learning and applying knowledge  no deficits    General Tasks and Commands  no deficits    Communication  no deficits    Mobility  lifting and carrying objects  walking    Self care  no deficits    Domestic Life  cooking  doing house work (cleaning house, washing dishes, laundry)    Interactions/Relationships  no deficits    Life Areas  no deficits    Community and Social Life  community life  recreation and leisure  Hoahaoism and spirituality         moderate   Clinical Presentation stable and uncomplicated low   Decision Making/ Complexity Score: low     Goals:  Short Term Goals: 3 weeks   1. Pt will be compliant with daily HEP.     Long Term Goals: 6 weeks   1. Pt will be able to perform standing household chores for 1  hour without increased back pain.   2. Pt will have no leg pain with household chores.   3. Pt will score </=40% limitation on FOTO lumbar survey.     PLAN   Plan of care Certification: 1/27/2022 to 3/10/22.    Outpatient Physical Therapy 2 times weekly for 6 weeks to include the following interventions: Manual Therapy, Moist Heat/ Ice, Neuromuscular Re-ed, Therapeutic Activities and Therapeutic Exercise.     Susanne Hoskins, PT      I CERTIFY THE NEED FOR THESE SERVICES FURNISHED UNDER THIS PLAN OF TREATMENT AND WHILE UNDER MY CARE   Physician's comments:     Physician's Signature: ___________________________________________________

## 2022-02-01 ENCOUNTER — CLINICAL SUPPORT (OUTPATIENT)
Dept: REHABILITATION | Facility: HOSPITAL | Age: 77
End: 2022-02-01
Payer: MEDICARE

## 2022-02-01 DIAGNOSIS — M48.061 SPINAL STENOSIS OF LUMBAR REGION WITHOUT NEUROGENIC CLAUDICATION: ICD-10-CM

## 2022-02-01 DIAGNOSIS — M54.16 LUMBAR RADICULOPATHY: Primary | ICD-10-CM

## 2022-02-01 PROCEDURE — 97110 THERAPEUTIC EXERCISES: CPT | Mod: PO,CQ

## 2022-02-01 NOTE — PROGRESS NOTES
"[facility]   Physical Therapy Treatment Note     Name: Lalo Guerrero  Clinic Number: [id number]    Therapy Diagnosis: No diagnosis found.  Physician: Doyle Young *    Visit Date: 2/1/2022  Physician: Doyle Young *     Physician Orders: PT Eval and Treat   Medical Diagnosis from Referral: Lumbar radiculopathy [M54.16], Spinal stenosis of lumbar region without neurogenic claudication [M48.061], Muscle spasm [M62.838]  Evaluation Date: 1/27/2022  Authorization Period Expiration: 12/31/22  Plan of Care Expiration: 3/10/22  Progress Note Due: 6th visit  Visit # / Visits authorized: 1/ 1         Precautions: RA      Time In: 8:30am  Time Out: 9:15 am  Total Appointment Time (timed & untimed codes): 45 minutes       OBJECTIVE     Objective Measures updated at progress report unless specified.       TREATMENT     LALO received the treatments listed below:      received therapeutic exercises to develop strength and ROM for 45  minutes including:  Seated HSS, 3x30s  LTR 20x 3"  PPT 20x  Bridging 10x 3"   Slouch over correct 15x  Modified piriformis 30" 2x  received the following manual therapy techniques: Soft tissue Mobilization were applied to the: n/a for n/a minutes, including:      PATIENT EDUCATION AND HOME EXERCISES     Home Exercises Provided and Patient Education Provided     Education provided:   - Educated pt on the importance of daily stretch to increase the benefit of therapy and positive results.     Written Home Exercises Provided: Patient instructed to cont prior HEP. Exercises were reviewed and LALO was able to demonstrate them prior to the end of the session.  LALO demonstrated good  understanding of the education provided. See EMR under Patient Instructions for exercises provided during therapy sessions    ASSESSMENT   Pt with less LBP posts session. Pt has not been complaint with her HEP. Educated pt on the importance of daily stretch in the am and " pm to increase the benefit of therapy and positive results. Pt stated her posture has always been bad.  Also discussed importance for upright posture, use of lumbar roll and to stand every hour to decrease back pain. Try lumbar roll next session. Issued her a HEP sheet with more exercises.     LALO Is progressing well towards her goals.   Pt prognosis is Good.     Pt will continue to benefit from skilled outpatient physical therapy to address the deficits listed in the problem list box on initial evaluation, provide pt/family education and to maximize pt's level of independence in the home and community environment.     Pt's spiritual, cultural and educational needs considered and pt agreeable to plan of care and goals.     Anticipated barriers to physical therapy: none    Goals:   Goals:  Short Term Goals: 3 weeks   1. Pt will be compliant with daily HEP.      Long Term Goals: 6 weeks   1. Pt will be able to perform standing household chores for 1 hour without increased back pain.   2. Pt will have no leg pain with household chores.   3. Pt will score </=40% limitation on FOTO lumbar survey.      PLAN       Plan of care Certification: 1/27/2022 to 3/10/22.     Outpatient Physical Therapy 2 times weekly for 6 weeks to include the following interventions: Manual Therapy, Moist Heat/ Ice, Neuromuscular Re-ed, Therapeutic Activities and Therapeutic Exercise  Dania Kearns, KATELIN

## 2022-02-08 ENCOUNTER — CLINICAL SUPPORT (OUTPATIENT)
Dept: REHABILITATION | Facility: HOSPITAL | Age: 77
End: 2022-02-08
Payer: MEDICARE

## 2022-02-08 DIAGNOSIS — M54.40 CHRONIC BILATERAL LOW BACK PAIN WITH SCIATICA, SCIATICA LATERALITY UNSPECIFIED: ICD-10-CM

## 2022-02-08 DIAGNOSIS — G89.29 CHRONIC BILATERAL LOW BACK PAIN WITH SCIATICA, SCIATICA LATERALITY UNSPECIFIED: ICD-10-CM

## 2022-02-08 PROCEDURE — 97110 THERAPEUTIC EXERCISES: CPT | Mod: PO,CQ

## 2022-02-08 NOTE — PROGRESS NOTES
"[facility]   Physical Therapy Treatment Note     Name: Lalo Guerrero  Clinic Number: [id number]    Therapy Diagnosis:   Encounter Diagnosis   Name Primary?    Chronic bilateral low back pain with sciatica, sciatica laterality unspecified      Physician: Doyle Young *    Visit Date: 2/8/2022  Physician: Doyle Young *     Physician Orders: PT Eval and Treat   Medical Diagnosis from Referral: Lumbar radiculopathy [M54.16], Spinal stenosis of lumbar region without neurogenic claudication [M48.061], Muscle spasm [M62.838]  Evaluation Date: 1/27/2022  Authorization Period Expiration: 12/31/22  Plan of Care Expiration: 3/10/22  Progress Note Due: 6th visit  Visit # / Visits authorized: 1/ 1         Precautions: RA      Time In: 9:45 am  Time Out: 10:30 am  Total Appointment Time (timed & untimed codes): 45 minutes       SuBJECTIVE     Lalo states that her L LBP is ~ 2/10 today.       TREATMENT     LALO received the treatments listed below:      received therapeutic exercises to develop strength and ROM for 40  minutes including:  Seated HSS, 3x30s  LTR 20x 3"  PPT with hip ADD with ball 3 sec hold 3 min  Bridging 20x 3"   Slouch over correct 20x  Seated scap retraction 20x  Brueger 20x  Modified piriformis 30" 3x  Horiz Row 20x red t-band  B shld ext red t-band 20x  Supine Marching with TA 20x  DKTC orange ball 20x     Rohini received the following manual therapy techniques: Soft tissue Mobilization were applied to the: LB with The Stick for 5 minutes      PATIENT EDUCATION AND HOME EXERCISES     Home Exercises Provided and Patient Education Provided     Education provided:   - Educated pt on the importance of daily stretch to increase the benefit of therapy and positive results.     Written Home Exercises Provided: Patient instructed to cont prior HEP. Exercises were reviewed and LALO was able to demonstrate them prior to the end of the session.    LALO " demonstrated good  understanding of the education provided. See EMR under Patient Instructions for exercises provided during therapy sessions    ASSESSMENT   Llao helen today's tx with progression of ther ex and addition of manual intervention well  with less LBP posts session. Pt has been complaint with her HEP since last session. Lalo required VCs for proper posture and to decrease shld hike with exs.   LALO Is progressing well towards her goals.   Pt prognosis is Good.     Pt will continue to benefit from skilled outpatient physical therapy to address the deficits listed in the problem list box on initial evaluation, provide pt/family education and to maximize pt's level of independence in the home and community environment.     Pt's spiritual, cultural and educational needs considered and pt agreeable to plan of care and goals.     Anticipated barriers to physical therapy: none    Goals:   Goals:  Short Term Goals: 3 weeks   1. Pt will be compliant with daily HEP.      Long Term Goals: 6 weeks   1. Pt will be able to perform standing household chores for 1 hour without increased back pain.   2. Pt will have no leg pain with household chores.   3. Pt will score </=40% limitation on FOTO lumbar survey.      PLAN       Plan of care Certification: 1/27/2022 to 3/10/22.     Outpatient Physical Therapy 2 times weekly for 6 weeks to include the following interventions: Manual Therapy, Moist Heat/ Ice, Neuromuscular Re-ed, Therapeutic Activities and Therapeutic Exercise  Tunde Tran, PTA

## 2022-02-10 ENCOUNTER — CLINICAL SUPPORT (OUTPATIENT)
Dept: REHABILITATION | Facility: HOSPITAL | Age: 77
End: 2022-02-10
Payer: MEDICARE

## 2022-02-10 DIAGNOSIS — M54.40 CHRONIC BILATERAL LOW BACK PAIN WITH SCIATICA, SCIATICA LATERALITY UNSPECIFIED: Primary | ICD-10-CM

## 2022-02-10 DIAGNOSIS — G89.29 CHRONIC BILATERAL LOW BACK PAIN WITH SCIATICA, SCIATICA LATERALITY UNSPECIFIED: Primary | ICD-10-CM

## 2022-02-10 PROCEDURE — 97110 THERAPEUTIC EXERCISES: CPT | Mod: PO,CQ

## 2022-02-10 NOTE — PROGRESS NOTES
"[facility]   Physical Therapy Treatment Note     Name: Lalo Guerrero  Clinic Number: [id number]    Therapy Diagnosis:   No diagnosis found.  Physician: Doyle Young *    Visit Date: 2/10/2022  Physician: Doyle Young *     Physician Orders: PT Eval and Treat   Medical Diagnosis from Referral: Lumbar radiculopathy [M54.16], Spinal stenosis of lumbar region without neurogenic claudication [M48.061], Muscle spasm [M62.838]  Evaluation Date: 1/27/2022  Authorization Period Expiration: 12/31/22  Plan of Care Expiration: 3/10/22  Progress Note Due: 6th visit  Visit # / Visits authorized: 4/ 1         Precautions: RA      Time In: 9:15 am  Time Out: 10:15 am  Total Appointment Time (timed & untimed codes): 45 minutes       SuBJECTIVE     Lalo states that her L LBP is ~ 2/10 today.       TREATMENT     LALO received the treatments listed below:      received therapeutic exercises to develop strength and ROM for 60  minutes including:    LTR 20x 3"  PPT with hip ADD with ball 3 sec hold 3 min  Bridging 20x 3"   Standing scap retraction 20x  Brueger 20x  Modified piriformis 30" 3x  Row 20x red t-band  Lat pull down 20 reps R TB  Supine Marching with TA 20x  DKTC orange ball 20x   Open Book 10x    Not done:  Slouch over correct 20x  Seated HSS, 3x30s  Rohini received the following manual therapy techniques: Soft tissue Mobilization were applied to the to upper back and LB for 5 minutes      PATIENT EDUCATION AND HOME EXERCISES     Home Exercises Provided and Patient Education Provided     Education provided:   - Educated pt on the importance of daily stretch to increase the benefit of therapy and positive results.     Written Home Exercises Provided: Patient instructed to cont prior HEP. Exercises were reviewed and LLAO was able to demonstrate them prior to the end of the session.    LALO demonstrated good  understanding of the education provided. See EMR under Patient " Instructions for exercises provided during therapy sessions    ASSESSMENT   Lalo helen today's tx with progression of ther ex and addition of manual intervention well with less back post session. Pt had poor posture in clinic so also discussed importance for upright posture, use of lumbar roll and to stand every hour to decrease back pain. Added thoracic extension stretch due to pain upper to LB.  Pt has been complaint with her HEP since last session. Issued pt HEP today. Pt would like to warm up on treadmill next session.   LALO Is progressing well towards her goals.   Pt prognosis is Good.     Pt will continue to benefit from skilled outpatient physical therapy to address the deficits listed in the problem list box on initial evaluation, provide pt/family education and to maximize pt's level of independence in the home and community environment.     Pt's spiritual, cultural and educational needs considered and pt agreeable to plan of care and goals.     Anticipated barriers to physical therapy: none    Goals:   Goals:  Short Term Goals: 3 weeks   1. Pt will be compliant with daily HEP.      Long Term Goals: 6 weeks   1. Pt will be able to perform standing household chores for 1 hour without increased back pain.   2. Pt will have no leg pain with household chores.   3. Pt will score </=40% limitation on FOTO lumbar survey.      PLAN       Plan of care Certification: 1/27/2022 to 3/10/22.     Outpatient Physical Therapy 2 times weekly for 6 weeks to include the following interventions: Manual Therapy, Moist Heat/ Ice, Neuromuscular Re-ed, Therapeutic Activities and Therapeutic Exercise  Dania Kearns, PTA

## 2022-02-15 ENCOUNTER — CLINICAL SUPPORT (OUTPATIENT)
Dept: REHABILITATION | Facility: HOSPITAL | Age: 77
End: 2022-02-15
Payer: MEDICARE

## 2022-02-15 DIAGNOSIS — G89.29 CHRONIC BILATERAL LOW BACK PAIN WITH SCIATICA, SCIATICA LATERALITY UNSPECIFIED: ICD-10-CM

## 2022-02-15 DIAGNOSIS — M54.40 CHRONIC BILATERAL LOW BACK PAIN WITH SCIATICA, SCIATICA LATERALITY UNSPECIFIED: ICD-10-CM

## 2022-02-15 PROCEDURE — 97140 MANUAL THERAPY 1/> REGIONS: CPT | Mod: PO | Performed by: PHYSICAL THERAPIST

## 2022-02-15 PROCEDURE — 97110 THERAPEUTIC EXERCISES: CPT | Mod: PO | Performed by: PHYSICAL THERAPIST

## 2022-02-15 NOTE — PROGRESS NOTES
"[facility]   Physical Therapy Treatment Note     Name: Lalo Guerrero  Clinic Number: [id number]    Therapy Diagnosis:   Encounter Diagnosis   Name Primary?    Chronic bilateral low back pain with sciatica, sciatica laterality unspecified      Physician: Doyle Young *    Visit Date: 2/15/2022  Physician: Doyle Young *     Physician Orders: PT Eval and Treat   Medical Diagnosis from Referral: Lumbar radiculopathy [M54.16], Spinal stenosis of lumbar region without neurogenic claudication [M48.061], Muscle spasm [M62.838]  Evaluation Date: 1/27/2022  Authorization Period Expiration: 12/31/22  Plan of Care Expiration: 3/10/22  Progress Note Due: 6th visit  Visit # / Visits authorized: 4/ 12        Precautions: RA      Time In: 9:45  am  Time Out: 10:38 am  Total Appointment Time (timed & untimed codes): 53 minutes       SuBJECTIVE     Lalo reports 2/10 pain today. States pain fluctuates a lot. She mainly is having soreness in her legs.       TREATMENT     LALO received the treatments listed below:      received therapeutic exercises to develop strength and ROM for 45  minutes including:    Recumbent bike, 3.5 min (pt c/o leg soreness) for endurance and mobility  LTR 20x 3"  PPT with hip ADD with ball 3 sec hold 3 min  Bridging 20x 3"   Standing scap retraction 20x  Brueger 20x back against wall with yellow TB  Modified piriformis 30" 3x  Row 20x red t-band  SHoulder extension, 20x, red TB  Lat pull down 20 reps R TB--np  Supine Marching with TA 20x  DKTC 20x   Open Book 10x--np  LAQ, 2x10  SLR, 2x10  Incline stretch, 1 min x 2  Seated HSS, 3x30s      Rohini received the following manual therapy techniques: IASTM with white stick applied to the to upper back and LB for 8 minutes      PATIENT EDUCATION AND HOME EXERCISES     Home Exercises Provided and Patient Education Provided     Education provided:   - Educated pt on the importance of daily stretch to increase " the benefit of therapy and positive results.     Written Home Exercises Provided: Patient instructed to cont prior HEP. Exercises were reviewed and LALO was able to demonstrate them prior to the end of the session.    LALO demonstrated good  understanding of the education provided. See EMR under Patient Instructions for exercises provided during therapy sessions    ASSESSMENT   Lalo states that she has fluctuating levels of leg pain throughout session, as well as midback pain. She had trouble consistently answering questions during session, indicating some deficits with short-term memory, which could be a limiting factor in her progress.     LALO Is progressing well towards her goals.   Pt prognosis is Good.     Pt will continue to benefit from skilled outpatient physical therapy to address the deficits listed in the problem list box on initial evaluation, provide pt/family education and to maximize pt's level of independence in the home and community environment.     Pt's spiritual, cultural and educational needs considered and pt agreeable to plan of care and goals.     Anticipated barriers to physical therapy: none    Goals:   Goals:  Short Term Goals: 3 weeks   1. Pt will be compliant with daily HEP.      Long Term Goals: 6 weeks   1. Pt will be able to perform standing household chores for 1 hour without increased back pain.   2. Pt will have no leg pain with household chores.   3. Pt will score </=40% limitation on FOTO lumbar survey.      PLAN       Plan of care Certification: 1/27/2022 to 3/10/22.     Outpatient Physical Therapy 2 times weekly for 6 weeks to include the following interventions: Manual Therapy, Moist Heat/ Ice, Neuromuscular Re-ed, Therapeutic Activities and Therapeutic Exercise  Susanne Hoskins, PT

## 2022-02-17 ENCOUNTER — CLINICAL SUPPORT (OUTPATIENT)
Dept: REHABILITATION | Facility: HOSPITAL | Age: 77
End: 2022-02-17
Payer: MEDICARE

## 2022-02-17 DIAGNOSIS — G89.29 CHRONIC BILATERAL LOW BACK PAIN WITH SCIATICA, SCIATICA LATERALITY UNSPECIFIED: Primary | ICD-10-CM

## 2022-02-17 DIAGNOSIS — M54.40 CHRONIC BILATERAL LOW BACK PAIN WITH SCIATICA, SCIATICA LATERALITY UNSPECIFIED: Primary | ICD-10-CM

## 2022-02-17 DIAGNOSIS — M48.061 SPINAL STENOSIS OF LUMBAR REGION WITHOUT NEUROGENIC CLAUDICATION: ICD-10-CM

## 2022-02-17 PROCEDURE — 97110 THERAPEUTIC EXERCISES: CPT | Mod: PO,CQ

## 2022-02-17 NOTE — PROGRESS NOTES
"  [facility]   Physical Therapy Treatment Note     Name: Lalo Guerrero  Clinic Number: [id number]    Therapy Diagnosis:   No diagnosis found.  Physician: Doyle Young *    Visit Date: 2/17/2022  Physician: Doyle Young *     Physician Orders: PT Eval and Treat   Medical Diagnosis from Referral: Lumbar radiculopathy [M54.16], Spinal stenosis of lumbar region without neurogenic claudication [M48.061], Muscle spasm [M62.838]  Evaluation Date: 1/27/2022  Authorization Period Expiration: 12/31/22  Plan of Care Expiration: 3/10/22  Progress Note Due: 6th visit  Visit # / Visits authorized: 5/ 12        Precautions: RA      Time In: 9:15  am  Time Out: 10:00 am  Total Appointment Time (timed & untimed codes): 45 minutes       SuBJECTIVE     Lalo reports no c/o LBP today. She has been complaint with her HEP.       TREATMENT     LALO received the treatments listed below:      received therapeutic exercises to develop strength and ROM for 45  minutes including:    Recumbent bike, 5 mins (pt c/o leg soreness) for endurance and mobility  LTR 20x 3"  PPT with hip ADD with ball 3 sec hold 3 min  Bridging 20x 3"    Standing scap retraction 20x  Brueger 20x back against wall with yellow TB (NP)  Modified piriformis 30" 3x  Row 20x red t-band  Shoulder extension, 20x, red TB  Lat pull down 20 reps R TB--np  Supine Marching with TA 20x  DKTC 20x   Open Book 10x--np  LAQ, 2x10  SLR, 2x10  Incline stretch, 1 min x 2  Seated HSS, 3x30s      Rohini received the following manual therapy techniques: IASTM with white stick applied to the to upper back and LB for 8 minutes      PATIENT EDUCATION AND HOME EXERCISES     Home Exercises Provided and Patient Education Provided     Education provided:   - Educated pt on the importance of daily stretch to increase the benefit of therapy and positive results.     Written Home Exercises Provided: Patient instructed to cont prior HEP. Exercises " were reviewed and LALO was able to demonstrate them prior to the end of the session.    LALO demonstrated good  understanding of the education provided. See EMR under Patient Instructions for exercises provided during therapy sessions    ASSESSMENT   Lalo with no pain coming into session, but bridging increased her back pain. As pt cont with HEP and received roller to L side on spine from scapular to LB her pain decreased. Discussed importance for upright posture and to stand every hour to decrease back pain. Pt understood.     LALO Is progressing well towards her goals.   Pt prognosis is Good.     Pt will continue to benefit from skilled outpatient physical therapy to address the deficits listed in the problem list box on initial evaluation, provide pt/family education and to maximize pt's level of independence in the home and community environment.     Pt's spiritual, cultural and educational needs considered and pt agreeable to plan of care and goals.     Anticipated barriers to physical therapy: none    Goals:   Goals:  Short Term Goals: 3 weeks   1. Pt will be compliant with daily HEP.      Long Term Goals: 6 weeks   1. Pt will be able to perform standing household chores for 1 hour without increased back pain.   2. Pt will have no leg pain with household chores.   3. Pt will score </=40% limitation on FOTO lumbar survey.      PLAN       Plan of care Certification: 1/27/2022 to 3/10/22.     Outpatient Physical Therapy 2 times weekly for 6 weeks to include the following interventions: Manual Therapy, Moist Heat/ Ice, Neuromuscular Re-ed, Therapeutic Activities and Therapeutic Exercise  Dania Kearns, PTA

## 2022-02-21 ENCOUNTER — OFFICE VISIT (OUTPATIENT)
Dept: FAMILY MEDICINE | Facility: CLINIC | Age: 77
End: 2022-02-21
Payer: MEDICARE

## 2022-02-21 VITALS
HEIGHT: 62 IN | HEART RATE: 77 BPM | WEIGHT: 132.5 LBS | BODY MASS INDEX: 24.38 KG/M2 | OXYGEN SATURATION: 97 % | SYSTOLIC BLOOD PRESSURE: 118 MMHG | DIASTOLIC BLOOD PRESSURE: 80 MMHG

## 2022-02-21 DIAGNOSIS — R73.9 HYPERGLYCEMIA: ICD-10-CM

## 2022-02-21 DIAGNOSIS — Z86.39 HISTORY OF IRON DEFICIENCY: ICD-10-CM

## 2022-02-21 DIAGNOSIS — M05.742 RHEUMATOID ARTHRITIS INVOLVING BOTH HANDS WITH POSITIVE RHEUMATOID FACTOR: Primary | ICD-10-CM

## 2022-02-21 DIAGNOSIS — M05.741 RHEUMATOID ARTHRITIS INVOLVING BOTH HANDS WITH POSITIVE RHEUMATOID FACTOR: Primary | ICD-10-CM

## 2022-02-21 DIAGNOSIS — E03.9 HYPOTHYROIDISM, ADULT: ICD-10-CM

## 2022-02-21 DIAGNOSIS — R35.89 POLYURIA: ICD-10-CM

## 2022-02-21 DIAGNOSIS — E03.9 ACQUIRED HYPOTHYROIDISM: ICD-10-CM

## 2022-02-21 DIAGNOSIS — R68.89 COLD FEELING: ICD-10-CM

## 2022-02-21 DIAGNOSIS — R53.83 FATIGUE, UNSPECIFIED TYPE: ICD-10-CM

## 2022-02-21 PROBLEM — I25.9 CHEST PAIN DUE TO MYOCARDIAL ISCHEMIA: Status: RESOLVED | Noted: 2020-11-24 | Resolved: 2022-02-21

## 2022-02-21 PROBLEM — D84.9 IMMUNOSUPPRESSION: Status: ACTIVE | Noted: 2022-02-21

## 2022-02-21 PROCEDURE — 1126F PR PAIN SEVERITY QUANTIFIED, NO PAIN PRESENT: ICD-10-PCS | Mod: CPTII,S$GLB,, | Performed by: INTERNAL MEDICINE

## 2022-02-21 PROCEDURE — 3074F SYST BP LT 130 MM HG: CPT | Mod: CPTII,S$GLB,, | Performed by: INTERNAL MEDICINE

## 2022-02-21 PROCEDURE — 90694 FLU VACCINE - QUADRIVALENT - ADJUVANTED: ICD-10-PCS | Mod: S$GLB,,, | Performed by: INTERNAL MEDICINE

## 2022-02-21 PROCEDURE — 1159F MED LIST DOCD IN RCRD: CPT | Mod: CPTII,S$GLB,, | Performed by: INTERNAL MEDICINE

## 2022-02-21 PROCEDURE — 1101F PR PT FALLS ASSESS DOC 0-1 FALLS W/OUT INJ PAST YR: ICD-10-PCS | Mod: CPTII,S$GLB,, | Performed by: INTERNAL MEDICINE

## 2022-02-21 PROCEDURE — 90694 VACC AIIV4 NO PRSRV 0.5ML IM: CPT | Mod: S$GLB,,, | Performed by: INTERNAL MEDICINE

## 2022-02-21 PROCEDURE — G0008 FLU VACCINE - QUADRIVALENT - ADJUVANTED: ICD-10-PCS | Mod: S$GLB,,, | Performed by: INTERNAL MEDICINE

## 2022-02-21 PROCEDURE — 1160F RVW MEDS BY RX/DR IN RCRD: CPT | Mod: CPTII,S$GLB,, | Performed by: INTERNAL MEDICINE

## 2022-02-21 PROCEDURE — 99999 PR PBB SHADOW E&M-EST. PATIENT-LVL IV: CPT | Mod: PBBFAC,,, | Performed by: INTERNAL MEDICINE

## 2022-02-21 PROCEDURE — 3288F FALL RISK ASSESSMENT DOCD: CPT | Mod: CPTII,S$GLB,, | Performed by: INTERNAL MEDICINE

## 2022-02-21 PROCEDURE — 3079F DIAST BP 80-89 MM HG: CPT | Mod: CPTII,S$GLB,, | Performed by: INTERNAL MEDICINE

## 2022-02-21 PROCEDURE — 99999 PR PBB SHADOW E&M-EST. PATIENT-LVL IV: ICD-10-PCS | Mod: PBBFAC,,, | Performed by: INTERNAL MEDICINE

## 2022-02-21 PROCEDURE — 3288F PR FALLS RISK ASSESSMENT DOCUMENTED: ICD-10-PCS | Mod: CPTII,S$GLB,, | Performed by: INTERNAL MEDICINE

## 2022-02-21 PROCEDURE — 3079F PR MOST RECENT DIASTOLIC BLOOD PRESSURE 80-89 MM HG: ICD-10-PCS | Mod: CPTII,S$GLB,, | Performed by: INTERNAL MEDICINE

## 2022-02-21 PROCEDURE — 99213 OFFICE O/P EST LOW 20 MIN: CPT | Mod: S$GLB,,, | Performed by: INTERNAL MEDICINE

## 2022-02-21 PROCEDURE — 99213 PR OFFICE/OUTPT VISIT, EST, LEVL III, 20-29 MIN: ICD-10-PCS | Mod: S$GLB,,, | Performed by: INTERNAL MEDICINE

## 2022-02-21 PROCEDURE — 1101F PT FALLS ASSESS-DOCD LE1/YR: CPT | Mod: CPTII,S$GLB,, | Performed by: INTERNAL MEDICINE

## 2022-02-21 PROCEDURE — 1160F PR REVIEW ALL MEDS BY PRESCRIBER/CLIN PHARMACIST DOCUMENTED: ICD-10-PCS | Mod: CPTII,S$GLB,, | Performed by: INTERNAL MEDICINE

## 2022-02-21 PROCEDURE — 1126F AMNT PAIN NOTED NONE PRSNT: CPT | Mod: CPTII,S$GLB,, | Performed by: INTERNAL MEDICINE

## 2022-02-21 PROCEDURE — G0008 ADMIN INFLUENZA VIRUS VAC: HCPCS | Mod: S$GLB,,, | Performed by: INTERNAL MEDICINE

## 2022-02-21 PROCEDURE — 3074F PR MOST RECENT SYSTOLIC BLOOD PRESSURE < 130 MM HG: ICD-10-PCS | Mod: CPTII,S$GLB,, | Performed by: INTERNAL MEDICINE

## 2022-02-21 PROCEDURE — 1159F PR MEDICATION LIST DOCUMENTED IN MEDICAL RECORD: ICD-10-PCS | Mod: CPTII,S$GLB,, | Performed by: INTERNAL MEDICINE

## 2022-02-21 NOTE — PROGRESS NOTES
Patient ID: Fabby Guerrero is a 76 y.o. female.    Chief Complaint: request blood work  (Always cold ) and m+3     Three-month follow-up.     Feels like she is cold all of the time. No TSH since nov of 2020. She would also like to look into iron levels. Still having memory issues may be going to see Dr. Crowley.   Having nocturia.     Flu shot- wants to think about it.   Shingles vaccine- states she had this  COVID vaccine- recommended but she declines    Patient Active Problem List   Diagnosis    Acquired hypothyroidism    Family history of colon cancer    Rheumatoid arthritis of hand    Osteoarthritis of spine with radiculopathy, thoracolumbar region    Osteoarthritis of neck    Mixed hyperlipidemia    Abnormal electrocardiogram (ECG) (EKG)    Cervical radiculopathy    Lumbar radiculopathy    Chronic bilateral low back pain with sciatica    Immunosuppression        Review of Systems   Constitutional: Negative for fever.   Respiratory: Negative for shortness of breath.    Cardiovascular: Negative for chest pain.   Gastrointestinal: Negative for abdominal pain.   Musculoskeletal: Positive for back pain.        Vitals:    02/21/22 1440   BP: 118/80   Pulse: 77     Physical Exam  Cardiovascular:      Rate and Rhythm: Normal rate and regular rhythm.      Heart sounds: No murmur heard.    No gallop.   Pulmonary:      Breath sounds: Normal breath sounds. No wheezing or rhonchi.   Abdominal:      General: Bowel sounds are normal.      Palpations: Abdomen is soft.      Tenderness: There is no abdominal tenderness.   Musculoskeletal:         General: Normal range of motion.      Cervical back: Neck supple.   Skin:     General: Skin is warm.      Findings: No rash.   Neurological:      Mental Status: She is alert.   Psychiatric:         Behavior: Behavior normal.         I personally reviewed past medical, family and social history.  Medication List with Changes/Refills   Current Medications    ACETAMINOPHEN  (TYLENOL) 650 MG TBSR    Take 650 mg by mouth every 8 (eight) hours as needed.     CELECOXIB (CELEBREX) 100 MG CAPSULE    Take 1 capsule by mouth twice daily    CYANOCOBALAMIN (VITAMIN B-12) 100 MCG TABLET    Take 100 mcg by mouth once daily.    FISH OIL-OMEGA-3 FATTY ACIDS 300-1,000 MG CAPSULE    Take 2 g by mouth once daily.    FOLIC ACID (FOLVITE) 1 MG TABLET    TAKE 2 TABLETS ONCE DAILY.    GLUCOSAMINE-CHONDROITIN 500-400 MG TABLET    Take 2 tablets by mouth once daily.     HYDROCODONE-ACETAMINOPHEN (NORCO)  MG PER TABLET    Take 1 tablet by mouth every 8 (eight) hours as needed for Pain.    LEVOTHYROXINE (SYNTHROID) 88 MCG TABLET    Take 1 tablet (88 mcg total) by mouth every morning.    MELATONIN 5 MG CAP    Take by mouth as needed.     METHOTREXATE 2.5 MG TAB    TAKE 4 TABLETS (10MG) EVERY 7 DAYS    MULTIVITAMIN (THERAGRAN) PER TABLET    Take 1 tablet by mouth once daily.    OMEPRAZOLE (PRILOSEC) 20 MG CAPSULE    Take 20 mg by mouth as needed.     TURMERIC (CURCUMIN MISC)    750 mg by Misc.(Non-Drug; Combo Route) route once daily.     VITAMIN A-C-D3-COD LIVER OIL ORAL    Take by mouth once daily.     VITAMIN B COMPLEX-FOLIC ACID 0.4 MG TAB    Take by mouth once daily.        Orders Placed This Encounter    Ferritin    Iron and TIBC    TSH    Hemoglobin A1C      Assessment      1. Rheumatoid arthritis involving both hands with positive rheumatoid factor    2. Hypothyroidism, adult    3. Fatigue, unspecified type    4. Cold feeling    5. Acquired hypothyroidism    6. History of iron deficiency    7. Polyuria    8. Hyperglycemia       Plan   Follow-up Rheumatology  Check labs    Recommend exercise and healthy diet (fresh fruits and vegetables, lean protein including chicken and fish)

## 2022-02-22 ENCOUNTER — CLINICAL SUPPORT (OUTPATIENT)
Dept: REHABILITATION | Facility: HOSPITAL | Age: 77
End: 2022-02-22
Payer: MEDICARE

## 2022-02-22 DIAGNOSIS — M54.41 CHRONIC BILATERAL LOW BACK PAIN WITH BILATERAL SCIATICA: Primary | ICD-10-CM

## 2022-02-22 DIAGNOSIS — M54.42 CHRONIC BILATERAL LOW BACK PAIN WITH BILATERAL SCIATICA: Primary | ICD-10-CM

## 2022-02-22 DIAGNOSIS — G89.29 CHRONIC BILATERAL LOW BACK PAIN WITH BILATERAL SCIATICA: Primary | ICD-10-CM

## 2022-02-22 PROCEDURE — 97110 THERAPEUTIC EXERCISES: CPT | Mod: PO | Performed by: PHYSICAL THERAPIST

## 2022-02-22 NOTE — PROGRESS NOTES
"  [facility]   Physical Therapy Treatment Note     Name: Lalo Guerrero  Clinic Number: [id number]    Therapy Diagnosis:   Encounter Diagnosis   Name Primary?    Chronic bilateral low back pain with bilateral sciatica Yes     Physician: Doyle Young *    Visit Date: 2/22/2022  Physician: Doyle Young *     Physician Orders: PT Eval and Treat   Medical Diagnosis from Referral: Lumbar radiculopathy [M54.16], Spinal stenosis of lumbar region without neurogenic claudication [M48.061], Muscle spasm [M62.838]  Evaluation Date: 1/27/2022  Authorization Period Expiration: 12/31/22  Plan of Care Expiration: 3/10/22  Progress Note Due: 6th visit  Visit # / Visits authorized: 6/ 12        Precautions: RA      Time In: 9:43  am  Time Out: 10:36 am  Total Appointment Time (timed & untimed codes): 53 minutes       SuBJECTIVE     Lalo reports "I' am in constant pain and soreness all the time." Has noticed that her hands are getting stiff again (looks like trigger finger), but states "this year I don't want to have steroids again".   She has been complaint with her HEP.     Pain level: 2-3/10  "All over, back and legs"      TREATMENT     LALO received the treatments listed below:      received therapeutic exercises to develop strength and ROM for 48  minutes including:    UBE retro, 2 min for postural endurance/strengthening  Recumbent bike, 3 mins (pt c/o leg soreness) for endurance and mobility  LTR 20x 3"  PPT with hip ADD with ball 3 sec hold 3 min  Bridging 20x 3"    Brueger 20x back against wall with red TB   Modified piriformis 30" 3x  Row 20x green t-band  Shoulder extension, 20x, green TB  Supine Marching with TA 20x  DKTC 20x with swiss ball  LAQ, 2x10, 1#  SLR, 2x10  Incline stretch, 1 min x 2  Seated HSS, 3x30s  Slouch overcorrect, 10x3s      Rohini received the following manual therapy techniques: IASTM with white stick applied to the to upper back and LB for 5 " minutes      PATIENT EDUCATION AND HOME EXERCISES     Home Exercises Provided and Patient Education Provided     Education provided:   - Educated pt on the importance of daily stretch to increase the benefit of therapy and positive results.     Written Home Exercises Provided: Patient instructed to cont prior HEP. Exercises were reviewed and LALO was able to demonstrate them prior to the end of the session.    LALO demonstrated good  understanding of the education provided. See EMR under Patient Instructions for exercises provided during therapy sessions    ASSESSMENT   Lalo did very well with progression of exercises today. She did not have pain with bridging today as with previous session.     LALO Is progressing well towards her goals.   Pt prognosis is Good.     Pt will continue to benefit from skilled outpatient physical therapy to address the deficits listed in the problem list box on initial evaluation, provide pt/family education and to maximize pt's level of independence in the home and community environment.     Pt's spiritual, cultural and educational needs considered and pt agreeable to plan of care and goals.     Anticipated barriers to physical therapy: none    Goals:   Goals:  Short Term Goals: 3 weeks   1. Pt will be compliant with daily HEP.      Long Term Goals: 6 weeks   1. Pt will be able to perform standing household chores for 1 hour without increased back pain.   2. Pt will have no leg pain with household chores.   3. Pt will score </=40% limitation on FOTO lumbar survey.      PLAN       Plan of care Certification: 1/27/2022 to 3/10/22.     Outpatient Physical Therapy 2 times weekly for 6 weeks to include the following interventions: Manual Therapy, Moist Heat/ Ice, Neuromuscular Re-ed, Therapeutic Activities and Therapeutic Exercise  Susanne Hoskins, PT

## 2022-02-28 DIAGNOSIS — M54.10 RADICULOPATHY, UNSPECIFIED SPINAL REGION: ICD-10-CM

## 2022-02-28 DIAGNOSIS — M05.742 RHEUMATOID ARTHRITIS INVOLVING BOTH HANDS WITH POSITIVE RHEUMATOID FACTOR: ICD-10-CM

## 2022-02-28 DIAGNOSIS — M47.25 OSTEOARTHRITIS OF SPINE WITH RADICULOPATHY, THORACOLUMBAR REGION: ICD-10-CM

## 2022-02-28 DIAGNOSIS — M05.741 RHEUMATOID ARTHRITIS INVOLVING BOTH HANDS WITH POSITIVE RHEUMATOID FACTOR: ICD-10-CM

## 2022-02-28 DIAGNOSIS — M47.812 SPONDYLOSIS OF CERVICAL REGION WITHOUT MYELOPATHY OR RADICULOPATHY: ICD-10-CM

## 2022-02-28 RX ORDER — HYDROCODONE BITARTRATE AND ACETAMINOPHEN 10; 325 MG/1; MG/1
1 TABLET ORAL
Qty: 30 TABLET | Refills: 0 | Status: SHIPPED | OUTPATIENT
Start: 2022-02-28 | End: 2022-04-11 | Stop reason: SDUPTHER

## 2022-02-28 NOTE — TELEPHONE ENCOUNTER
Care Due:                  Date            Visit Type   Department     Provider  --------------------------------------------------------------------------------                                EP [facility] FAMILY  Last Visit: 02-      CARE ([facility])   COLLEEN Gordon                              EP -                              PRIMARY      [facility] FAM[facility]  Next Visit: 09-      CARE ([facility])   COLLEEN Gordon                                                            Last  Test          Frequency    Reason                     Performed    Due Date  --------------------------------------------------------------------------------    TSH.........  12 months..  levothyroxine............  11- 11-    Powered by [employer]. Reference number: [id number].   2/28/2022 2:20:51 PM CST

## 2022-02-28 NOTE — TELEPHONE ENCOUNTER
----- Message from Riddhi  sent at 2/28/2022  2:09 PM CST -----  Regarding: call back  Type: Needs Medical Advice    Who Called:      Best Call Back Number:  or call cell     Additional Information: Requesting a call back from Nurse, regarding a refill for Rx HYDROcodone-acetaminophen (NORCO)  mg per tablet needs to be called in ,please advise and call in     [facility] [address] [place] [address]   Phone:  [phone]  Fax:  [phone]

## 2022-03-02 ENCOUNTER — CLINICAL SUPPORT (OUTPATIENT)
Dept: REHABILITATION | Facility: HOSPITAL | Age: 77
End: 2022-03-02
Payer: MEDICARE

## 2022-03-02 DIAGNOSIS — G89.29 CHRONIC BILATERAL LOW BACK PAIN WITH BILATERAL SCIATICA: Primary | ICD-10-CM

## 2022-03-02 DIAGNOSIS — M54.42 CHRONIC BILATERAL LOW BACK PAIN WITH BILATERAL SCIATICA: Primary | ICD-10-CM

## 2022-03-02 DIAGNOSIS — M54.41 CHRONIC BILATERAL LOW BACK PAIN WITH BILATERAL SCIATICA: Primary | ICD-10-CM

## 2022-03-02 DIAGNOSIS — R79.0 ABNORMAL IRON SATURATION: Primary | ICD-10-CM

## 2022-03-02 LAB
FERRITIN SERPL-MCNC: 101 NG/ML (ref 16–288)
HBA1C MFR BLD: 5.3 % OF TOTAL HGB
IRON SATN MFR SERPL: 71 % (CALC) (ref 16–45)
IRON SERPL-MCNC: 207 MCG/DL (ref 45–160)
TIBC SERPL-MCNC: 290 MCG/DL (CALC) (ref 250–450)
TSH SERPL-ACNC: 0.53 MIU/L (ref 0.4–4.5)

## 2022-03-02 PROCEDURE — 97110 THERAPEUTIC EXERCISES: CPT | Mod: PO | Performed by: PHYSICAL THERAPIST

## 2022-03-02 NOTE — PROGRESS NOTES
"  [facility]   Physical Therapy Treatment Note     Name: Lalo Guerrero  Clinic Number: [id number]    Therapy Diagnosis:   Encounter Diagnosis   Name Primary?    Chronic bilateral low back pain with bilateral sciatica Yes     Physician: Doyle Young *    Visit Date: 3/2/2022  Physician: Doyle Young *     Physician Orders: PT Eval and Treat   Medical Diagnosis from Referral: Lumbar radiculopathy [M54.16], Spinal stenosis of lumbar region without neurogenic claudication [M48.061], Muscle spasm [M62.838]  Evaluation Date: 1/27/2022  Authorization Period Expiration: 12/31/22  Plan of Care Expiration: 3/10/22  Progress Note Due: 6th visit  Visit # / Visits authorized: 7/ 12        Precautions: RA      Time In: 10:25  am  Time Out: 11:12 am  Total Appointment Time (timed & untimed codes): 47 minutes       SuBJECTIVE     Lalo reports she is feeling pretty good today. She has "my normal aches and pains".     Pain level: 2/10  L lower back      TREATMENT     LALO received the treatments listed below:      received therapeutic exercises to develop strength and ROM for 42  minutes including:    UBE retro, 2 min for postural endurance/strengthening  Recumbent bike, 3 mins (pt c/o leg soreness) for endurance and mobility  LTR 20x 3"  PPT with hip ADD with ball 3 sec hold 3 min  Bridging 20x 3"    Brueger 20x back against wall with red TB--np  Modified piriformis 30" 3x  Row 20x green t-band  Shoulder extension, 20x, green TB  DKTC with green swiss ball 15x  LAQ, 2x10, 1#  SLR, 2x10  Incline stretch, 1 min x 2  Seated HSS, 3x30s  Slouch overcorrect, 10x3s       Rohini received the following manual therapy techniques: IASTM with white stick applied to the to upper back and LB for 5 minutes      PATIENT EDUCATION AND HOME EXERCISES     Home Exercises Provided and Patient Education Provided     Education provided:   - Educated pt on the importance of daily stretch to increase " the benefit of therapy and positive results.     Written Home Exercises Provided: Patient instructed to cont prior HEP. Exercises were reviewed and LALO was able to demonstrate them prior to the end of the session.    LALO demonstrated good  understanding of the education provided. See EMR under Patient Instructions for exercises provided during therapy sessions    ASSESSMENT   Lalo had reduced pain following PT session today. Pain was almost immediately reduced when she assumed erect sitting, indicating a postural element to her pain. Pt's mild memory deficits may limit her carryover to maintain correct posture to help with her pain. Lalo had some hand spasms/trigger finger after using UBE today, so she was shown some stretches for her fingers and given small amount of yellow putty to keep her hands mobile.     LALO Is progressing well towards her goals.   Pt prognosis is Good.     Pt will continue to benefit from skilled outpatient physical therapy to address the deficits listed in the problem list box on initial evaluation, provide pt/family education and to maximize pt's level of independence in the home and community environment.     Pt's spiritual, cultural and educational needs considered and pt agreeable to plan of care and goals.     Anticipated barriers to physical therapy: none    Goals:   Goals:  Short Term Goals: 3 weeks   1. Pt will be compliant with daily HEP.      Long Term Goals: 6 weeks   1. Pt will be able to perform standing household chores for 1 hour without increased back pain.   2. Pt will have no leg pain with household chores.   3. Pt will score </=40% limitation on FOTO lumbar survey.      PLAN       Plan of care Certification: 1/27/2022 to 3/10/22.     Outpatient Physical Therapy 2 times weekly for 6 weeks to include the following interventions: Manual Therapy, Moist Heat/ Ice, Neuromuscular Re-ed, Therapeutic Activities and Therapeutic Exercise  Susanne Hoskins, PT

## 2022-03-04 ENCOUNTER — TELEPHONE (OUTPATIENT)
Dept: FAMILY MEDICINE | Facility: CLINIC | Age: 77
End: 2022-03-04
Payer: MEDICARE

## 2022-03-04 NOTE — TELEPHONE ENCOUNTER
----- Message from Billie Trujillo sent at 3/4/2022  8:15 AM CST -----  Type:  Test Results    Who Called:  patient   Name of Test (Lab/Mammo/Etc):  labs   Date of Test:  3/3   Ordering Provider:  adis  Where the test was performed:  [facility]   Best Call Back Number:  [phone]   Additional Information:  please advise-thank you

## 2022-03-04 NOTE — TELEPHONE ENCOUNTER
Patient called and wanted to know her iron results and what labs provider want her to redraw.  Patient also need the names of the test to verify if her insurance will pay for the labs again

## 2022-03-08 ENCOUNTER — OFFICE VISIT (OUTPATIENT)
Dept: RHEUMATOLOGY | Facility: CLINIC | Age: 77
End: 2022-03-08
Payer: MEDICARE

## 2022-03-08 VITALS
BODY MASS INDEX: 24.24 KG/M2 | DIASTOLIC BLOOD PRESSURE: 73 MMHG | SYSTOLIC BLOOD PRESSURE: 137 MMHG | WEIGHT: 131.75 LBS | HEART RATE: 77 BPM | HEIGHT: 62 IN

## 2022-03-08 DIAGNOSIS — D84.9 IMMUNOSUPPRESSION: ICD-10-CM

## 2022-03-08 DIAGNOSIS — M05.741 RHEUMATOID ARTHRITIS INVOLVING RIGHT HAND WITH POSITIVE RHEUMATOID FACTOR: Primary | ICD-10-CM

## 2022-03-08 DIAGNOSIS — E83.19 IRON OVERLOAD: ICD-10-CM

## 2022-03-08 PROCEDURE — 1125F PR PAIN SEVERITY QUANTIFIED, PAIN PRESENT: ICD-10-PCS | Mod: CPTII,S$GLB,, | Performed by: INTERNAL MEDICINE

## 2022-03-08 PROCEDURE — 99214 OFFICE O/P EST MOD 30 MIN: CPT | Mod: S$GLB,,, | Performed by: INTERNAL MEDICINE

## 2022-03-08 PROCEDURE — 1101F PR PT FALLS ASSESS DOC 0-1 FALLS W/OUT INJ PAST YR: ICD-10-PCS | Mod: CPTII,S$GLB,, | Performed by: INTERNAL MEDICINE

## 2022-03-08 PROCEDURE — 99214 PR OFFICE/OUTPT VISIT, EST, LEVL IV, 30-39 MIN: ICD-10-PCS | Mod: S$GLB,,, | Performed by: INTERNAL MEDICINE

## 2022-03-08 PROCEDURE — 3075F SYST BP GE 130 - 139MM HG: CPT | Mod: CPTII,S$GLB,, | Performed by: INTERNAL MEDICINE

## 2022-03-08 PROCEDURE — 99999 PR PBB SHADOW E&M-EST. PATIENT-LVL IV: ICD-10-PCS | Mod: PBBFAC,,, | Performed by: INTERNAL MEDICINE

## 2022-03-08 PROCEDURE — 1101F PT FALLS ASSESS-DOCD LE1/YR: CPT | Mod: CPTII,S$GLB,, | Performed by: INTERNAL MEDICINE

## 2022-03-08 PROCEDURE — 1160F RVW MEDS BY RX/DR IN RCRD: CPT | Mod: CPTII,S$GLB,, | Performed by: INTERNAL MEDICINE

## 2022-03-08 PROCEDURE — 1159F PR MEDICATION LIST DOCUMENTED IN MEDICAL RECORD: ICD-10-PCS | Mod: CPTII,S$GLB,, | Performed by: INTERNAL MEDICINE

## 2022-03-08 PROCEDURE — 1125F AMNT PAIN NOTED PAIN PRSNT: CPT | Mod: CPTII,S$GLB,, | Performed by: INTERNAL MEDICINE

## 2022-03-08 PROCEDURE — 3078F DIAST BP <80 MM HG: CPT | Mod: CPTII,S$GLB,, | Performed by: INTERNAL MEDICINE

## 2022-03-08 PROCEDURE — 3288F PR FALLS RISK ASSESSMENT DOCUMENTED: ICD-10-PCS | Mod: CPTII,S$GLB,, | Performed by: INTERNAL MEDICINE

## 2022-03-08 PROCEDURE — 3288F FALL RISK ASSESSMENT DOCD: CPT | Mod: CPTII,S$GLB,, | Performed by: INTERNAL MEDICINE

## 2022-03-08 PROCEDURE — 3078F PR MOST RECENT DIASTOLIC BLOOD PRESSURE < 80 MM HG: ICD-10-PCS | Mod: CPTII,S$GLB,, | Performed by: INTERNAL MEDICINE

## 2022-03-08 PROCEDURE — 1159F MED LIST DOCD IN RCRD: CPT | Mod: CPTII,S$GLB,, | Performed by: INTERNAL MEDICINE

## 2022-03-08 PROCEDURE — 1160F PR REVIEW ALL MEDS BY PRESCRIBER/CLIN PHARMACIST DOCUMENTED: ICD-10-PCS | Mod: CPTII,S$GLB,, | Performed by: INTERNAL MEDICINE

## 2022-03-08 PROCEDURE — 99999 PR PBB SHADOW E&M-EST. PATIENT-LVL IV: CPT | Mod: PBBFAC,,, | Performed by: INTERNAL MEDICINE

## 2022-03-08 PROCEDURE — 3075F PR MOST RECENT SYSTOLIC BLOOD PRESS GE 130-139MM HG: ICD-10-PCS | Mod: CPTII,S$GLB,, | Performed by: INTERNAL MEDICINE

## 2022-03-08 RX ORDER — MULTIVIT WITH MINERALS/HERBS
1 TABLET ORAL DAILY
COMMUNITY

## 2022-03-08 ASSESSMENT — ROUTINE ASSESSMENT OF PATIENT INDEX DATA (RAPID3)
FATIGUE SCORE: 1.1
PSYCHOLOGICAL DISTRESS SCORE: 1.1
MDHAQ FUNCTION SCORE: 0.4
PAIN SCORE: 2
PATIENT GLOBAL ASSESSMENT SCORE: 2
TOTAL RAPID3 SCORE: 1.78

## 2022-03-08 NOTE — PATIENT INSTRUCTIONS
Lab option if Insurance approved: [facility] ([facility]    Address: [address] [place], [place] [id number], [place] [id number]  Open ? Closes 6PM  Phone: [phone]    2. I have printed orders for your Iron just in case Dr. Gordon's not working.     3. I have printed your labs to be done in June for me.

## 2022-03-08 NOTE — PROGRESS NOTES
Subjective:          Chief Complaint: Fabby Guerrero is a 76 y.o. female who had concerns including Disease Management.    HPI:      Rheumatoid Arthritis  Patient is an 76 y.o. female here for follow up seropostive Rheumatoid Arthritis. Serologies are: RF+, CCP HIGH titer +.    Symptoms have been present for several years.   Symptoms include eye symptoms, joint pain, joint swelling, sleep difficulties and weakness of hands and are of moderate severity. Morning stiffness: 25 minute Patient denies joint pain, joint swelling and morning stiffness. Symptoms are made worse by: movement, overuse and resting.  Symptoms are helped by arthritis medications.  Associated symptoms include arthralgia and fatigue and secondary Sjogrens. Previously on Restasis, now systane/Refresh. Diclofenac for 1 year with decreased urination and stable creatinine; now off.     Patient is here today for a follow-up known rheumatoid arthritis osteoarthritis.  She was also working with Pain Management for lumbar radiculopathy.  She received an L4-5 epidural steroid injections 12/09/2021 with 50% relief she has some low back pain and pain in her calves and spasms at night sometimes during the day.  She did note the numbness and tingling has improved.    Labs 03/01/2022 showed no evidence of toxicity with methotrexate therapy inflammatory markers are normal.      Seen with Ortho, Dr. Agudelo/Romeo,  received Euflexxa in past.  5/2021 call with flare of left knee, depomedrol 40IM and repeat Euflexxa.    Bilateral this did help due to have this done again. C/o knee pain chronic and daily. No swelling, no instability.       [facility].     Current medications  Celebrex 100mg BID  Tylenol 650 2-3 daily  Methotrexate - Problems: none-- 4 tabs weekly doing ok.  folic acid 2mg still with some ulcers.   Prednisone 5mg PRN flares has not taken any prednisone in 1 year.   Hydrocodone rarely for pain-      No hx of HCQ-  No personal hx of malignancy.    C/o feet  /toes painful more but improved with recent celebrex.     [facility] monitoring show everything is within normal limits.    Interval events:   MRI of the shoulder some musculature is of the rotator cuff is grossly intact mild tendinopathy subacromial and glenohumeral narrowing but no active bursitis was noticed she has some hypertrophic changes at the AC joint    MRI of her cervical  spine however showed multilevel foraminal stenosis most prominent at C5-6 moderate to severe left > than right.  She also had a few disc osteophyte complexes which could even  a facet mediated pain causing muscle spasms radiating along the shoulder girdle in the upper trapezius region.     Dr Edmonds from pain management with some injections    Component      Latest Ref Rng & Units 4/17/2018 5/26/2015   Anti Sm/RNP Antibody      0.00 - 19.99 EU  0.35   Anti-Sm/RNP Interpretation      Negative  Negative   B27 Testing Date        06/16/2015 11:48 AM   HLA B27 Result        Negative   ds DNA Ab      Negative 1:10  Negative 1:10   CHON Screen      Negative <1:160  Negative <1:160   Rheumatoid Factor      <14 IU/mL 128 (H) 34.0 (H)   CCP Antibodies      <5.0 U/mL  6.1 (H)   Sed Rate      < OR = 30 mm/h 11    Cyclic Citrullinated Peptide (CCP) Ab (IgG)      UNITS >250 (H)    CRP      <8.0 mg/L 1.4      REVIEW OF SYSTEMS:    Review of Systems   Constitutional: Positive for malaise/fatigue. Negative for fever and weight loss.   HENT: Negative for sore throat.    Eyes: Positive for discharge and redness. Negative for double vision and photophobia.   Respiratory: Negative for cough, shortness of breath and wheezing.    Cardiovascular: Negative for chest pain, palpitations and orthopnea.   Gastrointestinal: Negative for abdominal pain, constipation and diarrhea.   Genitourinary: Negative for dysuria, hematuria and urgency.   Musculoskeletal: Positive for joint pain. Negative for back pain and myalgias.   Skin:  Negative for rash.   Neurological: Negative for dizziness, tingling, focal weakness and headaches.   Endo/Heme/Allergies: Does not bruise/bleed easily.   Psychiatric/Behavioral: Negative for depression, hallucinations and suicidal ideas.         Objective:        Past Medical History:   Diagnosis Date    Chronic neck pain     Chronic shoulder pain     Rheumatoid arthritis      Family History   Problem Relation Age of Onset    Cancer Mother         lung    Cancer Father         colon and lung    Cancer Daughter         melanoma     Social History     Tobacco Use    Smoking status: Former Smoker     Quit date: 1976     Years since quittin.9    Smokeless tobacco: Never Used   Substance Use Topics    Alcohol use: Yes     Alcohol/week: 3.0 standard drinks     Types: 3 Glasses of wine per week    Drug use: No         Current Outpatient Medications on File Prior to Visit   Medication Sig Dispense Refill    acetaminophen (TYLENOL) 650 MG TbSR Take 650 mg by mouth every 8 (eight) hours as needed.       b complex vitamins tablet Take 1 tablet by mouth once daily. B100      celecoxib (CELEBREX) 100 MG capsule Take 1 capsule by mouth twice daily 180 capsule 2    fish oil-omega-3 fatty acids 300-1,000 mg capsule Take 2 g by mouth once daily.      folic acid (FOLVITE) 1 MG tablet TAKE 2 TABLETS ONCE DAILY. (Patient taking differently: 2 (two) times daily. One tablet twice a day per patient) 180 tablet 3    ginkgo biloba 60 mg Cap Take by mouth.      glucosamine-chondroitin 500-400 mg tablet Take 2 tablets by mouth once daily.       HYDROcodone-acetaminophen (NORCO)  mg per tablet Take 1 tablet by mouth every 24 hours as needed for Pain. 30 tablet 0    levothyroxine (SYNTHROID) 88 MCG tablet Take 1 tablet (88 mcg total) by mouth every morning. 90 tablet 3    melatonin 5 mg Cap Take by mouth as needed.       methotrexate 2.5 MG Tab TAKE 4 TABLETS (10MG) EVERY 7 DAYS 48 tablet 3    omeprazole  (PRILOSEC) 20 MG capsule Take 20 mg by mouth as needed.       TURMERIC (CURCUMIN MISC) 750 mg by Misc.(Non-Drug; Combo Route) route once daily.       cyanocobalamin (VITAMIN B-12) 100 MCG tablet Take 100 mcg by mouth once daily.      multivitamin (THERAGRAN) per tablet Take 1 tablet by mouth once daily.      VITAMIN A-C-D3-COD LIVER OIL ORAL Take by mouth once daily.       vitamin B complex-folic acid 0.4 mg Tab Take by mouth once daily.        No current facility-administered medications on file prior to visit.       Vitals:    03/08/22 0958   BP: 137/73   Pulse: 77       Physical Exam:    Physical Exam  Constitutional:       Appearance: Normal appearance. She is well-developed.   HENT:      Nose: No septal deviation.      Mouth/Throat:      Mouth: No oral lesions.   Eyes:      Conjunctiva/sclera:      Right eye: Right conjunctiva is not injected.      Left eye: Left conjunctiva is not injected.      Pupils: Pupils are equal, round, and reactive to light.   Neck:      Thyroid: No thyroid mass or thyromegaly.      Vascular: No JVD.   Cardiovascular:      Rate and Rhythm: Normal rate and regular rhythm.      Pulses: Normal pulses.      Comments: No edema  Pulmonary:      Effort: Pulmonary effort is normal.      Breath sounds: Normal breath sounds.   Abdominal:      Palpations: Abdomen is soft.   Musculoskeletal:      Right shoulder: No swelling or tenderness. Normal range of motion.      Left shoulder: No swelling or tenderness. Normal range of motion.      Right elbow: No swelling. Normal range of motion. No tenderness.      Left elbow: No swelling. Normal range of motion. No tenderness.      Right wrist: Tenderness present. No swelling. Decreased range of motion.      Left wrist: Tenderness present. No swelling. Decreased range of motion.      Right hand: Tenderness present. Decreased range of motion.      Left hand: Tenderness present. Decreased range of motion.      Right hip: Normal range of motion. Normal  strength.      Left hip: No tenderness. Normal range of motion.      Right knee: No swelling. Normal range of motion. No tenderness.      Left knee: No swelling. Normal range of motion. No tenderness.      Right ankle: No swelling. No tenderness. Normal range of motion.      Left ankle: No swelling. No tenderness. Normal range of motion.      Comments: 5/5 upper and lower extremity bilateral muscle strength   Lymphadenopathy:      Cervical: No cervical adenopathy.   Skin:     General: Skin is dry.   Neurological:      Deep Tendon Reflexes: Reflexes are normal and symmetric.               Assessment:       Encounter Diagnoses   Name Primary?    Rheumatoid arthritis involving right hand with positive rheumatoid factor Yes    Iron overload     Immunosuppression           Plan:        Rheumatoid arthritis involving both hands with positive rheumatoid factor    Osteoarthritis of spine with radiculopathy, cervical spine.      MTX continue 4 tabs weekly   Folic acid  Celebrex 100mg BID will likely help with her joints as well-I see very little synovitis at this time and suspect this is residual damage that is causing bulk of pain in toes.   Prednisone 5-10-mg daily PRN-    Labs printed for her as we are not sure where she will be going to get labs.       Peripheral neuropathy vs Lumbar NF encroachment.    Better since LEX.    Gabapentin too many cognitive ASE off entirely   Hydrocodone PRN using very rarely     1. Keep Methotrexate for RA    3. New elevated iron w/o polycythemia.      3 Anxiety:    Tenex using rarely.     No follow-ups on file.          30min consultation with greater than 50% spent in counseling, chart review and coordination of care. All questions answered.

## 2022-03-09 ENCOUNTER — CLINICAL SUPPORT (OUTPATIENT)
Dept: REHABILITATION | Facility: HOSPITAL | Age: 77
End: 2022-03-09
Payer: MEDICARE

## 2022-03-09 ENCOUNTER — TELEPHONE (OUTPATIENT)
Dept: RHEUMATOLOGY | Facility: CLINIC | Age: 77
End: 2022-03-09
Payer: MEDICARE

## 2022-03-09 DIAGNOSIS — M54.42 CHRONIC BILATERAL LOW BACK PAIN WITH BILATERAL SCIATICA: Primary | ICD-10-CM

## 2022-03-09 DIAGNOSIS — G89.29 CHRONIC BILATERAL LOW BACK PAIN WITH BILATERAL SCIATICA: Primary | ICD-10-CM

## 2022-03-09 DIAGNOSIS — M54.41 CHRONIC BILATERAL LOW BACK PAIN WITH BILATERAL SCIATICA: Primary | ICD-10-CM

## 2022-03-09 PROBLEM — M54.40 CHRONIC BILATERAL LOW BACK PAIN WITH SCIATICA: Status: RESOLVED | Noted: 2022-01-27 | Resolved: 2022-03-09

## 2022-03-09 PROCEDURE — 97110 THERAPEUTIC EXERCISES: CPT | Mod: PO | Performed by: PHYSICAL THERAPIST

## 2022-03-09 NOTE — TELEPHONE ENCOUNTER
----- Message from Doris Mitchell sent at 3/8/2022  1:42 PM CST -----  Contact: pt  Type: Needs Medical Advice    Who Called: pt  Best Call Back Number: [phone]  Inquiry/Question: pt seen today.  She'd like a call  from the nurse who helped her today.  She has a few questions..Thank you~    Patient reports getting iron lab and will go to [facility] for other labs. Asking labs to be faxed to pcp. AKILA

## 2022-03-09 NOTE — PROGRESS NOTES
"  [facility]   Physical Therapy Treatment Note     Name: Lalo Guerrero  Clinic Number: [id number]    Therapy Diagnosis:   Encounter Diagnosis   Name Primary?    Chronic bilateral low back pain with bilateral sciatica Yes     Physician: Doyle Young *    Visit Date: 3/9/2022  Physician: Doyle Young *     Physician Orders: PT Eval and Treat   Medical Diagnosis from Referral: Lumbar radiculopathy [M54.16], Spinal stenosis of lumbar region without neurogenic claudication [M48.061], Muscle spasm [M62.838]  Evaluation Date: 1/27/2022  Authorization Period Expiration: 12/31/22  Plan of Care Expiration: 3/10/22  Progress Note Due: 6th visit  Visit # / Visits authorized: 8/ 12        Precautions: RA      Time In: 9:47  am  Time Out: 10:30 am  Total Appointment Time (timed & untimed codes): 38 minutes       SuBJECTIVE     Lalo reports she is feeling pretty good today. She has "my normal aches and pains".     Pain level: 2/10  L lower back      TREATMENT     LALO received the treatments listed below:      received therapeutic exercises to develop strength and ROM for 38  minutes including:    UBE retro, 2 min for postural endurance/strengthening  Recumbent bike, 3 mins (pt c/o leg soreness) for endurance and mobility  LTR 20x 3"  PPT with hip ADD with ball 3 sec hold 3 min  Bridging 20x 3"    Modified piriformis 30" 3x  Row 20x green t-band  Shoulder extension, 20x, green TB  LAQ, 2x10, 1#  SLR, 2x10  Incline stretch, 1 min x 2  Seated HSS, 3x30s  Slouch overcorrect, 10x3s       OBJECTIVE 3/9/22     Lumbar AROM:   Flexion WFL   Extension Min loss   Right side bending WFL   Left side bending WFL   Right rotation Min Loss   Left rotation Minloss      MMT RIGHT LEFT   Hip flex 4/5 4+/5   Hip ext 4/5 4/5   Hip abd 4+/5 4+/5   Hip add 5/5 5/5   Knee flex 4-5 4-/5   Knee ext 4+/5 4+/5             PATIENT EDUCATION AND HOME EXERCISES     Home Exercises Provided and Patient " Education Provided     Education provided:   - Educated pt on the importance of daily stretch to increase the benefit of therapy and positive results.     Written Home Exercises Provided: Patient instructed to cont prior HEP. Exercises were reviewed and LALO was able to demonstrate them prior to the end of the session.    LALO demonstrated good  understanding of the education provided. See EMR under Patient Instructions for exercises provided during therapy sessions    ASSESSMENT   Lalo has made great progress in PT, improving LE strength, lumbar AROM, which has lead to improved function as evidenced by her reduced FOTO limitation score from 59% to 41%. Pt is appropriate for DC to [facility] at this time.     LALO Is progressing well towards her goals.   Pt prognosis is Good.       Pt's spiritual, cultural and educational needs considered and pt agreeable to plan of care and goals.     Anticipated barriers to physical therapy: none    Goals:   Goals:  Short Term Goals: 3 weeks   1. Pt will be compliant with daily HEP.      Long Term Goals: 6 weeks   1. Pt will be able to perform standing household chores for 1 hour without increased back pain. Met  2. Pt will have no leg pain with household chores. Met  3. Pt will score </=40% limitation on FOTO lumbar survey. Partially met; Pt scores 41% disability on final visit.     PLAN       Discharge to [facility].    Susanne Hoskins, PT

## 2022-03-10 LAB
IRON SATN MFR SERPL: 55 % (ref 15–55)
IRON SERPL-MCNC: 154 UG/DL (ref 27–139)
TIBC SERPL-MCNC: 278 UG/DL (ref 250–450)
UIBC SERPL-MCNC: 124 UG/DL (ref 118–369)

## 2022-03-15 DIAGNOSIS — E03.9 HYPOTHYROIDISM, ADULT: ICD-10-CM

## 2022-03-15 DIAGNOSIS — M47.812 SPONDYLOSIS OF CERVICAL REGION WITHOUT MYELOPATHY OR RADICULOPATHY: ICD-10-CM

## 2022-03-15 DIAGNOSIS — M06.041 RHEUMATOID ARTHRITIS INVOLVING BOTH HANDS WITH NEGATIVE RHEUMATOID FACTOR: ICD-10-CM

## 2022-03-15 DIAGNOSIS — M06.042 RHEUMATOID ARTHRITIS INVOLVING BOTH HANDS WITH NEGATIVE RHEUMATOID FACTOR: ICD-10-CM

## 2022-03-15 DIAGNOSIS — M47.25 OSTEOARTHRITIS OF SPINE WITH RADICULOPATHY, THORACOLUMBAR REGION: ICD-10-CM

## 2022-03-15 RX ORDER — LEVOTHYROXINE SODIUM 88 UG/1
88 TABLET ORAL EVERY MORNING
Qty: 90 TABLET | Refills: 3 | Status: SHIPPED | OUTPATIENT
Start: 2022-03-15 | End: 2023-03-20 | Stop reason: SDUPTHER

## 2022-03-15 NOTE — TELEPHONE ENCOUNTER
----- Message from Prudence Aviles sent at 3/15/2022 10:31 AM CDT -----  Contact: pt  Type:  Patient Returning Call    Who Called:  pt  Who Left Message for Patient:  nurse  Does the patient know what this is regarding?:  yes  Best Call Back Number:  [phone]   Additional Information:

## 2022-03-15 NOTE — TELEPHONE ENCOUNTER
----- Message from Maude Lopez sent at 3/15/2022  8:41 AM CDT -----  Regarding: refill  Contact: patient  Type:  RX Refill Request    Who Called:  patient  Refill or New Rx:  refill  RX Name and Strength:  levothyroxine (SYNTHROID) 88 MCG tablet  How is the patient currently taking it? (ex. 1XDay):  1xday  Is this a 30 day or 90 day RX:  90  Preferred Pharmacy with phone number:    [id number]  [place] [address] [address] [facility]  [address] [place] [address]  Phone: [phone] Fax: [phone]  Local or Mail Order:  local  Ordering Provider:  Dr Gordon  [facility] [[email or web address]] Call Back Number:  [phone]  Additional Information:  Patient has been out for a couple of days. Please call patient if any questions. Thanks!

## 2022-03-15 NOTE — TELEPHONE ENCOUNTER
----- Message from Piter Gregorio sent at 3/15/2022  9:34 AM CDT -----  Contact: Self  Pt is calling about her Iron blood results and her prescription as well. Please call pt back at [phone] to update and advise, she would like to talk with someone.

## 2022-03-15 NOTE — TELEPHONE ENCOUNTER
----- Message from Maude Lopez sent at 3/15/2022  8:45 AM CDT -----  Regarding: results  Contact: patient  Type:  Test Results    Who Called:  patient  Name of Test (Lab/Mammo/Etc):  lab  Date of Test:  03/09/22  Ordering Provider:  Dr Olivera  Where the test was performed:  [facility]  Best Call Back Number:  [phone]  Additional Information:  Patient left a previous message yesterday. Please call patient to advise. Thanks!        Chris

## 2022-03-15 NOTE — TELEPHONE ENCOUNTER
No new care gaps identified.  Powered by [employer]. Reference number: [id number].   3/15/2022 8:55:52 AM CDT

## 2022-03-17 NOTE — PROGRESS NOTES
Patient was unable to get labs for iron studies with us but did go to [facility] so we printed new orders for her vic my delay in getting to you. JM

## 2022-03-18 ENCOUNTER — TELEPHONE (OUTPATIENT)
Dept: FAMILY MEDICINE | Facility: CLINIC | Age: 77
End: 2022-03-18
Payer: MEDICARE

## 2022-03-18 DIAGNOSIS — R79.0 ABNORMAL IRON SATURATION: Primary | ICD-10-CM

## 2022-03-18 DIAGNOSIS — R79.0 SERUM IRON RAISED: ICD-10-CM

## 2022-03-18 NOTE — TELEPHONE ENCOUNTER
----- Message from Terry Gordon DO sent at 3/17/2022  7:06 PM CDT -----  Iron and iron sat coming down, lets monitor

## 2022-03-21 ENCOUNTER — TELEPHONE (OUTPATIENT)
Dept: HEMATOLOGY/ONCOLOGY | Facility: CLINIC | Age: 77
End: 2022-03-21
Payer: MEDICARE

## 2022-03-21 NOTE — TELEPHONE ENCOUNTER
----- Message from Chinyere Arnold sent at 3/21/2022  4:05 PM CDT -----  Regarding: appt request  Contact: robbin  Type: Appointment Request    Name of Caller:  robbin  When is the first available appointment?  N/a  Symptoms:  n/a  Best Call Back Number:  [phone]    Additional Information:  Np asking for a call to schedule appt asap

## 2022-03-22 ENCOUNTER — TELEPHONE (OUTPATIENT)
Dept: HEMATOLOGY/ONCOLOGY | Facility: CLINIC | Age: 77
End: 2022-03-22
Payer: MEDICARE

## 2022-03-29 ENCOUNTER — OFFICE VISIT (OUTPATIENT)
Dept: HEMATOLOGY/ONCOLOGY | Facility: CLINIC | Age: 77
End: 2022-03-29
Payer: MEDICARE

## 2022-03-29 VITALS
OXYGEN SATURATION: 97 % | HEIGHT: 62 IN | WEIGHT: 132.25 LBS | HEART RATE: 76 BPM | BODY MASS INDEX: 24.34 KG/M2 | DIASTOLIC BLOOD PRESSURE: 82 MMHG | TEMPERATURE: 98 F | SYSTOLIC BLOOD PRESSURE: 130 MMHG

## 2022-03-29 DIAGNOSIS — R79.0 SERUM IRON RAISED: ICD-10-CM

## 2022-03-29 DIAGNOSIS — E03.9 HYPOTHYROIDISM, UNSPECIFIED TYPE: ICD-10-CM

## 2022-03-29 DIAGNOSIS — M06.9 RHEUMATOID ARTHRITIS, INVOLVING UNSPECIFIED SITE, UNSPECIFIED WHETHER RHEUMATOID FACTOR PRESENT: ICD-10-CM

## 2022-03-29 DIAGNOSIS — R79.0 ABNORMAL IRON SATURATION: Primary | ICD-10-CM

## 2022-03-29 PROCEDURE — 3075F SYST BP GE 130 - 139MM HG: CPT | Mod: CPTII,S$GLB,, | Performed by: INTERNAL MEDICINE

## 2022-03-29 PROCEDURE — 1126F PR PAIN SEVERITY QUANTIFIED, NO PAIN PRESENT: ICD-10-PCS | Mod: CPTII,S$GLB,, | Performed by: INTERNAL MEDICINE

## 2022-03-29 PROCEDURE — 1160F RVW MEDS BY RX/DR IN RCRD: CPT | Mod: CPTII,S$GLB,, | Performed by: INTERNAL MEDICINE

## 2022-03-29 PROCEDURE — 1101F PT FALLS ASSESS-DOCD LE1/YR: CPT | Mod: CPTII,S$GLB,, | Performed by: INTERNAL MEDICINE

## 2022-03-29 PROCEDURE — 3288F PR FALLS RISK ASSESSMENT DOCUMENTED: ICD-10-PCS | Mod: CPTII,S$GLB,, | Performed by: INTERNAL MEDICINE

## 2022-03-29 PROCEDURE — 1160F PR REVIEW ALL MEDS BY PRESCRIBER/CLIN PHARMACIST DOCUMENTED: ICD-10-PCS | Mod: CPTII,S$GLB,, | Performed by: INTERNAL MEDICINE

## 2022-03-29 PROCEDURE — 99999 PR PBB SHADOW E&M-EST. PATIENT-LVL IV: CPT | Mod: PBBFAC,,, | Performed by: INTERNAL MEDICINE

## 2022-03-29 PROCEDURE — 1159F PR MEDICATION LIST DOCUMENTED IN MEDICAL RECORD: ICD-10-PCS | Mod: CPTII,S$GLB,, | Performed by: INTERNAL MEDICINE

## 2022-03-29 PROCEDURE — 3079F PR MOST RECENT DIASTOLIC BLOOD PRESSURE 80-89 MM HG: ICD-10-PCS | Mod: CPTII,S$GLB,, | Performed by: INTERNAL MEDICINE

## 2022-03-29 PROCEDURE — 1159F MED LIST DOCD IN RCRD: CPT | Mod: CPTII,S$GLB,, | Performed by: INTERNAL MEDICINE

## 2022-03-29 PROCEDURE — 1126F AMNT PAIN NOTED NONE PRSNT: CPT | Mod: CPTII,S$GLB,, | Performed by: INTERNAL MEDICINE

## 2022-03-29 PROCEDURE — 99204 PR OFFICE/OUTPT VISIT, NEW, LEVL IV, 45-59 MIN: ICD-10-PCS | Mod: S$GLB,,, | Performed by: INTERNAL MEDICINE

## 2022-03-29 PROCEDURE — 99999 PR PBB SHADOW E&M-EST. PATIENT-LVL IV: ICD-10-PCS | Mod: PBBFAC,,, | Performed by: INTERNAL MEDICINE

## 2022-03-29 PROCEDURE — 3079F DIAST BP 80-89 MM HG: CPT | Mod: CPTII,S$GLB,, | Performed by: INTERNAL MEDICINE

## 2022-03-29 PROCEDURE — 3288F FALL RISK ASSESSMENT DOCD: CPT | Mod: CPTII,S$GLB,, | Performed by: INTERNAL MEDICINE

## 2022-03-29 PROCEDURE — 99204 OFFICE O/P NEW MOD 45 MIN: CPT | Mod: S$GLB,,, | Performed by: INTERNAL MEDICINE

## 2022-03-29 PROCEDURE — 1101F PR PT FALLS ASSESS DOC 0-1 FALLS W/OUT INJ PAST YR: ICD-10-PCS | Mod: CPTII,S$GLB,, | Performed by: INTERNAL MEDICINE

## 2022-03-29 PROCEDURE — 3075F PR MOST RECENT SYSTOLIC BLOOD PRESS GE 130-139MM HG: ICD-10-PCS | Mod: CPTII,S$GLB,, | Performed by: INTERNAL MEDICINE

## 2022-03-29 NOTE — PROGRESS NOTES
PATIENT: Fabby Guerrero  MRN: [id number]  DATE: 3/29/2022      Diagnosis:   1. Abnormal iron saturation    2. Serum iron raised    3. Hypothyroidism, unspecified type    4. Rheumatoid arthritis, involving unspecified site, unspecified whether rheumatoid factor present        Chief Complaint: Establish Care (Elevated serum iron)    Subjective:    Initial History: Ms. Guerrero is a 76 y.o. female with rheumatoid arthritis who presents to clinic for assessment of elevated iron levels.  The patient underwent labs work and had a Ferritin of 101ng/mL on 3/01/22 and serum iron 207mcg/dL.  Labs were rechecked on 3/09/22 with serum iron of 154ng/mL.  The patient complains of arthritis in her shoulder, hands, and knees. She states she takes CBD gummies and MTX with improvement in symptoms. She states she is cold often and has chills.  She slao endorses occasional headaches.  The patient denies CP, cough, SOB, abdominal pain, N/V, constipation, diarrhea.  The patient denies fever, chills, night sweats, weight loss, new lumps or bumps, easy bruising or bleeding.    Past Medical History:   Past Medical History:   Diagnosis Date    Chronic neck pain     Chronic shoulder pain     GERD (gastroesophageal reflux disease)     Rheumatoid arthritis        Past Surgical HIstory:   Past Surgical History:   Procedure Laterality Date    CARPAL TUNNEL RELEASE      bilateral     SECTION      x3    EPIDURAL STEROID INJECTION INTO CERVICAL SPINE N/A 2021    Procedure: Injection-steroid-epidural-cervical;  Surgeon: Ernesto Loya MD;  Location: [facility] OR;  Service: Pain Management;  Laterality: N/A;    EYE SURGERY      bilat cataracts with implant    HYSTERECTOMY      TRANSFORAMINAL EPIDURAL INJECTION OF STEROID Bilateral 2021    Procedure: Injection,steroid,epidural,transforaminal approach L4/5;  Surgeon: Ernesto Loya MD;  Location: [facility] OR;  Service: Pain Management;  Laterality: Bilateral;    TUBAL LIGATION          Family History:   Family History   Problem Relation Age of Onset    Cancer Mother         lung    Cancer Father         colon and lung    Cancer Daughter         melanoma       Social History:  reports that she quit smoking about 45 years ago. She has a 14.00 pack-year smoking history. She has never used smokeless tobacco. She reports current alcohol use of about 3.0 standard drinks of alcohol per week. She reports that she does not use drugs.    Allergies:  Review of patient's allergies indicates:  No Known Allergies    Medications:  Current Outpatient Medications   Medication Sig Dispense Refill    acetaminophen (TYLENOL) 650 MG TbSR Take 650 mg by mouth every 8 (eight) hours as needed.       b complex vitamins tablet Take 1 tablet by mouth once daily. B100      celecoxib (CELEBREX) 100 MG capsule Take 1 capsule by mouth twice daily 180 capsule 2    cyanocobalamin (VITAMIN B-12) 100 MCG tablet Take 100 mcg by mouth once daily.      fish oil-omega-3 fatty acids 300-1,000 mg capsule Take 2 g by mouth once daily.      folic acid (FOLVITE) 1 MG tablet TAKE 2 TABLETS ONCE DAILY. (Patient taking differently: 2 (two) times daily. One tablet twice a day per patient) 180 tablet 3    ginkgo biloba 60 mg Cap Take by mouth.      glucosamine-chondroitin 500-400 mg tablet Take 2 tablets by mouth once daily.       HYDROcodone-acetaminophen (NORCO)  mg per tablet Take 1 tablet by mouth every 24 hours as needed for Pain. 30 tablet 0    levothyroxine (SYNTHROID) 88 MCG tablet Take 1 tablet (88 mcg total) by mouth every morning. 90 tablet 3    melatonin 5 mg Cap Take by mouth as needed.       methotrexate 2.5 MG Tab TAKE 4 TABLETS (10MG) EVERY 7 DAYS 48 tablet 3    multivitamin (THERAGRAN) per tablet Take 1 tablet by mouth once daily.      omeprazole (PRILOSEC) 20 MG capsule Take 20 mg by mouth as needed.       TURMERIC (CURCUMIN MISC) 750 mg by Misc.(Non-Drug; Combo Route) route once daily.        "VITAMIN A-C-D3-COD LIVER OIL ORAL Take by mouth once daily.       vitamin B complex-folic acid 0.4 mg Tab Take by mouth once daily.        No current facility-administered medications for this visit.       Review of Systems   Constitutional: Positive for chills. Negative for appetite change, fatigue, fever and unexpected weight change.   HENT: Negative for sore throat and trouble swallowing.    Eyes: Negative for photophobia, pain and visual disturbance.   Respiratory: Negative for cough, chest tightness and shortness of breath.    Cardiovascular: Negative for chest pain, palpitations and leg swelling.   Gastrointestinal: Negative for abdominal pain, constipation, diarrhea, nausea and vomiting.   Endocrine: Positive for cold intolerance.   Genitourinary: Negative for difficulty urinating and dysuria.   Musculoskeletal: Positive for arthralgias (shoulder, hands, knees). Negative for back pain.   Skin: Negative for color change and rash.   Neurological: Positive for headaches (on occasion). Negative for dizziness, weakness, light-headedness and numbness.   Hematological: Negative for adenopathy. Does not bruise/bleed easily.       ECOG Performance Status: 1   Objective:      Vitals:   Vitals:    03/29/22 1241   BP: 130/82   BP Location: Left arm   Patient Position: Sitting   BP Method: Medium (Manual)   Pulse: 76   Temp: 97.5 °F (36.4 °C)   TempSrc: Temporal   SpO2: 97%   Weight: 60 kg (132 lb 4.4 oz)   Height: 5' 2" (1.575 m)       Physical Exam  Constitutional:       General: She is not in acute distress.     Appearance: She is well-developed. She is not diaphoretic.   HENT:      Head: Normocephalic and atraumatic.   Eyes:      General: No scleral icterus.        Right eye: No discharge.         Left eye: No discharge.   Cardiovascular:      Rate and Rhythm: Normal rate and regular rhythm.      Heart sounds: Normal heart sounds. No murmur heard.    No friction rub. No gallop.   Pulmonary:      Effort: Pulmonary " effort is normal. No respiratory distress.      Breath sounds: Normal breath sounds. No wheezing or rales.   Chest:      Chest wall: No tenderness.   Breasts:      Right: No axillary adenopathy or supraclavicular adenopathy.      Left: No axillary adenopathy or supraclavicular adenopathy.       Abdominal:      General: Bowel sounds are normal. There is no distension.      Palpations: Abdomen is soft. There is no mass.      Tenderness: There is no abdominal tenderness. There is no guarding or rebound.   Musculoskeletal:         General: No tenderness. Normal range of motion.   Lymphadenopathy:      Cervical: No cervical adenopathy.      Upper Body:      Right upper body: No supraclavicular or axillary adenopathy.      Left upper body: No supraclavicular or axillary adenopathy.   Skin:     Findings: No erythema or rash.   Neurological:      Mental Status: She is alert and oriented to person, place, and time.   Psychiatric:         Behavior: Behavior normal.         Laboratory Data:  No visits with results within 1 Week(s) from this visit.   Latest known visit with results is:   Orders Only on 03/09/2022   Component Date Value Ref Range Status    TIBC 03/09/2022 278  250 - 450 ug/dL Final    UIBC 03/09/2022 124  118 - 369 ug/dL Final    Iron 03/09/2022 154 (A) 27 - 139 ug/dL Final    Iron Saturation 03/09/2022 55  15 - 55 % Final     Lab Results   Component Value Date    FERRITIN 101 03/01/2022         Imaging: Reviewed    Assessment:       1. Abnormal iron saturation    2. Serum iron raised    3. Hypothyroidism, unspecified type    4. Rheumatoid arthritis, involving unspecified site, unspecified whether rheumatoid factor present           Plan:     Elevated Serum Iron - The patient has had an elevated serum iron level on two lab draws 3/01/22 and 3/09/22  -Transferrin saturation is 55.4% on 3/09/22  -Ferritin checked on 3/01/22 was normal  -Ferritin is a better test for total iron stores in the body than serum  iron  -Will repeat iron studies in 3 months  -Pt is not sure if she ate before the labs draws which can affect results  -Low suspicion for hemochromatosis given normal ferritin and absence of liver issues in her family history    Hypothyroidism - PT on synthroid and managed by PCP  -Pt requested TSH checked in 3 months  -Will order TSH and inform PCP of level once received    Rheumatoid Arthritis - The patient is currently under the care of Dr Olivera in rheumatology and is managed with MTX in addition to pain medications  -Pt symptoms controlled  -Management per rheumatology    Route Chart for Scheduling    Med Onc Chart Routing      Follow up with physician 3 months. The patient needs a CBC, CMP, ferritin and iron/TIBC through [facility] in 3 months with an appt with me the week after the lab.   Follow up with KAYLEY    Labs CBC, CMP, iron and TIBC and ferritin   Lab interval:     Imaging    Pharmacy appointment    Other referrals               Chema Dupree MD  [facility]  Hematology and Oncology  [facility]   [address] [place] [address]   O: [phone]  F: [phone]

## 2022-06-30 LAB
ALBUMIN SERPL-MCNC: 4.2 G/DL (ref 3.7–4.7)
ALBUMIN/GLOB SERPL: 1.6 {RATIO} (ref 1.2–2.2)
ALP SERPL-CCNC: 85 IU/L (ref 44–121)
ALT SERPL-CCNC: 27 IU/L (ref 0–32)
AST SERPL-CCNC: 33 IU/L (ref 0–40)
BASOPHILS # BLD AUTO: 0 X10E3/UL (ref 0–0.2)
BASOPHILS NFR BLD AUTO: 0 %
BILIRUB SERPL-MCNC: 0.4 MG/DL (ref 0–1.2)
BUN SERPL-MCNC: 9 MG/DL (ref 8–27)
BUN/CREAT SERPL: 11 (ref 12–28)
CALCIUM SERPL-MCNC: 9.2 MG/DL (ref 8.7–10.3)
CHLORIDE SERPL-SCNC: 94 MMOL/L (ref 96–106)
CO2 SERPL-SCNC: 23 MMOL/L (ref 20–29)
CREAT SERPL-MCNC: 0.81 MG/DL (ref 0.57–1)
CRP SERPL-MCNC: 86 MG/L (ref 0–10)
EOSINOPHIL # BLD AUTO: 0 X10E3/UL (ref 0–0.4)
EOSINOPHIL NFR BLD AUTO: 1 %
ERYTHROCYTE [DISTWIDTH] IN BLOOD BY AUTOMATED COUNT: 12.8 % (ref 11.7–15.4)
ERYTHROCYTE [SEDIMENTATION RATE] IN BLOOD BY WESTERGREN METHOD: 37 MM/HR (ref 0–40)
EST. GFR  (NO RACE VARIABLE): 75 ML/MIN/1.73
GLOBULIN SER CALC-MCNC: 2.7 G/DL (ref 1.5–4.5)
GLUCOSE SERPL-MCNC: 101 MG/DL (ref 65–99)
HCT VFR BLD AUTO: 38.2 % (ref 34–46.6)
HGB BLD-MCNC: 12.9 G/DL (ref 11.1–15.9)
IMM GRANULOCYTES # BLD AUTO: 0 X10E3/UL (ref 0–0.1)
IMM GRANULOCYTES NFR BLD AUTO: 1 %
IRON SATN MFR SERPL: 7 % (ref 15–55)
IRON SERPL-MCNC: 18 UG/DL (ref 27–139)
LYMPHOCYTES # BLD AUTO: 1 X10E3/UL (ref 0.7–3.1)
LYMPHOCYTES NFR BLD AUTO: 12 %
MCH RBC QN AUTO: 31.7 PG (ref 26.6–33)
MCHC RBC AUTO-ENTMCNC: 33.8 G/DL (ref 31.5–35.7)
MCV RBC AUTO: 94 FL (ref 79–97)
MONOCYTES # BLD AUTO: 0.9 X10E3/UL (ref 0.1–0.9)
MONOCYTES NFR BLD AUTO: 12 %
NEUTROPHILS # BLD AUTO: 5.8 X10E3/UL (ref 1.4–7)
NEUTROPHILS NFR BLD AUTO: 74 %
PLATELET # BLD AUTO: 278 X10E3/UL (ref 150–450)
POTASSIUM SERPL-SCNC: 4.2 MMOL/L (ref 3.5–5.2)
PROT SERPL-MCNC: 6.9 G/DL (ref 6–8.5)
RBC # BLD AUTO: 4.07 X10E6/UL (ref 3.77–5.28)
SODIUM SERPL-SCNC: 131 MMOL/L (ref 134–144)
TIBC SERPL-MCNC: 243 UG/DL (ref 250–450)
UIBC SERPL-MCNC: 225 UG/DL (ref 118–369)
WBC # BLD AUTO: 7.7 X10E3/UL (ref 3.4–10.8)

## 2022-08-25 ENCOUNTER — TELEPHONE (OUTPATIENT)
Dept: RHEUMATOLOGY | Facility: CLINIC | Age: 77
End: 2022-08-25

## 2022-08-25 NOTE — TELEPHONE ENCOUNTER
----- Message from Joey Rush sent at 8/25/2022  2:55 PM CDT -----  Type: Needs Medical Advice  Who Called:  Patient    Best Call Back Number: [phone]  Additional Information: Patient states that she needs her recent lab results sent to Dr. Matthias Montoya    Phone:  [phone]  Fax:      [phone]    Assured patient these labs are mailed. AKILA

## 2022-08-31 ENCOUNTER — OFFICE VISIT (OUTPATIENT)
Dept: RHEUMATOLOGY | Facility: CLINIC | Age: 77
End: 2022-08-31
Payer: MEDICARE

## 2022-08-31 VITALS
SYSTOLIC BLOOD PRESSURE: 134 MMHG | DIASTOLIC BLOOD PRESSURE: 68 MMHG | HEIGHT: 62 IN | WEIGHT: 131.75 LBS | HEART RATE: 74 BPM | BODY MASS INDEX: 24.24 KG/M2

## 2022-08-31 DIAGNOSIS — M47.812 SPONDYLOSIS OF CERVICAL REGION WITHOUT MYELOPATHY OR RADICULOPATHY: ICD-10-CM

## 2022-08-31 DIAGNOSIS — M06.041 RHEUMATOID ARTHRITIS INVOLVING BOTH HANDS WITH NEGATIVE RHEUMATOID FACTOR: ICD-10-CM

## 2022-08-31 DIAGNOSIS — M47.25 OSTEOARTHRITIS OF SPINE WITH RADICULOPATHY, THORACOLUMBAR REGION: ICD-10-CM

## 2022-08-31 DIAGNOSIS — M05.742 RHEUMATOID ARTHRITIS INVOLVING BOTH HANDS WITH POSITIVE RHEUMATOID FACTOR: ICD-10-CM

## 2022-08-31 DIAGNOSIS — M05.741 RHEUMATOID ARTHRITIS INVOLVING BOTH HANDS WITH POSITIVE RHEUMATOID FACTOR: ICD-10-CM

## 2022-08-31 DIAGNOSIS — M54.10 RADICULOPATHY, UNSPECIFIED SPINAL REGION: ICD-10-CM

## 2022-08-31 DIAGNOSIS — M06.042 RHEUMATOID ARTHRITIS INVOLVING BOTH HANDS WITH NEGATIVE RHEUMATOID FACTOR: ICD-10-CM

## 2022-08-31 PROCEDURE — 1125F PR PAIN SEVERITY QUANTIFIED, PAIN PRESENT: ICD-10-PCS | Mod: CPTII,S$GLB,, | Performed by: INTERNAL MEDICINE

## 2022-08-31 PROCEDURE — 3075F PR MOST RECENT SYSTOLIC BLOOD PRESS GE 130-139MM HG: ICD-10-PCS | Mod: CPTII,S$GLB,, | Performed by: INTERNAL MEDICINE

## 2022-08-31 PROCEDURE — 1101F PT FALLS ASSESS-DOCD LE1/YR: CPT | Mod: CPTII,S$GLB,, | Performed by: INTERNAL MEDICINE

## 2022-08-31 PROCEDURE — 3075F SYST BP GE 130 - 139MM HG: CPT | Mod: CPTII,S$GLB,, | Performed by: INTERNAL MEDICINE

## 2022-08-31 PROCEDURE — 3288F PR FALLS RISK ASSESSMENT DOCUMENTED: ICD-10-PCS | Mod: CPTII,S$GLB,, | Performed by: INTERNAL MEDICINE

## 2022-08-31 PROCEDURE — 99999 PR PBB SHADOW E&M-EST. PATIENT-LVL III: CPT | Mod: PBBFAC,,, | Performed by: INTERNAL MEDICINE

## 2022-08-31 PROCEDURE — 1101F PR PT FALLS ASSESS DOC 0-1 FALLS W/OUT INJ PAST YR: ICD-10-PCS | Mod: CPTII,S$GLB,, | Performed by: INTERNAL MEDICINE

## 2022-08-31 PROCEDURE — 1125F AMNT PAIN NOTED PAIN PRSNT: CPT | Mod: CPTII,S$GLB,, | Performed by: INTERNAL MEDICINE

## 2022-08-31 PROCEDURE — 3078F DIAST BP <80 MM HG: CPT | Mod: CPTII,S$GLB,, | Performed by: INTERNAL MEDICINE

## 2022-08-31 PROCEDURE — 99215 OFFICE O/P EST HI 40 MIN: CPT | Mod: S$GLB,,, | Performed by: INTERNAL MEDICINE

## 2022-08-31 PROCEDURE — 3288F FALL RISK ASSESSMENT DOCD: CPT | Mod: CPTII,S$GLB,, | Performed by: INTERNAL MEDICINE

## 2022-08-31 PROCEDURE — 1159F PR MEDICATION LIST DOCUMENTED IN MEDICAL RECORD: ICD-10-PCS | Mod: CPTII,S$GLB,, | Performed by: INTERNAL MEDICINE

## 2022-08-31 PROCEDURE — 3078F PR MOST RECENT DIASTOLIC BLOOD PRESSURE < 80 MM HG: ICD-10-PCS | Mod: CPTII,S$GLB,, | Performed by: INTERNAL MEDICINE

## 2022-08-31 PROCEDURE — 99999 PR PBB SHADOW E&M-EST. PATIENT-LVL III: ICD-10-PCS | Mod: PBBFAC,,, | Performed by: INTERNAL MEDICINE

## 2022-08-31 PROCEDURE — 99215 PR OFFICE/OUTPT VISIT, EST, LEVL V, 40-54 MIN: ICD-10-PCS | Mod: S$GLB,,, | Performed by: INTERNAL MEDICINE

## 2022-08-31 PROCEDURE — 1159F MED LIST DOCD IN RCRD: CPT | Mod: CPTII,S$GLB,, | Performed by: INTERNAL MEDICINE

## 2022-08-31 RX ORDER — HYDROCODONE BITARTRATE AND ACETAMINOPHEN 10; 325 MG/1; MG/1
1 TABLET ORAL
Qty: 30 TABLET | Refills: 0 | Status: SHIPPED | OUTPATIENT
Start: 2022-08-31 | End: 2023-01-20 | Stop reason: SDUPTHER

## 2022-08-31 RX ORDER — METHOTREXATE 2.5 MG/1
TABLET ORAL
Qty: 48 TABLET | Refills: 3 | Status: SHIPPED | OUTPATIENT
Start: 2022-08-31 | End: 2023-01-20 | Stop reason: SDUPTHER

## 2022-08-31 RX ORDER — FOLIC ACID 1 MG/1
TABLET ORAL
Qty: 180 TABLET | Refills: 0 | Status: SHIPPED | OUTPATIENT
Start: 2022-08-31 | End: 2023-01-20 | Stop reason: SDUPTHER

## 2022-08-31 ASSESSMENT — ROUTINE ASSESSMENT OF PATIENT INDEX DATA (RAPID3)
PATIENT GLOBAL ASSESSMENT SCORE: 4.5
TOTAL RAPID3 SCORE: 2.5
PAIN SCORE: 3
FATIGUE SCORE: 0
PSYCHOLOGICAL DISTRESS SCORE: 0
MDHAQ FUNCTION SCORE: 0

## 2022-08-31 NOTE — PROGRESS NOTES
"Subjective:          Chief Complaint: Fabyb Guerrero is a 76 y.o. female who had concerns including Disease Management.    HPI:      Rheumatoid Arthritis  Patient is an 76 y.o. female here for follow up seropostive Rheumatoid Arthritis, secondary Sjogrens, and OA   Serologies are: RF+, CCP HIGH titer +.    Symptoms have been present for several years.   Symptoms include eye symptoms, joint pain, joint swelling, sleep difficulties and weakness of hands and are of moderate severity. Morning stiffness: 25 minute Patient denies joint pain, joint swelling and morning stiffness. Symptoms are made worse by: movement, overuse and resting.  Symptoms are helped by arthritis medications.  Associated symptoms include arthralgia and fatigue and secondary Sjogrens. Previously on Restasis, now systane/Refresh.   Diclofenac for 1 year with decreased urination and stable creatinine; now off.       Seen w/ Pain Management for lumbar radiculopathy.  She received an L4-5 epidural steroid injections 12/09/2021 with 50% relief she has some low back pain and pain in her calves and spasms at night sometimes during the day.  She did note the numbness and tingling has improved.      Seen with Ortho, Dr. Agudelo/Romeo,  received Euflexxa in past.  5/2021 call with flare of left knee, depomedrol 40IM and repeat Euflexxa.    Bilateral this did help due to have this done again. C/o knee pain chronic and daily. No swelling, no instability.     She continues with the right hand "cramp" that is not a full triggering happens intermittently but no synovitis.   She did have elevated iron levels working with heme, appears some labs were attached to me but I will have her f/u with Dr. Dupree.     Current medications  Celebrex 100mg BID  Tylenol 650 2-3 daily  Methotrexate - Problems: none-- 4 tabs weekly doing ok.  folic acid 2mg- she dropped down to 1mg daily but noted return of oral ulcers.   Prednisone 5mg PRN flares has not taken any prednisone in 1 " year.   Hydrocodone rarely for pain- I filled #30 ok for 1 tab per day.       No hx of HCQ-  No personal hx of malignancy.    C/o feet /toes painful more but improved with recent celebrex.     Labs dated 12/2019 at [facility] for methotrexate monitoring show everything is within normal limits.    Interval events:   MRI of the shoulder some musculature is of the rotator cuff is grossly intact mild tendinopathy subacromial and glenohumeral narrowing but no active bursitis was noticed she has some hypertrophic changes at the AC joint    MRI of her cervical  spine however showed multilevel foraminal stenosis most prominent at C5-6 moderate to severe left > than right.  She also had a few disc osteophyte complexes which could even  a facet mediated pain causing muscle spasms radiating along the shoulder girdle in the upper trapezius region.     Dr Edmonds from pain management with some injections    Component      Latest Ref Rng & Units 4/17/2018 5/26/2015   Anti Sm/RNP Antibody      0.00 - 19.99 EU  0.35   Anti-Sm/RNP Interpretation      Negative  Negative   B27 Testing Date        06/16/2015 11:48 AM   HLA B27 Result        Negative   ds DNA Ab      Negative 1:10  Negative 1:10   CHON Screen      Negative <1:160  Negative <1:160   Rheumatoid Factor      <14 IU/mL 128 (H) 34.0 (H)   CCP Antibodies      <5.0 U/mL  6.1 (H)   Sed Rate      < OR = 30 mm/h 11    Cyclic Citrullinated Peptide (CCP) Ab (IgG)      UNITS >250 (H)    CRP      <8.0 mg/L 1.4      REVIEW OF SYSTEMS:    Review of Systems   Constitutional:  Positive for malaise/fatigue. Negative for fever and weight loss.   HENT:  Negative for sore throat.    Eyes:  Positive for discharge and redness. Negative for double vision and photophobia.   Respiratory:  Negative for cough, shortness of breath and wheezing.    Cardiovascular:  Negative for chest pain, palpitations and orthopnea.   Gastrointestinal:  Negative for abdominal pain, constipation and diarrhea.    Genitourinary:  Negative for dysuria, hematuria and urgency.   Musculoskeletal:  Positive for joint pain. Negative for back pain and myalgias.   Skin:  Negative for rash.   Neurological:  Negative for dizziness, tingling, focal weakness and headaches.   Endo/Heme/Allergies:  Does not bruise/bleed easily.   Psychiatric/Behavioral:  Negative for depression, hallucinations and suicidal ideas.        Objective:        Past Medical History:   Diagnosis Date    Chronic neck pain     Chronic shoulder pain     GERD (gastroesophageal reflux disease)     Rheumatoid arthritis      Family History   Problem Relation Age of Onset    Cancer Mother         lung    Cancer Father         colon and lung    Cancer Daughter         melanoma     Social History     Tobacco Use    Smoking status: Former     Packs/day: 1.00     Years: 14.00     Pack years: 14.00     Types: Cigarettes     Quit date: 1976     Years since quittin.4    Smokeless tobacco: Never   Substance Use Topics    Alcohol use: Yes     Alcohol/week: 3.0 standard drinks     Types: 3 Glasses of wine per week    Drug use: No         Current Outpatient Medications on File Prior to Visit   Medication Sig Dispense Refill    acetaminophen (TYLENOL) 650 MG TbSR Take 650 mg by mouth every 8 (eight) hours as needed.       b complex vitamins tablet Take 1 tablet by mouth once daily. B100      celecoxib (CELEBREX) 100 MG capsule Take 1 capsule (100 mg total) by mouth 2 (two) times daily. 180 capsule 2    fish oil-omega-3 fatty acids 300-1,000 mg capsule Take 2 g by mouth once daily.      folic acid (FOLVITE) 1 MG tablet Take 2 tablets by mouth once daily (Patient taking differently: Take 1 mg by mouth once daily. Take 1 tablet by mouth once daily) 180 tablet 0    glucosamine-chondroitin 500-400 mg tablet Take 2 tablets by mouth once daily.       levothyroxine (SYNTHROID) 88 MCG tablet Take 1 tablet (88 mcg total) by mouth every morning. 90 tablet 3    melatonin 5 mg Cap  Take by mouth as needed.       methotrexate 2.5 MG Tab TAKE 4 TABLETS (10MG) EVERY 7 DAYS 48 tablet 3    multivitamin (THERAGRAN) per tablet Take 1 tablet by mouth once daily.      TURMERIC (CURCUMIN MISC) 750 mg by Misc.(Non-Drug; Combo Route) route once daily.       UNABLE TO FIND medication name: CBD gummies pt takes 1/2 gummy every day      cyanocobalamin (VITAMIN B-12) 100 MCG tablet Take 100 mcg by mouth once daily.      ginkgo biloba 60 mg Cap Take by mouth.      HYDROcodone-acetaminophen (NORCO)  mg per tablet Take 1 tablet by mouth every 24 hours as needed for Pain. 30 tablet 0    omeprazole (PRILOSEC) 20 MG capsule Take 20 mg by mouth as needed.       VITAMIN A-C-D3-COD LIVER OIL ORAL Take by mouth once daily.       vitamin B complex-folic acid 0.4 mg Tab Take by mouth once daily.        No current facility-administered medications on file prior to visit.       Vitals:    08/31/22 1538   BP: 134/68   Pulse: 74       Physical Exam:    Physical Exam  Constitutional:       Appearance: Normal appearance. She is well-developed.   HENT:      Nose: No septal deviation.      Mouth/Throat:      Mouth: No oral lesions.   Eyes:      Conjunctiva/sclera:      Right eye: Right conjunctiva is not injected.      Left eye: Left conjunctiva is not injected.      Pupils: Pupils are equal, round, and reactive to light.   Neck:      Thyroid: No thyroid mass or thyromegaly.      Vascular: No JVD.   Cardiovascular:      Rate and Rhythm: Normal rate and regular rhythm.      Pulses: Normal pulses.      Comments: No edema  Pulmonary:      Effort: Pulmonary effort is normal.      Breath sounds: Normal breath sounds.   Abdominal:      Palpations: Abdomen is soft.   Musculoskeletal:      Right shoulder: No swelling or tenderness. Normal range of motion.      Left shoulder: No swelling or tenderness. Normal range of motion.      Right elbow: No swelling. Normal range of motion. No tenderness.      Left elbow: No swelling. Normal  range of motion. No tenderness.      Right wrist: Tenderness present. No swelling. Decreased range of motion.      Left wrist: Tenderness present. No swelling. Decreased range of motion.      Right hand: Tenderness present. Decreased range of motion.      Left hand: Tenderness present. Decreased range of motion.      Right hip: Normal range of motion. Normal strength.      Left hip: No tenderness. Normal range of motion.      Right knee: No swelling. Normal range of motion. No tenderness.      Left knee: No swelling. Normal range of motion. No tenderness.      Right ankle: No swelling. No tenderness. Normal range of motion.      Left ankle: No swelling. No tenderness. Normal range of motion.      Comments: 5/5 upper and lower extremity bilateral muscle strength   Lymphadenopathy:      Cervical: No cervical adenopathy.   Skin:     General: Skin is dry.   Neurological:      Deep Tendon Reflexes: Reflexes are normal and symmetric.             Assessment:       No diagnosis found.         Plan:        Rheumatoid arthritis involving both hands with positive rheumatoid factor    Osteoarthritis of spine with radiculopathy, cervical spine.      MTX continue 4 tabs weekly   Folic acid keep at 2mg weekly.   Celebrex 100mg BID will likely help with her joints as well-I see very little synovitis at this time and suspect this is residual damage that is causing bulk of pain in toes.   Prednisone 5-10-mg daily PRN-not using.   Hydrocodone for flare days as she is using infrequently  checked.       Peripheral neuropathy vs Lumbar NF encroachment.    Better since LEX.    Gabapentin too many cognitive ASE off entirely   Hydrocodone PRN using very rarely     1. Keep Methotrexate for RA    3. New elevated iron w/o polycythemia. Continue f/u with Heme.        No follow-ups on file.          40min consultation with greater than 50% spent in counseling, chart review and coordination of care. All questions answered.        Patient had  labs from neurology Dr. Montoya dated 8/26/22: ESR and CRP WNL, CBC WNL renal function WNL. No LAEs done.

## 2022-09-01 ENCOUNTER — TELEPHONE (OUTPATIENT)
Dept: HEMATOLOGY/ONCOLOGY | Facility: CLINIC | Age: 77
End: 2022-09-01

## 2022-09-01 NOTE — TELEPHONE ENCOUNTER
Called and spoke with pt. Pt was notified that Dr. Dupree would like to see her in the upcoming weeks with labs prior. Pt voiced understanding. Pt is scheduled for Md appt on 9/7/2022. [facility] Pt was instructed to go several days prior to be able to have results back in time to review at her appt. Pt verbalized understanding.

## 2022-09-01 NOTE — TELEPHONE ENCOUNTER
----- Message from Chema Dupree MD sent at 8/31/2022  4:47 PM CDT -----  The patient will need a CBC, CMP, ferritin and iron/TIBC in the next couple of weeks with an appt with em a week after the labs.

## 2022-09-06 ENCOUNTER — TELEPHONE (OUTPATIENT)
Dept: HEMATOLOGY/ONCOLOGY | Facility: CLINIC | Age: 77
End: 2022-09-06

## 2022-09-06 NOTE — TELEPHONE ENCOUNTER
[facility] results of the labs she completed on 9/2/2022. Request processed.

## 2022-09-07 ENCOUNTER — OFFICE VISIT (OUTPATIENT)
Dept: HEMATOLOGY/ONCOLOGY | Facility: CLINIC | Age: 77
End: 2022-09-07
Payer: MEDICARE

## 2022-09-07 ENCOUNTER — TELEPHONE (OUTPATIENT)
Dept: HEMATOLOGY/ONCOLOGY | Facility: CLINIC | Age: 77
End: 2022-09-07

## 2022-09-07 VITALS
DIASTOLIC BLOOD PRESSURE: 88 MMHG | HEART RATE: 48 BPM | WEIGHT: 130.31 LBS | SYSTOLIC BLOOD PRESSURE: 136 MMHG | TEMPERATURE: 98 F | BODY MASS INDEX: 23.98 KG/M2 | HEIGHT: 62 IN | OXYGEN SATURATION: 100 %

## 2022-09-07 DIAGNOSIS — M06.9 RHEUMATOID ARTHRITIS, INVOLVING UNSPECIFIED SITE, UNSPECIFIED WHETHER RHEUMATOID FACTOR PRESENT: ICD-10-CM

## 2022-09-07 DIAGNOSIS — R79.0 ABNORMAL IRON SATURATION: Primary | ICD-10-CM

## 2022-09-07 DIAGNOSIS — E03.9 HYPOTHYROIDISM, UNSPECIFIED TYPE: ICD-10-CM

## 2022-09-07 PROCEDURE — 1126F PR PAIN SEVERITY QUANTIFIED, NO PAIN PRESENT: ICD-10-PCS | Mod: CPTII,S$GLB,, | Performed by: INTERNAL MEDICINE

## 2022-09-07 PROCEDURE — 3288F FALL RISK ASSESSMENT DOCD: CPT | Mod: CPTII,S$GLB,, | Performed by: INTERNAL MEDICINE

## 2022-09-07 PROCEDURE — 1101F PT FALLS ASSESS-DOCD LE1/YR: CPT | Mod: CPTII,S$GLB,, | Performed by: INTERNAL MEDICINE

## 2022-09-07 PROCEDURE — 99999 PR PBB SHADOW E&M-EST. PATIENT-LVL IV: CPT | Mod: PBBFAC,,, | Performed by: INTERNAL MEDICINE

## 2022-09-07 PROCEDURE — 1159F MED LIST DOCD IN RCRD: CPT | Mod: CPTII,S$GLB,, | Performed by: INTERNAL MEDICINE

## 2022-09-07 PROCEDURE — 99213 OFFICE O/P EST LOW 20 MIN: CPT | Mod: S$GLB,,, | Performed by: INTERNAL MEDICINE

## 2022-09-07 PROCEDURE — 3075F SYST BP GE 130 - 139MM HG: CPT | Mod: CPTII,S$GLB,, | Performed by: INTERNAL MEDICINE

## 2022-09-07 PROCEDURE — 99999 PR PBB SHADOW E&M-EST. PATIENT-LVL IV: ICD-10-PCS | Mod: PBBFAC,,, | Performed by: INTERNAL MEDICINE

## 2022-09-07 PROCEDURE — 1101F PR PT FALLS ASSESS DOC 0-1 FALLS W/OUT INJ PAST YR: ICD-10-PCS | Mod: CPTII,S$GLB,, | Performed by: INTERNAL MEDICINE

## 2022-09-07 PROCEDURE — 1159F PR MEDICATION LIST DOCUMENTED IN MEDICAL RECORD: ICD-10-PCS | Mod: CPTII,S$GLB,, | Performed by: INTERNAL MEDICINE

## 2022-09-07 PROCEDURE — 1126F AMNT PAIN NOTED NONE PRSNT: CPT | Mod: CPTII,S$GLB,, | Performed by: INTERNAL MEDICINE

## 2022-09-07 PROCEDURE — 3079F DIAST BP 80-89 MM HG: CPT | Mod: CPTII,S$GLB,, | Performed by: INTERNAL MEDICINE

## 2022-09-07 PROCEDURE — 3288F PR FALLS RISK ASSESSMENT DOCUMENTED: ICD-10-PCS | Mod: CPTII,S$GLB,, | Performed by: INTERNAL MEDICINE

## 2022-09-07 PROCEDURE — 3075F PR MOST RECENT SYSTOLIC BLOOD PRESS GE 130-139MM HG: ICD-10-PCS | Mod: CPTII,S$GLB,, | Performed by: INTERNAL MEDICINE

## 2022-09-07 PROCEDURE — 3079F PR MOST RECENT DIASTOLIC BLOOD PRESSURE 80-89 MM HG: ICD-10-PCS | Mod: CPTII,S$GLB,, | Performed by: INTERNAL MEDICINE

## 2022-09-07 PROCEDURE — 99213 PR OFFICE/OUTPT VISIT, EST, LEVL III, 20-29 MIN: ICD-10-PCS | Mod: S$GLB,,, | Performed by: INTERNAL MEDICINE

## 2022-09-07 NOTE — PROGRESS NOTES
PATIENT: Fabby Guerrero  MRN: [id number]  DATE: 2022      Diagnosis:   1. Abnormal iron saturation    2. Hypothyroidism, unspecified type    3. Rheumatoid arthritis, involving unspecified site, unspecified whether rheumatoid factor present          Chief Complaint: Follow-up (6 month follow up, abnormal iron saturation )    Subjective:    Interval History: Ms. Guerrero is a 76 y.o. female with rheumatoid arthritis who presents to clinic for assessment of elevated iron levels.    Since the last clinic visit the patient underwent iron/TIBC on 22 with serum iron low at 18ug/dL.  Labs repeated on 22 showed a ferritin of 113ng/mL, serum iron of 106ug/dL and normal TIBC of 279 ug/dL.  The patient denies CP, cough, SOB, abdominal pain, N/V, constipation, diarrhea.  The patient denies fever, chills, night sweats, weight loss, new lumps or bumps, easy bruising or bleeding.    Prior History:  The patient underwent labs work and had a Ferritin of 101ng/mL on 3/01/22 and serum iron 207mcg/dL.  Labs were rechecked on 3/09/22 with serum iron of 154ng/mL.    Past Medical History:   Past Medical History:   Diagnosis Date    Chronic neck pain     Chronic shoulder pain     GERD (gastroesophageal reflux disease)     Rheumatoid arthritis        Past Surgical HIstory:   Past Surgical History:   Procedure Laterality Date    CARPAL TUNNEL RELEASE      bilateral     SECTION      x3    EPIDURAL STEROID INJECTION INTO CERVICAL SPINE N/A 2021    Procedure: Injection-steroid-epidural-cervical;  Surgeon: Ernesto Loya MD;  Location: [facility] OR;  Service: Pain Management;  Laterality: N/A;    EYE SURGERY      bilat cataracts with implant    HYSTERECTOMY      TRANSFORAMINAL EPIDURAL INJECTION OF STEROID Bilateral 2021    Procedure: Injection,steroid,epidural,transforaminal approach L4/5;  Surgeon: Ernesto Loya MD;  Location: [facility] OR;  Service: Pain Management;  Laterality: Bilateral;    TUBAL LIGATION          Family History:   Family History   Problem Relation Age of Onset    Cancer Mother         lung    Cancer Father         colon and lung    Cancer Daughter         melanoma       Social History:  reports that she quit smoking about 46 years ago. She has a 14.00 pack-year smoking history. She has never used smokeless tobacco. She reports current alcohol use of about 3.0 standard drinks per week. She reports that she does not use drugs.    Allergies:  Review of patient's allergies indicates:  No Known Allergies    Medications:  Current Outpatient Medications   Medication Sig Dispense Refill    acetaminophen (TYLENOL) 650 MG TbSR Take 650 mg by mouth every 8 (eight) hours as needed.       b complex vitamins tablet Take 1 tablet by mouth once daily. B100      celecoxib (CELEBREX) 100 MG capsule Take 1 capsule (100 mg total) by mouth 2 (two) times daily. 180 capsule 2    cyanocobalamin (VITAMIN B-12) 100 MCG tablet Take 100 mcg by mouth once daily.      fish oil-omega-3 fatty acids 300-1,000 mg capsule Take 2 g by mouth once daily.      folic acid (FOLVITE) 1 MG tablet Take 2 tablets by mouth once daily 180 tablet 0    ginkgo biloba 60 mg Cap Take by mouth.      glucosamine-chondroitin 500-400 mg tablet Take 2 tablets by mouth once daily.       HYDROcodone-acetaminophen (NORCO)  mg per tablet Take 1 tablet by mouth every 24 hours as needed for Pain. 30 tablet 0    levothyroxine (SYNTHROID) 88 MCG tablet Take 1 tablet (88 mcg total) by mouth every morning. 90 tablet 3    melatonin 5 mg Cap Take by mouth as needed.       methotrexate 2.5 MG Tab TAKE 4 TABLETS (10MG) EVERY 7 DAYS 48 tablet 3    multivitamin (THERAGRAN) per tablet Take 1 tablet by mouth once daily.      omeprazole (PRILOSEC) 20 MG capsule Take 20 mg by mouth as needed.       TURMERIC (CURCUMIN MISC) 750 mg by Misc.(Non-Drug; Combo Route) route once daily.       UNABLE TO FIND medication name: CBD gummies pt takes 1/2 gummy every day      VITAMIN  "A-C-D3-COD LIVER OIL ORAL Take by mouth once daily.       vitamin B complex-folic acid 0.4 mg Tab Take by mouth once daily.        No current facility-administered medications for this visit.       Review of Systems   Constitutional:  Negative for chills, fatigue, fever and unexpected weight change.   Respiratory:  Negative for cough and shortness of breath.    Cardiovascular:  Negative for chest pain and palpitations.   Gastrointestinal:  Negative for abdominal pain, constipation, diarrhea, nausea and vomiting.   Skin:  Negative for rash.   Neurological:  Negative for headaches.   Hematological:  Negative for adenopathy. Does not bruise/bleed easily.     ECOG Performance Status: 1   Objective:      Vitals:   Vitals:    09/07/22 1021   BP: 136/88   BP Location: Right arm   Patient Position: Sitting   BP Method: Medium (Manual)   Pulse: (!) 48   Temp: 97.5 °F (36.4 °C)   TempSrc: Temporal   SpO2: 100%   Weight: 59.1 kg (130 lb 4.7 oz)   Height: 5' 2" (1.575 m)       Physical Exam  Constitutional:       General: She is not in acute distress.     Appearance: She is well-developed. She is not diaphoretic.   HENT:      Head: Normocephalic and atraumatic.   Cardiovascular:      Rate and Rhythm: Normal rate and regular rhythm.      Heart sounds: Normal heart sounds. No murmur heard.    No friction rub. No gallop.   Pulmonary:      Effort: Pulmonary effort is normal. No respiratory distress.      Breath sounds: Normal breath sounds. No wheezing or rales.   Chest:      Chest wall: No tenderness.   Abdominal:      General: Bowel sounds are normal. There is no distension.      Palpations: Abdomen is soft. There is no mass.      Tenderness: There is no abdominal tenderness. There is no guarding or rebound.   Lymphadenopathy:      Cervical: No cervical adenopathy.      Upper Body:      Right upper body: No supraclavicular or axillary adenopathy.      Left upper body: No supraclavicular or axillary adenopathy.   Skin:     " Findings: No erythema or rash.   Neurological:      Mental Status: She is alert and oriented to person, place, and time.   Psychiatric:         Behavior: Behavior normal.       Laboratory Data:  No visits with results within 1 Week(s) from this visit.   Latest known visit with results is:   Orders Only on 06/29/2022   Component Date Value Ref Range Status    WBC 06/29/2022 7.7  3.4 - 10.8 x10E3/uL Final    RBC 06/29/2022 4.07  3.77 - 5.28 x10E6/uL Final    Hemoglobin 06/29/2022 12.9  11.1 - 15.9 g/dL Final    Hematocrit 06/29/2022 38.2  34.0 - 46.6 % Final    MCV 06/29/2022 94  79 - 97 fL Final    MCH 06/29/2022 31.7  26.6 - 33.0 pg Final    MCHC 06/29/2022 33.8  31.5 - 35.7 g/dL Final    RDW 06/29/2022 12.8  11.7 - 15.4 % Final    Platelets 06/29/2022 278  150 - 450 x10E3/uL Final    Neutrophils 06/29/2022 74  Not Estab. % Final    Lymphs 06/29/2022 12  Not Estab. % Final    Monocytes 06/29/2022 12  Not Estab. % Final    Eos 06/29/2022 1  Not Estab. % Final    Basos 06/29/2022 0  Not Estab. % Final    Neutrophils (Absolute) 06/29/2022 5.8  1.4 - 7.0 x10E3/uL Final    Lymphs (Absolute) 06/29/2022 1.0  0.7 - 3.1 x10E3/uL Final    Monocytes(Absolute) 06/29/2022 0.9  0.1 - 0.9 x10E3/uL Final    Eos (Absolute) 06/29/2022 0.0  0.0 - 0.4 x10E3/uL Final    Baso (Absolute) 06/29/2022 0.0  0.0 - 0.2 x10E3/uL Final    Immature Granulocytes 06/29/2022 1  Not Estab. % Final    Immature Grans (Abs) 06/29/2022 0.0  0.0 - 0.1 x10E3/uL Final    Glucose 06/29/2022 101 (H)  65 - 99 mg/dL Final    BUN 06/29/2022 9  8 - 27 mg/dL Final    Creatinine 06/29/2022 0.81  0.57 - 1.00 mg/dL Final    eGFR 06/29/2022 75  >59 mL/min/1.73 Final    BUN/Creatinine Ratio 06/29/2022 11 (L)  12 - 28 Final    Sodium 06/29/2022 131 (L)  134 - 144 mmol/L Final    Potassium 06/29/2022 4.2  3.5 - 5.2 mmol/L Final    Chloride 06/29/2022 94 (L)  96 - 106 mmol/L Final    CO2 06/29/2022 23  20 - 29 mmol/L Final    Calcium 06/29/2022 9.2  8.7 - 10.3 mg/dL Final     Protein, Total 06/29/2022 6.9  6.0 - 8.5 g/dL Final    Albumin 06/29/2022 4.2  3.7 - 4.7 g/dL Final    Globulin, Total 06/29/2022 2.7  1.5 - 4.5 g/dL Final    Albumin/Globulin Ratio 06/29/2022 1.6  1.2 - 2.2 Final    Total Bilirubin 06/29/2022 0.4  0.0 - 1.2 mg/dL Final    Alkaline Phosphatase 06/29/2022 85  44 - 121 IU/L Final    AST 06/29/2022 33  0 - 40 IU/L Final    ALT 06/29/2022 27  0 - 32 IU/L Final    TIBC 06/29/2022 243 (L)  250 - 450 ug/dL Final    UIBC 06/29/2022 225  118 - 369 ug/dL Final    Iron 06/29/2022 18 (L)  27 - 139 ug/dL Final    Iron Saturation 06/29/2022 7 (LL)  15 - 55 % Final    Sed Rate 06/29/2022 37  0 - 40 mm/hr Final    CRP 06/29/2022 86 (H)  0 - 10 mg/L Final     Lab Results   Component Value Date    FERRITIN 101 03/01/2022         Imaging: Reviewed    Assessment:       1. Abnormal iron saturation    2. Hypothyroidism, unspecified type    3. Rheumatoid arthritis, involving unspecified site, unspecified whether rheumatoid factor present             Plan:     Elevated Serum Iron - The patient has had an elevated serum iron level on two lab draws 3/01/22 and 3/09/22  -Transferrin saturation was 55.4% on 3/09/22  -Ferritin checked on 3/01/22 was normal  -Repeat iron studies 9/03/22 showed normal ferritin of 113ng/mL, serum iron of 106ug/dL and TIBC of 279 ug/dL  -There is no concern for hemochromatosis or iron overload  -Pt instructed taht she would not need a follow up appt and can be seen as needed    Hypothyroidism - PT on synthroid and managed by PCP    Rheumatoid Arthritis - The patient is currently under the care of Dr Olivera in rheumatology and is managed with MTX in addition to pain medications  -Pt symptoms controlled  -Management per rheumatology    Route Chart for Scheduling    Med Onc Chart Routing      Follow up with physician No follow up needed. Pt can be seen as needed   Follow up with KAYLEY    Infusion scheduling note    Injection scheduling note    Labs    Imaging     Pharmacy appointment    Other referrals             Chema Dupree MD  [facility]  Hematology and Oncology  [facility]   [address] [place] [address]   O: [phone]  F: [phone]

## 2022-09-07 NOTE — TELEPHONE ENCOUNTER
----- Message from Eloisa Abdi, Patient Care Assistant sent at 9/7/2022  8:03 AM CDT -----  Type: Needs Medical Advice  Who Called:  robbindonna [place] Call Back Number: [phone]    Additional Information: patient is calling to see if above provider has her test results before coming today, please call to further discuss, thanks

## 2022-09-07 NOTE — TELEPHONE ENCOUNTER
Called patient back and spoke with her. Pt was notified that we do have received her lab results through fax from [facility]. Pt verbalized understanding and confirmed she would be coming to today's appt.

## 2022-09-08 ENCOUNTER — PES CALL (OUTPATIENT)
Dept: ADMINISTRATIVE | Facility: CLINIC | Age: 77
End: 2022-09-08

## 2022-09-12 ENCOUNTER — OFFICE VISIT (OUTPATIENT)
Dept: FAMILY MEDICINE | Facility: CLINIC | Age: 77
End: 2022-09-12
Payer: MEDICARE

## 2022-09-12 VITALS
HEART RATE: 71 BPM | DIASTOLIC BLOOD PRESSURE: 80 MMHG | SYSTOLIC BLOOD PRESSURE: 120 MMHG | HEIGHT: 62 IN | WEIGHT: 132.5 LBS | OXYGEN SATURATION: 97 % | BODY MASS INDEX: 24.38 KG/M2

## 2022-09-12 DIAGNOSIS — E78.2 MIXED HYPERLIPIDEMIA: ICD-10-CM

## 2022-09-12 DIAGNOSIS — Z00.00 ANNUAL PHYSICAL EXAM: Primary | ICD-10-CM

## 2022-09-12 DIAGNOSIS — G47.00 INSOMNIA, UNSPECIFIED TYPE: ICD-10-CM

## 2022-09-12 DIAGNOSIS — R41.3 MEMORY PROBLEM: ICD-10-CM

## 2022-09-12 PROCEDURE — 3074F PR MOST RECENT SYSTOLIC BLOOD PRESSURE < 130 MM HG: ICD-10-PCS | Mod: CPTII,S$GLB,, | Performed by: INTERNAL MEDICINE

## 2022-09-12 PROCEDURE — 3288F PR FALLS RISK ASSESSMENT DOCUMENTED: ICD-10-PCS | Mod: CPTII,S$GLB,, | Performed by: INTERNAL MEDICINE

## 2022-09-12 PROCEDURE — 1101F PT FALLS ASSESS-DOCD LE1/YR: CPT | Mod: CPTII,S$GLB,, | Performed by: INTERNAL MEDICINE

## 2022-09-12 PROCEDURE — 1160F PR REVIEW ALL MEDS BY PRESCRIBER/CLIN PHARMACIST DOCUMENTED: ICD-10-PCS | Mod: CPTII,S$GLB,, | Performed by: INTERNAL MEDICINE

## 2022-09-12 PROCEDURE — 3079F PR MOST RECENT DIASTOLIC BLOOD PRESSURE 80-89 MM HG: ICD-10-PCS | Mod: CPTII,S$GLB,, | Performed by: INTERNAL MEDICINE

## 2022-09-12 PROCEDURE — 1101F PR PT FALLS ASSESS DOC 0-1 FALLS W/OUT INJ PAST YR: ICD-10-PCS | Mod: CPTII,S$GLB,, | Performed by: INTERNAL MEDICINE

## 2022-09-12 PROCEDURE — 99999 PR PBB SHADOW E&M-EST. PATIENT-LVL IV: CPT | Mod: PBBFAC,,, | Performed by: INTERNAL MEDICINE

## 2022-09-12 PROCEDURE — 99999 PR PBB SHADOW E&M-EST. PATIENT-LVL IV: ICD-10-PCS | Mod: PBBFAC,,, | Performed by: INTERNAL MEDICINE

## 2022-09-12 PROCEDURE — 1159F MED LIST DOCD IN RCRD: CPT | Mod: CPTII,S$GLB,, | Performed by: INTERNAL MEDICINE

## 2022-09-12 PROCEDURE — 3074F SYST BP LT 130 MM HG: CPT | Mod: CPTII,S$GLB,, | Performed by: INTERNAL MEDICINE

## 2022-09-12 PROCEDURE — 3288F FALL RISK ASSESSMENT DOCD: CPT | Mod: CPTII,S$GLB,, | Performed by: INTERNAL MEDICINE

## 2022-09-12 PROCEDURE — 99214 OFFICE O/P EST MOD 30 MIN: CPT | Mod: S$GLB,,, | Performed by: INTERNAL MEDICINE

## 2022-09-12 PROCEDURE — 3079F DIAST BP 80-89 MM HG: CPT | Mod: CPTII,S$GLB,, | Performed by: INTERNAL MEDICINE

## 2022-09-12 PROCEDURE — 1126F PR PAIN SEVERITY QUANTIFIED, NO PAIN PRESENT: ICD-10-PCS | Mod: CPTII,S$GLB,, | Performed by: INTERNAL MEDICINE

## 2022-09-12 PROCEDURE — 1159F PR MEDICATION LIST DOCUMENTED IN MEDICAL RECORD: ICD-10-PCS | Mod: CPTII,S$GLB,, | Performed by: INTERNAL MEDICINE

## 2022-09-12 PROCEDURE — 1126F AMNT PAIN NOTED NONE PRSNT: CPT | Mod: CPTII,S$GLB,, | Performed by: INTERNAL MEDICINE

## 2022-09-12 PROCEDURE — 1160F RVW MEDS BY RX/DR IN RCRD: CPT | Mod: CPTII,S$GLB,, | Performed by: INTERNAL MEDICINE

## 2022-09-12 PROCEDURE — 99214 PR OFFICE/OUTPT VISIT, EST, LEVL IV, 30-39 MIN: ICD-10-PCS | Mod: S$GLB,,, | Performed by: INTERNAL MEDICINE

## 2022-09-12 RX ORDER — TRAZODONE HYDROCHLORIDE 50 MG/1
50 TABLET ORAL NIGHTLY
Qty: 30 TABLET | Refills: 2 | Status: SHIPPED | OUTPATIENT
Start: 2022-09-12 | End: 2022-10-27 | Stop reason: SDUPTHER

## 2022-09-12 NOTE — PROGRESS NOTES
Patient ID: Fabby Guerrero is a 76 y.o. female.    Chief Complaint: Annual Exam      Saw Heme-Onc for abnormal iron saturation and elevated ferritin.  No concern for hemochromatosis or iron overload.     Diet- ok  Exercise- walking   Alcohol- 2-3 drinks/ week    Assessment HPI  Impression / Plan   Hyperlipidemia Due for repeat Check lipids.    Hypothyroidism Stable, normal range TSH Continue levothyroxine   Rheumatoid arthritis Followed by Dr. Olivera, taking methotrexate and folic acid    Memory problem  Saw dr. Montoya for memory. He was not too concerned. She thinks there is something going on.  Will come back for MOCA test.    Insomnia  She takes melatonin and tylenol pm for sleep. She is still waking up at about 4 am. Good sleep hygiene Trial trazodone.      Dx and Orders   Fabby was seen today for annual exam.    Diagnoses and all orders for this visit:    Annual physical exam    Mixed hyperlipidemia  -     Lipid Panel; Future  -     Lipid Panel    Memory problem  -     Vitamin B12; Future  -     Vitamin B12    Insomnia, unspecified type  -     traZODone (DESYREL) 50 MG tablet; Take 1 tablet (50 mg total) by mouth every evening.        Review of Systems   Constitutional:  Negative for fever.   Respiratory:  Negative for shortness of breath.         Occasional dyspnea, random, not with exertion   Cardiovascular:  Negative for chest pain.   Gastrointestinal:  Negative for abdominal pain.   Vitals:    09/12/22 0949   BP: 120/80   Pulse: 71     Wt Readings from Last 3 Encounters:   09/12/22 0949 60.1 kg (132 lb 7.9 oz)   09/07/22 1021 59.1 kg (130 lb 4.7 oz)   08/31/22 1538 59.8 kg (131 lb 11.6 oz)      Body mass index is 24.23 kg/m².   Physical Exam  Cardiovascular:      Rate and Rhythm: Normal rate and regular rhythm.      Heart sounds: No murmur heard.    No gallop.   Pulmonary:      Breath sounds: Normal breath sounds. No wheezing or rhonchi.   Abdominal:      General: Bowel sounds are normal.       Palpations: Abdomen is soft.      Tenderness: There is no abdominal tenderness.   Musculoskeletal:         General: Normal range of motion.      Cervical back: Neck supple.   Skin:     General: Skin is warm.      Findings: No rash.   Neurological:      Mental Status: She is alert.   Psychiatric:         Behavior: Behavior normal.           Hyperlipidemia Medications               fish oil-omega-3 fatty acids 300-1,000 mg capsule Take 2 g by mouth once daily.           Medication List with Changes/Refills   New Medications    TRAZODONE (DESYREL) 50 MG TABLET    Take 1 tablet (50 mg total) by mouth every evening.   Current Medications    ACETAMINOPHEN (TYLENOL) 650 MG TBSR    Take 650 mg by mouth every 8 (eight) hours as needed.     B COMPLEX VITAMINS TABLET    Take 1 tablet by mouth once daily. B100    CELECOXIB (CELEBREX) 100 MG CAPSULE    Take 1 capsule (100 mg total) by mouth 2 (two) times daily.    CYANOCOBALAMIN (VITAMIN B-12) 100 MCG TABLET    Take 100 mcg by mouth once daily.    FISH OIL-OMEGA-3 FATTY ACIDS 300-1,000 MG CAPSULE    Take 2 g by mouth once daily.    FOLIC ACID (FOLVITE) 1 MG TABLET    Take 2 tablets by mouth once daily    GINKGO BILOBA 60 MG CAP    Take by mouth.    GLUCOSAMINE-CHONDROITIN 500-400 MG TABLET    Take 2 tablets by mouth once daily.     HYDROCODONE-ACETAMINOPHEN (NORCO)  MG PER TABLET    Take 1 tablet by mouth every 24 hours as needed for Pain.    LEVOTHYROXINE (SYNTHROID) 88 MCG TABLET    Take 1 tablet (88 mcg total) by mouth every morning.    MELATONIN 5 MG CAP    Take by mouth as needed.     METHOTREXATE 2.5 MG TAB    TAKE 4 TABLETS (10MG) EVERY 7 DAYS    MULTIVITAMIN (THERAGRAN) PER TABLET    Take 1 tablet by mouth once daily.    TURMERIC (CURCUMIN MISC)    750 mg by Misc.(Non-Drug; Combo Route) route once daily.     UNABLE TO FIND    medication name: CBD gummies pt takes 1/2 gummy every day    VITAMIN A-C-D3-COD LIVER OIL ORAL    Take by mouth once daily.     VITAMIN B  COMPLEX-FOLIC ACID 0.4 MG TAB    Take by mouth once daily.    Discontinued Medications    OMEPRAZOLE (PRILOSEC) 20 MG CAPSULE    Take 20 mg by mouth as needed.        I personally reviewed past medical, family and social history.

## 2022-09-13 LAB
CHOLEST SERPL-MCNC: 274 MG/DL (ref 100–199)
HDLC SERPL-MCNC: 48 MG/DL
LDLC SERPL CALC-MCNC: 186 MG/DL (ref 0–99)
TRIGL SERPL-MCNC: 209 MG/DL (ref 0–149)
VIT B12 SERPL-MCNC: 393 PG/ML (ref 232–1245)
VLDLC SERPL CALC-MCNC: 40 MG/DL (ref 5–40)

## 2022-09-20 ENCOUNTER — TELEPHONE (OUTPATIENT)
Dept: FAMILY MEDICINE | Facility: CLINIC | Age: 77
End: 2022-09-20

## 2022-09-20 NOTE — TELEPHONE ENCOUNTER
----- Message from Gabriela Evans sent at 9/20/2022  9:23 AM CDT -----  Who Called: Patient    What is the reqeust in detail: Requesting call back to discuss the details of her upcoming 2 week check up appointment. Specifically to know which tests were to be taken and if any of them needed to be done prior to the appointment of which she is not scheduled for anything as of yet. Please advise.     Can the clinic reply by [facility]? No    Best Call Back Number: [phone]    Additional Information:

## 2022-09-21 ENCOUNTER — TELEPHONE (OUTPATIENT)
Dept: FAMILY MEDICINE | Facility: CLINIC | Age: 77
End: 2022-09-21

## 2022-09-21 NOTE — TELEPHONE ENCOUNTER
----- Message from Terry Gordon DO sent at 9/13/2022  5:42 PM CDT -----  LDL (bad cholesterol) is elevated.  We need to discuss starting cholesterol medicine like atorvastatin

## 2022-09-30 ENCOUNTER — OFFICE VISIT (OUTPATIENT)
Dept: FAMILY MEDICINE | Facility: CLINIC | Age: 77
End: 2022-09-30
Payer: MEDICARE

## 2022-09-30 VITALS
WEIGHT: 132.5 LBS | DIASTOLIC BLOOD PRESSURE: 78 MMHG | OXYGEN SATURATION: 95 % | HEART RATE: 73 BPM | HEIGHT: 62 IN | BODY MASS INDEX: 24.38 KG/M2 | SYSTOLIC BLOOD PRESSURE: 118 MMHG

## 2022-09-30 DIAGNOSIS — G47.30 SLEEP APNEA, UNSPECIFIED TYPE: ICD-10-CM

## 2022-09-30 DIAGNOSIS — R41.3 OTHER AMNESIA: Primary | ICD-10-CM

## 2022-09-30 DIAGNOSIS — R41.89 COGNITIVE IMPAIRMENT: ICD-10-CM

## 2022-09-30 PROCEDURE — 1159F MED LIST DOCD IN RCRD: CPT | Mod: CPTII,S$GLB,, | Performed by: INTERNAL MEDICINE

## 2022-09-30 PROCEDURE — 3074F SYST BP LT 130 MM HG: CPT | Mod: CPTII,S$GLB,, | Performed by: INTERNAL MEDICINE

## 2022-09-30 PROCEDURE — 1160F RVW MEDS BY RX/DR IN RCRD: CPT | Mod: CPTII,S$GLB,, | Performed by: INTERNAL MEDICINE

## 2022-09-30 PROCEDURE — 99214 PR OFFICE/OUTPT VISIT, EST, LEVL IV, 30-39 MIN: ICD-10-PCS | Mod: S$GLB,,, | Performed by: INTERNAL MEDICINE

## 2022-09-30 PROCEDURE — 99999 PR PBB SHADOW E&M-EST. PATIENT-LVL IV: ICD-10-PCS | Mod: PBBFAC,,, | Performed by: INTERNAL MEDICINE

## 2022-09-30 PROCEDURE — 1101F PT FALLS ASSESS-DOCD LE1/YR: CPT | Mod: CPTII,S$GLB,, | Performed by: INTERNAL MEDICINE

## 2022-09-30 PROCEDURE — 1126F AMNT PAIN NOTED NONE PRSNT: CPT | Mod: CPTII,S$GLB,, | Performed by: INTERNAL MEDICINE

## 2022-09-30 PROCEDURE — 3288F FALL RISK ASSESSMENT DOCD: CPT | Mod: CPTII,S$GLB,, | Performed by: INTERNAL MEDICINE

## 2022-09-30 PROCEDURE — 1126F PR PAIN SEVERITY QUANTIFIED, NO PAIN PRESENT: ICD-10-PCS | Mod: CPTII,S$GLB,, | Performed by: INTERNAL MEDICINE

## 2022-09-30 PROCEDURE — 1160F PR REVIEW ALL MEDS BY PRESCRIBER/CLIN PHARMACIST DOCUMENTED: ICD-10-PCS | Mod: CPTII,S$GLB,, | Performed by: INTERNAL MEDICINE

## 2022-09-30 PROCEDURE — 3078F PR MOST RECENT DIASTOLIC BLOOD PRESSURE < 80 MM HG: ICD-10-PCS | Mod: CPTII,S$GLB,, | Performed by: INTERNAL MEDICINE

## 2022-09-30 PROCEDURE — 1101F PR PT FALLS ASSESS DOC 0-1 FALLS W/OUT INJ PAST YR: ICD-10-PCS | Mod: CPTII,S$GLB,, | Performed by: INTERNAL MEDICINE

## 2022-09-30 PROCEDURE — 3288F PR FALLS RISK ASSESSMENT DOCUMENTED: ICD-10-PCS | Mod: CPTII,S$GLB,, | Performed by: INTERNAL MEDICINE

## 2022-09-30 PROCEDURE — 3078F DIAST BP <80 MM HG: CPT | Mod: CPTII,S$GLB,, | Performed by: INTERNAL MEDICINE

## 2022-09-30 PROCEDURE — 1159F PR MEDICATION LIST DOCUMENTED IN MEDICAL RECORD: ICD-10-PCS | Mod: CPTII,S$GLB,, | Performed by: INTERNAL MEDICINE

## 2022-09-30 PROCEDURE — 3074F PR MOST RECENT SYSTOLIC BLOOD PRESSURE < 130 MM HG: ICD-10-PCS | Mod: CPTII,S$GLB,, | Performed by: INTERNAL MEDICINE

## 2022-09-30 PROCEDURE — 99999 PR PBB SHADOW E&M-EST. PATIENT-LVL IV: CPT | Mod: PBBFAC,,, | Performed by: INTERNAL MEDICINE

## 2022-09-30 PROCEDURE — 99214 OFFICE O/P EST MOD 30 MIN: CPT | Mod: S$GLB,,, | Performed by: INTERNAL MEDICINE

## 2022-09-30 RX ORDER — DONEPEZIL HYDROCHLORIDE 5 MG/1
5 TABLET, FILM COATED ORAL NIGHTLY
Qty: 30 TABLET | Refills: 2 | Status: SHIPPED | OUTPATIENT
Start: 2022-09-30 | End: 2022-10-27

## 2022-09-30 NOTE — PROGRESS NOTES
Patient ID: Fabby Guerrero is a 76 y.o. female.    Chief Complaint: 2 week discuss labs and memory test       Chronic Medical Problems   Mixed hyperlipidemia, rheumatoid arthritis, immunosuppression due to methotrexate, hypothyroidism     Assessment HPI  Impression / Plan   Memory problem Here for MOCA She scored 21/30  most of the trouble was from word recall. TSH in range  in the presence of hypothyroid on levothyroxine.  B12 393,  she will restart B12. Memory starting to bother her more.  She does take Tylenol p.m. which has antihistamine.  She also has untreated  Sleep apnea Cognitive impairment  MRI   Trial donepezil  Consider inspire consult  with ENT.  StopTylenol p.m. and take trazodone instead    If donepezil causes too many side effects,  Will consider memantine   Insomnia Continue trazodone     Hyperlipidemia  , total cholesterol 278  Will discuss starting cholesterol medicine in 1 month for follow-up.  I do not want start 2 different medications at once.     Dx and Orders   Fabby was seen today for 2 week discuss labs and memory test .    Diagnoses and all orders for this visit:    Other amnesia    Cognitive impairment  -     MRI Brain Without Contrast; Future  -     donepeziL (ARICEPT) 5 MG tablet; Take 1 tablet (5 mg total) by mouth every evening.    Sleep apnea, unspecified type  -     Home Sleep Studies; Future        Review of Systems   Constitutional:  Negative for fever.   Respiratory:  Negative for shortness of breath.    Cardiovascular:  Negative for chest pain.   Gastrointestinal:  Negative for abdominal pain.   Vitals:    09/30/22 1332   BP: 118/78   Pulse: 73     Wt Readings from Last 3 Encounters:   09/30/22 1332 60.1 kg (132 lb 7.9 oz)   09/12/22 0949 60.1 kg (132 lb 7.9 oz)   09/07/22 1021 59.1 kg (130 lb 4.7 oz)      Body mass index is 24.23 kg/m².   Physical Exam  Cardiovascular:      Rate and Rhythm: Normal rate and regular rhythm.      Heart sounds: No murmur heard.    No  gallop.   Pulmonary:      Breath sounds: Normal breath sounds. No wheezing or rhonchi.   Abdominal:      General: Bowel sounds are normal.      Palpations: Abdomen is soft.      Tenderness: There is no abdominal tenderness.   Musculoskeletal:         General: Normal range of motion.      Cervical back: Neck supple.   Skin:     General: Skin is warm.      Findings: No rash.   Neurological:      Mental Status: She is alert.   Psychiatric:         Behavior: Behavior normal.           Hyperlipidemia Medications               fish oil-omega-3 fatty acids 300-1,000 mg capsule Take 2 g by mouth once daily.           Medication List with Changes/Refills   New Medications    DONEPEZIL (ARICEPT) 5 MG TABLET    Take 1 tablet (5 mg total) by mouth every evening.   Current Medications    ACETAMINOPHEN (TYLENOL) 650 MG TBSR    Take 650 mg by mouth every 8 (eight) hours as needed.     B COMPLEX VITAMINS TABLET    Take 1 tablet by mouth once daily. B100    CELECOXIB (CELEBREX) 100 MG CAPSULE    Take 1 capsule (100 mg total) by mouth 2 (two) times daily.    CYANOCOBALAMIN (VITAMIN B-12) 100 MCG TABLET    Take 100 mcg by mouth once daily.    FISH OIL-OMEGA-3 FATTY ACIDS 300-1,000 MG CAPSULE    Take 2 g by mouth once daily.    FOLIC ACID (FOLVITE) 1 MG TABLET    Take 2 tablets by mouth once daily    GINKGO BILOBA 60 MG CAP    Take by mouth.    GLUCOSAMINE-CHONDROITIN 500-400 MG TABLET    Take 2 tablets by mouth once daily.     HYDROCODONE-ACETAMINOPHEN (NORCO)  MG PER TABLET    Take 1 tablet by mouth every 24 hours as needed for Pain.    LEVOTHYROXINE (SYNTHROID) 88 MCG TABLET    Take 1 tablet (88 mcg total) by mouth every morning.    MELATONIN 5 MG CAP    Take by mouth as needed.     METHOTREXATE 2.5 MG TAB    TAKE 4 TABLETS (10MG) EVERY 7 DAYS    MULTIVITAMIN (THERAGRAN) PER TABLET    Take 1 tablet by mouth once daily.    TRAZODONE (DESYREL) 50 MG TABLET    Take 1 tablet (50 mg total) by mouth every evening.    TURMERIC  (CURCUMIN MISC)    750 mg by Misc.(Non-Drug; Combo Route) route once daily.     UNABLE TO FIND    medication name: CBD gummies pt takes 1/2 gummy every day    VITAMIN A-C-D3-COD LIVER OIL ORAL    Take by mouth once daily.     VITAMIN B COMPLEX-FOLIC ACID 0.4 MG TAB    Take by mouth once daily.        I personally reviewed past medical, family and social history.

## 2022-10-04 ENCOUNTER — TELEPHONE (OUTPATIENT)
Dept: FAMILY MEDICINE | Facility: CLINIC | Age: 77
End: 2022-10-04

## 2022-10-04 NOTE — TELEPHONE ENCOUNTER
I have checked on 2 sources and there is no interaction between donepezil and trazodone.  She should be able to take these at the same time

## 2022-10-04 NOTE — TELEPHONE ENCOUNTER
Patient went to the pharmacy and they not to take the donepezil and trazodone at the same time. So what to do?

## 2022-10-04 NOTE — TELEPHONE ENCOUNTER
----- Message from Pacheco Castano sent at 10/4/2022 10:37 AM CDT -----  Type: Needs Medical Advice  Who Called:  pt  Symptoms (please be specific):  pt said she need to speak to a nurse about her meds--said she will explain to the nurse when she return her call--please call and advise--  Best Call Back Number: [phone] (home) or her cell [phone]  Additional Information: thank you

## 2022-10-04 NOTE — TELEPHONE ENCOUNTER
----- Message from Pacheco Castano sent at 10/4/2022 10:37 AM CDT -----  Type: Needs Medical Advice  Who Called:  pt  Symptoms (please be specific):  pt said she need to speak to a nurse about her meds--said she will explain to the nurse when she return her call--please call and advise--  Best Call Back Number: [phone] (home) or her cell [phone]  Additional Information: thank you

## 2022-10-06 ENCOUNTER — TELEPHONE (OUTPATIENT)
Dept: FAMILY MEDICINE | Facility: CLINIC | Age: 77
End: 2022-10-06

## 2022-10-06 NOTE — TELEPHONE ENCOUNTER
----- Message from Elizabeth Walters sent at 10/6/2022  9:19 AM CDT -----  Patient Call Back    Who Called: Kuldip/ [employer]    What is the reqeust in detail:KULDIP CALLING TO SPEAK WITH SOMEONE REGARDING THE PT MRI THAT WAS ORDERED. KULDIP STATED THAT SHE NEED MORE INFORMATION TO COMPLETE THE PA. PLS ADVISE.     Can the clinic reply by [facility]?    Best Call Back Number: [phone]

## 2022-10-06 NOTE — TELEPHONE ENCOUNTER
Spoke with montserrat from [facility]. Pt was anxious about her PA not being completed yet. She was told that it's usually down by  PA department. If they need something from us they will contact our office requesting information.

## 2022-10-07 ENCOUNTER — TELEPHONE (OUTPATIENT)
Dept: FAMILY MEDICINE | Facility: CLINIC | Age: 77
End: 2022-10-07

## 2022-10-07 NOTE — TELEPHONE ENCOUNTER
----- Message from Magnolia Kaba sent at 10/7/2022  9:31 AM CDT -----  Contact: [phone]  Type: Needs Medical Advice  Who Called:  Pt     Best Call Back Number: [phone]    Additional Information: Pt calling to fu on her question about her medication directions donepezil and trazodone.

## 2022-10-18 ENCOUNTER — HOSPITAL ENCOUNTER (OUTPATIENT)
Dept: RADIOLOGY | Facility: HOSPITAL | Age: 77
Discharge: HOME | End: 2022-10-18
Attending: INTERNAL MEDICINE
Payer: MEDICARE

## 2022-10-18 DIAGNOSIS — R41.89 COGNITIVE IMPAIRMENT: ICD-10-CM

## 2022-10-18 PROCEDURE — 70551 MRI BRAIN STEM W/O DYE: CPT | Mod: 26,,, | Performed by: RADIOLOGY

## 2022-10-18 PROCEDURE — 70551 MRI BRAIN STEM W/O DYE: CPT | Mod: TC,PO

## 2022-10-18 PROCEDURE — 70551 MRI BRAIN WITHOUT CONTRAST: ICD-10-PCS | Mod: 26,,, | Performed by: RADIOLOGY

## 2022-10-20 ENCOUNTER — TELEPHONE (OUTPATIENT)
Dept: FAMILY MEDICINE | Facility: CLINIC | Age: 77
End: 2022-10-20

## 2022-10-20 NOTE — TELEPHONE ENCOUNTER
----- Message from Terry Gordon DO sent at 10/18/2022  5:36 PM CDT -----  Nothing worrisome on MRI.  Will discuss at appointment

## 2022-10-20 NOTE — TELEPHONE ENCOUNTER
REFERRING PHYSICIAN: Isiah Galo MD    DATE:  10/19/2022    PREOPERATIVE DIAGNOSIS:  Aortic stenosis.    POSTOPERATIVE DIAGNOSIS:  Aortic stenosis.    PROCEDURE PERFORMED:  Transcatheter aortic valve replacement (26 mm Bridges Ora).    CO-SURGEONS:  Dr. Tre Estrada and Dr. Louis Gomez.    ANESTHESIA:  General.    DESCRIPTION OF PROCEDURE:  After adequate general endotracheal anesthesia, the patient was prepped and draped in the usual sterile fashion.  Utilizing micropuncture technique, the right common femoral artery, left common femoral vein, and left common femoral artery were all accessed, and 6-Emirati sheaths were placed.  The guidewire was placed across the right common femoral artery sheath and the sheath was removed.  Two Perclose devices were placed and the 6-Emirati sheath was exchanged for 10-Emirati sheath.  A balloon tipped flow-directed temporary pacing catheter was then placed through the left common femoral vein.  It was advanced into the apex of the right ventricle.  Pacing parameters and thresholds were obtained.  Next, a pigtail catheter was advanced through the left common femoral artery sheath and advanced into the aortic root.  The aortic root was nonselectively engaged and injected.  A Lunderquist wire was then advanced through the 10-Emirati sheath in the right common femoral artery.  The 10-Emirati sheath was removed and eventually replaced with the Bridges 16-Emirati eSheath.  A Lunderquist wire was then advanced.  An AL1 catheter was then advanced over the Lunderquist wire into the aortic root.  A PTFE wire was then advanced across the aortic valve and AL1 catheter was advanced across the aortic valve and the PTFE wire was removed and exchanged for a Safari wire.  A TAVR time-out was then performed.  The 26 mm Commander delivery system was then placed across the 16-Emirati sheath.  The valve was mounted on the balloon.  The delivery system was then advanced across the  ----- Message from Reinaldo Angulo sent at 12/20/2019  3:18 PM CST -----  Contact: pt  Type: Needs Medical Advice    Who Called:  pt    Best Call Back Number: [phone]  Additional Information: stating that she forgot to ask if the lab orders for [facility] are fasting labs or not. Please call to advise.      aortic root and across the aortic annulus.  Respirations were held and rapid pacing at a rate of 160 beats per minute was initiated.  The aortic root was nonselectively engaged and injected.  Deeming the valve to be in good position, the valve was successfully deployed.  All sheaths and catheters were removed.  Transesophageal echocardiography revealed a well-positioned valve functionally normally and without residual aortic insufficiency.  All catheter and sheaths were removed and the right common femoral artery successfully     closed with the 2 previously placed Perclose devices.  He tolerated the procedure well, and all final sponge and needle counts were correct.        Dictated By:  Isiah Galo MD        D:  10/19/2022 15:43:16  T:  10/20/2022 06:25:43  TVV/modl  Job:  [id number]      cc:  Isiah Galo MD

## 2022-10-27 ENCOUNTER — OFFICE VISIT (OUTPATIENT)
Dept: FAMILY MEDICINE | Facility: CLINIC | Age: 77
End: 2022-10-27
Payer: MEDICARE

## 2022-10-27 VITALS
HEART RATE: 63 BPM | OXYGEN SATURATION: 97 % | WEIGHT: 131.63 LBS | BODY MASS INDEX: 24.22 KG/M2 | SYSTOLIC BLOOD PRESSURE: 112 MMHG | HEIGHT: 62 IN | DIASTOLIC BLOOD PRESSURE: 66 MMHG

## 2022-10-27 DIAGNOSIS — R41.89 COGNITIVE IMPAIRMENT: Primary | ICD-10-CM

## 2022-10-27 DIAGNOSIS — G47.00 INSOMNIA, UNSPECIFIED TYPE: ICD-10-CM

## 2022-10-27 DIAGNOSIS — F51.01 PRIMARY INSOMNIA: ICD-10-CM

## 2022-10-27 DIAGNOSIS — E78.2 MIXED HYPERLIPIDEMIA: ICD-10-CM

## 2022-10-27 DIAGNOSIS — E61.1 IRON DEFICIENCY: ICD-10-CM

## 2022-10-27 PROCEDURE — 3074F SYST BP LT 130 MM HG: CPT | Mod: CPTII,S$GLB,, | Performed by: INTERNAL MEDICINE

## 2022-10-27 PROCEDURE — 3074F PR MOST RECENT SYSTOLIC BLOOD PRESSURE < 130 MM HG: ICD-10-PCS | Mod: CPTII,S$GLB,, | Performed by: INTERNAL MEDICINE

## 2022-10-27 PROCEDURE — 99214 PR OFFICE/OUTPT VISIT, EST, LEVL IV, 30-39 MIN: ICD-10-PCS | Mod: S$GLB,,, | Performed by: INTERNAL MEDICINE

## 2022-10-27 PROCEDURE — 1125F AMNT PAIN NOTED PAIN PRSNT: CPT | Mod: CPTII,S$GLB,, | Performed by: INTERNAL MEDICINE

## 2022-10-27 PROCEDURE — 1125F PR PAIN SEVERITY QUANTIFIED, PAIN PRESENT: ICD-10-PCS | Mod: CPTII,S$GLB,, | Performed by: INTERNAL MEDICINE

## 2022-10-27 PROCEDURE — 3288F PR FALLS RISK ASSESSMENT DOCUMENTED: ICD-10-PCS | Mod: CPTII,S$GLB,, | Performed by: INTERNAL MEDICINE

## 2022-10-27 PROCEDURE — 1101F PT FALLS ASSESS-DOCD LE1/YR: CPT | Mod: CPTII,S$GLB,, | Performed by: INTERNAL MEDICINE

## 2022-10-27 PROCEDURE — 1160F RVW MEDS BY RX/DR IN RCRD: CPT | Mod: CPTII,S$GLB,, | Performed by: INTERNAL MEDICINE

## 2022-10-27 PROCEDURE — 3078F DIAST BP <80 MM HG: CPT | Mod: CPTII,S$GLB,, | Performed by: INTERNAL MEDICINE

## 2022-10-27 PROCEDURE — 1159F PR MEDICATION LIST DOCUMENTED IN MEDICAL RECORD: ICD-10-PCS | Mod: CPTII,S$GLB,, | Performed by: INTERNAL MEDICINE

## 2022-10-27 PROCEDURE — 1159F MED LIST DOCD IN RCRD: CPT | Mod: CPTII,S$GLB,, | Performed by: INTERNAL MEDICINE

## 2022-10-27 PROCEDURE — 1160F PR REVIEW ALL MEDS BY PRESCRIBER/CLIN PHARMACIST DOCUMENTED: ICD-10-PCS | Mod: CPTII,S$GLB,, | Performed by: INTERNAL MEDICINE

## 2022-10-27 PROCEDURE — 1101F PR PT FALLS ASSESS DOC 0-1 FALLS W/OUT INJ PAST YR: ICD-10-PCS | Mod: CPTII,S$GLB,, | Performed by: INTERNAL MEDICINE

## 2022-10-27 PROCEDURE — 99214 OFFICE O/P EST MOD 30 MIN: CPT | Mod: S$GLB,,, | Performed by: INTERNAL MEDICINE

## 2022-10-27 PROCEDURE — 99999 PR PBB SHADOW E&M-EST. PATIENT-LVL IV: ICD-10-PCS | Mod: PBBFAC,,, | Performed by: INTERNAL MEDICINE

## 2022-10-27 PROCEDURE — 3078F PR MOST RECENT DIASTOLIC BLOOD PRESSURE < 80 MM HG: ICD-10-PCS | Mod: CPTII,S$GLB,, | Performed by: INTERNAL MEDICINE

## 2022-10-27 PROCEDURE — 3288F FALL RISK ASSESSMENT DOCD: CPT | Mod: CPTII,S$GLB,, | Performed by: INTERNAL MEDICINE

## 2022-10-27 PROCEDURE — 99999 PR PBB SHADOW E&M-EST. PATIENT-LVL IV: CPT | Mod: PBBFAC,,, | Performed by: INTERNAL MEDICINE

## 2022-10-27 RX ORDER — MEMANTINE HYDROCHLORIDE 5 MG/1
TABLET ORAL
Qty: 53 TABLET | Refills: 0 | Status: SHIPPED | OUTPATIENT
Start: 2022-10-27 | End: 2022-12-12

## 2022-10-27 RX ORDER — TRAZODONE HYDROCHLORIDE 100 MG/1
100 TABLET ORAL NIGHTLY
Qty: 30 TABLET | Refills: 2 | Status: SHIPPED | OUTPATIENT
Start: 2022-10-27 | End: 2022-11-28

## 2022-10-27 RX ORDER — ATORVASTATIN CALCIUM 10 MG/1
10 TABLET, FILM COATED ORAL DAILY
Qty: 90 TABLET | Refills: 3 | Status: SHIPPED | OUTPATIENT
Start: 2022-10-27 | End: 2023-11-03 | Stop reason: SDUPTHER

## 2022-10-27 NOTE — PROGRESS NOTES
Patient ID: Fabby Guerrero is a 77 y.o. female.    Chief Complaint: Follow-up (Results/Cramps in legs at night/Information on medication  donepezil and trazodone/Urination at night frequently /)      Chronic Medical Problems   Mixed hyperlipidemia, rheumatoid arthritis, immunosuppression due to methotrexate, hypothyroidism     Assessment HPI / Notes Impression / Plan    cognitive impairment MRI shows mild cerebral volume loss and likely microvascular ischemic change. The donepezil causes far too much urinary frequency for her. Stop donepezil and start Namenda.   Hyperlipidemia  .  Recall micro ischemic changes on MRI Start atorvastatin 10 mg two weeks after starting Namenda.   Insomnia Still needs improvement  Continue melatonin.  Increased trazodone to 100 mg.  She can try taking half a tablet at bedtime and another half if she wakes up in the middle of the night.  Alternatively she can take 100 mg at bedtime and see if she wakes up later on.     Dx and Orders   Fabby was seen today for follow-up.    Diagnoses and all orders for this visit:    Cognitive impairment  -     memantine (NAMENDA) 5 MG Tab; Take 1 tablet (5 mg total) by mouth once daily for 7 days, THEN 2 tablets (10 mg total) once daily for 23 days.    Primary insomnia    Mixed hyperlipidemia  -     atorvastatin (LIPITOR) 10 MG tablet; Take 1 tablet (10 mg total) by mouth once daily.    Insomnia, unspecified type  -     traZODone (DESYREL) 100 MG tablet; Take 1 tablet (100 mg total) by mouth every evening.        Review of Systems   Constitutional:  Negative for fever.   Respiratory:  Negative for shortness of breath.    Cardiovascular:  Negative for chest pain.   Gastrointestinal:  Negative for abdominal pain.   Genitourinary:  Positive for frequency.   Psychiatric/Behavioral:  Positive for sleep disturbance.    Vitals:    10/27/22 1315   BP: 112/66   Pulse: 63     Wt Readings from Last 3 Encounters:   10/27/22 1315 59.7 kg (131 lb 9.8 oz)    09/30/22 1332 60.1 kg (132 lb 7.9 oz)   09/12/22 0949 60.1 kg (132 lb 7.9 oz)      Body mass index is 24.07 kg/m².   Physical Exam  Cardiovascular:      Rate and Rhythm: Normal rate and regular rhythm.      Heart sounds: No murmur heard.    No gallop.   Pulmonary:      Breath sounds: Normal breath sounds. No wheezing or rhonchi.   Abdominal:      Palpations: Abdomen is soft.      Tenderness: There is no abdominal tenderness.   Musculoskeletal:         General: Normal range of motion.      Cervical back: Neck supple.   Skin:     General: Skin is warm.      Findings: No rash.   Neurological:      Mental Status: She is alert.   Psychiatric:         Behavior: Behavior normal.           Hyperlipidemia Medications               fish oil-omega-3 fatty acids 300-1,000 mg capsule Take 2 g by mouth once daily.           Medication List with Changes/Refills   New Medications    ATORVASTATIN (LIPITOR) 10 MG TABLET    Take 1 tablet (10 mg total) by mouth once daily.    MEMANTINE (NAMENDA) 5 MG TAB    Take 1 tablet (5 mg total) by mouth once daily for 7 days, THEN 2 tablets (10 mg total) once daily for 23 days.   Current Medications    ACETAMINOPHEN (TYLENOL) 650 MG TBSR    Take 650 mg by mouth every 8 (eight) hours as needed.     B COMPLEX VITAMINS TABLET    Take 1 tablet by mouth once daily. B100    CELECOXIB (CELEBREX) 100 MG CAPSULE    Take 1 capsule (100 mg total) by mouth 2 (two) times daily.    CYANOCOBALAMIN (VITAMIN B-12) 100 MCG TABLET    Take 100 mcg by mouth once daily.    DONEPEZIL (ARICEPT) 5 MG TABLET    Take 1 tablet (5 mg total) by mouth every evening.    FISH OIL-OMEGA-3 FATTY ACIDS 300-1,000 MG CAPSULE    Take 2 g by mouth once daily.    FOLIC ACID (FOLVITE) 1 MG TABLET    Take 2 tablets by mouth once daily    GINKGO BILOBA 60 MG CAP    Take by mouth.    GLUCOSAMINE-CHONDROITIN 500-400 MG TABLET    Take 2 tablets by mouth once daily.     HYDROCODONE-ACETAMINOPHEN (NORCO)  MG PER TABLET    Take 1 tablet  by mouth every 24 hours as needed for Pain.    LEVOTHYROXINE (SYNTHROID) 88 MCG TABLET    Take 1 tablet (88 mcg total) by mouth every morning.    MELATONIN 5 MG CAP    Take by mouth as needed.     METHOTREXATE 2.5 MG TAB    TAKE 4 TABLETS (10MG) EVERY 7 DAYS    MULTIVITAMIN (THERAGRAN) PER TABLET    Take 1 tablet by mouth once daily.    TURMERIC (CURCUMIN MISC)    750 mg by Misc.(Non-Drug; Combo Route) route once daily.     UNABLE TO FIND    medication name: CBD gummies pt takes 1/2 gummy every day    VITAMIN A-C-D3-COD LIVER OIL ORAL    Take by mouth once daily.     VITAMIN B COMPLEX-FOLIC ACID 0.4 MG TAB    Take by mouth once daily.    Changed and/or Refilled Medications    Modified Medication Previous Medication    TRAZODONE (DESYREL) 100 MG TABLET traZODone (DESYREL) 50 MG tablet       Take 1 tablet (100 mg total) by mouth every evening.    Take 1 tablet (50 mg total) by mouth every evening.       I personally reviewed past medical, family and social history.

## 2022-10-28 LAB
CK SERPL-CCNC: 81 U/L (ref 32–182)
FERRITIN SERPL-MCNC: 131 NG/ML (ref 15–150)
IRON SATN MFR SERPL: 17 % (ref 15–55)
IRON SERPL-MCNC: 47 UG/DL (ref 27–139)
TIBC SERPL-MCNC: 272 UG/DL (ref 250–450)
UIBC SERPL-MCNC: 225 UG/DL (ref 118–369)

## 2022-11-02 ENCOUNTER — TELEPHONE (OUTPATIENT)
Dept: FAMILY MEDICINE | Facility: CLINIC | Age: 77
End: 2022-11-02

## 2022-11-02 NOTE — TELEPHONE ENCOUNTER
----- Message from Nadeen Louie sent at 11/2/2022 11:17 AM CDT -----  Contact: self  Type: Needs Medical Advice    Who Called:  patient  Symptoms (please be specific):  test results    Best Call Back Number: [phone]   Additional Information: The pt stated that she would like to speak with someone in the office regarding her test results. The pt stated that someone from the office was suppose to call her 10 mins ago.  Please call pt back ASAP. Thanks.

## 2022-11-02 NOTE — TELEPHONE ENCOUNTER
----- Message from Terry Gordon DO sent at 10/28/2022 10:09 AM CDT -----  Labs look great.  Iron studies now completely normal

## 2022-11-07 ENCOUNTER — TELEPHONE (OUTPATIENT)
Dept: FAMILY MEDICINE | Facility: CLINIC | Age: 77
End: 2022-11-07

## 2022-11-07 DIAGNOSIS — Z20.828 EXPOSURE TO INFLUENZA: Primary | ICD-10-CM

## 2022-11-07 RX ORDER — OSELTAMIVIR PHOSPHATE 75 MG/1
75 CAPSULE ORAL DAILY
Qty: 10 CAPSULE | Refills: 0 | Status: SHIPPED | OUTPATIENT
Start: 2022-11-07 | End: 2022-11-17

## 2022-11-07 NOTE — TELEPHONE ENCOUNTER
Patient  was in clinic today seeing Dr. Taylor,     He is positive for FLU A,     Patient would like for you to send in a preventative to help her from catching the flu.     Please advise.     She did ask for the nurse to call her if she can.

## 2022-11-07 NOTE — TELEPHONE ENCOUNTER
Prescription sent.  Let her know that I hope Waldo feels better soon and that she does not get the flu.

## 2022-11-28 ENCOUNTER — OFFICE VISIT (OUTPATIENT)
Dept: FAMILY MEDICINE | Facility: CLINIC | Age: 77
End: 2022-11-28
Payer: MEDICARE

## 2022-11-28 VITALS
WEIGHT: 131.63 LBS | OXYGEN SATURATION: 95 % | BODY MASS INDEX: 24.22 KG/M2 | DIASTOLIC BLOOD PRESSURE: 62 MMHG | HEART RATE: 67 BPM | HEIGHT: 62 IN | SYSTOLIC BLOOD PRESSURE: 126 MMHG

## 2022-11-28 DIAGNOSIS — R41.89 COGNITIVE IMPAIRMENT: ICD-10-CM

## 2022-11-28 DIAGNOSIS — E78.2 MIXED HYPERLIPIDEMIA: Primary | ICD-10-CM

## 2022-11-28 PROCEDURE — 3288F PR FALLS RISK ASSESSMENT DOCUMENTED: ICD-10-PCS | Mod: CPTII,S$GLB,, | Performed by: INTERNAL MEDICINE

## 2022-11-28 PROCEDURE — 1126F AMNT PAIN NOTED NONE PRSNT: CPT | Mod: CPTII,S$GLB,, | Performed by: INTERNAL MEDICINE

## 2022-11-28 PROCEDURE — 1160F PR REVIEW ALL MEDS BY PRESCRIBER/CLIN PHARMACIST DOCUMENTED: ICD-10-PCS | Mod: CPTII,S$GLB,, | Performed by: INTERNAL MEDICINE

## 2022-11-28 PROCEDURE — 3288F FALL RISK ASSESSMENT DOCD: CPT | Mod: CPTII,S$GLB,, | Performed by: INTERNAL MEDICINE

## 2022-11-28 PROCEDURE — 1159F MED LIST DOCD IN RCRD: CPT | Mod: CPTII,S$GLB,, | Performed by: INTERNAL MEDICINE

## 2022-11-28 PROCEDURE — 3074F PR MOST RECENT SYSTOLIC BLOOD PRESSURE < 130 MM HG: ICD-10-PCS | Mod: CPTII,S$GLB,, | Performed by: INTERNAL MEDICINE

## 2022-11-28 PROCEDURE — 1126F PR PAIN SEVERITY QUANTIFIED, NO PAIN PRESENT: ICD-10-PCS | Mod: CPTII,S$GLB,, | Performed by: INTERNAL MEDICINE

## 2022-11-28 PROCEDURE — 3078F DIAST BP <80 MM HG: CPT | Mod: CPTII,S$GLB,, | Performed by: INTERNAL MEDICINE

## 2022-11-28 PROCEDURE — 99999 PR PBB SHADOW E&M-EST. PATIENT-LVL IV: ICD-10-PCS | Mod: PBBFAC,,, | Performed by: INTERNAL MEDICINE

## 2022-11-28 PROCEDURE — 3074F SYST BP LT 130 MM HG: CPT | Mod: CPTII,S$GLB,, | Performed by: INTERNAL MEDICINE

## 2022-11-28 PROCEDURE — 99999 PR PBB SHADOW E&M-EST. PATIENT-LVL IV: CPT | Mod: PBBFAC,,, | Performed by: INTERNAL MEDICINE

## 2022-11-28 PROCEDURE — 1159F PR MEDICATION LIST DOCUMENTED IN MEDICAL RECORD: ICD-10-PCS | Mod: CPTII,S$GLB,, | Performed by: INTERNAL MEDICINE

## 2022-11-28 PROCEDURE — 1101F PT FALLS ASSESS-DOCD LE1/YR: CPT | Mod: CPTII,S$GLB,, | Performed by: INTERNAL MEDICINE

## 2022-11-28 PROCEDURE — 99214 PR OFFICE/OUTPT VISIT, EST, LEVL IV, 30-39 MIN: ICD-10-PCS | Mod: S$GLB,,, | Performed by: INTERNAL MEDICINE

## 2022-11-28 PROCEDURE — 1160F RVW MEDS BY RX/DR IN RCRD: CPT | Mod: CPTII,S$GLB,, | Performed by: INTERNAL MEDICINE

## 2022-11-28 PROCEDURE — 1101F PR PT FALLS ASSESS DOC 0-1 FALLS W/OUT INJ PAST YR: ICD-10-PCS | Mod: CPTII,S$GLB,, | Performed by: INTERNAL MEDICINE

## 2022-11-28 PROCEDURE — 3078F PR MOST RECENT DIASTOLIC BLOOD PRESSURE < 80 MM HG: ICD-10-PCS | Mod: CPTII,S$GLB,, | Performed by: INTERNAL MEDICINE

## 2022-11-28 PROCEDURE — 99214 OFFICE O/P EST MOD 30 MIN: CPT | Mod: S$GLB,,, | Performed by: INTERNAL MEDICINE

## 2022-11-28 NOTE — PROGRESS NOTES
Patient ID: Fabby Guerrero is a 77 y.o. female.    Chief Complaint: medication review and Adenopathy      HPI     Started namenda and atorvastatin. Increased trazodone at last visit. Did not increase namenda as recommended in the instructions. Worried about trazodone causing memory issue. She stopped it. She is taking melatonin and CBD. Sleep is stable so she will continue this. She has not started lipitor. Having slight sore throat and feeling like something is in the back of her throat.  States she gets this periodically.  Has taken amoxicillin for this which seems to make it better.     Assessment and plan     Stop trazodone.  Continue melatonin  Continue Namenda   Discussed symptoms not sufficient enough to check for strep throat and amoxicillin is not indicated.  She will start atorvastatin.  Check labs in 1 month with follow-up    Fabby was seen today for medication review and adenopathy.    Diagnoses and all orders for this visit:    Mixed hyperlipidemia    Cognitive impairment      Review of Systems   Constitutional:  Negative for fever.   HENT:  Positive for sore throat.    Respiratory:  Negative for shortness of breath.    Cardiovascular:  Negative for chest pain.   Gastrointestinal:  Negative for abdominal pain.     Physical Exam  Cardiovascular:      Rate and Rhythm: Normal rate and regular rhythm.      Heart sounds: No murmur heard.    No gallop.   Pulmonary:      Breath sounds: Normal breath sounds. No wheezing or rhonchi.   Abdominal:      Palpations: Abdomen is soft.      Tenderness: There is no abdominal tenderness.   Musculoskeletal:         General: Normal range of motion.      Cervical back: Neck supple.   Skin:     General: Skin is warm.      Findings: No rash.   Neurological:      Mental Status: She is alert.   Psychiatric:         Behavior: Behavior normal.       Vitals:    11/28/22 0955   BP: 126/62   Pulse: 67     Wt Readings from Last 3 Encounters:   11/28/22 0955 59.7 kg (131 lb 9.8  oz)   10/27/22 1315 59.7 kg (131 lb 9.8 oz)   09/30/22 1332 60.1 kg (132 lb 7.9 oz)      Body mass index is 24.07 kg/m².         Hyperlipidemia Medications               fish oil-omega-3 fatty acids 300-1,000 mg capsule Take 2 g by mouth once daily.    atorvastatin (LIPITOR) 10 MG tablet Take 1 tablet (10 mg total) by mouth once daily.           Medication List with Changes/Refills   Current Medications    ACETAMINOPHEN (TYLENOL) 650 MG TBSR    Take 650 mg by mouth every 8 (eight) hours as needed.     ATORVASTATIN (LIPITOR) 10 MG TABLET    Take 1 tablet (10 mg total) by mouth once daily.    B COMPLEX VITAMINS TABLET    Take 1 tablet by mouth once daily. B100    CELECOXIB (CELEBREX) 100 MG CAPSULE    Take 1 capsule (100 mg total) by mouth 2 (two) times daily.    CYANOCOBALAMIN (VITAMIN B-12) 100 MCG TABLET    Take 100 mcg by mouth once daily.    FISH OIL-OMEGA-3 FATTY ACIDS 300-1,000 MG CAPSULE    Take 2 g by mouth once daily.    FOLIC ACID (FOLVITE) 1 MG TABLET    Take 2 tablets by mouth once daily    GINKGO BILOBA 60 MG CAP    Take by mouth.    GLUCOSAMINE-CHONDROITIN 500-400 MG TABLET    Take 2 tablets by mouth once daily.     HYDROCODONE-ACETAMINOPHEN (NORCO)  MG PER TABLET    Take 1 tablet by mouth every 24 hours as needed for Pain.    LEVOTHYROXINE (SYNTHROID) 88 MCG TABLET    Take 1 tablet (88 mcg total) by mouth every morning.    MELATONIN 5 MG CAP    Take by mouth as needed.     MEMANTINE (NAMENDA) 5 MG TAB    Take 1 tablet (5 mg total) by mouth once daily for 7 days, THEN 2 tablets (10 mg total) once daily for 23 days.    METHOTREXATE 2.5 MG TAB    TAKE 4 TABLETS (10MG) EVERY 7 DAYS    MULTIVITAMIN (THERAGRAN) PER TABLET    Take 1 tablet by mouth once daily.    TURMERIC (CURCUMIN MISC)    750 mg by Misc.(Non-Drug; Combo Route) route once daily.     UNABLE TO FIND    medication name: CBD gummies pt takes 1/2 gummy every day    VITAMIN A-C-D3-COD LIVER OIL ORAL    Take by mouth once daily.     VITAMIN B  COMPLEX-FOLIC ACID 0.4 MG TAB    Take by mouth once daily.    Discontinued Medications    TRAZODONE (DESYREL) 100 MG TABLET    Take 1 tablet (100 mg total) by mouth every evening.       I personally reviewed past medical, family and social history.

## 2022-12-30 ENCOUNTER — TELEPHONE (OUTPATIENT)
Dept: RHEUMATOLOGY | Facility: CLINIC | Age: 77
End: 2022-12-30

## 2022-12-30 NOTE — TELEPHONE ENCOUNTER
Called patient to rescheduled no answer  ----- Message from Pacheco Castano sent at 12/30/2022  1:35 PM CST -----  Type:  Sooner Appointment Request    Caller is requesting a sooner appointment.  Caller declined first available appointment listed below.  Caller will not accept being placed on the waitlist and is requesting a message be sent to doctor.    Name of Caller:  pt  When is the first available appointment?  1/3 had to cancel said she will not be able to make it--  Symptoms:  4 month f/u  Best Call Back Number:  [phone] (home)     Additional Information:  please call and advise--thank you

## 2023-01-04 LAB
ALBUMIN SERPL-MCNC: 4.5 G/DL (ref 3.7–4.7)
ALP SERPL-CCNC: 80 IU/L (ref 44–121)
ALT SERPL-CCNC: 18 IU/L (ref 0–32)
AST SERPL-CCNC: 23 IU/L (ref 0–40)
BILIRUB DIRECT SERPL-MCNC: <0.1 MG/DL (ref 0–0.4)
BILIRUB SERPL-MCNC: 0.4 MG/DL (ref 0–1.2)
CHOLEST SERPL-MCNC: 296 MG/DL (ref 100–199)
CK SERPL-CCNC: 61 U/L (ref 32–182)
HDLC SERPL-MCNC: 58 MG/DL
LABORATORY COMMENT REPORT: ABNORMAL
LDLC SERPL CALC-MCNC: 198 MG/DL (ref 0–99)
PROT SERPL-MCNC: 6.9 G/DL (ref 6–8.5)
TRIGL SERPL-MCNC: 212 MG/DL (ref 0–149)
VLDLC SERPL CALC-MCNC: 40 MG/DL (ref 5–40)

## 2023-01-08 ENCOUNTER — TELEPHONE (OUTPATIENT)
Dept: FAMILY MEDICINE | Facility: CLINIC | Age: 78
End: 2023-01-08
Payer: MEDICARE

## 2023-01-08 NOTE — TELEPHONE ENCOUNTER
----- Message from Terry Gordon DO sent at 1/4/2023  6:58 PM CST -----  LDL has actually increased. Did you start atorvastatin?

## 2023-01-09 ENCOUNTER — TELEPHONE (OUTPATIENT)
Dept: RHEUMATOLOGY | Facility: CLINIC | Age: 78
End: 2023-01-09
Payer: MEDICARE

## 2023-01-09 NOTE — TELEPHONE ENCOUNTER
----- Message from Yamel Waldrop sent at 1/9/2023  8:16 AM CST -----  Contact: self  Type:  Patient Returning Call    Who Called:self  Who Left Message for Patient: Lindsay Dodge  Does the patient know what this is regarding?:reschedule an appt  Would the patient rather a call back or a response via [facility]? call  Best Call Back Number:[phone] (home)     Additional Information: pt wants to reschedule an appt because they canceled the one she had. Pat wants to take the one on Friday the 20th. Please advise and thank you.    Patient confirms 1 pm for the 20th of January. CG

## 2023-01-20 ENCOUNTER — OFFICE VISIT (OUTPATIENT)
Dept: RHEUMATOLOGY | Facility: CLINIC | Age: 78
End: 2023-01-20
Payer: MEDICARE

## 2023-01-20 VITALS
BODY MASS INDEX: 23.83 KG/M2 | SYSTOLIC BLOOD PRESSURE: 106 MMHG | WEIGHT: 129.5 LBS | HEART RATE: 88 BPM | HEIGHT: 62 IN | DIASTOLIC BLOOD PRESSURE: 63 MMHG

## 2023-01-20 DIAGNOSIS — D84.9 IMMUNOSUPPRESSION: Primary | ICD-10-CM

## 2023-01-20 DIAGNOSIS — M05.742 RHEUMATOID ARTHRITIS INVOLVING BOTH HANDS WITH POSITIVE RHEUMATOID FACTOR: ICD-10-CM

## 2023-01-20 DIAGNOSIS — M47.812 SPONDYLOSIS OF CERVICAL REGION WITHOUT MYELOPATHY OR RADICULOPATHY: ICD-10-CM

## 2023-01-20 DIAGNOSIS — M47.25 OSTEOARTHRITIS OF SPINE WITH RADICULOPATHY, THORACOLUMBAR REGION: ICD-10-CM

## 2023-01-20 DIAGNOSIS — M05.741 RHEUMATOID ARTHRITIS INVOLVING BOTH HANDS WITH POSITIVE RHEUMATOID FACTOR: ICD-10-CM

## 2023-01-20 DIAGNOSIS — M54.10 RADICULOPATHY, UNSPECIFIED SPINAL REGION: ICD-10-CM

## 2023-01-20 DIAGNOSIS — M06.041 RHEUMATOID ARTHRITIS INVOLVING BOTH HANDS WITH NEGATIVE RHEUMATOID FACTOR: ICD-10-CM

## 2023-01-20 DIAGNOSIS — M06.042 RHEUMATOID ARTHRITIS INVOLVING BOTH HANDS WITH NEGATIVE RHEUMATOID FACTOR: ICD-10-CM

## 2023-01-20 PROCEDURE — 1101F PT FALLS ASSESS-DOCD LE1/YR: CPT | Mod: HCNC,CPTII,S$GLB, | Performed by: INTERNAL MEDICINE

## 2023-01-20 PROCEDURE — 1126F PR PAIN SEVERITY QUANTIFIED, NO PAIN PRESENT: ICD-10-PCS | Mod: HCNC,CPTII,S$GLB, | Performed by: INTERNAL MEDICINE

## 2023-01-20 PROCEDURE — 99999 PR PBB SHADOW E&M-EST. PATIENT-LVL III: ICD-10-PCS | Mod: PBBFAC,HCNC,, | Performed by: INTERNAL MEDICINE

## 2023-01-20 PROCEDURE — 3074F SYST BP LT 130 MM HG: CPT | Mod: HCNC,CPTII,S$GLB, | Performed by: INTERNAL MEDICINE

## 2023-01-20 PROCEDURE — 3078F PR MOST RECENT DIASTOLIC BLOOD PRESSURE < 80 MM HG: ICD-10-PCS | Mod: HCNC,CPTII,S$GLB, | Performed by: INTERNAL MEDICINE

## 2023-01-20 PROCEDURE — 99215 PR OFFICE/OUTPT VISIT, EST, LEVL V, 40-54 MIN: ICD-10-PCS | Mod: HCNC,S$GLB,, | Performed by: INTERNAL MEDICINE

## 2023-01-20 PROCEDURE — 3078F DIAST BP <80 MM HG: CPT | Mod: HCNC,CPTII,S$GLB, | Performed by: INTERNAL MEDICINE

## 2023-01-20 PROCEDURE — 99215 OFFICE O/P EST HI 40 MIN: CPT | Mod: HCNC,S$GLB,, | Performed by: INTERNAL MEDICINE

## 2023-01-20 PROCEDURE — 3074F PR MOST RECENT SYSTOLIC BLOOD PRESSURE < 130 MM HG: ICD-10-PCS | Mod: HCNC,CPTII,S$GLB, | Performed by: INTERNAL MEDICINE

## 2023-01-20 PROCEDURE — 99999 PR PBB SHADOW E&M-EST. PATIENT-LVL III: CPT | Mod: PBBFAC,HCNC,, | Performed by: INTERNAL MEDICINE

## 2023-01-20 PROCEDURE — 1159F PR MEDICATION LIST DOCUMENTED IN MEDICAL RECORD: ICD-10-PCS | Mod: HCNC,CPTII,S$GLB, | Performed by: INTERNAL MEDICINE

## 2023-01-20 PROCEDURE — 1126F AMNT PAIN NOTED NONE PRSNT: CPT | Mod: HCNC,CPTII,S$GLB, | Performed by: INTERNAL MEDICINE

## 2023-01-20 PROCEDURE — 1159F MED LIST DOCD IN RCRD: CPT | Mod: HCNC,CPTII,S$GLB, | Performed by: INTERNAL MEDICINE

## 2023-01-20 PROCEDURE — 1101F PR PT FALLS ASSESS DOC 0-1 FALLS W/OUT INJ PAST YR: ICD-10-PCS | Mod: HCNC,CPTII,S$GLB, | Performed by: INTERNAL MEDICINE

## 2023-01-20 PROCEDURE — 3288F FALL RISK ASSESSMENT DOCD: CPT | Mod: HCNC,CPTII,S$GLB, | Performed by: INTERNAL MEDICINE

## 2023-01-20 PROCEDURE — 3288F PR FALLS RISK ASSESSMENT DOCUMENTED: ICD-10-PCS | Mod: HCNC,CPTII,S$GLB, | Performed by: INTERNAL MEDICINE

## 2023-01-20 RX ORDER — HYDROCODONE BITARTRATE AND ACETAMINOPHEN 10; 325 MG/1; MG/1
1 TABLET ORAL
Qty: 30 TABLET | Refills: 0 | Status: SHIPPED | OUTPATIENT
Start: 2023-01-20 | End: 2023-03-20 | Stop reason: SDUPTHER

## 2023-01-20 RX ORDER — CELECOXIB 100 MG/1
100 CAPSULE ORAL 2 TIMES DAILY
Qty: 180 CAPSULE | Refills: 2 | Status: SHIPPED | OUTPATIENT
Start: 2023-01-20 | End: 2023-03-20 | Stop reason: SDUPTHER

## 2023-01-20 RX ORDER — FOLIC ACID 1 MG/1
TABLET ORAL
Qty: 180 TABLET | Refills: 0 | Status: SHIPPED | OUTPATIENT
Start: 2023-01-20 | End: 2023-06-06 | Stop reason: SDUPTHER

## 2023-01-20 RX ORDER — METHOTREXATE 2.5 MG/1
TABLET ORAL
Qty: 48 TABLET | Refills: 3 | Status: SHIPPED | OUTPATIENT
Start: 2023-01-20 | End: 2023-06-06 | Stop reason: SDUPTHER

## 2023-01-20 ASSESSMENT — ROUTINE ASSESSMENT OF PATIENT INDEX DATA (RAPID3)
AM STIFFNESS SCORE: 1, YES
TOTAL RAPID3 SCORE: 1.78
WHEN YOU AWAKENED IN THE MORNING OVER THE LAST WEEK, PLEASE INDICATE THE AMOUNT OF TIME IT TAKES UNTIL YOU ARE AS LIMBER AS YOU WILL BE FOR THE DAY: 30
MDHAQ FUNCTION SCORE: 0.1
PAIN SCORE: 3
PATIENT GLOBAL ASSESSMENT SCORE: 2
FATIGUE SCORE: 1

## 2023-01-20 NOTE — PROGRESS NOTES
"Subjective:          Chief Complaint: Fabby Guerrero is a 77 y.o. female who had no chief complaint listed for this encounter.    HPI:      Rheumatoid Arthritis  Patient is an 77 y.o. female here for follow up seropostive Rheumatoid Arthritis, secondary Sjogrens, and OA   Serologies are: RF+, CCP HIGH titer +.      Symptoms have been present for several years.   Symptoms include eye symptoms, joint pain, joint swelling, sleep difficulties and weakness of hands and are of moderate severity. Morning stiffness: 25 minute Patient denies joint pain, joint swelling and morning stiffness. Symptoms are made worse by: movement, overuse and resting.  Symptoms are helped by arthritis medications.  Associated symptoms include arthralgia and fatigue and secondary Sjogrens. Previously on Restasis, now systane/Refresh.   Diclofenac for 1 year with decreased urination and stable creatinine; now off.     Patient shoulders are better, cramping at the hands, axial skeleton pain all seem to be doing very well.   Using Hydrocodone rarely.     Seen w/ Pain Management for lumbar radiculopathy.  She received an L4-5 epidural steroid injections 12/09/2021 with 50% relief she has some low back pain and pain in her calves and spasms at night sometimes during the day.  She did note the numbness and tingling has improved.      Seen with Ortho, Dr. Agudelo/Romeo,  received Euflexxa in past.  5/2021 call with flare of left knee, depomedrol 40IM and repeat Euflexxa.    Bilateral this did help due to have this done again. C/o knee pain chronic and daily. No swelling, no instability. - knees stable and manageable.     She continues with the right hand "cramp" that is not a full triggering happens intermittently but no synovitis.   She did have elevated iron levels working with heme, appears some labs were attached to me but I will have her f/u with Dr. Dupree.     Current medications  Celebrex 100mg BID  Tylenol 650 2-3 daily  Methotrexate - " Problems: none-- 4 tabs weekly doing ok.  folic acid 2mg- she dropped down to 1mg daily but noted return of oral ulcers.   Prednisone 5mg PRN flares has not taken any prednisone in 1 year.   Hydrocodone rarely for pain- I filled #30 ok for 1 tab per day.       No hx of HCQ-  No personal hx of malignancy.    C/o feet /toes painful more but improved with recent celebrex.     Labs dated 12/2019 at [facility] for methotrexate monitoring show everything is within normal limits.    Interval events:   MRI of the shoulder some musculature is of the rotator cuff is grossly intact mild tendinopathy subacromial and glenohumeral narrowing but no active bursitis was noticed she has some hypertrophic changes at the AC joint    MRI of her cervical  spine however showed multilevel foraminal stenosis most prominent at C5-6 moderate to severe left > than right.  She also had a few disc osteophyte complexes which could even  a facet mediated pain causing muscle spasms radiating along the shoulder girdle in the upper trapezius region.     Dr Edmonds from pain management with some injections    Component      Latest Ref Rng & Units 4/17/2018 5/26/2015   Anti Sm/RNP Antibody      0.00 - 19.99 EU  0.35   Anti-Sm/RNP Interpretation      Negative  Negative   B27 Testing Date        06/16/2015 11:48 AM   HLA B27 Result        Negative   ds DNA Ab      Negative 1:10  Negative 1:10   CHON Screen      Negative <1:160  Negative <1:160   Rheumatoid Factor      <14 IU/mL 128 (H) 34.0 (H)   CCP Antibodies      <5.0 U/mL  6.1 (H)   Sed Rate      < OR = 30 mm/h 11    Cyclic Citrullinated Peptide (CCP) Ab (IgG)      UNITS >250 (H)    CRP      <8.0 mg/L 1.4      REVIEW OF SYSTEMS:    Review of Systems   Constitutional:  Positive for malaise/fatigue. Negative for fever and weight loss.   HENT:  Negative for sore throat.    Eyes:  Positive for discharge and redness. Negative for double vision and photophobia.   Respiratory:  Negative for cough, shortness of  breath and wheezing.    Cardiovascular:  Negative for chest pain, palpitations and orthopnea.   Gastrointestinal:  Negative for abdominal pain, constipation and diarrhea.   Genitourinary:  Negative for dysuria, hematuria and urgency.   Musculoskeletal:  Positive for joint pain. Negative for back pain and myalgias.   Skin:  Negative for rash.   Neurological:  Negative for dizziness, tingling, focal weakness and headaches.   Endo/Heme/Allergies:  Does not bruise/bleed easily.   Psychiatric/Behavioral:  Negative for depression, hallucinations and suicidal ideas.        Objective:        Past Medical History:   Diagnosis Date    Chronic neck pain     Chronic shoulder pain     GERD (gastroesophageal reflux disease)     Rheumatoid arthritis      Family History   Problem Relation Age of Onset    Cancer Mother         lung    Cancer Father         colon and lung    Cancer Daughter         melanoma     Social History     Tobacco Use    Smoking status: Former     Packs/day: 1.00     Years: 14.00     Pack years: 14.00     Types: Cigarettes     Quit date: 1976     Years since quittin.8    Smokeless tobacco: Never   Substance Use Topics    Alcohol use: Yes     Alcohol/week: 3.0 standard drinks     Types: 3 Glasses of wine per week    Drug use: No         Current Outpatient Medications on File Prior to Visit   Medication Sig Dispense Refill    acetaminophen (TYLENOL) 650 MG TbSR Take 650 mg by mouth every 8 (eight) hours as needed.       atorvastatin (LIPITOR) 10 MG tablet Take 1 tablet (10 mg total) by mouth once daily. 90 tablet 3    b complex vitamins tablet Take 1 tablet by mouth once daily. B100      celecoxib (CELEBREX) 100 MG capsule Take 1 capsule (100 mg total) by mouth 2 (two) times daily. 180 capsule 2    cyanocobalamin (VITAMIN B-12) 100 MCG tablet Take 100 mcg by mouth once daily.      fish oil-omega-3 fatty acids 300-1,000 mg capsule Take 2 g by mouth once daily.      folic acid (FOLVITE) 1 MG tablet Take  2 tablets by mouth once daily 180 tablet 0    ginkgo biloba 60 mg Cap Take by mouth.      glucosamine-chondroitin 500-400 mg tablet Take 2 tablets by mouth once daily.       levothyroxine (SYNTHROID) 88 MCG tablet Take 1 tablet (88 mcg total) by mouth every morning. 90 tablet 3    melatonin 5 mg Cap Take by mouth as needed.       memantine (NAMENDA) 5 MG Tab Take 1 tablet (5 mg total) by mouth 2 (two) times daily. 60 tablet 2    methotrexate 2.5 MG Tab TAKE 4 TABLETS (10MG) EVERY 7 DAYS 48 tablet 3    multivitamin (THERAGRAN) per tablet Take 1 tablet by mouth once daily.      TURMERIC (CURCUMIN MISC) 750 mg by Misc.(Non-Drug; Combo Route) route once daily.       UNABLE TO FIND medication name: CBD pain relief gummy 33 mg - 1 daily      VITAMIN A-C-D3-COD LIVER OIL ORAL Take by mouth once daily.       vitamin B complex-folic acid 0.4 mg Tab Take by mouth once daily.       HYDROcodone-acetaminophen (NORCO)  mg per tablet Take 1 tablet by mouth every 24 hours as needed for Pain. 30 tablet 0    UNABLE TO FIND medication name: CBD gummies pt takes 1/2 gummy every day       No current facility-administered medications on file prior to visit.       Vitals:    01/20/23 1305   BP: 106/63   Pulse: 88       Physical Exam:    Physical Exam  Constitutional:       Appearance: Normal appearance. She is well-developed.   HENT:      Nose: No septal deviation.      Mouth/Throat:      Mouth: No oral lesions.   Eyes:      Conjunctiva/sclera:      Right eye: Right conjunctiva is not injected.      Left eye: Left conjunctiva is not injected.      Pupils: Pupils are equal, round, and reactive to light.   Neck:      Thyroid: No thyroid mass or thyromegaly.      Vascular: No JVD.   Cardiovascular:      Rate and Rhythm: Normal rate and regular rhythm.      Pulses: Normal pulses.      Comments: No edema  Pulmonary:      Effort: Pulmonary effort is normal.      Breath sounds: Normal breath sounds.   Abdominal:      Palpations: Abdomen is  soft.   Musculoskeletal:      Right shoulder: No swelling or tenderness. Normal range of motion.      Left shoulder: No swelling or tenderness. Normal range of motion.      Right elbow: No swelling. Normal range of motion. No tenderness.      Left elbow: No swelling. Normal range of motion. No tenderness.      Right wrist: Tenderness present. No swelling. Decreased range of motion.      Left wrist: Tenderness present. No swelling. Decreased range of motion.      Right hand: Tenderness present. Decreased range of motion.      Left hand: Tenderness present. Decreased range of motion.      Right hip: Normal range of motion. Normal strength.      Left hip: No tenderness. Normal range of motion.      Right knee: No swelling. Normal range of motion. No tenderness.      Left knee: No swelling. Normal range of motion. No tenderness.      Right ankle: No swelling. No tenderness. Normal range of motion.      Left ankle: No swelling. No tenderness. Normal range of motion.      Comments: 5/5 upper and lower extremity bilateral muscle strength   Lymphadenopathy:      Cervical: No cervical adenopathy.   Skin:     General: Skin is dry.   Neurological:      Deep Tendon Reflexes: Reflexes are normal and symmetric.             Assessment:       No diagnosis found.         Plan:        Rheumatoid arthritis involving both hands with positive rheumatoid factor    Osteoarthritis of spine with radiculopathy, cervical spine.      MTX continue 4 tabs weekly   Folic acid keep at 2mg weekly.   Celebrex 100mg BID will likely help with her joints as well-I see very little synovitis at this time and suspect this is residual damage that is causing bulk of pain in toes.   Prednisone 5-10-mg daily PRN-not using.   Hydrocodone for flare days as she is using infrequently  checked.       Peripheral neuropathy vs Lumbar NF encroachment.    Better since LEX.    Gabapentin too many cognitive ASE off entirely   Hydrocodone PRN using very rarely     1.  Keep Methotrexate for RA- last testing with PCP liver ok, will repeat labs at [facility] in 3 motnhs    2. Iron resolved.     3. Continue with PRN Hydrocodone.      No follow-ups on file.         40min consultation with greater than 50% of that time included Preparing to see the patient (review records, tests), Obtaining and/or reviewing separately obtained historical data, Performing a medically appropriate examination and/or evaluation , Ordering medications, tests, and/or procedures, Referring and communicating with other healthcare professionals , Documenting clinical information in the electronic or other health record and Independently interpreting results  (as warranted) & communicating results to the patient/family/caregiver. All questions answered.          Patient had labs from neurology Dr. Montoya dated 8/26/22: ESR and CRP WNL, CBC WNL renal function WNL.

## 2023-03-16 LAB
ALBUMIN SERPL-MCNC: 4.5 G/DL (ref 3.7–4.7)
ALBUMIN/GLOB SERPL: 2 {RATIO} (ref 1.2–2.2)
ALP SERPL-CCNC: 78 IU/L (ref 44–121)
ALT SERPL-CCNC: 22 IU/L (ref 0–32)
AST SERPL-CCNC: 26 IU/L (ref 0–40)
BASOPHILS # BLD AUTO: 0 X10E3/UL (ref 0–0.2)
BASOPHILS NFR BLD AUTO: 0 %
BILIRUB SERPL-MCNC: 0.3 MG/DL (ref 0–1.2)
BUN SERPL-MCNC: 11 MG/DL (ref 8–27)
BUN/CREAT SERPL: 17 (ref 12–28)
CALCIUM SERPL-MCNC: 9.7 MG/DL (ref 8.7–10.3)
CHLORIDE SERPL-SCNC: 99 MMOL/L (ref 96–106)
CO2 SERPL-SCNC: 25 MMOL/L (ref 20–29)
CREAT SERPL-MCNC: 0.66 MG/DL (ref 0.57–1)
CRP SERPL-MCNC: <1 MG/L (ref 0–10)
EOSINOPHIL # BLD AUTO: 0.1 X10E3/UL (ref 0–0.4)
EOSINOPHIL NFR BLD AUTO: 1 %
ERYTHROCYTE [DISTWIDTH] IN BLOOD BY AUTOMATED COUNT: 12.2 % (ref 11.7–15.4)
ERYTHROCYTE [SEDIMENTATION RATE] IN BLOOD BY WESTERGREN METHOD: 14 MM/HR (ref 0–40)
EST. GFR  (NO RACE VARIABLE): 90 ML/MIN/1.73
GLOBULIN SER CALC-MCNC: 2.3 G/DL (ref 1.5–4.5)
GLUCOSE SERPL-MCNC: 99 MG/DL (ref 70–99)
HCT VFR BLD AUTO: 41.6 % (ref 34–46.6)
HGB BLD-MCNC: 14.1 G/DL (ref 11.1–15.9)
IMM GRANULOCYTES # BLD AUTO: 0 X10E3/UL (ref 0–0.1)
IMM GRANULOCYTES NFR BLD AUTO: 0 %
LYMPHOCYTES # BLD AUTO: 1.5 X10E3/UL (ref 0.7–3.1)
LYMPHOCYTES NFR BLD AUTO: 21 %
MCH RBC QN AUTO: 31.5 PG (ref 26.6–33)
MCHC RBC AUTO-ENTMCNC: 33.9 G/DL (ref 31.5–35.7)
MCV RBC AUTO: 93 FL (ref 79–97)
MONOCYTES # BLD AUTO: 0.5 X10E3/UL (ref 0.1–0.9)
MONOCYTES NFR BLD AUTO: 8 %
NEUTROPHILS # BLD AUTO: 4.9 X10E3/UL (ref 1.4–7)
NEUTROPHILS NFR BLD AUTO: 70 %
PLATELET # BLD AUTO: 366 X10E3/UL (ref 150–450)
POTASSIUM SERPL-SCNC: 4.4 MMOL/L (ref 3.5–5.2)
PROT SERPL-MCNC: 6.8 G/DL (ref 6–8.5)
RBC # BLD AUTO: 4.48 X10E6/UL (ref 3.77–5.28)
SODIUM SERPL-SCNC: 138 MMOL/L (ref 134–144)
WBC # BLD AUTO: 7.1 X10E3/UL (ref 3.4–10.8)

## 2023-03-20 ENCOUNTER — OFFICE VISIT (OUTPATIENT)
Dept: FAMILY MEDICINE | Facility: CLINIC | Age: 78
End: 2023-03-20
Payer: MEDICARE

## 2023-03-20 VITALS
HEIGHT: 62 IN | WEIGHT: 128.75 LBS | SYSTOLIC BLOOD PRESSURE: 116 MMHG | OXYGEN SATURATION: 96 % | DIASTOLIC BLOOD PRESSURE: 64 MMHG | HEART RATE: 76 BPM | BODY MASS INDEX: 23.69 KG/M2

## 2023-03-20 DIAGNOSIS — M05.742 RHEUMATOID ARTHRITIS INVOLVING BOTH HANDS WITH POSITIVE RHEUMATOID FACTOR: ICD-10-CM

## 2023-03-20 DIAGNOSIS — M06.041 RHEUMATOID ARTHRITIS INVOLVING BOTH HANDS WITH NEGATIVE RHEUMATOID FACTOR: ICD-10-CM

## 2023-03-20 DIAGNOSIS — R41.89 COGNITIVE IMPAIRMENT: Primary | ICD-10-CM

## 2023-03-20 DIAGNOSIS — E03.9 HYPOTHYROIDISM, ADULT: ICD-10-CM

## 2023-03-20 DIAGNOSIS — E03.9 ACQUIRED HYPOTHYROIDISM: ICD-10-CM

## 2023-03-20 DIAGNOSIS — M47.25 OSTEOARTHRITIS OF SPINE WITH RADICULOPATHY, THORACOLUMBAR REGION: ICD-10-CM

## 2023-03-20 DIAGNOSIS — M47.812 SPONDYLOSIS OF CERVICAL REGION WITHOUT MYELOPATHY OR RADICULOPATHY: ICD-10-CM

## 2023-03-20 DIAGNOSIS — E78.2 MIXED HYPERLIPIDEMIA: ICD-10-CM

## 2023-03-20 DIAGNOSIS — M05.741 RHEUMATOID ARTHRITIS INVOLVING BOTH HANDS WITH POSITIVE RHEUMATOID FACTOR: ICD-10-CM

## 2023-03-20 DIAGNOSIS — M05.741 RHEUMATOID ARTHRITIS INVOLVING RIGHT HAND WITH POSITIVE RHEUMATOID FACTOR: ICD-10-CM

## 2023-03-20 DIAGNOSIS — M54.10 RADICULOPATHY, UNSPECIFIED SPINAL REGION: ICD-10-CM

## 2023-03-20 DIAGNOSIS — M06.042 RHEUMATOID ARTHRITIS INVOLVING BOTH HANDS WITH NEGATIVE RHEUMATOID FACTOR: ICD-10-CM

## 2023-03-20 PROCEDURE — 3074F PR MOST RECENT SYSTOLIC BLOOD PRESSURE < 130 MM HG: ICD-10-PCS | Mod: HCNC,CPTII,S$GLB, | Performed by: INTERNAL MEDICINE

## 2023-03-20 PROCEDURE — 1159F PR MEDICATION LIST DOCUMENTED IN MEDICAL RECORD: ICD-10-PCS | Mod: HCNC,CPTII,S$GLB, | Performed by: INTERNAL MEDICINE

## 2023-03-20 PROCEDURE — 3074F SYST BP LT 130 MM HG: CPT | Mod: HCNC,CPTII,S$GLB, | Performed by: INTERNAL MEDICINE

## 2023-03-20 PROCEDURE — 1159F MED LIST DOCD IN RCRD: CPT | Mod: HCNC,CPTII,S$GLB, | Performed by: INTERNAL MEDICINE

## 2023-03-20 PROCEDURE — 99999 PR PBB SHADOW E&M-EST. PATIENT-LVL IV: ICD-10-PCS | Mod: PBBFAC,HCNC,, | Performed by: INTERNAL MEDICINE

## 2023-03-20 PROCEDURE — 99999 PR PBB SHADOW E&M-EST. PATIENT-LVL IV: CPT | Mod: PBBFAC,HCNC,, | Performed by: INTERNAL MEDICINE

## 2023-03-20 PROCEDURE — 1126F AMNT PAIN NOTED NONE PRSNT: CPT | Mod: HCNC,CPTII,S$GLB, | Performed by: INTERNAL MEDICINE

## 2023-03-20 PROCEDURE — 99214 OFFICE O/P EST MOD 30 MIN: CPT | Mod: HCNC,S$GLB,, | Performed by: INTERNAL MEDICINE

## 2023-03-20 PROCEDURE — 3288F PR FALLS RISK ASSESSMENT DOCUMENTED: ICD-10-PCS | Mod: HCNC,CPTII,S$GLB, | Performed by: INTERNAL MEDICINE

## 2023-03-20 PROCEDURE — 3078F PR MOST RECENT DIASTOLIC BLOOD PRESSURE < 80 MM HG: ICD-10-PCS | Mod: HCNC,CPTII,S$GLB, | Performed by: INTERNAL MEDICINE

## 2023-03-20 PROCEDURE — 1160F PR REVIEW ALL MEDS BY PRESCRIBER/CLIN PHARMACIST DOCUMENTED: ICD-10-PCS | Mod: HCNC,CPTII,S$GLB, | Performed by: INTERNAL MEDICINE

## 2023-03-20 PROCEDURE — 3078F DIAST BP <80 MM HG: CPT | Mod: HCNC,CPTII,S$GLB, | Performed by: INTERNAL MEDICINE

## 2023-03-20 PROCEDURE — 1126F PR PAIN SEVERITY QUANTIFIED, NO PAIN PRESENT: ICD-10-PCS | Mod: HCNC,CPTII,S$GLB, | Performed by: INTERNAL MEDICINE

## 2023-03-20 PROCEDURE — 99214 PR OFFICE/OUTPT VISIT, EST, LEVL IV, 30-39 MIN: ICD-10-PCS | Mod: HCNC,S$GLB,, | Performed by: INTERNAL MEDICINE

## 2023-03-20 PROCEDURE — 1101F PR PT FALLS ASSESS DOC 0-1 FALLS W/OUT INJ PAST YR: ICD-10-PCS | Mod: HCNC,CPTII,S$GLB, | Performed by: INTERNAL MEDICINE

## 2023-03-20 PROCEDURE — 3288F FALL RISK ASSESSMENT DOCD: CPT | Mod: HCNC,CPTII,S$GLB, | Performed by: INTERNAL MEDICINE

## 2023-03-20 PROCEDURE — 1101F PT FALLS ASSESS-DOCD LE1/YR: CPT | Mod: HCNC,CPTII,S$GLB, | Performed by: INTERNAL MEDICINE

## 2023-03-20 PROCEDURE — 1160F RVW MEDS BY RX/DR IN RCRD: CPT | Mod: HCNC,CPTII,S$GLB, | Performed by: INTERNAL MEDICINE

## 2023-03-20 RX ORDER — CELECOXIB 100 MG/1
100 CAPSULE ORAL 2 TIMES DAILY
Qty: 180 CAPSULE | Refills: 2 | Status: SHIPPED | OUTPATIENT
Start: 2023-03-20 | End: 2024-01-02

## 2023-03-20 RX ORDER — MEMANTINE HYDROCHLORIDE 5 MG/1
5 TABLET ORAL 2 TIMES DAILY
Qty: 180 TABLET | Refills: 1 | Status: SHIPPED | OUTPATIENT
Start: 2023-03-20 | End: 2024-03-12

## 2023-03-20 RX ORDER — IBUPROFEN 800 MG/1
800 TABLET ORAL EVERY 6 HOURS PRN
COMMUNITY
Start: 2023-03-03 | End: 2023-06-06

## 2023-03-20 RX ORDER — LEVOTHYROXINE SODIUM 88 UG/1
88 TABLET ORAL EVERY MORNING
Qty: 90 TABLET | Refills: 3 | Status: SHIPPED | OUTPATIENT
Start: 2023-03-20

## 2023-03-20 RX ORDER — HYDROCODONE BITARTRATE AND ACETAMINOPHEN 10; 325 MG/1; MG/1
1 TABLET ORAL
Qty: 30 TABLET | Refills: 0 | Status: SHIPPED | OUTPATIENT
Start: 2023-03-20 | End: 2023-06-06 | Stop reason: SDUPTHER

## 2023-03-20 NOTE — PROGRESS NOTES
Patient ID: Fabby Guerrero is a 77 y.o. female.    Chief Complaint: Medication Refill (6 month f.u)     6 mo follow up     Needs refill on celebrex and hydrocodone (last rxed by Dr. Olivera). This helps get hand, knee, neck, arm pain down from 7/10 to 3/10.      Assessment and Plan      1. Cognitive impairment   - stable, continue   2. Mixed hyperlipidemia Namenda  - , expressed importance of taking atorvastatin.  She states that she will take.  He is worried about memory loss.  Discussed that this can actually help prevent vascular dementia   3. Rheumatoid arthritis involving right hand with positive rheumatoid factor   - stable, continue follow-up with Rheumatology, methotrexate, Celebrex, hydrocodone for chronic pain.   4. Acquired hypothyroidism   - continue Synthroid, check TSH in 1 month   5. Rheumatoid arthritis involving both hands with positive rheumatoid factor    6. Osteoarthritis of spine with radiculopathy, thoracolumbar region    7. Spondylosis of cervical region without myelopathy or radiculopathy    8. Radiculopathy, unspecified spinal region    9. Rheumatoid arthritis involving both hands with negative rheumatoid factor    10. Hypothyroidism, adult        Review of Systems   Constitutional:  Negative for fever.   Respiratory:  Negative for shortness of breath.    Cardiovascular:  Negative for chest pain.   Gastrointestinal:  Negative for abdominal pain.      Objective     Vitals:    03/20/23 0934   BP: 116/64   Pulse: 76     Wt Readings from Last 3 Encounters:   03/20/23 0934 58.4 kg (128 lb 12 oz)   01/20/23 1305 58.7 kg (129 lb 8.3 oz)   11/28/22 0955 59.7 kg (131 lb 9.8 oz)      Body mass index is 23.55 kg/m².   Physical Exam  Cardiovascular:      Rate and Rhythm: Normal rate and regular rhythm.      Heart sounds: No murmur heard.    No gallop.   Pulmonary:      Breath sounds: Normal breath sounds. No wheezing or rhonchi.   Abdominal:      Palpations: Abdomen is soft.      Tenderness:  There is no abdominal tenderness.   Musculoskeletal:         General: Normal range of motion.      Cervical back: Neck supple.   Skin:     General: Skin is warm.      Findings: No rash.   Neurological:      Mental Status: She is alert.   Psychiatric:         Behavior: Behavior normal.        Orders     Fabby was seen today for medication refill.    Diagnoses and all orders for this visit:    Cognitive impairment  -     memantine (NAMENDA) 5 MG Tab; Take 1 tablet (5 mg total) by mouth 2 (two) times daily.    Mixed hyperlipidemia  -     Cancel: Lipid Panel; Future  -     Lipid Panel; Future  -     Lipid Panel    Rheumatoid arthritis involving right hand with positive rheumatoid factor    Acquired hypothyroidism  -     levothyroxine (SYNTHROID) 88 MCG tablet; Take 1 tablet (88 mcg total) by mouth every morning.  -     Cancel: TSH; Future  -     TSH; Future  -     TSH    Rheumatoid arthritis involving both hands with positive rheumatoid factor  -     HYDROcodone-acetaminophen (NORCO)  mg per tablet; Take 1 tablet by mouth every 24 hours as needed for Pain.  -     celecoxib (CELEBREX) 100 MG capsule; Take 1 capsule (100 mg total) by mouth 2 (two) times daily.    Osteoarthritis of spine with radiculopathy, thoracolumbar region  -     HYDROcodone-acetaminophen (NORCO)  mg per tablet; Take 1 tablet by mouth every 24 hours as needed for Pain.  -     celecoxib (CELEBREX) 100 MG capsule; Take 1 capsule (100 mg total) by mouth 2 (two) times daily.    Spondylosis of cervical region without myelopathy or radiculopathy  -     HYDROcodone-acetaminophen (NORCO)  mg per tablet; Take 1 tablet by mouth every 24 hours as needed for Pain.  -     celecoxib (CELEBREX) 100 MG capsule; Take 1 capsule (100 mg total) by mouth 2 (two) times daily.    Radiculopathy, unspecified spinal region  -     HYDROcodone-acetaminophen (NORCO)  mg per tablet; Take 1 tablet by mouth every 24 hours as needed for Pain.  -      celecoxib (CELEBREX) 100 MG capsule; Take 1 capsule (100 mg total) by mouth 2 (two) times daily.    Rheumatoid arthritis involving both hands with negative rheumatoid factor    Hypothyroidism, adult               Hyperlipidemia Medications               atorvastatin (LIPITOR) 10 MG tablet Take 1 tablet (10 mg total) by mouth once daily.    fish oil-omega-3 fatty acids 300-1,000 mg capsule Take 2 g by mouth once daily.           Medication List with Changes/Refills   Current Medications    ACETAMINOPHEN (TYLENOL) 650 MG TBSR    Take 650 mg by mouth every 8 (eight) hours as needed.     ATORVASTATIN (LIPITOR) 10 MG TABLET    Take 1 tablet (10 mg total) by mouth once daily.    B COMPLEX VITAMINS TABLET    Take 1 tablet by mouth once daily. B100    CYANOCOBALAMIN (VITAMIN B-12) 100 MCG TABLET    Take 100 mcg by mouth once daily.    FISH OIL-OMEGA-3 FATTY ACIDS 300-1,000 MG CAPSULE    Take 2 g by mouth once daily.    FOLIC ACID (FOLVITE) 1 MG TABLET    Take 2 tablets by mouth once daily    GINKGO BILOBA 60 MG CAP    Take by mouth.    GLUCOSAMINE-CHONDROITIN 500-400 MG TABLET    Take 2 tablets by mouth once daily.     IBUPROFEN (ADVIL,MOTRIN) 800 MG TABLET    Take 800 mg by mouth every 6 (six) hours as needed.    MELATONIN 5 MG CAP    Take by mouth as needed.     METHOTREXATE 2.5 MG TAB    TAKE 4 TABLETS (10MG) EVERY 7 DAYS    MULTIVITAMIN (THERAGRAN) PER TABLET    Take 1 tablet by mouth once daily.    TURMERIC (CURCUMIN MISC)    750 mg by Misc.(Non-Drug; Combo Route) route once daily.     UNABLE TO FIND    medication name: CBD gummies pt takes 1/2 gummy every day    UNABLE TO FIND    medication name: CBD pain relief gummy 33 mg - 1 daily    VITAMIN A-C-D3-COD LIVER OIL ORAL    Take by mouth once daily.     VITAMIN B COMPLEX-FOLIC ACID 0.4 MG TAB    Take by mouth once daily.    Changed and/or Refilled Medications    Modified Medication Previous Medication    CELECOXIB (CELEBREX) 100 MG CAPSULE celecoxib (CELEBREX) 100 MG  capsule       Take 1 capsule (100 mg total) by mouth 2 (two) times daily.    Take 1 capsule (100 mg total) by mouth 2 (two) times daily.    HYDROCODONE-ACETAMINOPHEN (NORCO)  MG PER TABLET HYDROcodone-acetaminophen (NORCO)  mg per tablet       Take 1 tablet by mouth every 24 hours as needed for Pain.    Take 1 tablet by mouth every 24 hours as needed for Pain.    LEVOTHYROXINE (SYNTHROID) 88 MCG TABLET levothyroxine (SYNTHROID) 88 MCG tablet       Take 1 tablet (88 mcg total) by mouth every morning.    Take 1 tablet (88 mcg total) by mouth every morning.    MEMANTINE (NAMENDA) 5 MG TAB memantine (NAMENDA) 5 MG Tab       Take 1 tablet (5 mg total) by mouth 2 (two) times daily.    Take 1 tablet (5 mg total) by mouth 2 (two) times daily.       I personally reviewed past medical, family and social history.

## 2023-03-20 NOTE — PATIENT INSTRUCTIONS
Start taking atorvastatin again.  2.   Please go to [facility] and get TSH and lipid panel in 1 month.

## 2023-03-21 NOTE — PLAN OF CARE
[facility]  Physical Therapy Discharge Note    Name: Fabby Guerrero  Clinic Number: [id number]    Therapy Diagnosis:   Encounter Diagnosis   Name Primary?    Chronic bilateral low back pain with bilateral sciatica Yes     Physician: Doyle Young *    Physician Orders: Eval and treat  Medical Diagnosis: Radiculopathy, lumbar region [M54.16], Spinal stenosis, lumbar region without neurogenic claudication [M48.061]    Evaluation Date: 1/27/22      Date of Last visit: 3/9/22  Total Visits Received: 8    ASSESSMENT      Fabby has made great progress in PT, improving LE strength, lumbar AROM, which has lead to improved function as evidenced by her reduced FOTO limitation score from 59% to 41%. Pt is appropriate for DC to [facility] at this time.    Discharge reason: Patient has reached the maximum rehab potential for the present time    Discharge FOTO Score: 41%    Goals:   Long Term Goals: 6 weeks   1. Pt will be able to perform standing household chores for 1 hour without increased back pain. Met  2. Pt will have no leg pain with household chores. Met  3. Pt will score </=40% limitation on FOTO lumbar survey. Partially met; Pt scores 41% disability on final visit.    PLAN   This patient is discharged from Physical Therapy      Susanne Hoskins, PT     
104.9

## 2023-03-22 LAB
CHOLEST SERPL-MCNC: 268 MG/DL (ref 100–199)
HDLC SERPL-MCNC: 54 MG/DL
LDLC SERPL CALC-MCNC: 177 MG/DL (ref 0–99)
TRIGL SERPL-MCNC: 200 MG/DL (ref 0–149)
TSH SERPL DL<=0.005 MIU/L-ACNC: 1.12 UIU/ML (ref 0.45–4.5)
VLDLC SERPL CALC-MCNC: 37 MG/DL (ref 5–40)

## 2023-04-10 ENCOUNTER — TELEPHONE (OUTPATIENT)
Dept: FAMILY MEDICINE | Facility: CLINIC | Age: 78
End: 2023-04-10
Payer: MEDICARE

## 2023-04-10 NOTE — TELEPHONE ENCOUNTER
Spoke with patient stated that her prescription was disconnected but it just sent to a different pharmacy, patient said she would check with the pharmacy

## 2023-05-18 ENCOUNTER — TELEPHONE (OUTPATIENT)
Dept: RHEUMATOLOGY | Facility: CLINIC | Age: 78
End: 2023-05-18
Payer: MEDICARE

## 2023-05-18 DIAGNOSIS — D84.9 IMMUNOSUPPRESSION: ICD-10-CM

## 2023-05-18 DIAGNOSIS — M05.742 RHEUMATOID ARTHRITIS INVOLVING BOTH HANDS WITH POSITIVE RHEUMATOID FACTOR: Primary | ICD-10-CM

## 2023-05-18 DIAGNOSIS — M05.741 RHEUMATOID ARTHRITIS INVOLVING BOTH HANDS WITH POSITIVE RHEUMATOID FACTOR: Primary | ICD-10-CM

## 2023-05-18 DIAGNOSIS — E83.19 IRON OVERLOAD: ICD-10-CM

## 2023-05-18 NOTE — TELEPHONE ENCOUNTER
----- Message from Audra Yen sent at 5/18/2023  4:15 PM CDT -----  Contact: pt  Pt is calling and would like a call back   Please give pt a call back [phone]    Patient asking for a rescheduling of May appointment and lab orders to be placed. Patient getting labs at [facility] and booked 6-6-23. CG       [facility]        NAME: Margot Murrell is a 70 y o  female  : 1954    MRN: [id number]  DATE: 2023  TIME: 10:55 AM    /72   Pulse 78   Temp 97 8 °F (36 6 °C)   Resp 18   LMP  (LMP Unknown)   SpO2 99%     Assessment and Plan   Encounter for screening laboratory testing for severe acute respiratory syndrome coronavirus 2 (SARS-CoV-2) [Z20 822]  1  Encounter for screening laboratory testing for severe acute respiratory syndrome coronavirus 2 (SARS-CoV-2)  Poct Covid 19 Rapid Antigen Test    doxycycline (ADOXA) 100 MG tablet    benzonatate (TESSALON PERLES) 100 mg capsule    fluticasone (FLONASE) 50 mcg/act nasal spray      2  Acute maxillary sinusitis, recurrence not specified  doxycycline (ADOXA) 100 MG tablet    benzonatate (TESSALON PERLES) 100 mg capsule    fluticasone (FLONASE) 50 mcg/act nasal spray      3  Bronchitis  doxycycline (ADOXA) 100 MG tablet    benzonatate (TESSALON PERLES) 100 mg capsule    fluticasone (FLONASE) 50 mcg/act nasal spray            Patient Instructions       Follow up with PCP in 3-5 days  Proceed to  ER if symptoms worsen  Chief Complaint     Chief Complaint   Patient presents with   • Cough     Sneezing, nasal congestion, runny nose, cough and headache  Requesting COVID testing  History of Present Illness       Pt with 2 days of cough and nasal congestion       Review of Systems   Review of Systems   Constitutional: Negative  HENT: Positive for congestion, sinus pressure and sinus pain  Negative for sneezing  Eyes: Negative  Respiratory: Positive for cough  Cardiovascular: Negative  Gastrointestinal: Negative  Endocrine: Negative  Genitourinary: Negative  Musculoskeletal: Negative  Skin: Negative  Allergic/Immunologic: Negative  Neurological: Negative  Hematological: Negative  Psychiatric/Behavioral: Negative  All other systems reviewed and are negative          Current Medications Current Outpatient Medications:   •  aspirin 81 MG tablet, Take 1 tablet by mouth daily, Disp: , Rfl:   •  benzonatate (TESSALON PERLES) 100 mg capsule, Take 1 capsule (100 mg total) by mouth 3 (three) times a day as needed for cough, Disp: 20 capsule, Rfl: 0  •  Cholecalciferol (Vitamin D3) 25 MCG (1000 UT) CAPS, Take 1 capsule (1,000 Units total) by mouth daily, Disp: 30 capsule, Rfl: 11  •  doxycycline (ADOXA) 100 MG tablet, Take 1 tablet (100 mg total) by mouth 2 (two) times a day for 10 days, Disp: 20 tablet, Rfl: 0  •  ezetimibe (ZETIA) 10 mg tablet, Take 1 tablet (10 mg total) by mouth daily, Disp: 90 tablet, Rfl: 1  •  fluticasone (FLONASE) 50 mcg/act nasal spray, 1 spray into each nostril daily, Disp: 9 9 mL, Rfl: 0  •  levothyroxine 50 mcg tablet, Take 2 tablets (100 mcg total) by mouth daily, Disp: 180 tablet, Rfl: 1  •  lisinopril (ZESTRIL) 20 mg tablet, Take 1 tablet (20 mg total) by mouth daily, Disp: 90 tablet, Rfl: 1  •  metFORMIN (GLUCOPHAGE-XR) 500 mg 24 hr tablet, TAKE 1 TABLET BY MOUTH ONCE DAILY WITH  DINNER, Disp: 90 tablet, Rfl: 1  •  multivitamin (THERAGRAN) TABS, Take 1 tablet by mouth, Disp: , Rfl:   •  Omega-3 Fatty Acids (FISH OIL PO), Take 2 g by mouth, Disp: , Rfl:   •  oxybutynin (DITROPAN-XL) 10 MG 24 hr tablet, Take 10 mg by mouth daily, Disp: , Rfl:   •  Diclofenac Sodium (VOLTAREN) 1 %, Apply 2 g topically 4 (four) times a day (Patient not taking: Reported on 3/17/2023), Disp: 100 g, Rfl: 0  •  pantoprazole (PROTONIX) 40 mg tablet, Take 40 mg by mouth daily  (Patient not taking: Reported on 3/17/2023), Disp: , Rfl:     Current Allergies     Allergies as of 03/17/2023 - Reviewed 03/17/2023   Allergen Reaction Noted   • Statins Myalgia 11/18/2013            The following portions of the patient's history were reviewed and updated as appropriate: allergies, current medications, past family history, past medical history, past social history, past surgical history and problem list      Past Medical History:   Diagnosis Date   • Abscess of neck 3/2/2020   • Alcohol induced acute pancreatitis 8/25/2018   • Disease of thyroid gland    • GERD (gastroesophageal reflux disease)    • HL (hearing loss)    • Hyperlipidemia    • Polyp of sigmoid colon     last assessed 6/12/12   • Sleep disturbances     last assessed 6/5/14       Past Surgical History:   Procedure Laterality Date   • BREAST CYST ASPIRATION Right     20 yrs ago in dr office net results   • COLONOSCOPY  06/18/2011    Complete  Repeat in 5yrs     • IR IMAGE GUIDED ASPIRATION / DRAINAGE  3/3/2020   • NASAL FRACTURE SURGERY         Family History   Problem Relation Age of Onset   • Alzheimer's disease Mother    • Diabetes Mother    • No Known Problems Father    • No Known Problems Maternal Grandmother    • No Known Problems Maternal Grandfather    • No Known Problems Paternal Grandmother    • No Known Problems Paternal Grandfather    • No Known Problems Maternal Aunt    • No Known Problems Maternal Aunt    • No Known Problems Maternal Aunt    • No Known Problems Maternal Aunt    • No Known Problems Maternal Aunt    • No Known Problems Paternal Aunt    • No Known Problems Paternal Aunt    • No Known Problems Paternal Aunt          Medications have been verified  Objective   /72   Pulse 78   Temp 97 8 °F (36 6 °C)   Resp 18   LMP  (LMP Unknown)   SpO2 99%        Physical Exam     Physical Exam  Vitals and nursing note reviewed  Constitutional:       Appearance: Normal appearance  She is normal weight  HENT:      Head: Normocephalic and atraumatic  Right Ear: Tympanic membrane, ear canal and external ear normal       Left Ear: Tympanic membrane, ear canal and external ear normal       Nose: Congestion and rhinorrhea present  Comments: Boggy mucosa  Yellow nasal d/c   Eyes:      Extraocular Movements: Extraocular movements intact        Conjunctiva/sclera: Conjunctivae normal       Pupils: Pupils are equal, round, and reactive to light  Cardiovascular:      Rate and Rhythm: Normal rate and regular rhythm  Pulses: Normal pulses  Heart sounds: Normal heart sounds  Pulmonary:      Effort: Pulmonary effort is normal       Comments: Minor coarse sounds   Abdominal:      General: Abdomen is flat  Bowel sounds are normal       Palpations: Abdomen is soft  Musculoskeletal:         General: Normal range of motion  Cervical back: Normal range of motion and neck supple  Skin:     General: Skin is warm  Capillary Refill: Capillary refill takes less than 2 seconds  Neurological:      General: No focal deficit present  Mental Status: She is alert and oriented to person, place, and time     Psychiatric:         Mood and Affect: Mood normal          Behavior: Behavior normal

## 2023-06-06 ENCOUNTER — OFFICE VISIT (OUTPATIENT)
Dept: RHEUMATOLOGY | Facility: CLINIC | Age: 78
End: 2023-06-06
Payer: MEDICARE

## 2023-06-06 VITALS
HEIGHT: 62 IN | HEART RATE: 76 BPM | WEIGHT: 129.63 LBS | BODY MASS INDEX: 23.85 KG/M2 | SYSTOLIC BLOOD PRESSURE: 132 MMHG | DIASTOLIC BLOOD PRESSURE: 78 MMHG

## 2023-06-06 DIAGNOSIS — M54.10 RADICULOPATHY, UNSPECIFIED SPINAL REGION: ICD-10-CM

## 2023-06-06 DIAGNOSIS — M05.742 RHEUMATOID ARTHRITIS INVOLVING BOTH HANDS WITH POSITIVE RHEUMATOID FACTOR: ICD-10-CM

## 2023-06-06 DIAGNOSIS — M06.042 RHEUMATOID ARTHRITIS INVOLVING BOTH HANDS WITH NEGATIVE RHEUMATOID FACTOR: ICD-10-CM

## 2023-06-06 DIAGNOSIS — M47.812 SPONDYLOSIS OF CERVICAL REGION WITHOUT MYELOPATHY OR RADICULOPATHY: ICD-10-CM

## 2023-06-06 DIAGNOSIS — D84.9 IMMUNOSUPPRESSION: ICD-10-CM

## 2023-06-06 DIAGNOSIS — M06.041 RHEUMATOID ARTHRITIS INVOLVING BOTH HANDS WITH NEGATIVE RHEUMATOID FACTOR: ICD-10-CM

## 2023-06-06 DIAGNOSIS — M54.16 LUMBAR RADICULOPATHY: ICD-10-CM

## 2023-06-06 DIAGNOSIS — Z79.899 HIGH RISK MEDICATIONS (NOT ANTICOAGULANTS) LONG-TERM USE: ICD-10-CM

## 2023-06-06 DIAGNOSIS — M05.741 RHEUMATOID ARTHRITIS INVOLVING RIGHT HAND WITH POSITIVE RHEUMATOID FACTOR: Primary | ICD-10-CM

## 2023-06-06 DIAGNOSIS — M05.741 RHEUMATOID ARTHRITIS INVOLVING BOTH HANDS WITH POSITIVE RHEUMATOID FACTOR: ICD-10-CM

## 2023-06-06 DIAGNOSIS — M47.25 OSTEOARTHRITIS OF SPINE WITH RADICULOPATHY, THORACOLUMBAR REGION: ICD-10-CM

## 2023-06-06 PROCEDURE — 1101F PR PT FALLS ASSESS DOC 0-1 FALLS W/OUT INJ PAST YR: ICD-10-PCS | Mod: CPTII,S$GLB,, | Performed by: INTERNAL MEDICINE

## 2023-06-06 PROCEDURE — 3288F FALL RISK ASSESSMENT DOCD: CPT | Mod: CPTII,S$GLB,, | Performed by: INTERNAL MEDICINE

## 2023-06-06 PROCEDURE — 1159F PR MEDICATION LIST DOCUMENTED IN MEDICAL RECORD: ICD-10-PCS | Mod: CPTII,S$GLB,, | Performed by: INTERNAL MEDICINE

## 2023-06-06 PROCEDURE — 3078F PR MOST RECENT DIASTOLIC BLOOD PRESSURE < 80 MM HG: ICD-10-PCS | Mod: CPTII,S$GLB,, | Performed by: INTERNAL MEDICINE

## 2023-06-06 PROCEDURE — 3075F PR MOST RECENT SYSTOLIC BLOOD PRESS GE 130-139MM HG: ICD-10-PCS | Mod: CPTII,S$GLB,, | Performed by: INTERNAL MEDICINE

## 2023-06-06 PROCEDURE — 3078F DIAST BP <80 MM HG: CPT | Mod: CPTII,S$GLB,, | Performed by: INTERNAL MEDICINE

## 2023-06-06 PROCEDURE — 99999 PR PBB SHADOW E&M-EST. PATIENT-LVL III: ICD-10-PCS | Mod: PBBFAC,,, | Performed by: INTERNAL MEDICINE

## 2023-06-06 PROCEDURE — 99214 OFFICE O/P EST MOD 30 MIN: CPT | Mod: S$GLB,,, | Performed by: INTERNAL MEDICINE

## 2023-06-06 PROCEDURE — 3288F PR FALLS RISK ASSESSMENT DOCUMENTED: ICD-10-PCS | Mod: CPTII,S$GLB,, | Performed by: INTERNAL MEDICINE

## 2023-06-06 PROCEDURE — 3075F SYST BP GE 130 - 139MM HG: CPT | Mod: CPTII,S$GLB,, | Performed by: INTERNAL MEDICINE

## 2023-06-06 PROCEDURE — 99999 PR PBB SHADOW E&M-EST. PATIENT-LVL III: CPT | Mod: PBBFAC,,, | Performed by: INTERNAL MEDICINE

## 2023-06-06 PROCEDURE — 1125F PR PAIN SEVERITY QUANTIFIED, PAIN PRESENT: ICD-10-PCS | Mod: CPTII,S$GLB,, | Performed by: INTERNAL MEDICINE

## 2023-06-06 PROCEDURE — 99214 PR OFFICE/OUTPT VISIT, EST, LEVL IV, 30-39 MIN: ICD-10-PCS | Mod: S$GLB,,, | Performed by: INTERNAL MEDICINE

## 2023-06-06 PROCEDURE — 1125F AMNT PAIN NOTED PAIN PRSNT: CPT | Mod: CPTII,S$GLB,, | Performed by: INTERNAL MEDICINE

## 2023-06-06 PROCEDURE — 1101F PT FALLS ASSESS-DOCD LE1/YR: CPT | Mod: CPTII,S$GLB,, | Performed by: INTERNAL MEDICINE

## 2023-06-06 PROCEDURE — 1159F MED LIST DOCD IN RCRD: CPT | Mod: CPTII,S$GLB,, | Performed by: INTERNAL MEDICINE

## 2023-06-06 RX ORDER — METHOTREXATE 2.5 MG/1
TABLET ORAL
Qty: 48 TABLET | Refills: 3 | Status: SHIPPED | OUTPATIENT
Start: 2023-06-06 | End: 2023-10-09 | Stop reason: SDUPTHER

## 2023-06-06 RX ORDER — FOLIC ACID 1 MG/1
TABLET ORAL
Qty: 180 TABLET | Refills: 0 | Status: SHIPPED | OUTPATIENT
Start: 2023-06-06 | End: 2023-10-09 | Stop reason: SDUPTHER

## 2023-06-06 RX ORDER — FOLIC ACID 1 MG/1
TABLET ORAL
Qty: 180 TABLET | Refills: 0 | Status: SHIPPED | OUTPATIENT
Start: 2023-06-06 | End: 2023-06-06 | Stop reason: SDUPTHER

## 2023-06-06 RX ORDER — HYDROCODONE BITARTRATE AND ACETAMINOPHEN 10; 325 MG/1; MG/1
1 TABLET ORAL
Qty: 30 TABLET | Refills: 0 | Status: SHIPPED | OUTPATIENT
Start: 2023-06-06 | End: 2023-10-09 | Stop reason: SDUPTHER

## 2023-06-06 NOTE — PROGRESS NOTES
"Subjective:          Chief Complaint: Fabby Guerrero is a 77 y.o. female who had concerns including Disease Management.    HPI:      Rheumatoid Arthritis  Patient is an 77 y.o. female here for follow up seropostive Rheumatoid Arthritis, secondary Sjogrens, and OA  Serologies are: RF+, CCP HIGH titer +.      Symptoms have been present for several years.   Symptoms include eye symptoms, joint pain, joint swelling, sleep difficulties and weakness of hands and are of moderate severity. Morning stiffness: 25 minute Patient denies joint pain, joint swelling and morning stiffness. Symptoms are made worse by: movement, overuse and resting.  Symptoms are helped by arthritis medications.  Associated symptoms include arthralgia and fatigue and secondary Sjogrens. Previously on Restasis, now systane/Refresh.   Diclofenac for 1 year with decreased urination and stable creatinine; now off.       Patient shoulders are better, cramping at the hands, axial skeleton pain all seem to be doing very well.   Using Hydrocodone rarely.     Seen w/ Pain Management for lumbar radiculopathy.  She received an L4-5 epidural steroid injections 12/09/2021 with 50% relief she has some low back pain and pain in her calves and spasms at night sometimes during the day.  She did note the numbness and tingling has improved.      Seen with Ortho, Dr. Agudelo/Romeo,  received Euflexxa in past.  5/2021 call with flare of left knee, depomedrol 40IM and repeat Euflexxa.    Bilateral this did help due to have this done again. C/o knee pain chronic and daily. No swelling, no instability. - knees stable and manageable.     She continues with the right hand "cramp" that is not a full triggering happens intermittently but no synovitis.   She did have elevated iron levels working with heme, appears some labs were attached to me but I will have her f/u with Dr. Dupree.     Current medications  Celebrex 100mg BID  Tylenol 650 2-3 daily  Methotrexate - Problems: " none-- 4 tabs weekly doing ok.  folic acid 2mg- she dropped down to 1mg daily but noted return of oral ulcers. She does seem to do better on 2mg daily, but her hair grows so fast.   Prednisone 5mg PRN flares has not taken any prednisone in 1 year.   Hydrocodone rarely for pain- I filled #30 ok for 1 tab per day.       No hx of HCQ-  No personal hx of malignancy.    C/o feet /toes painful more but improved with recent celebrex.     Labs dated 12/2019 at [facility] for methotrexate monitoring show everything is within normal limits.    Interval events:   MRI of the shoulder some musculature is of the rotator cuff is grossly intact mild tendinopathy subacromial and glenohumeral narrowing but no active bursitis was noticed she has some hypertrophic changes at the AC joint    MRI of her cervical  spine however showed multilevel foraminal stenosis most prominent at C5-6 moderate to severe left > than right.  She also had a few disc osteophyte complexes which could even  a facet mediated pain causing muscle spasms radiating along the shoulder girdle in the upper trapezius region.     Dr Edmonds from pain management with some injections    Component      Latest Ref Rng & Units 4/17/2018 5/26/2015   Anti Sm/RNP Antibody      0.00 - 19.99 EU  0.35   Anti-Sm/RNP Interpretation      Negative  Negative   B27 Testing Date        06/16/2015 11:48 AM   HLA B27 Result        Negative   ds DNA Ab      Negative 1:10  Negative 1:10   CHON Screen      Negative <1:160  Negative <1:160   Rheumatoid Factor      <14 IU/mL 128 (H) 34.0 (H)   CCP Antibodies      <5.0 U/mL  6.1 (H)   Sed Rate      < OR = 30 mm/h 11    Cyclic Citrullinated Peptide (CCP) Ab (IgG)      UNITS >250 (H)    CRP      <8.0 mg/L 1.4      REVIEW OF SYSTEMS:    Review of Systems   Constitutional:  Positive for malaise/fatigue. Negative for fever and weight loss.   HENT:  Negative for sore throat.    Eyes:  Positive for discharge and redness. Negative for double vision and  photophobia.   Respiratory:  Negative for cough, shortness of breath and wheezing.    Cardiovascular:  Negative for chest pain, palpitations and orthopnea.   Gastrointestinal:  Negative for abdominal pain, constipation and diarrhea.   Genitourinary:  Negative for dysuria, hematuria and urgency.   Musculoskeletal:  Positive for joint pain. Negative for back pain and myalgias.   Skin:  Negative for rash.   Neurological:  Negative for dizziness, tingling, focal weakness and headaches.   Endo/Heme/Allergies:  Does not bruise/bleed easily.   Psychiatric/Behavioral:  Negative for depression, hallucinations and suicidal ideas.        Objective:        Past Medical History:   Diagnosis Date    Chronic neck pain     Chronic shoulder pain     GERD (gastroesophageal reflux disease)     Rheumatoid arthritis      Family History   Problem Relation Age of Onset    Cancer Mother         lung    Cancer Father         colon and lung    Cancer Daughter         melanoma     Social History     Tobacco Use    Smoking status: Former     Packs/day: 1.00     Years: 14.00     Pack years: 14.00     Types: Cigarettes     Quit date: 1976     Years since quittin.1    Smokeless tobacco: Never   Substance Use Topics    Alcohol use: Yes     Alcohol/week: 3.0 standard drinks     Types: 3 Glasses of wine per week    Drug use: No         Current Outpatient Medications on File Prior to Visit   Medication Sig Dispense Refill    acetaminophen (TYLENOL) 650 MG TbSR Take 650 mg by mouth every 8 (eight) hours as needed.       atorvastatin (LIPITOR) 10 MG tablet Take 1 tablet (10 mg total) by mouth once daily. 90 tablet 3    b complex vitamins tablet Take 1 tablet by mouth once daily. B100      celecoxib (CELEBREX) 100 MG capsule Take 1 capsule (100 mg total) by mouth 2 (two) times daily. 180 capsule 2    cyanocobalamin (VITAMIN B-12) 100 MCG tablet Take 100 mcg by mouth once daily.      fish oil-omega-3 fatty acids 300-1,000 mg capsule Take 2 g by  mouth once daily.      folic acid (FOLVITE) 1 MG tablet Take 2 tablets by mouth once daily 180 tablet 0    ginkgo biloba 60 mg Cap Take by mouth.      glucosamine-chondroitin 500-400 mg tablet Take 2 tablets by mouth once daily.       ibuprofen (ADVIL,MOTRIN) 800 MG tablet Take 800 mg by mouth every 6 (six) hours as needed.      levothyroxine (SYNTHROID) 88 MCG tablet Take 1 tablet (88 mcg total) by mouth every morning. 90 tablet 3    melatonin 5 mg Cap Take by mouth as needed.       memantine (NAMENDA) 5 MG Tab Take 1 tablet (5 mg total) by mouth 2 (two) times daily. 180 tablet 1    methotrexate 2.5 MG Tab TAKE 4 TABLETS (10MG) EVERY 7 DAYS 48 tablet 3    multivitamin (THERAGRAN) per tablet Take 1 tablet by mouth once daily.      TURMERIC (CURCUMIN MISC) 750 mg by Misc.(Non-Drug; Combo Route) route once daily.       UNABLE TO FIND medication name: CBD gummies pt takes 1/2 gummy every day      UNABLE TO FIND medication name: CBD pain relief gummy 33 mg - 1 daily      VITAMIN A-C-D3-COD LIVER OIL ORAL Take by mouth once daily.       vitamin B complex-folic acid 0.4 mg Tab Take by mouth once daily.       HYDROcodone-acetaminophen (NORCO)  mg per tablet Take 1 tablet by mouth every 24 hours as needed for Pain. 30 tablet 0     No current facility-administered medications on file prior to visit.       Vitals:    06/06/23 1541   BP: 132/78   Pulse: 76       Physical Exam:    Physical Exam  Constitutional:       Appearance: Normal appearance. She is well-developed.   HENT:      Nose: No septal deviation.      Mouth/Throat:      Mouth: No oral lesions.   Eyes:      Conjunctiva/sclera:      Right eye: Right conjunctiva is not injected.      Left eye: Left conjunctiva is not injected.      Pupils: Pupils are equal, round, and reactive to light.   Neck:      Thyroid: No thyroid mass or thyromegaly.      Vascular: No JVD.   Cardiovascular:      Rate and Rhythm: Normal rate and regular rhythm.      Pulses: Normal pulses.       Comments: No edema  Pulmonary:      Effort: Pulmonary effort is normal.      Breath sounds: Normal breath sounds.   Abdominal:      Palpations: Abdomen is soft.   Musculoskeletal:      Right shoulder: No swelling or tenderness. Normal range of motion.      Left shoulder: No swelling or tenderness. Normal range of motion.      Right elbow: No swelling. Normal range of motion. No tenderness.      Left elbow: No swelling. Normal range of motion. No tenderness.      Right wrist: Tenderness present. No swelling. Decreased range of motion.      Left wrist: Tenderness present. No swelling. Decreased range of motion.      Right hand: Tenderness present. Decreased range of motion.      Left hand: Tenderness present. Decreased range of motion.      Right hip: Normal range of motion. Normal strength.      Left hip: No tenderness. Normal range of motion.      Right knee: No swelling. Normal range of motion. No tenderness.      Left knee: No swelling. Normal range of motion. No tenderness.      Right ankle: No swelling. No tenderness. Normal range of motion.      Left ankle: No swelling. No tenderness. Normal range of motion.      Comments: 5/5 upper and lower extremity bilateral muscle strength   Lymphadenopathy:      Cervical: No cervical adenopathy.   Skin:     General: Skin is dry.   Neurological:      Deep Tendon Reflexes: Reflexes are normal and symmetric.             Assessment:       Encounter Diagnoses   Name Primary?    Rheumatoid arthritis involving right hand with positive rheumatoid factor Yes    Lumbar radiculopathy     Immunosuppression     Rheumatoid arthritis involving both hands with positive rheumatoid factor     Osteoarthritis of spine with radiculopathy, thoracolumbar region     High risk medications (not anticoagulants) long-term use             Plan:        Rheumatoid arthritis involving both hands with positive rheumatoid factor    Osteoarthritis of spine with radiculopathy, cervical spine.      MTX  continue 4 tabs weekly   Folic acid keep at 2mg weekly.   Celebrex 100mg BID will likely help with her joints as well-I see very little synovitis at this time and suspect this is residual damage that is causing bulk of pain in toes.   Prednisone 5-10-mg daily PRN-not using.   Hydrocodone for flare days as she is using infrequently  checked.       Peripheral neuropathy vs Lumbar NF encroachment.    Better since LEX.    Gabapentin too many cognitive ASE off entirely   Hydrocodone PRN using very rarely     1. Keep Methotrexate for RA- last testing with PCP liver ok, will repeat labs at [facility] in 3 motnhs    2. Iron resolved.     3. Continue with PRN Hydrocodone.- filled with her primary physician.       No follow-ups on file.         40min consultation with greater than 50% of that time included Preparing to see the patient (review records, tests), Obtaining and/or reviewing separately obtained historical data, Performing a medically appropriate examination and/or evaluation , Ordering medications, tests, and/or procedures, Referring and communicating with other healthcare professionals , Documenting clinical information in the electronic or other health record and Independently interpreting results  (as warranted) & communicating results to the patient/family/caregiver. All questions answered.          Patient had labs from neurology Dr. Montoya dated 8/26/22: ESR and CRP WNL, CBC WNL renal function WNL.

## 2023-10-09 ENCOUNTER — OFFICE VISIT (OUTPATIENT)
Dept: RHEUMATOLOGY | Facility: CLINIC | Age: 78
End: 2023-10-09
Payer: MEDICARE

## 2023-10-09 VITALS
HEIGHT: 62 IN | SYSTOLIC BLOOD PRESSURE: 123 MMHG | HEART RATE: 73 BPM | WEIGHT: 129 LBS | DIASTOLIC BLOOD PRESSURE: 68 MMHG | BODY MASS INDEX: 23.74 KG/M2

## 2023-10-09 DIAGNOSIS — M05.741 RHEUMATOID ARTHRITIS INVOLVING BOTH HANDS WITH POSITIVE RHEUMATOID FACTOR: ICD-10-CM

## 2023-10-09 DIAGNOSIS — M05.742 RHEUMATOID ARTHRITIS INVOLVING BOTH HANDS WITH POSITIVE RHEUMATOID FACTOR: ICD-10-CM

## 2023-10-09 DIAGNOSIS — D84.9 IMMUNOSUPPRESSION: ICD-10-CM

## 2023-10-09 DIAGNOSIS — Z79.899 HIGH RISK MEDICATIONS (NOT ANTICOAGULANTS) LONG-TERM USE: ICD-10-CM

## 2023-10-09 DIAGNOSIS — M47.812 SPONDYLOSIS OF CERVICAL REGION WITHOUT MYELOPATHY OR RADICULOPATHY: ICD-10-CM

## 2023-10-09 DIAGNOSIS — M06.042 RHEUMATOID ARTHRITIS INVOLVING BOTH HANDS WITH NEGATIVE RHEUMATOID FACTOR: ICD-10-CM

## 2023-10-09 DIAGNOSIS — M54.10 RADICULOPATHY, UNSPECIFIED SPINAL REGION: ICD-10-CM

## 2023-10-09 DIAGNOSIS — M05.9 RHEUMATOID ARTHRITIS WITH POSITIVE RHEUMATOID FACTOR, INVOLVING UNSPECIFIED SITE: Primary | ICD-10-CM

## 2023-10-09 DIAGNOSIS — M47.25 OSTEOARTHRITIS OF SPINE WITH RADICULOPATHY, THORACOLUMBAR REGION: ICD-10-CM

## 2023-10-09 DIAGNOSIS — M06.041 RHEUMATOID ARTHRITIS INVOLVING BOTH HANDS WITH NEGATIVE RHEUMATOID FACTOR: ICD-10-CM

## 2023-10-09 PROCEDURE — 3074F PR MOST RECENT SYSTOLIC BLOOD PRESSURE < 130 MM HG: ICD-10-PCS | Mod: HCNC,CPTII,S$GLB, | Performed by: INTERNAL MEDICINE

## 2023-10-09 PROCEDURE — 1101F PT FALLS ASSESS-DOCD LE1/YR: CPT | Mod: HCNC,CPTII,S$GLB, | Performed by: INTERNAL MEDICINE

## 2023-10-09 PROCEDURE — 99999 PR PBB SHADOW E&M-EST. PATIENT-LVL IV: ICD-10-PCS | Mod: PBBFAC,HCNC,, | Performed by: INTERNAL MEDICINE

## 2023-10-09 PROCEDURE — 1125F AMNT PAIN NOTED PAIN PRSNT: CPT | Mod: HCNC,CPTII,S$GLB, | Performed by: INTERNAL MEDICINE

## 2023-10-09 PROCEDURE — 3288F FALL RISK ASSESSMENT DOCD: CPT | Mod: HCNC,CPTII,S$GLB, | Performed by: INTERNAL MEDICINE

## 2023-10-09 PROCEDURE — 1101F PR PT FALLS ASSESS DOC 0-1 FALLS W/OUT INJ PAST YR: ICD-10-PCS | Mod: HCNC,CPTII,S$GLB, | Performed by: INTERNAL MEDICINE

## 2023-10-09 PROCEDURE — 99999 PR PBB SHADOW E&M-EST. PATIENT-LVL IV: CPT | Mod: PBBFAC,HCNC,, | Performed by: INTERNAL MEDICINE

## 2023-10-09 PROCEDURE — 1159F MED LIST DOCD IN RCRD: CPT | Mod: HCNC,CPTII,S$GLB, | Performed by: INTERNAL MEDICINE

## 2023-10-09 PROCEDURE — 3288F PR FALLS RISK ASSESSMENT DOCUMENTED: ICD-10-PCS | Mod: HCNC,CPTII,S$GLB, | Performed by: INTERNAL MEDICINE

## 2023-10-09 PROCEDURE — 3078F DIAST BP <80 MM HG: CPT | Mod: HCNC,CPTII,S$GLB, | Performed by: INTERNAL MEDICINE

## 2023-10-09 PROCEDURE — 99215 PR OFFICE/OUTPT VISIT, EST, LEVL V, 40-54 MIN: ICD-10-PCS | Mod: HCNC,S$GLB,, | Performed by: INTERNAL MEDICINE

## 2023-10-09 PROCEDURE — 3074F SYST BP LT 130 MM HG: CPT | Mod: HCNC,CPTII,S$GLB, | Performed by: INTERNAL MEDICINE

## 2023-10-09 PROCEDURE — 3078F PR MOST RECENT DIASTOLIC BLOOD PRESSURE < 80 MM HG: ICD-10-PCS | Mod: HCNC,CPTII,S$GLB, | Performed by: INTERNAL MEDICINE

## 2023-10-09 PROCEDURE — 99215 OFFICE O/P EST HI 40 MIN: CPT | Mod: HCNC,S$GLB,, | Performed by: INTERNAL MEDICINE

## 2023-10-09 PROCEDURE — 1159F PR MEDICATION LIST DOCUMENTED IN MEDICAL RECORD: ICD-10-PCS | Mod: HCNC,CPTII,S$GLB, | Performed by: INTERNAL MEDICINE

## 2023-10-09 PROCEDURE — 1125F PR PAIN SEVERITY QUANTIFIED, PAIN PRESENT: ICD-10-PCS | Mod: HCNC,CPTII,S$GLB, | Performed by: INTERNAL MEDICINE

## 2023-10-09 RX ORDER — HYDROCODONE BITARTRATE AND ACETAMINOPHEN 10; 325 MG/1; MG/1
1 TABLET ORAL
Qty: 30 TABLET | Refills: 0 | Status: SHIPPED | OUTPATIENT
Start: 2023-10-09 | End: 2023-11-30 | Stop reason: SDUPTHER

## 2023-10-09 RX ORDER — FOLIC ACID 1 MG/1
TABLET ORAL
Qty: 180 TABLET | Refills: 0 | Status: SHIPPED | OUTPATIENT
Start: 2023-10-09 | End: 2024-03-05

## 2023-10-09 RX ORDER — METHOTREXATE 2.5 MG/1
TABLET ORAL
Qty: 48 TABLET | Refills: 3 | Status: SHIPPED | OUTPATIENT
Start: 2023-10-09 | End: 2024-03-20 | Stop reason: SDUPTHER

## 2023-10-09 ASSESSMENT — ROUTINE ASSESSMENT OF PATIENT INDEX DATA (RAPID3)
PATIENT GLOBAL ASSESSMENT SCORE: 6
PSYCHOLOGICAL DISTRESS SCORE: 0
FATIGUE SCORE: 0
MDHAQ FUNCTION SCORE: 0
TOTAL RAPID3 SCORE: 3
PAIN SCORE: 3

## 2023-10-09 NOTE — PROGRESS NOTES
"Subjective:          Chief Complaint: Fabby Guerrero is a 78 y.o. female who had concerns including Disease Management.    HPI:      Rheumatoid Arthritis  Patient is an 77 y.o. female here for follow up seropostive Rheumatoid Arthritis, secondary Sjogrens, and OA  Serologies are: RF+, CCP HIGH titer +.      Symptoms have been present for several years.   Symptoms include eye symptoms, joint pain, joint swelling, sleep difficulties and weakness of hands and are of moderate severity. Morning stiffness: 25 minute Patient denies joint pain, joint swelling and morning stiffness. Symptoms are made worse by: movement, overuse and resting.  Symptoms are helped by arthritis medications.  Associated symptoms include arthralgia and fatigue and secondary Sjogrens. Previously on Restasis, now systane/Refresh.   Diclofenac for 1 year with decreased urination and stable creatinine; now off.     10/2023: patient working with Dr. Zuniga in [place] with multiple lesions left ear, right arm near axilla, 2 on back. Pending pathology  Cervical spine     Seen w/ Pain Management   Patient with known cervical stenosis: hx of C7-T1 in 2021 followed by   Using Hydrocodone rarely.   She received an L4-5 epidural steroid injections 12/09/2021 with 50% relief she has some low back pain and pain in her calves and spasms at night sometimes during the day.  She did note the numbness and tingling has improved.      Seen with Ortho, Dr. Agudelo/Romeo,  received Euflexxa in past.  5/2021 call with flare of left knee, depomedrol 40IM and repeat Euflexxa.    Bilateral this did help due to have this done again. C/o knee pain chronic and daily. No swelling, no instability. - knees stable and manageable.     She continues with the right hand "cramp" that is not a full triggering happens intermittently but no synovitis.   She did have elevated iron levels working with heme, appears some labs were attached to me but I will have her f/u with Dr. Dupree. "     Current medications  Celebrex 100mg BID  Tylenol 650 2-3 daily  Methotrexate - Problems: none-- 4 tabs weekly doing ok.  folic acid 2mg- she dropped down to 1mg daily but noted return of oral ulcers. She does seem to do better on 2mg daily, but her hair grows so fast.   Prednisone 5mg PRN flares has not taken any prednisone in 1 year.   Hydrocodone rarely for pain- I filled #30 ok for 1 tab per day.       No hx of HCQ-  No personal hx of malignancy.    C/o feet /toes painful more but improved with recent celebrex.     Labs dated 12/2019 at [facility] for methotrexate monitoring show everything is within normal limits.    Interval events:   MRI of the shoulder some musculature is of the rotator cuff is grossly intact mild tendinopathy subacromial and glenohumeral narrowing but no active bursitis was noticed she has some hypertrophic changes at the AC joint    MRI of her cervical  spine however showed multilevel foraminal stenosis most prominent at C5-6 moderate to severe left > than right.  She also had a few disc osteophyte complexes which could even  a facet mediated pain causing muscle spasms radiating along the shoulder girdle in the upper trapezius region.     Dr Edmonds from pain management with some injections    Component      Latest Ref Rng & Units 4/17/2018 5/26/2015   Anti Sm/RNP Antibody      0.00 - 19.99 EU  0.35   Anti-Sm/RNP Interpretation      Negative  Negative   B27 Testing Date        06/16/2015 11:48 AM   HLA B27 Result        Negative   ds DNA Ab      Negative 1:10  Negative 1:10   CHON Screen      Negative <1:160  Negative <1:160   Rheumatoid Factor      <14 IU/mL 128 (H) 34.0 (H)   CCP Antibodies      <5.0 U/mL  6.1 (H)   Sed Rate      < OR = 30 mm/h 11    Cyclic Citrullinated Peptide (CCP) Ab (IgG)      UNITS >250 (H)    CRP      <8.0 mg/L 1.4      REVIEW OF SYSTEMS:    Review of Systems   Constitutional:  Positive for malaise/fatigue. Negative for fever and weight loss.   HENT:  Negative for  sore throat.    Eyes:  Positive for discharge and redness. Negative for double vision and photophobia.   Respiratory:  Negative for cough, shortness of breath and wheezing.    Cardiovascular:  Negative for chest pain, palpitations and orthopnea.   Gastrointestinal:  Negative for abdominal pain, constipation and diarrhea.   Genitourinary:  Negative for dysuria, hematuria and urgency.   Musculoskeletal:  Positive for joint pain. Negative for back pain and myalgias.   Skin:  Negative for rash.   Neurological:  Negative for dizziness, tingling, focal weakness and headaches.   Endo/Heme/Allergies:  Does not bruise/bleed easily.   Psychiatric/Behavioral:  Negative for depression, hallucinations and suicidal ideas.          Objective:        Past Medical History:   Diagnosis Date    Chronic neck pain     Chronic shoulder pain     GERD (gastroesophageal reflux disease)     Rheumatoid arthritis      Family History   Problem Relation Age of Onset    Cancer Mother         lung    Cancer Father         colon and lung    Cancer Daughter         melanoma     Social History     Tobacco Use    Smoking status: Former     Current packs/day: 0.00     Average packs/day: 1 pack/day for 14.0 years (14.0 ttl pk-yrs)     Types: Cigarettes     Start date: 1962     Quit date: 1976     Years since quittin.5    Smokeless tobacco: Never   Substance Use Topics    Alcohol use: Yes     Alcohol/week: 3.0 standard drinks of alcohol     Types: 3 Glasses of wine per week    Drug use: No         Current Outpatient Medications on File Prior to Visit   Medication Sig Dispense Refill    acetaminophen (TYLENOL) 650 MG TbSR Take 650 mg by mouth every 8 (eight) hours as needed.       atorvastatin (LIPITOR) 10 MG tablet Take 1 tablet (10 mg total) by mouth once daily. 90 tablet 3    b complex vitamins tablet Take 1 tablet by mouth once daily. B100      celecoxib (CELEBREX) 100 MG capsule Take 1 capsule (100 mg total) by mouth 2 (two) times  daily. 180 capsule 2    cyanocobalamin (VITAMIN B-12) 100 MCG tablet Take 100 mcg by mouth once daily.      fish oil-omega-3 fatty acids 300-1,000 mg capsule Take 2 g by mouth once daily.      folic acid (FOLVITE) 1 MG tablet Take 2 tablets by mouth once daily 180 tablet 0    ginkgo biloba 60 mg Cap Take by mouth.      glucosamine-chondroitin 500-400 mg tablet Take 2 tablets by mouth once daily.       levothyroxine (SYNTHROID) 88 MCG tablet Take 1 tablet (88 mcg total) by mouth every morning. 90 tablet 3    melatonin 5 mg Cap Take by mouth as needed.       memantine (NAMENDA) 5 MG Tab Take 1 tablet (5 mg total) by mouth 2 (two) times daily. 180 tablet 1    methotrexate 2.5 MG Tab TAKE 4 TABLETS (10MG) EVERY 7 DAYS 48 tablet 3    multivitamin (THERAGRAN) per tablet Take 1 tablet by mouth once daily.      TURMERIC (CURCUMIN MISC) 750 mg by Misc.(Non-Drug; Combo Route) route once daily.       UNABLE TO FIND medication name: CBD gummies pt takes 1/2 gummy every day      UNABLE TO FIND medication name: CBD pain relief gummy 33 mg - 1 daily      VITAMIN A-C-D3-COD LIVER OIL ORAL Take by mouth once daily.       vitamin B complex-folic acid 0.4 mg Tab Take by mouth once daily.       HYDROcodone-acetaminophen (NORCO)  mg per tablet Take 1 tablet by mouth every 24 hours as needed for Pain. 30 tablet 0     No current facility-administered medications on file prior to visit.       Vitals:    10/09/23 1307   BP: 123/68   Pulse: 73       Physical Exam:    Physical Exam  Constitutional:       Appearance: Normal appearance. She is well-developed.   HENT:      Nose: No septal deviation.      Mouth/Throat:      Mouth: No oral lesions.   Eyes:      Conjunctiva/sclera:      Right eye: Right conjunctiva is not injected.      Left eye: Left conjunctiva is not injected.      Pupils: Pupils are equal, round, and reactive to light.   Neck:      Thyroid: No thyroid mass or thyromegaly.      Vascular: No JVD.   Cardiovascular:      Rate  and Rhythm: Normal rate and regular rhythm.      Pulses: Normal pulses.      Comments: No edema  Pulmonary:      Effort: Pulmonary effort is normal.      Breath sounds: Normal breath sounds.   Abdominal:      Palpations: Abdomen is soft.   Musculoskeletal:      Right shoulder: No swelling or tenderness. Normal range of motion.      Left shoulder: No swelling or tenderness. Normal range of motion.      Right elbow: No swelling. Normal range of motion. No tenderness.      Left elbow: No swelling. Normal range of motion. No tenderness.      Right wrist: Tenderness present. No swelling. Decreased range of motion.      Left wrist: Tenderness present. No swelling. Decreased range of motion.      Right hand: Tenderness present. Decreased range of motion.      Left hand: Tenderness present. Decreased range of motion.      Right hip: Normal range of motion. Normal strength.      Left hip: No tenderness. Normal range of motion.      Right knee: No swelling. Normal range of motion. No tenderness.      Left knee: No swelling. Normal range of motion. No tenderness.      Right ankle: No swelling. No tenderness. Normal range of motion.      Left ankle: No swelling. No tenderness. Normal range of motion.      Comments: 5/5 upper and lower extremity bilateral muscle strength   Lymphadenopathy:      Cervical: No cervical adenopathy.   Skin:     General: Skin is dry.   Neurological:      Deep Tendon Reflexes: Reflexes are normal and symmetric.               Assessment:       Encounter Diagnoses   Name Primary?    Rheumatoid arthritis with positive rheumatoid factor, involving unspecified site Yes    Immunosuppression     High risk medications (not anticoagulants) long-term use             Plan:        Rheumatoid arthritis with positive rheumatoid factor, involving unspecified site  -     CBC Auto Differential; Standing; Expected date: 10/09/2023  -     Comprehensive Metabolic Panel; Standing; Expected date: 10/09/2023  -      Sedimentation rate; Standing; Expected date: 10/09/2023  -     C-Reactive Protein; Standing; Expected date: 10/09/2023    Immunosuppression  -     CBC Auto Differential; Standing; Expected date: 10/09/2023  -     Comprehensive Metabolic Panel; Standing; Expected date: 10/09/2023  -     Sedimentation rate; Standing; Expected date: 10/09/2023  -     C-Reactive Protein; Standing; Expected date: 10/09/2023    High risk medications (not anticoagulants) long-term use  -     CBC Auto Differential; Standing; Expected date: 10/09/2023  -     Comprehensive Metabolic Panel; Standing; Expected date: 10/09/2023  -     Sedimentation rate; Standing; Expected date: 10/09/2023  -     C-Reactive Protein; Standing; Expected date: 10/09/2023    Rheumatoid arthritis involving both hands with positive rheumatoid factor  -     methotrexate 2.5 MG Tab; TAKE 4 TABLETS (10MG) EVERY 7 DAYS  Dispense: 48 tablet; Refill: 3  -     folic acid (FOLVITE) 1 MG tablet; Take 2 tablets by mouth once daily  Dispense: 180 tablet; Refill: 0  -     HYDROcodone-acetaminophen (NORCO)  mg per tablet; Take 1 tablet by mouth every 24 hours as needed for Pain.  Dispense: 30 tablet; Refill: 0    Rheumatoid arthritis involving both hands with negative rheumatoid factor  -     methotrexate 2.5 MG Tab; TAKE 4 TABLETS (10MG) EVERY 7 DAYS  Dispense: 48 tablet; Refill: 3    Osteoarthritis of spine with radiculopathy, thoracolumbar region  -     folic acid (FOLVITE) 1 MG tablet; Take 2 tablets by mouth once daily  Dispense: 180 tablet; Refill: 0  -     HYDROcodone-acetaminophen (NORCO)  mg per tablet; Take 1 tablet by mouth every 24 hours as needed for Pain.  Dispense: 30 tablet; Refill: 0    Spondylosis of cervical region without myelopathy or radiculopathy  -     HYDROcodone-acetaminophen (NORCO)  mg per tablet; Take 1 tablet by mouth every 24 hours as needed for Pain.  Dispense: 30 tablet; Refill: 0    Radiculopathy, unspecified spinal region  -      HYDROcodone-acetaminophen (NORCO)  mg per tablet; Take 1 tablet by mouth every 24 hours as needed for Pain.  Dispense: 30 tablet; Refill: 0      MTX continue 4 tabs weekly   Folic acid keep at 2mg weekly.   Celebrex 100mg BID will likely help with her joints as well-I see very little synovitis at this time and suspect this is residual damage that is causing bulk of pain in toes.   Prednisone 5-10-mg daily PRN-not using.   Hydrocodone for flare days as she is using infrequently  checked.       Peripheral neuropathy vs Lumbar NF encroachment.    Better since LEX.    Gabapentin too many cognitive ASE off entirely   Hydrocodone PRN using very rarely     1. Keep Methotrexate for RA- last [facility] liver ok[facility]    2. Discussed cervical spine pain reminded her she was seen with pain management Dr. Loya-    3. Continue with PRN Hydrocodone.- filled with her primary physician.       Follow up in about 4 months (around 2/9/2024).         40min consultation with greater than 50% of that time included Preparing to see the patient (review records, tests), Obtaining and/or reviewing separately obtained historical data, Performing a medically appropriate examination and/or evaluation , Ordering medications, tests, and/or procedures, Referring and communicating with other healthcare professionals , Documenting clinical information in the electronic or other health record and Independently interpreting results  (as warranted) & communicating results to the patient/family/caregiver. All questions answered.    Patient had labs from neurology Dr. Montoya dated 8/26/22: ESR and CRP WNL, CBC WNL renal function WNL.

## 2023-10-23 DIAGNOSIS — R32 URINARY INCONTINENCE, UNSPECIFIED TYPE: Primary | ICD-10-CM

## 2023-11-02 ENCOUNTER — LAB VISIT (OUTPATIENT)
Dept: LAB | Facility: HOSPITAL | Age: 78
End: 2023-11-02
Attending: INTERNAL MEDICINE
Payer: MEDICARE

## 2023-11-02 ENCOUNTER — OFFICE VISIT (OUTPATIENT)
Dept: FAMILY MEDICINE | Facility: CLINIC | Age: 78
End: 2023-11-02
Payer: MEDICARE

## 2023-11-02 VITALS
WEIGHT: 127.19 LBS | OXYGEN SATURATION: 99 % | DIASTOLIC BLOOD PRESSURE: 64 MMHG | HEIGHT: 62 IN | SYSTOLIC BLOOD PRESSURE: 124 MMHG | BODY MASS INDEX: 23.4 KG/M2 | HEART RATE: 73 BPM

## 2023-11-02 DIAGNOSIS — M65.341 TRIGGER RING FINGER OF RIGHT HAND: ICD-10-CM

## 2023-11-02 DIAGNOSIS — E03.9 ACQUIRED HYPOTHYROIDISM: ICD-10-CM

## 2023-11-02 DIAGNOSIS — E78.2 MIXED HYPERLIPIDEMIA: ICD-10-CM

## 2023-11-02 DIAGNOSIS — R32 URINARY INCONTINENCE, UNSPECIFIED TYPE: ICD-10-CM

## 2023-11-02 DIAGNOSIS — Z78.0 POST-MENOPAUSAL: ICD-10-CM

## 2023-11-02 DIAGNOSIS — M05.741 RHEUMATOID ARTHRITIS INVOLVING RIGHT HAND WITH POSITIVE RHEUMATOID FACTOR: ICD-10-CM

## 2023-11-02 DIAGNOSIS — Z00.00 ANNUAL PHYSICAL EXAM: ICD-10-CM

## 2023-11-02 DIAGNOSIS — R41.89 COGNITIVE IMPAIRMENT: Primary | ICD-10-CM

## 2023-11-02 LAB
ALBUMIN SERPL BCP-MCNC: 3.8 G/DL (ref 3.5–5.2)
ALP SERPL-CCNC: 74 U/L (ref 55–135)
ALT SERPL W/O P-5'-P-CCNC: 20 U/L (ref 10–44)
ANION GAP SERPL CALC-SCNC: 9 MMOL/L (ref 8–16)
AST SERPL-CCNC: 25 U/L (ref 10–40)
BASOPHILS # BLD AUTO: 0.05 K/UL (ref 0–0.2)
BASOPHILS NFR BLD: 0.5 % (ref 0–1.9)
BILIRUB SERPL-MCNC: 0.3 MG/DL (ref 0.1–1)
BUN SERPL-MCNC: 14 MG/DL (ref 8–23)
CALCIUM SERPL-MCNC: 9.4 MG/DL (ref 8.7–10.5)
CHLORIDE SERPL-SCNC: 99 MMOL/L (ref 95–110)
CHOLEST SERPL-MCNC: 242 MG/DL (ref 120–199)
CHOLEST/HDLC SERPL: 4.6 {RATIO} (ref 2–5)
CO2 SERPL-SCNC: 26 MMOL/L (ref 23–29)
CREAT SERPL-MCNC: 0.8 MG/DL (ref 0.5–1.4)
DIFFERENTIAL METHOD: ABNORMAL
EOSINOPHIL # BLD AUTO: 0.1 K/UL (ref 0–0.5)
EOSINOPHIL NFR BLD: 1.5 % (ref 0–8)
ERYTHROCYTE [DISTWIDTH] IN BLOOD BY AUTOMATED COUNT: 13.1 % (ref 11.5–14.5)
EST. GFR  (NO RACE VARIABLE): >60 ML/MIN/1.73 M^2
GLUCOSE SERPL-MCNC: 107 MG/DL (ref 70–110)
HCT VFR BLD AUTO: 41.5 % (ref 37–48.5)
HDLC SERPL-MCNC: 53 MG/DL (ref 40–75)
HDLC SERPL: 21.9 % (ref 20–50)
HGB BLD-MCNC: 13.9 G/DL (ref 12–16)
IMM GRANULOCYTES # BLD AUTO: 0.06 K/UL (ref 0–0.04)
IMM GRANULOCYTES NFR BLD AUTO: 0.6 % (ref 0–0.5)
LDLC SERPL CALC-MCNC: 150.4 MG/DL (ref 63–159)
LYMPHOCYTES # BLD AUTO: 1.4 K/UL (ref 1–4.8)
LYMPHOCYTES NFR BLD: 15.2 % (ref 18–48)
MCH RBC QN AUTO: 31.2 PG (ref 27–31)
MCHC RBC AUTO-ENTMCNC: 33.5 G/DL (ref 32–36)
MCV RBC AUTO: 93 FL (ref 82–98)
MONOCYTES # BLD AUTO: 0.7 K/UL (ref 0.3–1)
MONOCYTES NFR BLD: 7.8 % (ref 4–15)
NEUTROPHILS # BLD AUTO: 6.9 K/UL (ref 1.8–7.7)
NEUTROPHILS NFR BLD: 74.4 % (ref 38–73)
NONHDLC SERPL-MCNC: 189 MG/DL
NRBC BLD-RTO: 0 /100 WBC
PLATELET # BLD AUTO: 367 K/UL (ref 150–450)
PMV BLD AUTO: 9.8 FL (ref 9.2–12.9)
POTASSIUM SERPL-SCNC: 4.4 MMOL/L (ref 3.5–5.1)
PROT SERPL-MCNC: 6.9 G/DL (ref 6–8.4)
RBC # BLD AUTO: 4.46 M/UL (ref 4–5.4)
SODIUM SERPL-SCNC: 134 MMOL/L (ref 136–145)
TRIGL SERPL-MCNC: 193 MG/DL (ref 30–150)
TSH SERPL DL<=0.005 MIU/L-ACNC: 0.75 UIU/ML (ref 0.4–4)
WBC # BLD AUTO: 9.27 K/UL (ref 3.9–12.7)

## 2023-11-02 PROCEDURE — 99999 PR PBB SHADOW E&M-EST. PATIENT-LVL V: CPT | Mod: PBBFAC,HCNC,, | Performed by: INTERNAL MEDICINE

## 2023-11-02 PROCEDURE — 1101F PR PT FALLS ASSESS DOC 0-1 FALLS W/OUT INJ PAST YR: ICD-10-PCS | Mod: HCNC,CPTII,S$GLB, | Performed by: INTERNAL MEDICINE

## 2023-11-02 PROCEDURE — 1160F PR REVIEW ALL MEDS BY PRESCRIBER/CLIN PHARMACIST DOCUMENTED: ICD-10-PCS | Mod: HCNC,CPTII,S$GLB, | Performed by: INTERNAL MEDICINE

## 2023-11-02 PROCEDURE — 3288F PR FALLS RISK ASSESSMENT DOCUMENTED: ICD-10-PCS | Mod: HCNC,CPTII,S$GLB, | Performed by: INTERNAL MEDICINE

## 2023-11-02 PROCEDURE — 1126F PR PAIN SEVERITY QUANTIFIED, NO PAIN PRESENT: ICD-10-PCS | Mod: HCNC,CPTII,S$GLB, | Performed by: INTERNAL MEDICINE

## 2023-11-02 PROCEDURE — 1101F PT FALLS ASSESS-DOCD LE1/YR: CPT | Mod: HCNC,CPTII,S$GLB, | Performed by: INTERNAL MEDICINE

## 2023-11-02 PROCEDURE — 3074F SYST BP LT 130 MM HG: CPT | Mod: HCNC,CPTII,S$GLB, | Performed by: INTERNAL MEDICINE

## 2023-11-02 PROCEDURE — 3078F PR MOST RECENT DIASTOLIC BLOOD PRESSURE < 80 MM HG: ICD-10-PCS | Mod: HCNC,CPTII,S$GLB, | Performed by: INTERNAL MEDICINE

## 2023-11-02 PROCEDURE — 99214 PR OFFICE/OUTPT VISIT, EST, LEVL IV, 30-39 MIN: ICD-10-PCS | Mod: HCNC,S$GLB,, | Performed by: INTERNAL MEDICINE

## 2023-11-02 PROCEDURE — 85025 COMPLETE CBC W/AUTO DIFF WBC: CPT | Mod: HCNC | Performed by: INTERNAL MEDICINE

## 2023-11-02 PROCEDURE — 1160F RVW MEDS BY RX/DR IN RCRD: CPT | Mod: HCNC,CPTII,S$GLB, | Performed by: INTERNAL MEDICINE

## 2023-11-02 PROCEDURE — 1159F PR MEDICATION LIST DOCUMENTED IN MEDICAL RECORD: ICD-10-PCS | Mod: HCNC,CPTII,S$GLB, | Performed by: INTERNAL MEDICINE

## 2023-11-02 PROCEDURE — 3078F DIAST BP <80 MM HG: CPT | Mod: HCNC,CPTII,S$GLB, | Performed by: INTERNAL MEDICINE

## 2023-11-02 PROCEDURE — 99999 PR PBB SHADOW E&M-EST. PATIENT-LVL V: ICD-10-PCS | Mod: PBBFAC,HCNC,, | Performed by: INTERNAL MEDICINE

## 2023-11-02 PROCEDURE — 1159F MED LIST DOCD IN RCRD: CPT | Mod: HCNC,CPTII,S$GLB, | Performed by: INTERNAL MEDICINE

## 2023-11-02 PROCEDURE — 99214 OFFICE O/P EST MOD 30 MIN: CPT | Mod: HCNC,S$GLB,, | Performed by: INTERNAL MEDICINE

## 2023-11-02 PROCEDURE — 80061 LIPID PANEL: CPT | Mod: HCNC | Performed by: INTERNAL MEDICINE

## 2023-11-02 PROCEDURE — 84443 ASSAY THYROID STIM HORMONE: CPT | Mod: HCNC | Performed by: INTERNAL MEDICINE

## 2023-11-02 PROCEDURE — 36415 COLL VENOUS BLD VENIPUNCTURE: CPT | Mod: HCNC,PO | Performed by: INTERNAL MEDICINE

## 2023-11-02 PROCEDURE — 3288F FALL RISK ASSESSMENT DOCD: CPT | Mod: HCNC,CPTII,S$GLB, | Performed by: INTERNAL MEDICINE

## 2023-11-02 PROCEDURE — 1126F AMNT PAIN NOTED NONE PRSNT: CPT | Mod: HCNC,CPTII,S$GLB, | Performed by: INTERNAL MEDICINE

## 2023-11-02 PROCEDURE — 80053 COMPREHEN METABOLIC PANEL: CPT | Mod: HCNC | Performed by: INTERNAL MEDICINE

## 2023-11-02 PROCEDURE — 3074F PR MOST RECENT SYSTOLIC BLOOD PRESSURE < 130 MM HG: ICD-10-PCS | Mod: HCNC,CPTII,S$GLB, | Performed by: INTERNAL MEDICINE

## 2023-11-02 NOTE — PROGRESS NOTES
Patient ID: Fabby Guerrero is a 78 y.o. female.    Chief Complaint: Pain (Patient states she has pain in her hands and back )        Assessment and Plan      1. Cognitive impairment    2. Mixed hyperlipidemia  - Lipid Panel; Future    3. Rheumatoid arthritis involving right hand with positive rheumatoid factor  - Comprehensive Metabolic Panel; Future  - CBC Auto Differential; Future    4. Acquired hypothyroidism  - TSH; Future    5. Post-menopausal  - DXA Bone Density Axial Skeleton 1 or more sites; Future    6. Urinary incontinence, unspecified type  - Ambulatory referral/consult to Urology; Future    7. Annual physical exam     Continue current medications   To urology and Ortho   See orders     HPI     Annual     Medications, recent labs, health maintenance, and diet has been reviewed.    Exercise- walking   Alcohol- occasional   Tobacco- none     Memory issues seem to be progressing despite taking memantine.  She is about to go to 5 mg twice a day.  She is likely going to see a neurologist that her friend will recommend.  She does not know the name.    Rheumatoid arthritis-taking methotrexate and folic acid.  For pain she takes p.r.n. hydrocodone and Celebrex twice daily.  She is followed by Dr. Olivera    She will be seeing derm for basal cell carcinoma removal (Dr. Brown)     Hypothyroid is controlled on levothyroxine 88 mcg    She is having triggering of her right ring finger.  Painful at times.  Open to seeing hand specialist    Still having urinary frequency.  Especially at night.  Does not drink caffeine close to bedtime.  She took oxybutynin in the past but she was worried about it affecting her memory.  She does not remember if it worked or not.  She is open to seeing Urology again.    Also having cramps in her calves at night when trying to sleep.  Discussed taking a tsp of pickle juice when this happens.    Review of Systems   Constitutional:  Negative for fever.   Respiratory:  Negative for  shortness of breath.    Cardiovascular:  Negative for chest pain.   Gastrointestinal:  Negative for abdominal pain.        Objective     Vitals:    11/02/23 0951   BP: 124/64   Pulse: 73     Wt Readings from Last 3 Encounters:   11/02/23 0951 57.7 kg (127 lb 3.3 oz)   10/24/23 1407 61.7 kg (136 lb)   10/09/23 1307 58.5 kg (128 lb 15.5 oz)      Body mass index is 23.27 kg/m².   Physical Exam  Cardiovascular:      Rate and Rhythm: Normal rate and regular rhythm.      Heart sounds: No murmur heard.     No gallop.   Pulmonary:      Breath sounds: Normal breath sounds. No wheezing or rhonchi.   Abdominal:      Palpations: Abdomen is soft.      Tenderness: There is no abdominal tenderness.          Medication List with Changes/Refills   Current Medications    ACETAMINOPHEN (TYLENOL) 650 MG TBSR    Take 650 mg by mouth every 8 (eight) hours as needed.     ATORVASTATIN (LIPITOR) 10 MG TABLET    Take 1 tablet (10 mg total) by mouth once daily.    B COMPLEX VITAMINS TABLET    Take 1 tablet by mouth once daily. B100    CELECOXIB (CELEBREX) 100 MG CAPSULE    Take 1 capsule (100 mg total) by mouth 2 (two) times daily.    CYANOCOBALAMIN (VITAMIN B-12) 100 MCG TABLET    Take 100 mcg by mouth once daily.    FISH OIL-OMEGA-3 FATTY ACIDS 300-1,000 MG CAPSULE    Take 2 g by mouth once daily.    FOLIC ACID (FOLVITE) 1 MG TABLET    Take 2 tablets by mouth once daily    GINKGO BILOBA 60 MG CAP    Take by mouth.    GLUCOSAMINE-CHONDROITIN 500-400 MG TABLET    Take 2 tablets by mouth once daily.     HYDROCODONE-ACETAMINOPHEN (NORCO)  MG PER TABLET    Take 1 tablet by mouth every 24 hours as needed for Pain.    LEVOTHYROXINE (SYNTHROID) 88 MCG TABLET    Take 1 tablet (88 mcg total) by mouth every morning.    MELATONIN 5 MG CAP    Take by mouth as needed.     MEMANTINE (NAMENDA) 5 MG TAB    Take 1 tablet (5 mg total) by mouth 2 (two) times daily.    METHOTREXATE 2.5 MG TAB    TAKE 4 TABLETS (10MG) EVERY 7 DAYS    MULTIVITAMIN  (THERAGRAN) PER TABLET    Take 1 tablet by mouth once daily.    TURMERIC (CURCUMIN MISC)    750 mg by Misc.(Non-Drug; Combo Route) route once daily.     UNABLE TO FIND    medication name: CBD gummies pt takes 1/2 gummy every day    UNABLE TO FIND    medication name: CBD pain relief gummy 33 mg - 1 daily    VITAMIN A-C-D3-COD LIVER OIL ORAL    Take by mouth once daily.     VITAMIN B COMPLEX-FOLIC ACID 0.4 MG TAB    Take by mouth once daily.        I personally reviewed past medical, family and social history.    Patient Active Problem List   Diagnosis    Acquired hypothyroidism    Family history of colon cancer    Rheumatoid arthritis of hand    Osteoarthritis of spine with radiculopathy, thoracolumbar region    Osteoarthritis of neck    Mixed hyperlipidemia    Abnormal electrocardiogram (ECG) (EKG)    Cervical radiculopathy    Lumbar radiculopathy    Immunosuppression    Cognitive impairment    Primary insomnia

## 2023-11-03 ENCOUNTER — TELEPHONE (OUTPATIENT)
Dept: FAMILY MEDICINE | Facility: CLINIC | Age: 78
End: 2023-11-03
Payer: MEDICARE

## 2023-11-03 DIAGNOSIS — E78.2 MIXED HYPERLIPIDEMIA: ICD-10-CM

## 2023-11-03 RX ORDER — ATORVASTATIN CALCIUM 10 MG/1
10 TABLET, FILM COATED ORAL DAILY
Qty: 90 TABLET | Refills: 3 | Status: SHIPPED | OUTPATIENT
Start: 2023-11-03 | End: 2024-11-02

## 2023-11-03 NOTE — TELEPHONE ENCOUNTER
----- Message from Terry Gordon DO sent at 11/2/2023  7:55 PM CDT -----  These labs were supposed to be done in 6 months I believe.  I recommend that she start the cholesterol medicine that was prescribed for her.

## 2023-11-07 ENCOUNTER — HOSPITAL ENCOUNTER (OUTPATIENT)
Dept: RADIOLOGY | Facility: HOSPITAL | Age: 78
Discharge: HOME | End: 2023-11-07
Attending: INTERNAL MEDICINE
Payer: MEDICARE

## 2023-11-07 DIAGNOSIS — Z78.0 POST-MENOPAUSAL: ICD-10-CM

## 2023-11-07 PROCEDURE — 77080 DXA BONE DENSITY AXIAL: CPT | Mod: TC,HCNC,PO

## 2023-11-07 PROCEDURE — 77080 DXA BONE DENSITY AXIAL: CPT | Mod: 26,HCNC,, | Performed by: RADIOLOGY

## 2023-11-07 PROCEDURE — 77080 DXA BONE DENSITY AXIAL SKELETON 1 OR MORE SITES: ICD-10-PCS | Mod: 26,HCNC,, | Performed by: RADIOLOGY

## 2023-11-30 ENCOUNTER — TELEPHONE (OUTPATIENT)
Dept: NEUROLOGY | Facility: CLINIC | Age: 78
End: 2023-11-30
Payer: MEDICARE

## 2023-11-30 ENCOUNTER — OFFICE VISIT (OUTPATIENT)
Dept: FAMILY MEDICINE | Facility: CLINIC | Age: 78
End: 2023-11-30
Payer: MEDICARE

## 2023-11-30 VITALS
SYSTOLIC BLOOD PRESSURE: 120 MMHG | DIASTOLIC BLOOD PRESSURE: 62 MMHG | WEIGHT: 127.44 LBS | HEART RATE: 68 BPM | BODY MASS INDEX: 23.31 KG/M2 | OXYGEN SATURATION: 99 %

## 2023-11-30 DIAGNOSIS — R41.89 COGNITIVE IMPAIRMENT: ICD-10-CM

## 2023-11-30 DIAGNOSIS — R32 URINARY INCONTINENCE, UNSPECIFIED TYPE: Primary | ICD-10-CM

## 2023-11-30 DIAGNOSIS — M54.10 RADICULOPATHY, UNSPECIFIED SPINAL REGION: ICD-10-CM

## 2023-11-30 DIAGNOSIS — M05.741 RHEUMATOID ARTHRITIS INVOLVING BOTH HANDS WITH POSITIVE RHEUMATOID FACTOR: ICD-10-CM

## 2023-11-30 DIAGNOSIS — M05.742 RHEUMATOID ARTHRITIS INVOLVING BOTH HANDS WITH POSITIVE RHEUMATOID FACTOR: ICD-10-CM

## 2023-11-30 DIAGNOSIS — M47.25 OSTEOARTHRITIS OF SPINE WITH RADICULOPATHY, THORACOLUMBAR REGION: ICD-10-CM

## 2023-11-30 DIAGNOSIS — M47.812 SPONDYLOSIS OF CERVICAL REGION WITHOUT MYELOPATHY OR RADICULOPATHY: ICD-10-CM

## 2023-11-30 PROCEDURE — 1160F PR REVIEW ALL MEDS BY PRESCRIBER/CLIN PHARMACIST DOCUMENTED: ICD-10-PCS | Mod: HCNC,CPTII,S$GLB, | Performed by: INTERNAL MEDICINE

## 2023-11-30 PROCEDURE — 99214 OFFICE O/P EST MOD 30 MIN: CPT | Mod: HCNC,S$GLB,, | Performed by: INTERNAL MEDICINE

## 2023-11-30 PROCEDURE — 1159F PR MEDICATION LIST DOCUMENTED IN MEDICAL RECORD: ICD-10-PCS | Mod: HCNC,CPTII,S$GLB, | Performed by: INTERNAL MEDICINE

## 2023-11-30 PROCEDURE — 1101F PT FALLS ASSESS-DOCD LE1/YR: CPT | Mod: HCNC,CPTII,S$GLB, | Performed by: INTERNAL MEDICINE

## 2023-11-30 PROCEDURE — 3288F FALL RISK ASSESSMENT DOCD: CPT | Mod: HCNC,CPTII,S$GLB, | Performed by: INTERNAL MEDICINE

## 2023-11-30 PROCEDURE — 99999 PR PBB SHADOW E&M-EST. PATIENT-LVL III: ICD-10-PCS | Mod: PBBFAC,HCNC,, | Performed by: INTERNAL MEDICINE

## 2023-11-30 PROCEDURE — 3074F SYST BP LT 130 MM HG: CPT | Mod: HCNC,CPTII,S$GLB, | Performed by: INTERNAL MEDICINE

## 2023-11-30 PROCEDURE — 1160F RVW MEDS BY RX/DR IN RCRD: CPT | Mod: HCNC,CPTII,S$GLB, | Performed by: INTERNAL MEDICINE

## 2023-11-30 PROCEDURE — 99214 PR OFFICE/OUTPT VISIT, EST, LEVL IV, 30-39 MIN: ICD-10-PCS | Mod: HCNC,S$GLB,, | Performed by: INTERNAL MEDICINE

## 2023-11-30 PROCEDURE — 3078F DIAST BP <80 MM HG: CPT | Mod: HCNC,CPTII,S$GLB, | Performed by: INTERNAL MEDICINE

## 2023-11-30 PROCEDURE — 1101F PR PT FALLS ASSESS DOC 0-1 FALLS W/OUT INJ PAST YR: ICD-10-PCS | Mod: HCNC,CPTII,S$GLB, | Performed by: INTERNAL MEDICINE

## 2023-11-30 PROCEDURE — 3078F PR MOST RECENT DIASTOLIC BLOOD PRESSURE < 80 MM HG: ICD-10-PCS | Mod: HCNC,CPTII,S$GLB, | Performed by: INTERNAL MEDICINE

## 2023-11-30 PROCEDURE — 3074F PR MOST RECENT SYSTOLIC BLOOD PRESSURE < 130 MM HG: ICD-10-PCS | Mod: HCNC,CPTII,S$GLB, | Performed by: INTERNAL MEDICINE

## 2023-11-30 PROCEDURE — 99999 PR PBB SHADOW E&M-EST. PATIENT-LVL III: CPT | Mod: PBBFAC,HCNC,, | Performed by: INTERNAL MEDICINE

## 2023-11-30 PROCEDURE — 1159F MED LIST DOCD IN RCRD: CPT | Mod: HCNC,CPTII,S$GLB, | Performed by: INTERNAL MEDICINE

## 2023-11-30 PROCEDURE — 3288F PR FALLS RISK ASSESSMENT DOCUMENTED: ICD-10-PCS | Mod: HCNC,CPTII,S$GLB, | Performed by: INTERNAL MEDICINE

## 2023-11-30 RX ORDER — HYDROCODONE BITARTRATE AND ACETAMINOPHEN 10; 325 MG/1; MG/1
1 TABLET ORAL
Qty: 30 TABLET | Refills: 0 | Status: SHIPPED | OUTPATIENT
Start: 2023-11-30 | End: 2023-12-05 | Stop reason: SDUPTHER

## 2023-11-30 NOTE — PROGRESS NOTES
Patient ID: Fabby Guerrero is a 78 y.o. female.    Chief Complaint: bmd       Problem HPI/Assessment Plan    Follow up on dexa which showed showed frax of 16/4.3%. Tooth extraction yesterday. Discussed potential sfx problems with bisphosphonates and tooth extractions Will hold off on bisphosphonate for now.   Urinary incontinence Feels like she has to urinate all of the time. Wakes her up 5-6x at night. Does not think it is all the vitamins she takes.     Last visit   Still having urinary frequency. Especially at night. Does not drink caffeine close to bedtime. She took oxybutynin in the past but she was worried about it affecting her memory. She does not remember if it worked or not. She is open to seeing Urology again. Declines UA today- needs to see urology.    Rheumatoid arthritis taking methotrexate and folic acid.  For pain she takes p.r.n. hydrocodone and Celebrex twice daily.  She is followed by Dr. Olivera Follow-up rheumatology. Needs refill on hydrocodone- refilled   Hypothyroid Controlled Continue levothyroxine 88 mcg   Immunosuppression Secondary to methotrexate, no signs of infection Monitor   Cognitive impairment Past [place] 21/30, taking memantine Monitor   Osteopenia with elevated frax.  After discussion of risks and benefits she chose to hold off on bisphosphonate Monitor        Review of Systems   Constitutional:  Negative for fever.   Respiratory:  Negative for shortness of breath.    Cardiovascular:  Negative for chest pain.   Gastrointestinal:  Negative for abdominal pain.        Orders   1. Rheumatoid arthritis involving both hands with positive rheumatoid factor  - HYDROcodone-acetaminophen (NORCO)  mg per tablet; Take 1 tablet by mouth every 24 hours as needed for Pain.  Dispense: 30 tablet; Refill: 0    2. Osteoarthritis of spine with radiculopathy, thoracolumbar region  - HYDROcodone-acetaminophen (NORCO)  mg per tablet; Take 1 tablet by mouth every 24 hours as needed for  Pain.  Dispense: 30 tablet; Refill: 0    3. Spondylosis of cervical region without myelopathy or radiculopathy  - HYDROcodone-acetaminophen (NORCO)  mg per tablet; Take 1 tablet by mouth every 24 hours as needed for Pain.  Dispense: 30 tablet; Refill: 0    4. Radiculopathy, unspecified spinal region  - HYDROcodone-acetaminophen (NORCO)  mg per tablet; Take 1 tablet by mouth every 24 hours as needed for Pain.  Dispense: 30 tablet; Refill: 0    5. Urinary incontinence, unspecified type  - Ambulatory referral/consult to Urology; Future    6. Cognitive impairment  - Ambulatory referral/consult to Neurology; Future         Objective   Vitals:    11/30/23 1045   BP: 120/62   Pulse: 68     Wt Readings from Last 3 Encounters:   11/30/23 1045 57.8 kg (127 lb 6.8 oz)   11/13/23 1347 56.7 kg (125 lb)   11/02/23 0951 57.7 kg (127 lb 3.3 oz)      Body mass index is 23.31 kg/m².   Physical Exam  Cardiovascular:      Rate and Rhythm: Normal rate and regular rhythm.      Heart sounds: No murmur heard.     No gallop.   Pulmonary:      Breath sounds: Normal breath sounds. No wheezing or rhonchi.   Abdominal:      Palpations: Abdomen is soft.      Tenderness: There is no abdominal tenderness.          Medication List with Changes/Refills   Current Medications    ACETAMINOPHEN (TYLENOL) 650 MG TBSR    Take 650 mg by mouth every 8 (eight) hours as needed.     ATORVASTATIN (LIPITOR) 10 MG TABLET    Take 1 tablet (10 mg total) by mouth once daily.    B COMPLEX VITAMINS TABLET    Take 1 tablet by mouth once daily. B100    CELECOXIB (CELEBREX) 100 MG CAPSULE    Take 1 capsule (100 mg total) by mouth 2 (two) times daily.    CYANOCOBALAMIN (VITAMIN B-12) 100 MCG TABLET    Take 100 mcg by mouth once daily.    FISH OIL-OMEGA-3 FATTY ACIDS 300-1,000 MG CAPSULE    Take 2 g by mouth once daily.    FOLIC ACID (FOLVITE) 1 MG TABLET    Take 2 tablets by mouth once daily    GINKGO BILOBA 60 MG CAP    Take by mouth.     GLUCOSAMINE-CHONDROITIN 500-400 MG TABLET    Take 2 tablets by mouth once daily.     LEVOTHYROXINE (SYNTHROID) 88 MCG TABLET    Take 1 tablet (88 mcg total) by mouth every morning.    MELATONIN 5 MG CAP    Take by mouth as needed.     MEMANTINE (NAMENDA) 5 MG TAB    Take 1 tablet (5 mg total) by mouth 2 (two) times daily.    METHOTREXATE 2.5 MG TAB    TAKE 4 TABLETS (10MG) EVERY 7 DAYS    MULTIVITAMIN (THERAGRAN) PER TABLET    Take 1 tablet by mouth once daily.    TURMERIC (CURCUMIN MISC)    750 mg by Misc.(Non-Drug; Combo Route) route once daily.     UNABLE TO FIND    medication name: CBD gummies pt takes 1/2 gummy every day    UNABLE TO FIND    medication name: CBD pain relief gummy 33 mg - 1 daily    VITAMIN A-C-D3-COD LIVER OIL ORAL    Take by mouth once daily.     VITAMIN B COMPLEX-FOLIC ACID 0.4 MG TAB    Take by mouth once daily.    Changed and/or Refilled Medications    Modified Medication Previous Medication    HYDROCODONE-ACETAMINOPHEN (NORCO)  MG PER TABLET HYDROcodone-acetaminophen (NORCO)  mg per tablet       Take 1 tablet by mouth every 24 hours as needed for Pain.    Take 1 tablet by mouth every 24 hours as needed for Pain.       I personally reviewed past medical, family and social history.    Patient Active Problem List   Diagnosis    Acquired hypothyroidism    Family history of colon cancer    Rheumatoid arthritis of hand    Osteoarthritis of spine with radiculopathy, thoracolumbar region    Osteoarthritis of neck    Mixed hyperlipidemia    Abnormal electrocardiogram (ECG) (EKG)    Cervical radiculopathy    Lumbar radiculopathy    Immunosuppression    Cognitive impairment    Primary insomnia

## 2023-12-01 ENCOUNTER — TELEPHONE (OUTPATIENT)
Dept: NEUROLOGY | Facility: CLINIC | Age: 78
End: 2023-12-01
Payer: MEDICARE

## 2023-12-01 ENCOUNTER — TELEPHONE (OUTPATIENT)
Dept: FAMILY MEDICINE | Facility: CLINIC | Age: 78
End: 2023-12-01
Payer: MEDICARE

## 2023-12-02 NOTE — TELEPHONE ENCOUNTER
Spoke to patient- stated the pharmacy did not have HYDROcodone-acetaminophen (NORCO) but she will wait for it to come in on Monday

## 2023-12-02 NOTE — TELEPHONE ENCOUNTER
----- Message from Miles Carias sent at 11/30/2023  3:58 PM CST -----  Type: Needs Medical Advice  Who Called:  pt   Pharmacy name and phone #:    [facility] - [place] - [address] [address] [facility] [address]  [address] [address]  [place] [address]  Phone: [phone] Fax: [phone]  Best Call Back Number: [phone]    Additional Information: pt stated she would like to get clarification on which rx is okay to take with pt's pain rx sent today please call back to advise, confirm, and clarify asap thanks!

## 2023-12-02 NOTE — TELEPHONE ENCOUNTER
Patient would like to clarify which medications she can take.  Please read her message and advise.

## 2023-12-02 NOTE — TELEPHONE ENCOUNTER
I do not understand the message.  More information is needed so I can properly answer the question.

## 2023-12-05 ENCOUNTER — TELEPHONE (OUTPATIENT)
Dept: NEUROLOGY | Facility: CLINIC | Age: 78
End: 2023-12-05
Payer: MEDICARE

## 2023-12-05 DIAGNOSIS — M47.25 OSTEOARTHRITIS OF SPINE WITH RADICULOPATHY, THORACOLUMBAR REGION: ICD-10-CM

## 2023-12-05 DIAGNOSIS — M47.812 SPONDYLOSIS OF CERVICAL REGION WITHOUT MYELOPATHY OR RADICULOPATHY: ICD-10-CM

## 2023-12-05 DIAGNOSIS — M05.742 RHEUMATOID ARTHRITIS INVOLVING BOTH HANDS WITH POSITIVE RHEUMATOID FACTOR: ICD-10-CM

## 2023-12-05 DIAGNOSIS — M54.10 RADICULOPATHY, UNSPECIFIED SPINAL REGION: ICD-10-CM

## 2023-12-05 DIAGNOSIS — M05.741 RHEUMATOID ARTHRITIS INVOLVING BOTH HANDS WITH POSITIVE RHEUMATOID FACTOR: ICD-10-CM

## 2023-12-05 RX ORDER — HYDROCODONE BITARTRATE AND ACETAMINOPHEN 10; 325 MG/1; MG/1
1 TABLET ORAL
Qty: 30 TABLET | Refills: 0 | Status: SHIPPED | OUTPATIENT
Start: 2023-12-05 | End: 2024-03-20 | Stop reason: SDUPTHER

## 2023-12-05 NOTE — TELEPHONE ENCOUNTER
No care due was identified.  [facility] Embedded Care Due Messages. Reference number: [id number].   12/05/2023 10:15:11 AM CST

## 2023-12-05 NOTE — TELEPHONE ENCOUNTER
----- Message from Adali Wong sent at 12/4/2023  3:34 PM CST -----  Contact: Self  Pt is calling in regards to the medication  HYDROcodone-acetaminophen (NORCO), stated [facility] never got them in stock and wants to know if we can cancel that one and have it sent to:    [facility] [address] [facility] [place] [address] [place] [address]  Phone: [phone] Fax: [phone]    Can we please check on this and call pt back to advise at [phone]. Thank You.

## 2024-01-02 DIAGNOSIS — M47.25 OSTEOARTHRITIS OF SPINE WITH RADICULOPATHY, THORACOLUMBAR REGION: ICD-10-CM

## 2024-01-02 DIAGNOSIS — M05.741 RHEUMATOID ARTHRITIS INVOLVING BOTH HANDS WITH POSITIVE RHEUMATOID FACTOR: ICD-10-CM

## 2024-01-02 DIAGNOSIS — M05.742 RHEUMATOID ARTHRITIS INVOLVING BOTH HANDS WITH POSITIVE RHEUMATOID FACTOR: ICD-10-CM

## 2024-01-02 DIAGNOSIS — M47.812 SPONDYLOSIS OF CERVICAL REGION WITHOUT MYELOPATHY OR RADICULOPATHY: ICD-10-CM

## 2024-01-02 DIAGNOSIS — M54.10 RADICULOPATHY, UNSPECIFIED SPINAL REGION: ICD-10-CM

## 2024-01-02 RX ORDER — CELECOXIB 100 MG/1
100 CAPSULE ORAL 2 TIMES DAILY
Qty: 180 CAPSULE | Refills: 2 | Status: SHIPPED | OUTPATIENT
Start: 2024-01-02

## 2024-01-02 NOTE — TELEPHONE ENCOUNTER
No care due was identified.  [facility] Embedded Care Due Messages. Reference number: [id number].   1/02/2024 12:17:51 PM CST

## 2024-01-11 ENCOUNTER — TELEPHONE (OUTPATIENT)
Dept: FAMILY MEDICINE | Facility: CLINIC | Age: 79
End: 2024-01-11
Payer: MEDICARE

## 2024-01-11 NOTE — TELEPHONE ENCOUNTER
Contacted pt rescheduling appt however pt made an appt with dentist     ----- Message from Joey Rush sent at 1/11/2024  9:10 AM CST -----  Type:  Same Day Appointment Request    Caller is requesting a same day appointment.  Caller declined first available appointment listed below.      Name of Caller:  Patient  When is the first available appointment?  01/23/24  Symptoms:  Pain -inside of mouth  Best Call Back Number:   [phone] or [phone]  Additional Information:

## 2024-02-05 ENCOUNTER — TELEPHONE (OUTPATIENT)
Dept: UROLOGY | Facility: CLINIC | Age: 79
End: 2024-02-05
Payer: MEDICARE

## 2024-02-05 ENCOUNTER — OFFICE VISIT (OUTPATIENT)
Dept: UROLOGY | Facility: CLINIC | Age: 79
End: 2024-02-05
Payer: MEDICARE

## 2024-02-05 VITALS — HEIGHT: 62 IN | WEIGHT: 128.06 LBS | BODY MASS INDEX: 23.57 KG/M2

## 2024-02-05 DIAGNOSIS — R32 URINARY INCONTINENCE, UNSPECIFIED TYPE: Primary | ICD-10-CM

## 2024-02-05 LAB
BILIRUBIN, UA POC OHS: NEGATIVE
BLOOD, UA POC OHS: NEGATIVE
CLARITY, UA POC OHS: CLEAR
COLOR, UA POC OHS: YELLOW
GLUCOSE, UA POC OHS: NEGATIVE
KETONES, UA POC OHS: NEGATIVE
LEUKOCYTES, UA POC OHS: NEGATIVE
NITRITE, UA POC OHS: NEGATIVE
PH, UA POC OHS: 7
POC RESIDUAL URINE VOLUME: 0 ML (ref 0–100)
PROTEIN, UA POC OHS: NEGATIVE
SPECIFIC GRAVITY, UA POC OHS: 1.01
UROBILINOGEN, UA POC OHS: 0.2

## 2024-02-05 PROCEDURE — 99204 OFFICE O/P NEW MOD 45 MIN: CPT | Mod: HCNC,S$GLB,, | Performed by: UROLOGY

## 2024-02-05 PROCEDURE — 3288F FALL RISK ASSESSMENT DOCD: CPT | Mod: HCNC,CPTII,S$GLB, | Performed by: UROLOGY

## 2024-02-05 PROCEDURE — 1159F MED LIST DOCD IN RCRD: CPT | Mod: HCNC,CPTII,S$GLB, | Performed by: UROLOGY

## 2024-02-05 PROCEDURE — 99999 PR PBB SHADOW E&M-EST. PATIENT-LVL III: CPT | Mod: PBBFAC,HCNC,, | Performed by: UROLOGY

## 2024-02-05 PROCEDURE — 1126F AMNT PAIN NOTED NONE PRSNT: CPT | Mod: HCNC,CPTII,S$GLB, | Performed by: UROLOGY

## 2024-02-05 PROCEDURE — 81003 URINALYSIS AUTO W/O SCOPE: CPT | Mod: QW,HCNC,S$GLB, | Performed by: UROLOGY

## 2024-02-05 PROCEDURE — 1101F PT FALLS ASSESS-DOCD LE1/YR: CPT | Mod: HCNC,CPTII,S$GLB, | Performed by: UROLOGY

## 2024-02-05 PROCEDURE — 51798 US URINE CAPACITY MEASURE: CPT | Mod: HCNC,S$GLB,, | Performed by: UROLOGY

## 2024-02-05 RX ORDER — VIBEGRON 75 MG/1
75 TABLET, FILM COATED ORAL DAILY
Qty: 30 TABLET | Refills: 11 | Status: SHIPPED | OUTPATIENT
Start: 2024-02-05 | End: 2024-02-20 | Stop reason: SDUPTHER

## 2024-02-05 RX ORDER — VIBEGRON 75 MG/1
75 TABLET, FILM COATED ORAL DAILY
Qty: 30 TABLET | Refills: 11 | Status: SHIPPED | OUTPATIENT
Start: 2024-02-05 | End: 2024-02-05 | Stop reason: SDUPTHER

## 2024-02-05 NOTE — TELEPHONE ENCOUNTER
Contacted pt re: scheduling SUDS/cysto for Dr. Rendon at [facility]. Unable to leave a voicemail given VM box is not set up.

## 2024-02-05 NOTE — PROGRESS NOTES
[facility]      New Overactive Bladder (OAB) Note    2024    Referred by:  Terry Gordon,     HPI: Fabby Guerrero is a very pleasant 78 y.o. female who is a new patient to our department referred for evaluation of urinary incontinence of several years duration. She reports bother is associated with urinary frequency (24hrs) (12-15x daily), with nocturia (5x per night) and with urgency that does not result in urinary incontinence. She reports no stress urinary incontinence associated with exertion. She does not require daily pad use.  She has decreased overall fluid intake.  She reports urinary urgency is day and night but more predominant at night. Patient reports urgency is independent of position.     She denies symptoms of irritative voiding including dysuria. She reports symptoms of obstructive voiding including hesitancy and sense of incomplete emptying. Bladder scan PVR was 0 mL. Her history includes prior pelvic surgery ( hysterectomy (for benign disease))  but excludes a history of urolithiasis, hematuria, neurological symptoms/diagnoses, incontinence procedures, and prolapse surgeries. She denies all other prior pelvic surgeries or neurologic diagnoses. She does not report symptoms suggestive of advanced POP. She denies a history of constipation. She denies a history suggestive of glaucoma.  She reports existing mild memory loss.     Previous incontinence therapies:  Behavioral Therapies  : (fluid/dietary modification) -  provided no benefit  Medications  mirabegron (Myrbetriq) 50 mg 6 months (provided some benefit; did make voiding more difficult)  Other Therapies  No Other Therapies    A review of 10+ systems was conducted with pertinent positive and negative findings documented in HPI with all other systems reviewed and negative.    Past medical, family, surgical and social history reviewed as documented in chart with pertinent positive medical, family, surgical  and social history detailed in HPI.    Exam Findings:    Deferred to Cystoscopy Const: no acute distress, conversant and alert  Eyes: anicteric, extraocular muscles intact  ENMT: normocephalic, Nl oral membranes  Cardio: no cyanosis, nl cap refill  Pulm: no tachypnea; no resp distress  Abd: soft, no tenderness  Musc: no laceration, no tenderness  Neuro: alert; oriented x 3  Skin: warm, dry; no petichiae  Psych: no anxiety; normal speech     Assessment/Plan:    Overactive Bladder with Urgency Incontinence (new, addt'l workup): Her history is suggestive of Overactive Bladder (OAB) without predominantly Urgency Urinary Incontinence (UUI). The presence or absence of incontinence as well as the relative contribution of stress or urgency incontinence can be further clarified with a 3-day volume-based voiding/incontinence diary provided today. This will also help to screen for polyuria and help to guide us on further counseling on behavioral therapy including timed voiding and bladder training. We will review this on her return visit.     Her reported symptoms today are relatively moderate and therefore, I believe it would be appropriate to continue pharmacologic therapy in addition to behavioral therapies. However, given the limited options she has with medications (due to memory impairment) and voiding difficulty, she will be tentatively planned for UDS and cystoscopy. This can be cancelled if she improves.

## 2024-02-06 ENCOUNTER — TELEPHONE (OUTPATIENT)
Dept: UROLOGY | Facility: CLINIC | Age: 79
End: 2024-02-06
Payer: MEDICARE

## 2024-02-08 ENCOUNTER — TELEPHONE (OUTPATIENT)
Dept: UROLOGY | Facility: CLINIC | Age: 79
End: 2024-02-08
Payer: MEDICARE

## 2024-02-08 NOTE — TELEPHONE ENCOUNTER
Spoke with patient re: scheduling for UDS and cystoscopy procedure for [facility]. Pt states that she did not know that further testing was needed. Pt states that she will have to think about this, pt asked for proper spelling of the noted procedures to look it over and asked for callback number if she decided to proceed with scheduling at a later time. I provided pt with the inquired information and the Urology CC # [phone] for when ready to schedule.

## 2024-02-20 ENCOUNTER — TELEPHONE (OUTPATIENT)
Dept: UROLOGY | Facility: CLINIC | Age: 79
End: 2024-02-20
Payer: MEDICARE

## 2024-02-20 RX ORDER — VIBEGRON 75 MG/1
75 TABLET, FILM COATED ORAL DAILY
Qty: 30 TABLET | Refills: 11 | Status: SHIPPED | OUTPATIENT
Start: 2024-02-20

## 2024-02-20 NOTE — TELEPHONE ENCOUNTER
Called pt to inform her that her Rx was sent into [facility]. No VM box set up.    ----- Message from Devyn Rendon MD sent at 2/20/2024 12:44 PM CST -----  New prescription sent to [facility].   CG  ----- Message -----  From: Linnea Escalante MA  Sent: 2/19/2024   3:06 PM CST  To: Devyn Rendon MD    She wants to transfer the Rx to [facility]. After telling her there wasn't an alternative, she would like to pay for the medication    ----- Message -----  From: Devyn Rendon MD  Sent: 2/19/2024   2:54 PM CST  To: VEL Abrams,  I may be misunderstanding. I don't have an alternative medication to prescribe.   CG  ----- Message -----  From: Linnea Escalante MA  Sent: 2/19/2024   2:47 PM CST  To: Devyn Rendon MD    Pt would like it to be sent to:    [facility] [address] [facility] [address] [address] [address]   Phone: [phone] Fax: [phone]       ----- Message -----  From: Devyn Rendon MD  Sent: 2/19/2024   1:45 PM CST  To: Linnea Escalante MA    No alternative medication available for her needs.   Would recommend that we proceed with urodynamics/cystoscopy as scheduled.   CG  ----- Message -----  From: Linnea Escalante MA  Sent: 2/19/2024   1:43 PM CST  To: Devyn Rendon MD    Pt requesting alternative Rx be sent into her pharmacy    ----- Message -----  From: Tahmina Anne  Sent: 2/19/2024  11:36 AM CST  To: Justice Shepard Staff    Who Called: Pt    What is the request in detail: Requesting call back to discuss recent Rx being too expensive. Pt needs alternative. Please advise    [facility] - [place], [place] - [address] [address] [address]  [address] [address] [place] [address]  Phone: [phone] Fax: [phone]    Can the clinic reply by [facility]? No    Best Call Back Number: [phone] or [phone]      Additional Information:

## 2024-02-20 NOTE — TELEPHONE ENCOUNTER
Attempted to contact pt in regards to message received. Pt did not answer call and no vm was able to be left.       Jasmyne Nieto Staff  Caller: pt [phone] (Today,  1:24 PM)  Pt calling to speak to nurse regarding procedure, location, coverage and insurance auth

## 2024-02-26 ENCOUNTER — TELEPHONE (OUTPATIENT)
Dept: UROLOGY | Facility: CLINIC | Age: 79
End: 2024-02-26
Payer: MEDICARE

## 2024-02-26 NOTE — TELEPHONE ENCOUNTER
Contacted patient to answer questions regarding upcoming appt. Notified patient that the procedures she will be having are the urodynamics study and a cystoscopy. Informed patient that she may eat and take regularly prescribed medications. Asked pt to arrive 15 mins early and be prepared to urinate as part of the test.   Pt also concerned about insurance and whether tests are authorized. Concerned that she may have to pay at time of appointment. Instructed patient that as appointments are scheduled, they are sent to insurance company for authorization and to get the most accurate information on authorization, she should contact her insurance company directly. Pt stated that she will contact insurance company to verify.  All questions/concerns addressed.

## 2024-03-05 DIAGNOSIS — M05.741 RHEUMATOID ARTHRITIS INVOLVING BOTH HANDS WITH POSITIVE RHEUMATOID FACTOR: ICD-10-CM

## 2024-03-05 DIAGNOSIS — M47.25 OSTEOARTHRITIS OF SPINE WITH RADICULOPATHY, THORACOLUMBAR REGION: ICD-10-CM

## 2024-03-05 DIAGNOSIS — M05.742 RHEUMATOID ARTHRITIS INVOLVING BOTH HANDS WITH POSITIVE RHEUMATOID FACTOR: ICD-10-CM

## 2024-03-05 RX ORDER — FOLIC ACID 1 MG/1
TABLET ORAL
Qty: 180 TABLET | Refills: 0 | Status: SHIPPED | OUTPATIENT
Start: 2024-03-05 | End: 2024-03-20 | Stop reason: SDUPTHER

## 2024-03-06 ENCOUNTER — PROCEDURE VISIT (OUTPATIENT)
Dept: UROLOGY | Facility: CLINIC | Age: 79
End: 2024-03-06
Payer: MEDICARE

## 2024-03-06 VITALS
RESPIRATION RATE: 18 BRPM | HEART RATE: 75 BPM | BODY MASS INDEX: 23.45 KG/M2 | WEIGHT: 127.44 LBS | SYSTOLIC BLOOD PRESSURE: 121 MMHG | TEMPERATURE: 98 F | HEIGHT: 62 IN | DIASTOLIC BLOOD PRESSURE: 71 MMHG

## 2024-03-06 DIAGNOSIS — R32 URINARY INCONTINENCE, UNSPECIFIED TYPE: ICD-10-CM

## 2024-03-06 PROCEDURE — 51797 INTRAABDOMINAL PRESSURE TEST: CPT | Mod: 26,S$GLB,, | Performed by: UROLOGY

## 2024-03-06 PROCEDURE — 52000 CYSTOURETHROSCOPY: CPT | Mod: 59,S$GLB,, | Performed by: UROLOGY

## 2024-03-06 PROCEDURE — 51741 ELECTRO-UROFLOWMETRY FIRST: CPT | Mod: 26,51,S$GLB, | Performed by: UROLOGY

## 2024-03-06 PROCEDURE — 51784 ANAL/URINARY MUSCLE STUDY: CPT | Mod: 26,51,S$GLB, | Performed by: UROLOGY

## 2024-03-06 PROCEDURE — 51728 CYSTOMETROGRAM W/VP: CPT | Mod: 26,S$GLB,, | Performed by: UROLOGY

## 2024-03-06 RX ORDER — LIDOCAINE HYDROCHLORIDE 20 MG/ML
JELLY TOPICAL
Status: COMPLETED | OUTPATIENT
Start: 2024-03-06 | End: 2024-03-06

## 2024-03-06 RX ADMIN — LIDOCAINE HYDROCHLORIDE: 20 JELLY TOPICAL at 01:03

## 2024-03-06 NOTE — PATIENT INSTRUCTIONS
SIMPLE URODYNAMIC STUDY (SUDS) & CYSTOSCOPY  UROLOGY CLINIC DISCHARGE INSTRUCTIONS    You have had a procedure that will require time to properly heal. Follow the instructions you have been given on how to care for yourself once you are home. Below is additional information to help in your recovery.    ACTIVITY  There are no restrictions in activity. Start doing again the things you did before the procedure.  You may experience a slight burning sensation. You may notice a small amount of blood in your urine. This will clear up within a day. Call the clinic if this continues beyond 48 hours.    DIET  Continue your normal diet. You may eat the same foods you ate before your procedure.  Drink plenty of fluids during the first 24-48 hours following your procedure.    MEDICATIONS  Resume all other previous medications from your prescribing physician.  Continue any pre=procedure antibiotics until they are all gone.    SIGNS AND SYMPTOMS TO REPORT TO THE DOCTOR  Chills or fever greater than 101° F within 24 hours of procedure.  Changes in urination, such as increased bleeding, foul smell, cloudy urine, or painful urination.  Call your doctor with any questions or concerns.    For any emergency situation, call 911 immediately or go to your nearest emergency room.    [facility]  [phone]

## 2024-03-06 NOTE — PROCEDURES
Urodynamic Report    [facility]       Referring Physician:  Devyn Rendon MD    YOB: 1945  Date of Exam: 3/6/2024    HPI: Fabby Guerrero is a very pleasant 78 y.o. female who is a new patient to our department referred for evaluation of urinary incontinence of several years duration. She reports bother is associated with urinary frequency (24hrs) (12-15x daily), with nocturia (5x per night) and with urgency that does not result in urinary incontinence. She reports no stress urinary incontinence associated with exertion. She does not require daily pad use.  She has decreased overall fluid intake.  She reports urinary urgency is day and night but more predominant at night. Patient reports urgency is independent of position.      She denies symptoms of irritative voiding including dysuria. She reports symptoms of obstructive voiding including hesitancy and sense of incomplete emptying. Bladder scan PVR was 0 mL. Her history includes prior pelvic surgery ( hysterectomy (for benign disease))  but excludes a history of urolithiasis, hematuria, neurological symptoms/diagnoses, incontinence procedures, and prolapse surgeries. She denies all other prior pelvic surgeries or neurologic diagnoses. She does not report symptoms suggestive of advanced POP. She denies a history of constipation. She denies a history suggestive of glaucoma.  She reports existing mild memory loss.      Previous incontinence therapies:  Behavioral Therapies  : (fluid/dietary modification) -  provided no benefit  Medications  mirabegron (Myrbetriq) 50 mg 6 months (provided some benefit; did make voiding more difficult)  Other Therapies  No Other Therapies     Cystometrogram:    Position: Sitting  Filling Rate: 30 mL/sec   Catheter: 7F  Fluid:Water       Cath PVR prior: 50 mL Voiding Diary Capacity: Not Available   First Sensation: 120 mL First Desire: 120 mL   Strong Desire: 208 mL Cystometric  Capacity: 300 mL       Pdet at [facility]: 0 cm H2O Compliance: Normal       Detrusor Overactivity (DO): Absent  Volume first DO: No DO   Max DO Pressure: No DO Urgency Incontinence: Absent        Leak Point Pressure Testing:        Volume Tested: 150 mL  Abdominal Leak Point Pressure:  60 cmH2O   Stress Induced DO: Absent          Voiding Study:        Voided Volume: 0 mL PVR: 300 mL   Maximum Flow: 0  mL/sec Average Flow 0 mL/sec   Max Pdet: 0 cmH2O  Pdet at Max Flow: 0 cmH2O   EMG Storage: Normal Recruitment  EMG Voiding: Flaring with valsalva   **Able to void following the study**        Impression:        Sensation:Early    Capacity: Normal    Compliance:Normal    Detrusor Overactivity:Absent    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 On filling phase she demonstrates early first and strong desire with a normal bladder capacity. There is no detrusor overactivity (DO) demonstrated on this exam (though absence of DO on urodynamic evaluation does not exclude it as causative agent of urgency symptoms). Bladder compliance at capacity is normal.     On stress testing, with adequate cough and valsalva effort, there is VINNIE demonstrated at pressures above those traditionally associated with intrinsic sphincter deficiency (ISD).    On voiding phase, dysfunctional voiding is present with no underlying detrusor contraction demonstrated. Voiding is with valsalva voiding only. However, following catheter removal, she was able to void to completion. She reports hesitancy since starting Gemtesa and this may be impacting her voiding.     Cystoscopy Details: Informed consent was obtained and she was sterily prepped and 1% lidocaine jelly was injected per urethra. A  flexible cystoscope was inserted into the bladder via the urethra. There was no evidence of stricture, stenosis, lesions, other obstruction, or diverticulum. Significant findings included a normal unobstructed urethra only. Cystoscopic examination of the bladder revealed orthotopically positioned, normal bilateral ureteral orifices with clear yellow urine effluxing from each orifice. All mucosal surfaces were examined with no apparent stones, tumors, foreign bodies, erythema, trabeculation, diverticula, or ulcers. The procedure was concluded without complications. The patient was not administered a post-procedure antibiotic.     Refractory Overactive Bladder: Very limited in medication choice; had had voiding difficulty reported with Gemtesa but also not much improvement in frequency/urgency. Discussed options for third-line therapies including Botox and SNM as well as future therapies in the pipeline. She wants to discontinue the Gemtesa and observe her symptoms for now. Will return in 3 months to discuss.

## 2024-03-12 DIAGNOSIS — R41.89 COGNITIVE IMPAIRMENT: ICD-10-CM

## 2024-03-12 RX ORDER — MEMANTINE HYDROCHLORIDE 5 MG/1
5 TABLET ORAL 2 TIMES DAILY
Qty: 180 TABLET | Refills: 3 | Status: SHIPPED | OUTPATIENT
Start: 2024-03-12 | End: 2024-03-21

## 2024-03-18 ENCOUNTER — TELEPHONE (OUTPATIENT)
Dept: RHEUMATOLOGY | Facility: CLINIC | Age: 79
End: 2024-03-18
Payer: MEDICARE

## 2024-03-18 NOTE — TELEPHONE ENCOUNTER
----- Message from Nunu Sanabria sent at 3/18/2024  8:31 AM CDT -----  Regarding: Appt  Type:  Same Day Appointment Request    Caller is requesting a same day appointment.  Caller declined first available appointment listed below.    Name of Caller:Pt    When is the first available appointment?3/20    Telephone [phone]      Pt st she is in a lot of pain and would liek to see the Dr today. Thank you

## 2024-03-20 ENCOUNTER — OFFICE VISIT (OUTPATIENT)
Dept: RHEUMATOLOGY | Facility: CLINIC | Age: 79
End: 2024-03-20
Payer: MEDICARE

## 2024-03-20 VITALS
WEIGHT: 127.31 LBS | HEART RATE: 80 BPM | HEIGHT: 62 IN | DIASTOLIC BLOOD PRESSURE: 65 MMHG | BODY MASS INDEX: 23.43 KG/M2 | SYSTOLIC BLOOD PRESSURE: 127 MMHG

## 2024-03-20 DIAGNOSIS — M54.10 RADICULOPATHY, UNSPECIFIED SPINAL REGION: ICD-10-CM

## 2024-03-20 DIAGNOSIS — M05.741 RHEUMATOID ARTHRITIS INVOLVING BOTH HANDS WITH POSITIVE RHEUMATOID FACTOR: ICD-10-CM

## 2024-03-20 DIAGNOSIS — Z79.899 HIGH RISK MEDICATIONS (NOT ANTICOAGULANTS) LONG-TERM USE: Primary | ICD-10-CM

## 2024-03-20 DIAGNOSIS — M06.042 RHEUMATOID ARTHRITIS INVOLVING BOTH HANDS WITH NEGATIVE RHEUMATOID FACTOR: ICD-10-CM

## 2024-03-20 DIAGNOSIS — M47.25 OSTEOARTHRITIS OF SPINE WITH RADICULOPATHY, THORACOLUMBAR REGION: ICD-10-CM

## 2024-03-20 DIAGNOSIS — M06.041 RHEUMATOID ARTHRITIS INVOLVING BOTH HANDS WITH NEGATIVE RHEUMATOID FACTOR: ICD-10-CM

## 2024-03-20 DIAGNOSIS — M05.742 RHEUMATOID ARTHRITIS INVOLVING BOTH HANDS WITH POSITIVE RHEUMATOID FACTOR: ICD-10-CM

## 2024-03-20 DIAGNOSIS — M47.812 SPONDYLOSIS OF CERVICAL REGION WITHOUT MYELOPATHY OR RADICULOPATHY: ICD-10-CM

## 2024-03-20 PROCEDURE — 96372 THER/PROPH/DIAG INJ SC/IM: CPT | Mod: HCNC,S$GLB,, | Performed by: INTERNAL MEDICINE

## 2024-03-20 PROCEDURE — 99999 PR PBB SHADOW E&M-EST. PATIENT-LVL IV: CPT | Mod: PBBFAC,HCNC,, | Performed by: INTERNAL MEDICINE

## 2024-03-20 PROCEDURE — 1101F PT FALLS ASSESS-DOCD LE1/YR: CPT | Mod: HCNC,CPTII,S$GLB, | Performed by: INTERNAL MEDICINE

## 2024-03-20 PROCEDURE — 3288F FALL RISK ASSESSMENT DOCD: CPT | Mod: HCNC,CPTII,S$GLB, | Performed by: INTERNAL MEDICINE

## 2024-03-20 PROCEDURE — 99214 OFFICE O/P EST MOD 30 MIN: CPT | Mod: HCNC,25,S$GLB, | Performed by: INTERNAL MEDICINE

## 2024-03-20 PROCEDURE — 3074F SYST BP LT 130 MM HG: CPT | Mod: HCNC,CPTII,S$GLB, | Performed by: INTERNAL MEDICINE

## 2024-03-20 PROCEDURE — 3078F DIAST BP <80 MM HG: CPT | Mod: HCNC,CPTII,S$GLB, | Performed by: INTERNAL MEDICINE

## 2024-03-20 PROCEDURE — 1125F AMNT PAIN NOTED PAIN PRSNT: CPT | Mod: HCNC,CPTII,S$GLB, | Performed by: INTERNAL MEDICINE

## 2024-03-20 PROCEDURE — 1159F MED LIST DOCD IN RCRD: CPT | Mod: HCNC,CPTII,S$GLB, | Performed by: INTERNAL MEDICINE

## 2024-03-20 RX ORDER — METHOTREXATE 2.5 MG/1
TABLET ORAL
Qty: 48 TABLET | Refills: 3 | Status: SHIPPED | OUTPATIENT
Start: 2024-03-20 | End: 2024-03-20 | Stop reason: SDUPTHER

## 2024-03-20 RX ORDER — METHOTREXATE 2.5 MG/1
TABLET ORAL
Qty: 48 TABLET | Refills: 3 | Status: SHIPPED | OUTPATIENT
Start: 2024-03-20

## 2024-03-20 RX ORDER — FOLIC ACID 1 MG/1
2000 TABLET ORAL DAILY
Qty: 180 TABLET | Refills: 0 | Status: SHIPPED | OUTPATIENT
Start: 2024-03-20

## 2024-03-20 RX ORDER — METHYLPREDNISOLONE ACETATE 40 MG/ML
40 INJECTION, SUSPENSION INTRA-ARTICULAR; INTRALESIONAL; INTRAMUSCULAR; SOFT TISSUE
Status: COMPLETED | OUTPATIENT
Start: 2024-03-20 | End: 2024-03-20

## 2024-03-20 RX ORDER — HYDROCODONE BITARTRATE AND ACETAMINOPHEN 10; 325 MG/1; MG/1
1 TABLET ORAL
Qty: 30 TABLET | Refills: 0 | Status: SHIPPED | OUTPATIENT
Start: 2024-03-20 | End: 2024-04-19

## 2024-03-20 RX ADMIN — METHYLPREDNISOLONE ACETATE 40 MG: 40 INJECTION, SUSPENSION INTRA-ARTICULAR; INTRALESIONAL; INTRAMUSCULAR; SOFT TISSUE at 03:03

## 2024-03-20 ASSESSMENT — ROUTINE ASSESSMENT OF PATIENT INDEX DATA (RAPID3)
PAIN SCORE: 8
PSYCHOLOGICAL DISTRESS SCORE: 1.1
PATIENT GLOBAL ASSESSMENT SCORE: 5.5
MDHAQ FUNCTION SCORE: 0.3
FATIGUE SCORE: 1.1
TOTAL RAPID3 SCORE: 4.83

## 2024-03-20 NOTE — PATIENT INSTRUCTIONS
You have a referral from Dr. Clarke for Neurology within [facility]- [phone] if you would like to see someone outside of [facility] please reach out to Dr. Gordon for guidance.     Ratna Mack Glynn:     [phone]         Blood work for Dr. Olivera at the [facility] prior to our next visit in July 2024.   Please restart Methotrexate 4 tabs once weekly I do not feel this is affecting your memory.

## 2024-03-20 NOTE — PROGRESS NOTES
"Subjective:          Chief Complaint: Fabby Guerrero is a 78 y.o. female who had concerns including Disease Management.    HPI:      Rheumatoid Arthritis  Patient is an 77 y.o. female here for follow up seropostive Rheumatoid Arthritis, secondary Sjogrens, and OA  Serologies are: RF+, CCP HIGH titer +.      Symptoms have been present for several years.   Symptoms include eye symptoms, joint pain, joint swelling, sleep difficulties and weakness of hands and are of moderate severity. Morning stiffness: 25 minute Patient denies joint pain, joint swelling and morning stiffness. Symptoms are made worse by: movement, overuse and resting.  Symptoms are helped by arthritis medications.  Associated symptoms include arthralgia and fatigue and secondary Sjogrens. Previously on Restasis, now systane/Refresh.   Diclofenac for 1 year with decreased urination and stable creatinine; now off.     10/2023: patient working with Dr. Zuniga in [place] with multiple lesions left ear, right arm near axilla, 2 on back. Pending pathology  Cervical spine     Seen w/ Pain Management   Patient with known cervical stenosis: hx of C7-T1 in 2021 followed by   Using Hydrocodone rarely.   She received an L4-5 epidural steroid injections 12/09/2021 with 50% relief she has some low back pain and pain in her calves and spasms at night sometimes during the day.  She did note the numbness and tingling has improved.      Seen with Ortho, Dr. Agudelo/Romeo,  received Euflexxa in past.  5/2021 call with flare of left knee, depomedrol 40IM and repeat Euflexxa.     C/o knee pain chronic and daily. No swelling, no instability. - knees stable and manageable.     She continues with the right hand "cramp" that is not a full triggering happens intermittently but no synovitis.       Current medications  Celebrex 100mg BID  Tylenol 650 2-3 daily  Methotrexate - Problems: none-- 4 tabs weekly doing ok.  folic acid 2mg- she dropped down to 1mg daily but noted " return of oral ulcers. She does seem to do better on 2mg daily, but her hair grows so fast.   Prednisone 5mg PRN flares has not taken any prednisone in 1 year.   Hydrocodone rarely for pain- I filled #30 ok for 1 tab per day.       No hx of HCQ-  No personal hx of malignancy.    C/o feet /toes painful more but improved with recent celebrex.     Labs dated 12/2019 at [facility] for methotrexate monitoring show everything is within normal limits.    Interval events:   MRI of the shoulder some musculature is of the rotator cuff is grossly intact mild tendinopathy subacromial and glenohumeral narrowing but no active bursitis was noticed she has some hypertrophic changes at the AC joint    MRI of her cervical  spine however showed multilevel foraminal stenosis most prominent at C5-6 moderate to severe left > than right.  She also had a few disc osteophyte complexes which could even  a facet mediated pain causing muscle spasms radiating along the shoulder girdle in the upper trapezius region.     Dr Edmonds from pain management with some injections    Component      Latest Ref Rng & Units 4/17/2018 5/26/2015   Anti Sm/RNP Antibody      0.00 - 19.99 EU  0.35   Anti-Sm/RNP Interpretation      Negative  Negative   B27 Testing Date        06/16/2015 11:48 AM   HLA B27 Result        Negative   ds DNA Ab      Negative 1:10  Negative 1:10   CHON Screen      Negative <1:160  Negative <1:160   Rheumatoid Factor      <14 IU/mL 128 (H) 34.0 (H)   CCP Antibodies      <5.0 U/mL  6.1 (H)   Sed Rate      < OR = 30 mm/h 11    Cyclic Citrullinated Peptide (CCP) Ab (IgG)      UNITS >250 (H)    CRP      <8.0 mg/L 1.4      REVIEW OF SYSTEMS:    Review of Systems   Constitutional:  Positive for malaise/fatigue. Negative for fever and weight loss.   HENT:  Negative for sore throat.    Eyes:  Positive for discharge and redness. Negative for double vision and photophobia.   Respiratory:  Negative for cough, shortness of breath and wheezing.     Cardiovascular:  Negative for chest pain, palpitations and orthopnea.   Gastrointestinal:  Negative for abdominal pain, constipation and diarrhea.   Genitourinary:  Negative for dysuria, hematuria and urgency.   Musculoskeletal:  Positive for joint pain. Negative for back pain and myalgias.   Skin:  Negative for rash.   Neurological:  Negative for dizziness, tingling, focal weakness and headaches.   Endo/Heme/Allergies:  Does not bruise/bleed easily.   Psychiatric/Behavioral:  Negative for depression, hallucinations and suicidal ideas.          Objective:        Past Medical History:   Diagnosis Date    Chronic neck pain     Chronic shoulder pain     GERD (gastroesophageal reflux disease)     Rheumatoid arthritis      Family History   Problem Relation Age of Onset    Cancer Mother         lung    Cancer Father         colon and lung    Cancer Daughter         melanoma     Social History     Tobacco Use    Smoking status: Former     Current packs/day: 0.00     Average packs/day: 1 pack/day for 14.0 years (14.0 ttl pk-yrs)     Types: Cigarettes     Start date: 1962     Quit date: 1976     Years since quittin.9     Passive exposure: Never    Smokeless tobacco: Never   Substance Use Topics    Alcohol use: Yes     Alcohol/week: 3.0 standard drinks of alcohol     Types: 3 Glasses of wine per week    Drug use: No         Current Outpatient Medications on File Prior to Visit   Medication Sig Dispense Refill    acetaminophen (TYLENOL) 650 MG TbSR Take 650 mg by mouth every 8 (eight) hours as needed.       atorvastatin (LIPITOR) 10 MG tablet Take 1 tablet (10 mg total) by mouth once daily. 90 tablet 3    b complex vitamins tablet Take 1 tablet by mouth once daily. B100      celecoxib (CELEBREX) 100 MG capsule TAKE ONE CAPSULE BY MOUTH TWICE DAILY 180 capsule 2    cyanocobalamin (VITAMIN B-12) 100 MCG tablet Take 100 mcg by mouth once daily.      fish oil-omega-3 fatty acids 300-1,000 mg capsule Take 2 g by  mouth once daily.      folic acid (FOLVITE) 1 MG tablet Take 2 tablets by mouth once daily 180 tablet 0    GEMTESA 75 mg Tab Take 75 mg by mouth once daily. 30 tablet 11    ginkgo biloba 60 mg Cap Take by mouth.      glucosamine-chondroitin 500-400 mg tablet Take 2 tablets by mouth once daily.       levothyroxine (SYNTHROID) 88 MCG tablet Take 1 tablet (88 mcg total) by mouth every morning. 90 tablet 3    melatonin 5 mg Cap Take by mouth as needed.       memantine (NAMENDA) 5 MG Tab TAKE ONE TABLET BY MOUTH TWICE DAILY 180 tablet 3    methotrexate 2.5 MG Tab TAKE 4 TABLETS (10MG) EVERY 7 DAYS 48 tablet 3    multivitamin (THERAGRAN) per tablet Take 1 tablet by mouth once daily.      TURMERIC (CURCUMIN MISC) 750 mg by Misc.(Non-Drug; Combo Route) route once daily.       UNABLE TO FIND medication name: CBD gummies pt takes 1/2 gummy every day      UNABLE TO FIND medication name: CBD pain relief gummy 33 mg - 1 daily      VITAMIN A-C-D3-COD LIVER OIL ORAL Take by mouth once daily.       vitamin B complex-folic acid 0.4 mg Tab Take by mouth once daily.       HYDROcodone-acetaminophen (NORCO)  mg per tablet Take 1 tablet by mouth every 24 hours as needed for Pain. 30 tablet 0     No current facility-administered medications on file prior to visit.       Vitals:    03/20/24 1416   BP: 127/65   Pulse: 80       Physical Exam:    Physical Exam  Constitutional:       Appearance: Normal appearance. She is well-developed.   HENT:      Nose: No septal deviation.      Mouth/Throat:      Mouth: No oral lesions.   Eyes:      Conjunctiva/sclera:      Right eye: Right conjunctiva is not injected.      Left eye: Left conjunctiva is not injected.      Pupils: Pupils are equal, round, and reactive to light.   Neck:      Thyroid: No thyroid mass or thyromegaly.      Vascular: No JVD.   Cardiovascular:      Rate and Rhythm: Normal rate and regular rhythm.      Pulses: Normal pulses.      Comments: No edema  Pulmonary:      Effort:  Pulmonary effort is normal.      Breath sounds: Normal breath sounds.   Abdominal:      Palpations: Abdomen is soft.   Musculoskeletal:      Right shoulder: No swelling or tenderness. Normal range of motion.      Left shoulder: No swelling or tenderness. Normal range of motion.      Right elbow: No swelling. Normal range of motion. No tenderness.      Left elbow: No swelling. Normal range of motion. No tenderness.      Right wrist: Tenderness present. No swelling. Decreased range of motion.      Left wrist: Tenderness present. No swelling. Decreased range of motion.      Right hand: Tenderness present. Decreased range of motion.      Left hand: Tenderness present. Decreased range of motion.      Right hip: Normal range of motion. Normal strength.      Left hip: No tenderness. Normal range of motion.      Right knee: No swelling. Normal range of motion. No tenderness.      Left knee: No swelling. Normal range of motion. No tenderness.      Right ankle: No swelling. No tenderness. Normal range of motion.      Left ankle: No swelling. No tenderness. Normal range of motion.      Comments: 5/5 upper and lower extremity bilateral muscle strength   Lymphadenopathy:      Cervical: No cervical adenopathy.   Skin:     General: Skin is dry.   Neurological:      Deep Tendon Reflexes: Reflexes are normal and symmetric.               Assessment:       Encounter Diagnoses   Name Primary?    Rheumatoid arthritis involving both hands with positive rheumatoid factor     Osteoarthritis of spine with radiculopathy, thoracolumbar region     Spondylosis of cervical region without myelopathy or radiculopathy     Radiculopathy, unspecified spinal region     Rheumatoid arthritis involving both hands with negative rheumatoid factor               Plan:        Rheumatoid arthritis involving both hands with positive rheumatoid factor  -     HYDROcodone-acetaminophen (NORCO)  mg per tablet; Take 1 tablet by mouth every 24 hours as needed  for Pain.  Dispense: 30 tablet; Refill: 0  -     methylPREDNISolone acetate injection 40 mg  -     Discontinue: methotrexate 2.5 MG Tab; TAKE 4 TABLETS (10MG) EVERY 7 DAYS  Dispense: 48 tablet; Refill: 3  -     methotrexate 2.5 MG Tab; TAKE 4 TABLETS (10MG) EVERY 7 DAYS  Dispense: 48 tablet; Refill: 3  -     folic acid (FOLVITE) 1 MG tablet; Take 2 tablets (2,000 mcg total) by mouth once daily.  Dispense: 180 tablet; Refill: 0    Osteoarthritis of spine with radiculopathy, thoracolumbar region  -     HYDROcodone-acetaminophen (NORCO)  mg per tablet; Take 1 tablet by mouth every 24 hours as needed for Pain.  Dispense: 30 tablet; Refill: 0  -     folic acid (FOLVITE) 1 MG tablet; Take 2 tablets (2,000 mcg total) by mouth once daily.  Dispense: 180 tablet; Refill: 0    Spondylosis of cervical region without myelopathy or radiculopathy  -     HYDROcodone-acetaminophen (NORCO)  mg per tablet; Take 1 tablet by mouth every 24 hours as needed for Pain.  Dispense: 30 tablet; Refill: 0    Radiculopathy, unspecified spinal region  -     HYDROcodone-acetaminophen (NORCO)  mg per tablet; Take 1 tablet by mouth every 24 hours as needed for Pain.  Dispense: 30 tablet; Refill: 0    Rheumatoid arthritis involving both hands with negative rheumatoid factor  -     Discontinue: methotrexate 2.5 MG Tab; TAKE 4 TABLETS (10MG) EVERY 7 DAYS  Dispense: 48 tablet; Refill: 3  -     methotrexate 2.5 MG Tab; TAKE 4 TABLETS (10MG) EVERY 7 DAYS  Dispense: 48 tablet; Refill: 3        MTX continue 4 tabs weekly   Folic acid keep at 2mg weekly.   Celebrex 100mg BID will likely help with her joints as well-I see very little synovitis at this time and suspect this is residual damage that is causing bulk of pain in toes.   Prednisone 5-10-mg daily PRN-not using.   Hydrocodone for flare days as she is using infrequently  checked.       Peripheral neuropathy vs Lumbar NF encroachment.    Better since LEX.      1. Keep Methotrexate  for RA- last testing with PCP liver ok, will repeat labs at [facility] in 3 motnhs- pharmacy apparent told her to not take Celebrex with MTX but this is ok. She is in a flare with her RA as off MTX x 2 months.     2. Right shoulder and various joints painful- going to give IM depomedrol today.     3. Continue with PRN Hydrocodone.- filled for her today.     4. Provided her with numbers for Neurology today-she has a referral and I do not think MTX is contributing to memory loss as she is worse today and has not been taking MTX for 2-3 months.         No follow-ups on file.         40min consultation with greater than 50% of that time included Preparing to see the patient (review records, tests), Obtaining and/or reviewing separately obtained historical data, Performing a medically appropriate examination and/or evaluation , Ordering medications, tests, and/or procedures, Referring and communicating with other healthcare professionals , Documenting clinical information in the electronic or other health record and Independently interpreting results  (as warranted) & communicating results to the patient/family/caregiver. All questions answered.    Patient had labs from neurology Dr. Montoya dated 8/26/22: ESR and CRP WNL, CBC WNL renal function WNL.

## 2024-03-21 ENCOUNTER — OFFICE VISIT (OUTPATIENT)
Dept: NEUROLOGY | Facility: CLINIC | Age: 79
End: 2024-03-21
Payer: MEDICARE

## 2024-03-21 ENCOUNTER — TELEPHONE (OUTPATIENT)
Dept: NEUROLOGY | Facility: CLINIC | Age: 79
End: 2024-03-21

## 2024-03-21 VITALS
WEIGHT: 126.56 LBS | BODY MASS INDEX: 23.29 KG/M2 | RESPIRATION RATE: 14 BRPM | HEART RATE: 79 BPM | HEIGHT: 62 IN | SYSTOLIC BLOOD PRESSURE: 121 MMHG | DIASTOLIC BLOOD PRESSURE: 66 MMHG

## 2024-03-21 DIAGNOSIS — R41.3 MEMORY LOSS: ICD-10-CM

## 2024-03-21 DIAGNOSIS — R41.89 COGNITIVE IMPAIRMENT: ICD-10-CM

## 2024-03-21 DIAGNOSIS — G47.33 OSA (OBSTRUCTIVE SLEEP APNEA): Primary | ICD-10-CM

## 2024-03-21 PROCEDURE — 3078F DIAST BP <80 MM HG: CPT | Mod: HCNC,CPTII,S$GLB, | Performed by: NURSE PRACTITIONER

## 2024-03-21 PROCEDURE — 99417 PROLNG OP E/M EACH 15 MIN: CPT | Mod: HCNC,S$GLB,, | Performed by: NURSE PRACTITIONER

## 2024-03-21 PROCEDURE — 1159F MED LIST DOCD IN RCRD: CPT | Mod: HCNC,CPTII,S$GLB, | Performed by: NURSE PRACTITIONER

## 2024-03-21 PROCEDURE — 3074F SYST BP LT 130 MM HG: CPT | Mod: HCNC,CPTII,S$GLB, | Performed by: NURSE PRACTITIONER

## 2024-03-21 PROCEDURE — 1101F PT FALLS ASSESS-DOCD LE1/YR: CPT | Mod: HCNC,CPTII,S$GLB, | Performed by: NURSE PRACTITIONER

## 2024-03-21 PROCEDURE — 1126F AMNT PAIN NOTED NONE PRSNT: CPT | Mod: HCNC,CPTII,S$GLB, | Performed by: NURSE PRACTITIONER

## 2024-03-21 PROCEDURE — 99215 OFFICE O/P EST HI 40 MIN: CPT | Mod: HCNC,S$GLB,, | Performed by: NURSE PRACTITIONER

## 2024-03-21 PROCEDURE — 99999 PR PBB SHADOW E&M-EST. PATIENT-LVL V: CPT | Mod: PBBFAC,HCNC,, | Performed by: NURSE PRACTITIONER

## 2024-03-21 PROCEDURE — 3288F FALL RISK ASSESSMENT DOCD: CPT | Mod: HCNC,CPTII,S$GLB, | Performed by: NURSE PRACTITIONER

## 2024-03-21 NOTE — PROGRESS NOTES
NEUROLOGY  Outpatient Consultation Visit     [facility]  [address] [facility], [place] [address]  [phone] (office) / [phone] (fax)    Patient Name:  Fabby Guerrero  :  1945  MR #:  [id number]  Acct #:  [id number]    Date of  Visit: 2024    Other Physicians:  Terry Gordon DO (Primary Care Physician)      CHIEF COMPLAINT: Memory Loss        HISTORY OF PRESENT ILLNESS:  Fabby Guerrero is a 78 y.o. R-handed female seen in consultation for memory loss per Jazmyne Olivera DO    Medical history is significant for OA, lumbar radiculopathy, cervical radiculopathy, cervical spine stenosis, HLD, RA, Sjogren's,  immunosuppression, hypothyroidism, insomnia, DIMITRIS not on CPAP    Here today with her , who assists with history    She has a 10th grade level education. She worked as a homemaker and a .     She notes ST memory loss. Onset was about 2 years ago with progression over time. She rapidly forgets conversations, shows that she has watched on TV. No trouble cooking, driving. Her  manages their finances. She loves to read and is still able to typically recall stories, etc. No trouble managing her medications. She doesn't note any executive dysfunction.     She is [place] and has hearing aids.     She has DIMTIRIS, but stopped CPAP many years ago. She wakes up several times with nocturia. She snores. No RBD behaviors. Feels tired during the day. Takes melatonin in the middle of the night if she wakes up. May take a Tylenol PM sometimes to help sleep (approx 1x per week).     Denies anxiety or depression. She has not had any hallucinations.     Denies any physical concerns, like tremor or gait change.     Has chronic OAB, recently established with urology. Prescribed Gemtesa, but doesn't seem to be taking it.     Established with Dr. Olivera in rheum. Immunosuppressed with MTX, PRN prednisone. Trialed off of MTX for 2-3 months to see if this could be related  to cognitive changes, but has worsened rather than improved.     Saw Dr. Montoya for memory loss in 2022. She did not get any specific diagnosis from this, but did not follow up.     MOCA was 21/30 per Dr. Gordon 9/2022. Started on B12 supplementation and donepezil. Donepezil caused urinary frequency per his notes, but she doesn't recall this and still has this issue. Currently on memantine 5 mg once per day. She thinks that this is to help her sleep.     She is not aware of any FH of memory loss.     Former smoker, quit 40+ yrs ago. Has an occasional glass of wine.       Allergies:  Review of patient's allergies indicates:  No Known Allergies    Current Medications:  Current Outpatient Medications   Medication Sig Dispense Refill    acetaminophen (TYLENOL) 650 MG TbSR Take 650 mg by mouth every 8 (eight) hours as needed.       atorvastatin (LIPITOR) 10 MG tablet Take 1 tablet (10 mg total) by mouth once daily. 90 tablet 3    b complex vitamins tablet Take 1 tablet by mouth once daily. B100      celecoxib (CELEBREX) 100 MG capsule TAKE ONE CAPSULE BY MOUTH TWICE DAILY 180 capsule 2    cyanocobalamin (VITAMIN B-12) 100 MCG tablet Take 100 mcg by mouth once daily.      fish oil-omega-3 fatty acids 300-1,000 mg capsule Take 2 g by mouth once daily.      folic acid (FOLVITE) 1 MG tablet Take 2 tablets (2,000 mcg total) by mouth once daily. 180 tablet 0    GEMTESA 75 mg Tab Take 75 mg by mouth once daily. 30 tablet 11    ginkgo biloba 60 mg Cap Take by mouth.      glucosamine-chondroitin 500-400 mg tablet Take 2 tablets by mouth once daily.       HYDROcodone-acetaminophen (NORCO)  mg per tablet Take 1 tablet by mouth every 24 hours as needed for Pain. 30 tablet 0    levothyroxine (SYNTHROID) 88 MCG tablet Take 1 tablet (88 mcg total) by mouth every morning. 90 tablet 3    melatonin 5 mg Cap Take by mouth as needed.       methotrexate 2.5 MG Tab TAKE 4 TABLETS (10MG) EVERY 7 DAYS 48 tablet 3    multivitamin  (THERAGRAN) per tablet Take 1 tablet by mouth once daily.      TURMERIC (CURCUMIN MISC) 750 mg by Misc.(Non-Drug; Combo Route) route once daily.       UNABLE TO FIND medication name: CBD gummies pt takes 1/2 gummy every day      UNABLE TO FIND medication name: CBD pain relief gummy 33 mg - 1 daily      VITAMIN A-C-D3-COD LIVER OIL ORAL Take by mouth once daily.       vitamin B complex-folic acid 0.4 mg Tab Take by mouth once daily.        No current facility-administered medications for this visit.       Past Medical History:  Past Medical History:   Diagnosis Date    Chronic neck pain     Chronic shoulder pain     GERD (gastroesophageal reflux disease)     Rheumatoid arthritis        Past Surgical History:  Past Surgical History:   Procedure Laterality Date    CARPAL TUNNEL RELEASE      bilateral     SECTION      x3    EPIDURAL STEROID INJECTION INTO CERVICAL SPINE N/A 2021    Procedure: Injection-steroid-epidural-cervical;  Surgeon: Ernesto Loya MD;  Location: [facility] OR;  Service: Pain Management;  Laterality: N/A;    EYE SURGERY      bilat cataracts with implant    HYSTERECTOMY      TRANSFORAMINAL EPIDURAL INJECTION OF STEROID Bilateral 2021    Procedure: Injection,steroid,epidural,transforaminal approach L4/5;  Surgeon: Ernesto Loya MD;  Location: [facility] OR;  Service: Pain Management;  Laterality: Bilateral;    TUBAL LIGATION         Family History:  family history includes Cancer in her daughter, father, and mother.    Social History:   reports that she quit smoking about 47 years ago. Her smoking use included cigarettes. She started smoking about 61 years ago. She has a 14.0 pack-year smoking history. She has never been exposed to tobacco smoke. She has never used smokeless tobacco. She reports current alcohol use of about 3.0 standard drinks of alcohol per week. She reports that she does not use drugs.      REVIEW OF SYSTEMS:  As per HPI    PHYSICAL EXAM:  /66 (BP Location:  "Right arm, Patient Position: Sitting, BP Method: Medium (Automatic))   Pulse 79   Resp 14   Ht 5' 2" (1.575 m)   Wt 57.4 kg (126 lb 8.7 oz)   BMI 23.15 kg/m²     General: Well groomed. No acute distress.  Cardiovascular: Regular rate and rhythm.    Pulmonary: Normal effort and rate.   Musculoskeletal: Arthritic hands, moves all extremities well.  Extremities: No clubbing, cyanosis or edema.     Neurological exam:  Mental status: Awake and alert.  Oriented to person, place, time and situation. Recent memory impaired in conversation and on DR. Fund of knowledge normal. Decreased attention and concentration. MOCA 24+1/30.   Speech/Language: Fluent and appropriate. No dysarthria or aphasia on conversation. Able to follow complex commands.   Cranial nerves (II-XII): Visual fields full. Pupils equal round and reactive to light, extraocular movements intact, no ptosis, no nystagmus. Facial sensation and symmetry intact bilaterally. Hearing grossly intact. Palate mobile and midline. Shoulder shrug normal bilaterally. Normal tongue protrusion.   Motor: 5 out of 5 strength throughout the upper and lower extremities bilaterally. Normal bulk and tone. No abnormal movements noted. No drift appreciated.   Sensation: Intact to light touch and temperature bilaterally.   DTR: 2+ at the knees and biceps bilaterally.  Coordination: Finger-nose-finger testing intact bilaterally. STEFANIE normal bilaterally. BUE R>L action tremor (mild). No rest tremor.   Gait: fluid, no shuffling. Pivots to turn. Dec R arm swing with mild R ambulatory tremor       DIAGNOSTIC DATA:  I have personally reviewed provider notes, labs and imaging made available to me today.     Imaging:  MRI brain wo 10/18/2022:    FINDINGS:  Mild prominence of the ventricles and sulci compatible with generalized cerebral volume loss.  No hydrocephalus.     Scattered foci of T2/FLAIR hyperintense signal within the supratentorial white matter, nonspecific and may reflect mild " "sequelae of chronic microvascular ischemic change.    Diffusion-weighted images demonstrate no evidence of an acute infarct.   Susceptibility weighted images demonstrate no evidence of acute or chronic hemorrhage. No mass effect or midline shift.     Normal vascular flow voids are preserved.     Bone marrow signal intensity is normal. The paranasal sinuses are normal.  The visualized portions of the mastoids are unremarkable.  Bilateral lens replacements are noted. Otherwise, the orbits appear within normal limits.     Impression:     1. No evidence of an acute intracranial abnormality.  2.  Mild generalized cerebral volume loss and scattered T2/FLAIR hyperintense foci within the supratentorial white matter, nonspecific but likely reflecting sequelae of chronic microvascular ischemic change.    Cardiac:  EKG 11/2020  NSR    Labs:  Lab Results   Component Value Date    WBC 9.27 11/02/2023    HGB 13.9 11/02/2023    HCT 41.5 11/02/2023     11/02/2023    MCV 93 11/02/2023    RDW 13.1 11/02/2023     Lab Results   Component Value Date     (L) 11/02/2023    K 4.4 11/02/2023    CL 99 11/02/2023    CO2 26 11/02/2023    BUN 14 11/02/2023    CREATININE 0.8 11/02/2023     11/02/2023    CALCIUM 9.4 11/02/2023    MG 2.2 08/30/2019    PHOS 4.7 (H) 08/30/2019     Lab Results   Component Value Date    PROT 6.9 11/02/2023    ALBUMIN 3.8 11/02/2023    BILITOT 0.3 11/02/2023    AST 25 11/02/2023    ALKPHOS 74 11/02/2023    ALT 20 11/02/2023     No results found for: "INR", "PROTIME", "PTT"  Lab Results   Component Value Date    CHOL 242 (H) 11/02/2023    HDL 53 11/02/2023    LDLCALC 150.4 11/02/2023    TRIG 193 (H) 11/02/2023    CHOLHDL 21.9 11/02/2023     Lab Results   Component Value Date    HGBA1C 5.3 03/01/2022      Lab Results   Component Value Date    SSVANJSL29 393 09/12/2022     No results found for: "FOLATE"  Lab Results   Component Value Date    TSH 0.749 11/02/2023     Lab Results   Component Value Date "    CRP <0.50 10/09/2023     Lab Results   Component Value Date    SEDRATE 14 10/09/2023       ASSESSMENT & PLAN:  Fabby Guerrero is a 78 y.o. R-handed female seen in consultation for memory loss.     Problem List Items Addressed This Visit          Neuro    Cognitive impairment    Overview     MOCA:  21/30 per PCP 9/2022  24+1/30 March 2024    MRI brain 10/2022 - mild gen atrophy and microangiopathy, normal for age          Current Assessment & Plan     Discussed normal aging / MCI / dementia with pt and family -- does not meet criteria for dementia at present    Mild impairment on MOCA today, but suspect that notable anxiety during testing may have contributed to some errors. Denies anxiety on a chronic basis, however.     Untreated DIMITRIS / insomnia likely contributing. Agreeable to repeat sleep testing to try newer modes of CPAP vs possible Inspire if she is a candidate. PSG ordered. Also reviewed proper use of melatonin & adverse SE associated with OTC sleep aids like Tylenol PM    Reviewed role of Rx in this setting -- notes indicate SE of urinary freq from donepezil in the past, although this is still a significant issue for which she is seeing urology. Memantine unlikely to be of any significant benefit at this point, so she can dc this.     Discussed vascular RF and importance of continued efforts to maximize vascular health    Medication list reviewed and discussed. Uses narcotics sparingly.     Plan:  Serologies to assess for any metabolic factors that may worsen cognition  NP testing  PSG as above               Other    DIMITRIS (obstructive sleep apnea) - Primary     Other Visit Diagnoses       Memory loss                Follow up: after NP testing     I spent a total of 70 minutes on the day of the visit.    This includes face to face time with the patient, as well as non-face to face time preparing for and completing the visit (review of prior diagnostic testing and clinical notes, obtaining or reviewing  history, documenting clinical information in the EMR, independently interpreting and communicating results to the patient/family and coordinating ongoing care).       I appreciate the opportunity to participate in the care of this patient. Please feel free to contact me with any concerns or questions.       Mildred Davalos, ACNPC-AG  [facility] [address] [place] [address]

## 2024-03-21 NOTE — ASSESSMENT & PLAN NOTE
Discussed normal aging / MCI / dementia with pt and family -- does not meet criteria for dementia at present    Mild impairment on MOCA today, but suspect that notable anxiety during testing may have contributed to some errors. Denies anxiety on a chronic basis, however.     Untreated DIMITRIS / insomnia likely contributing. Agreeable to repeat sleep testing to try newer modes of CPAP vs possible Inspire if she is a candidate. PSG ordered. Also reviewed proper use of melatonin & adverse SE associated with OTC sleep aids like Tylenol PM    Reviewed role of Rx in this setting -- notes indicate SE of urinary freq from donepezil in the past, although this is still a significant issue for which she is seeing urology. Memantine unlikely to be of any significant benefit at this point, so she can dc this.     Discussed vascular RF and importance of continued efforts to maximize vascular health    Medication list reviewed and discussed. Uses narcotics sparingly.     Plan:  Serologies to assess for any metabolic factors that may worsen cognition  NP testing  PSG as above

## 2024-03-21 NOTE — PATIENT INSTRUCTIONS
"We will obtain some basic labs to rule out any reversible causes for your memory loss, such as a nutritional deficiency or a thyroid problem.  I have also referred you for a more in-depth cognitive assessment with our neuropyschologist. Please be aware that this process may take a few months to complete. They will contact you to schedule this when it is time.     Certain medications have been shown to slow the clinical progression of MCI in some people. It is important to understand that these medications cannot reverse any pre-existing process.   Memantine is more typically used in the later stages when someone has dementia. You can go ahead and stop this since you are at such a low dose.     Untreated sleep apnea and poor sleep can also contribute to memory issues. The sleep apnea is likely part of why you wake up often. We will repeat an in lab sleep study now to assess how bad your sleep apnea is to see if you are a candidate for the Inspire device.     Melatonin is meant to be taken at bedtime to signal to your body that it is night, time to sleep rather than taken in the early morning hours. I recommend that you try to take an extended release melatonin at bedtime to see if this helps.     Over the counter sleep meds that say "PM" typically contain Benadryl, which has negative cognitive effects. Please stop using this. Regular Tylenol is perfectly fine.     It is very important to work diligently to keep the blood vessels healthy to prevent any worsening. You should continue to work closely with your PCP to control your blood pressure, cholesterol and blood sugar. The [place] diet is also recommended to promote overall vascular health.     I will see you back after the cognitive testing has been completed    Please call our clinic at [phone] or send a message on the [employer] portal if there are any changes to the plan discussed today. For example, if you are not contacted for the requested tests, " referral(s) within one week, if you are unable to receive the medications prescribed, or if you feel you need to change the treatment course for any reason.

## 2024-03-25 ENCOUNTER — TELEPHONE (OUTPATIENT)
Dept: RHEUMATOLOGY | Facility: CLINIC | Age: 79
End: 2024-03-25
Payer: MEDICARE

## 2024-03-25 NOTE — TELEPHONE ENCOUNTER
----- Message from Ira Metcalf sent at 3/25/2024 10:12 AM CDT -----  Regarding: Needs Medical Advice  Contact: patient at [phone]  Type: Needs Medical Advice  Who Called:  patient at [phone]    Additional Information: Patient is calling to check and see what type of injection she had to tell another provider. Please call and advise. Thank you

## 2024-04-03 ENCOUNTER — TELEPHONE (OUTPATIENT)
Dept: NEUROLOGY | Facility: CLINIC | Age: 79
End: 2024-04-03
Payer: MEDICARE

## 2024-04-03 NOTE — TELEPHONE ENCOUNTER
----- Message from Mildred Davalos NP sent at 4/3/2024 12:08 PM CDT -----  Labs dont show any cause for memory loss  Thiamine (vitamin B1) is elevated --- that is most often from taking a vitamin but is not harmful

## 2024-04-11 DIAGNOSIS — E03.9 ACQUIRED HYPOTHYROIDISM: ICD-10-CM

## 2024-04-11 NOTE — TELEPHONE ENCOUNTER
No care due was identified.  Health [facility] Embedded Care Due Messages. Reference number: [id number].   4/11/2024 2:57:22 PM CDT

## 2024-04-12 RX ORDER — LEVOTHYROXINE SODIUM 88 UG/1
TABLET ORAL
Qty: 90 TABLET | Refills: 2 | Status: SHIPPED | OUTPATIENT
Start: 2024-04-12

## 2024-04-12 NOTE — TELEPHONE ENCOUNTER
Refill Decision Note   Fabby Guerrero  is requesting a refill authorization.  Brief Assessment and Rationale for Refill:  Approve     Medication Therapy Plan:         Comments:     Note composed:2:49 PM 04/12/2024

## 2024-04-16 ENCOUNTER — TELEPHONE (OUTPATIENT)
Dept: RHEUMATOLOGY | Facility: CLINIC | Age: 79
End: 2024-04-16
Payer: MEDICARE

## 2024-04-16 NOTE — TELEPHONE ENCOUNTER
----- Message from Giovana Gonsales sent at 4/16/2024  8:40 AM CDT -----  Contact: Self  Type:  Patient Returning Call    Who Called:  Self  Who Left Message for Patient:  nurse  Does the patient know what this is regarding?:  not sure  Best Call Back Number:  [phone]  Additional Information:  Please call the patient back at the phone number listed above to advise. Thank you!

## 2024-04-18 ENCOUNTER — TELEPHONE (OUTPATIENT)
Dept: FAMILY MEDICINE | Facility: CLINIC | Age: 79
End: 2024-04-18
Payer: MEDICARE

## 2024-04-19 NOTE — TELEPHONE ENCOUNTER
Called and left patient a voice message of her appointment time and date.  And if she have any questions to contact the office.

## 2024-04-19 NOTE — TELEPHONE ENCOUNTER
----- Message from Elizabeth Barlow sent at 4/18/2024  4:38 PM CDT -----  Type:  Reschedule Appointment Request    Caller is requesting to reschedule appointment.      Name of Caller:pt    When is the first available appointment?na    Symptoms:3 month follow up    Would the patient rather a call back or a response via [facility]? Call back     Best Call Back Number:[phone]      Additional Information: her  Waldo Guerrero MRN [id number] has an appt on 4/24 @ 10 am and she is wanting to know if she can be seen that day to instead if at her scheduled appt which is 5/9 since she will be with him     Please call Back to advise. Thanks!

## 2024-04-19 NOTE — TELEPHONE ENCOUNTER
I scheduled patient the same day.  But it is at 2:15 she want to be seen with her  is it alright for her to come in with him at 10am

## 2024-04-19 NOTE — TELEPHONE ENCOUNTER
No, this messes up the appointment times of other patients.  They will need to be seen separately.  Sorry for the inconvenience.

## 2024-04-22 ENCOUNTER — TELEPHONE (OUTPATIENT)
Dept: FAMILY MEDICINE | Facility: CLINIC | Age: 79
End: 2024-04-22
Payer: MEDICARE

## 2024-04-22 NOTE — TELEPHONE ENCOUNTER
----- Message from Yamel Waldrop sent at 4/22/2024 12:48 PM CDT -----  Contact: self  Type:  Needs Medical Advice    Who Called: self  Symptoms (please be specific): pt would like to know if both her and her  Waldo Guerrero MRN [id number] can be seen the same time instead of separately. They both have appts on 4/24.  Would the patient rather a call back or a response via [facility]? call  Best Call Back Number: [phone]    Additional Information: please advise and thank you.

## 2024-04-24 ENCOUNTER — OFFICE VISIT (OUTPATIENT)
Dept: FAMILY MEDICINE | Facility: CLINIC | Age: 79
End: 2024-04-24
Payer: MEDICARE

## 2024-04-24 VITALS
OXYGEN SATURATION: 98 % | DIASTOLIC BLOOD PRESSURE: 72 MMHG | BODY MASS INDEX: 22.84 KG/M2 | HEART RATE: 72 BPM | SYSTOLIC BLOOD PRESSURE: 124 MMHG | WEIGHT: 124.13 LBS | HEIGHT: 62 IN

## 2024-04-24 DIAGNOSIS — M05.741 RHEUMATOID ARTHRITIS INVOLVING RIGHT HAND WITH POSITIVE RHEUMATOID FACTOR: Primary | ICD-10-CM

## 2024-04-24 DIAGNOSIS — E03.9 ACQUIRED HYPOTHYROIDISM: ICD-10-CM

## 2024-04-24 DIAGNOSIS — E78.2 MIXED HYPERLIPIDEMIA: ICD-10-CM

## 2024-04-24 DIAGNOSIS — M85.89 OSTEOPENIA OF MULTIPLE SITES: ICD-10-CM

## 2024-04-24 DIAGNOSIS — D84.9 IMMUNOSUPPRESSION: ICD-10-CM

## 2024-04-24 DIAGNOSIS — G47.33 OSA (OBSTRUCTIVE SLEEP APNEA): ICD-10-CM

## 2024-04-24 PROCEDURE — 3078F DIAST BP <80 MM HG: CPT | Mod: HCNC,CPTII,S$GLB, | Performed by: INTERNAL MEDICINE

## 2024-04-24 PROCEDURE — 1160F RVW MEDS BY RX/DR IN RCRD: CPT | Mod: HCNC,CPTII,S$GLB, | Performed by: INTERNAL MEDICINE

## 2024-04-24 PROCEDURE — 3288F FALL RISK ASSESSMENT DOCD: CPT | Mod: HCNC,CPTII,S$GLB, | Performed by: INTERNAL MEDICINE

## 2024-04-24 PROCEDURE — 99214 OFFICE O/P EST MOD 30 MIN: CPT | Mod: HCNC,S$GLB,, | Performed by: INTERNAL MEDICINE

## 2024-04-24 PROCEDURE — 1126F AMNT PAIN NOTED NONE PRSNT: CPT | Mod: HCNC,CPTII,S$GLB, | Performed by: INTERNAL MEDICINE

## 2024-04-24 PROCEDURE — 1159F MED LIST DOCD IN RCRD: CPT | Mod: HCNC,CPTII,S$GLB, | Performed by: INTERNAL MEDICINE

## 2024-04-24 PROCEDURE — 99999 PR PBB SHADOW E&M-EST. PATIENT-LVL III: CPT | Mod: PBBFAC,HCNC,, | Performed by: INTERNAL MEDICINE

## 2024-04-24 PROCEDURE — 3074F SYST BP LT 130 MM HG: CPT | Mod: HCNC,CPTII,S$GLB, | Performed by: INTERNAL MEDICINE

## 2024-04-24 PROCEDURE — 1101F PT FALLS ASSESS-DOCD LE1/YR: CPT | Mod: HCNC,CPTII,S$GLB, | Performed by: INTERNAL MEDICINE

## 2024-04-24 NOTE — PROGRESS NOTES
Patient ID: Fabby Guerrero is a 78 y.o. female.    Chief Complaint: Follow-up    Six-month follow-up    Problem HPI Plan    Bilateral arm and left hip pain.  Burning sensation. Has taken gabapentin (worried about cog sfx). Stopped MTX.  Rec to discuss with rheum and restart MTX   Urinary incontinence Improved some. Wakes her up 3-5x at night. Saw urology and UDS done and cystoscopy planned  Follow up urology    Mixed hyperlipidemia Stable  Continue lipitor.    Rheumatoid arthritis Stopped MTX. For pain she takes p.r.n. hydrocodone and Celebrex twice daily.  She is followed by Dr. Olivera Follow-up rheumatology.    Immunosuppression Stopped methotrexate but will likely be restarting.  No signs of infection Monitor   Hypothyroid Controlled Continue levothyroxine 88 mcg   DIMITRIS  Untreated currently. Still snoring.  Had difficulty wearing the CPAP.  Could be contributing to memory problem.  Home sleep study and sleep medicine consult   Cognitive impairment Past [place] 21/30, taking memantine, MOCA 24+1/30 with neuro.  Just saw neuro Monitor, continue memantine   Osteopenia with elevated frax.  After discussion of risks and benefits she chose to hold off on bisphosphonate Monitor       Rheumatoid arthritis involving right hand with positive rheumatoid factor  -     Comprehensive Metabolic Panel; Future; Expected date: 04/24/2024  -     CBC Auto Differential; Future; Expected date: 04/24/2024    Osteopenia of multiple sites    Mixed hyperlipidemia  -     Lipid Panel; Future; Expected date: 04/24/2024    Acquired hypothyroidism  -     TSH; Future; Expected date: 04/24/2024    Immunosuppression    DIMITRIS (obstructive sleep apnea)  -     Home Sleep Study; Future; Expected date: 05/01/2024  -     Ambulatory referral/consult to Sleep Disorders; Future; Expected date: 05/01/2024          Review of Systems   Constitutional:  Negative for fever.   Respiratory:  Negative for shortness of breath.    Cardiovascular:  Negative for chest  pain.   Gastrointestinal:  Negative for abdominal pain.      Objective     Vitals:    04/24/24 0925   BP: 124/72   Pulse: 72      Wt Readings from Last 3 Encounters:   04/24/24 0925 56.3 kg (124 lb 1.9 oz)   03/21/24 0847 57.4 kg (126 lb 8.7 oz)   03/20/24 1416 57.7 kg (127 lb 5.1 oz)      Body mass index is 22.7 kg/m².     Physical Exam  Cardiovascular:      Rate and Rhythm: Normal rate and regular rhythm.      Heart sounds: No murmur heard.     No gallop.   Pulmonary:      Breath sounds: Normal breath sounds. No wheezing or rhonchi.   Abdominal:      Palpations: Abdomen is soft.      Tenderness: There is no abdominal tenderness.               Hyperlipidemia Medications               atorvastatin (LIPITOR) 10 MG tablet Take 1 tablet (10 mg total) by mouth once daily.    fish oil-omega-3 fatty acids 300-1,000 mg capsule Take 2 g by mouth once daily.           Medication List with Changes/Refills   Current Medications    ACETAMINOPHEN (TYLENOL) 650 MG TBSR    Take 650 mg by mouth every 8 (eight) hours as needed.     ATORVASTATIN (LIPITOR) 10 MG TABLET    Take 1 tablet (10 mg total) by mouth once daily.    B COMPLEX VITAMINS TABLET    Take 1 tablet by mouth once daily. B100    CELECOXIB (CELEBREX) 100 MG CAPSULE    TAKE ONE CAPSULE BY MOUTH TWICE DAILY    CYANOCOBALAMIN (VITAMIN B-12) 100 MCG TABLET    Take 100 mcg by mouth once daily.    FISH OIL-OMEGA-3 FATTY ACIDS 300-1,000 MG CAPSULE    Take 2 g by mouth once daily.    FOLIC ACID (FOLVITE) 1 MG TABLET    Take 2 tablets (2,000 mcg total) by mouth once daily.    GEMTESA 75 MG TAB    Take 75 mg by mouth once daily.    GINKGO BILOBA 60 MG CAP    Take by mouth.    GLUCOSAMINE-CHONDROITIN 500-400 MG TABLET    Take 2 tablets by mouth once daily.     HYDROCODONE-ACETAMINOPHEN (NORCO)  MG PER TABLET    Take 1 tablet by mouth every 24 hours as needed for Pain.    LEVOTHYROXINE (SYNTHROID) 88 MCG TABLET    TAKE ONE TABLET BY MOUTH EVERY MORNING 30 MINUTES before  BREAKFAST    MELATONIN 5 MG CAP    Take by mouth as needed.     METHOTREXATE 2.5 MG TAB    TAKE 4 TABLETS (10MG) EVERY 7 DAYS    MULTIVITAMIN (THERAGRAN) PER TABLET    Take 1 tablet by mouth once daily.    TURMERIC (CURCUMIN MISC)    750 mg by Misc.(Non-Drug; Combo Route) route once daily.     UNABLE TO FIND    medication name: CBD gummies pt takes 1/2 gummy every day    UNABLE TO FIND    medication name: CBD pain relief gummy 33 mg - 1 daily    VITAMIN A-C-D3-COD LIVER OIL ORAL    Take by mouth once daily.     VITAMIN B COMPLEX-FOLIC ACID 0.4 MG TAB    Take by mouth once daily.        I personally reviewed past medical, family and social history.

## 2024-04-30 LAB — CRC RECOMMENDATION EXT: NORMAL

## 2024-05-08 ENCOUNTER — PATIENT OUTREACH (OUTPATIENT)
Dept: ADMINISTRATIVE | Facility: HOSPITAL | Age: 79
End: 2024-05-08
Payer: MEDICARE

## 2024-05-08 NOTE — PROGRESS NOTES
Population Health Chart Review & Patient Outreach Details      Additional [facility] Health Notes:               Updates Requested / Reviewed:      Updated Care Coordination Note, Care Everywhere, Care Team Updated, and Immunizations Reconciliation Completed or Queried: [facility] Topics Overdue:      [facility] Score: 0     Patient is not due for any topics at this time.    Tetanus Vaccine  Shingles/Zoster Vaccine  RSV Vaccine                  Health Maintenance Topic(s) Outreach Outcomes & Actions Taken:    Colorectal Cancer Screening - Outreach Outcomes & Actions Taken  : External Records Uploaded, Care Team Updated, & History Updated if Applicable

## 2024-06-19 DIAGNOSIS — M05.741 RHEUMATOID ARTHRITIS INVOLVING BOTH HANDS WITH POSITIVE RHEUMATOID FACTOR: ICD-10-CM

## 2024-06-19 DIAGNOSIS — M47.25 OSTEOARTHRITIS OF SPINE WITH RADICULOPATHY, THORACOLUMBAR REGION: ICD-10-CM

## 2024-06-19 DIAGNOSIS — M05.742 RHEUMATOID ARTHRITIS INVOLVING BOTH HANDS WITH POSITIVE RHEUMATOID FACTOR: ICD-10-CM

## 2024-06-24 RX ORDER — FOLIC ACID 1 MG/1
2000 TABLET ORAL DAILY
Qty: 180 TABLET | Refills: 3 | Status: SHIPPED | OUTPATIENT
Start: 2024-06-24

## 2024-08-06 ENCOUNTER — OFFICE VISIT (OUTPATIENT)
Dept: RHEUMATOLOGY | Facility: CLINIC | Age: 79
End: 2024-08-06
Payer: MEDICARE

## 2024-08-06 VITALS
WEIGHT: 125 LBS | DIASTOLIC BLOOD PRESSURE: 65 MMHG | SYSTOLIC BLOOD PRESSURE: 114 MMHG | HEART RATE: 94 BPM | BODY MASS INDEX: 22.86 KG/M2

## 2024-08-06 DIAGNOSIS — M47.25 OSTEOARTHRITIS OF SPINE WITH RADICULOPATHY, THORACOLUMBAR REGION: ICD-10-CM

## 2024-08-06 DIAGNOSIS — M05.742 RHEUMATOID ARTHRITIS INVOLVING BOTH HANDS WITH POSITIVE RHEUMATOID FACTOR: ICD-10-CM

## 2024-08-06 DIAGNOSIS — M47.812 SPONDYLOSIS OF CERVICAL REGION WITHOUT MYELOPATHY OR RADICULOPATHY: ICD-10-CM

## 2024-08-06 DIAGNOSIS — M06.041 RHEUMATOID ARTHRITIS INVOLVING BOTH HANDS WITH NEGATIVE RHEUMATOID FACTOR: ICD-10-CM

## 2024-08-06 DIAGNOSIS — M54.10 RADICULOPATHY, UNSPECIFIED SPINAL REGION: ICD-10-CM

## 2024-08-06 DIAGNOSIS — M06.042 RHEUMATOID ARTHRITIS INVOLVING BOTH HANDS WITH NEGATIVE RHEUMATOID FACTOR: ICD-10-CM

## 2024-08-06 DIAGNOSIS — M05.741 RHEUMATOID ARTHRITIS INVOLVING BOTH HANDS WITH POSITIVE RHEUMATOID FACTOR: ICD-10-CM

## 2024-08-06 DIAGNOSIS — M05.741 RHEUMATOID ARTHRITIS INVOLVING RIGHT HAND WITH POSITIVE RHEUMATOID FACTOR: Primary | ICD-10-CM

## 2024-08-06 PROCEDURE — 99214 OFFICE O/P EST MOD 30 MIN: CPT | Mod: HCNC,25,S$GLB, | Performed by: INTERNAL MEDICINE

## 2024-08-06 PROCEDURE — 3288F FALL RISK ASSESSMENT DOCD: CPT | Mod: HCNC,CPTII,S$GLB, | Performed by: INTERNAL MEDICINE

## 2024-08-06 PROCEDURE — 1101F PT FALLS ASSESS-DOCD LE1/YR: CPT | Mod: HCNC,CPTII,S$GLB, | Performed by: INTERNAL MEDICINE

## 2024-08-06 PROCEDURE — 3078F DIAST BP <80 MM HG: CPT | Mod: HCNC,CPTII,S$GLB, | Performed by: INTERNAL MEDICINE

## 2024-08-06 PROCEDURE — 1125F AMNT PAIN NOTED PAIN PRSNT: CPT | Mod: HCNC,CPTII,S$GLB, | Performed by: INTERNAL MEDICINE

## 2024-08-06 PROCEDURE — 96372 THER/PROPH/DIAG INJ SC/IM: CPT | Mod: HCNC,S$GLB,, | Performed by: INTERNAL MEDICINE

## 2024-08-06 PROCEDURE — 99999 PR PBB SHADOW E&M-EST. PATIENT-LVL IV: CPT | Mod: PBBFAC,HCNC,, | Performed by: INTERNAL MEDICINE

## 2024-08-06 PROCEDURE — 3074F SYST BP LT 130 MM HG: CPT | Mod: HCNC,CPTII,S$GLB, | Performed by: INTERNAL MEDICINE

## 2024-08-06 PROCEDURE — 1159F MED LIST DOCD IN RCRD: CPT | Mod: HCNC,CPTII,S$GLB, | Performed by: INTERNAL MEDICINE

## 2024-08-06 RX ORDER — METHOTREXATE 2.5 MG/1
TABLET ORAL
Qty: 48 TABLET | Refills: 3 | Status: SHIPPED | OUTPATIENT
Start: 2024-08-06

## 2024-08-06 RX ORDER — METHYLPREDNISOLONE ACETATE 40 MG/ML
40 INJECTION, SUSPENSION INTRA-ARTICULAR; INTRALESIONAL; INTRAMUSCULAR; SOFT TISSUE
Status: COMPLETED | OUTPATIENT
Start: 2024-08-06 | End: 2024-08-06

## 2024-08-06 RX ORDER — FOLIC ACID 1 MG/1
2000 TABLET ORAL DAILY
Qty: 180 TABLET | Refills: 3 | Status: SHIPPED | OUTPATIENT
Start: 2024-08-06

## 2024-08-06 RX ORDER — CELECOXIB 100 MG/1
100 CAPSULE ORAL 2 TIMES DAILY
Qty: 180 CAPSULE | Refills: 2 | Status: SHIPPED | OUTPATIENT
Start: 2024-08-06

## 2024-08-06 RX ORDER — KETOROLAC TROMETHAMINE 30 MG/ML
30 INJECTION, SOLUTION INTRAMUSCULAR; INTRAVENOUS
Status: COMPLETED | OUTPATIENT
Start: 2024-08-06 | End: 2024-08-06

## 2024-08-06 RX ORDER — CYCLOBENZAPRINE HCL 10 MG
10 TABLET ORAL 3 TIMES DAILY
COMMUNITY
Start: 2024-07-11

## 2024-08-06 RX ORDER — MEMANTINE HYDROCHLORIDE 5 MG/1
5 TABLET ORAL 2 TIMES DAILY
COMMUNITY
Start: 2024-06-21

## 2024-08-06 RX ADMIN — KETOROLAC TROMETHAMINE 30 MG: 30 INJECTION, SOLUTION INTRAMUSCULAR; INTRAVENOUS at 05:08

## 2024-08-06 RX ADMIN — METHYLPREDNISOLONE ACETATE 40 MG: 40 INJECTION, SUSPENSION INTRA-ARTICULAR; INTRALESIONAL; INTRAMUSCULAR; SOFT TISSUE at 05:08

## 2024-08-13 NOTE — PROGRESS NOTES
Subjective     Patient ID: Fabby Guerrero is a 78 y.o. female.    Chief Complaint: Pain in back and arm (Pt states there are no symptoms of a uti)      HPI      Pt is new to me, PCP Dr. Gordon.     Pt is a 78 year old female with cervical and lumbar ddd, RA with immunosuppression, hypothyroidism, insomnia, DIMITRIS, osteopenia. She presents today complaining of urinary frequency. Has had this for a while and takes gemtesa, but is concerned she may have a UTI. No urinary incontinence. No dysuria, hematuria, flank pain, N/V, chills.        Sees rheumatology and pain management for cervical and lumbar DDD + RA. Reports daily chronic pain. Has follow up appointments with them already scheduled. Asks for additional recommendations regarding ways to control pain. Takes norco prn, does not consistently take gabapentin.     Review of Systems   All other systems reviewed and are negative.         Objective     Physical Exam  Constitutional:       General: She is not in acute distress.     Appearance: Normal appearance. She is not ill-appearing, toxic-appearing or diaphoretic.   HENT:      Head: Normocephalic and atraumatic.   Cardiovascular:      Heart sounds: Normal heart sounds.   Pulmonary:      Effort: No respiratory distress.      Breath sounds: Normal breath sounds.   Abdominal:      General: Abdomen is flat. Bowel sounds are normal. There is no distension.      Palpations: Abdomen is soft. There is no mass.      Tenderness: There is no abdominal tenderness. There is no right CVA tenderness, left CVA tenderness, guarding or rebound.      Hernia: No hernia is present.   Neurological:      General: No focal deficit present.      Mental Status: She is alert and oriented to person, place, and time.   Psychiatric:         Mood and Affect: Mood normal.         Behavior: Behavior normal.         Thought Content: Thought content normal.         Judgment: Judgment normal.       1. Urinary frequency  - Urinalysis  - CULTURE,  URINE  - if no infection, schedule with urology    2. Rheumatoid arthritis involving right hand with positive rheumatoid factor    -continue with rheumatology, encouraged tylenol utilization prn    3. Spondylosis of cervical region without myelopathy or radiculopathy    4. Osteoarthritis of spine with radiculopathy, thoracolumbar region  -continue with pain management    5. Mixed hyperlipidemia  -continue statin, followed by pcp, needs repeat lab and has appt scheduled for october 6. Encounter for medication review and counseling  -encouraged celebrex as prescribed, as well as gabapentin, encouraged tylenol for additional pain control and discussed safe acetanimophen dosing, norco sparingly prn      RTC/ER precautions given. F/U with me prn    Shital Grubbs PA-C

## 2024-08-14 ENCOUNTER — OFFICE VISIT (OUTPATIENT)
Dept: FAMILY MEDICINE | Facility: CLINIC | Age: 79
End: 2024-08-14
Payer: MEDICARE

## 2024-08-14 VITALS
DIASTOLIC BLOOD PRESSURE: 66 MMHG | SYSTOLIC BLOOD PRESSURE: 118 MMHG | OXYGEN SATURATION: 95 % | BODY MASS INDEX: 22.96 KG/M2 | WEIGHT: 124.75 LBS | HEART RATE: 82 BPM | HEIGHT: 62 IN

## 2024-08-14 DIAGNOSIS — Z71.89 ENCOUNTER FOR MEDICATION REVIEW AND COUNSELING: ICD-10-CM

## 2024-08-14 DIAGNOSIS — M47.25 OSTEOARTHRITIS OF SPINE WITH RADICULOPATHY, THORACOLUMBAR REGION: ICD-10-CM

## 2024-08-14 DIAGNOSIS — E78.2 MIXED HYPERLIPIDEMIA: ICD-10-CM

## 2024-08-14 DIAGNOSIS — M05.741 RHEUMATOID ARTHRITIS INVOLVING RIGHT HAND WITH POSITIVE RHEUMATOID FACTOR: ICD-10-CM

## 2024-08-14 DIAGNOSIS — M47.812 SPONDYLOSIS OF CERVICAL REGION WITHOUT MYELOPATHY OR RADICULOPATHY: ICD-10-CM

## 2024-08-14 DIAGNOSIS — R35.0 URINARY FREQUENCY: Primary | ICD-10-CM

## 2024-08-14 LAB
BACTERIA #/AREA URNS HPF: ABNORMAL /HPF
BILIRUB UR QL STRIP: NEGATIVE
CLARITY UR: CLEAR
COLOR UR: YELLOW
GLUCOSE UR QL STRIP: NEGATIVE
HGB UR QL STRIP: NEGATIVE
KETONES UR QL STRIP: NEGATIVE
LEUKOCYTE ESTERASE UR QL STRIP: ABNORMAL
MICROSCOPIC COMMENT: ABNORMAL
NITRITE UR QL STRIP: NEGATIVE
PH UR STRIP: 6 [PH] (ref 5–8)
PROT UR QL STRIP: NEGATIVE
RBC #/AREA URNS HPF: 1 /HPF (ref 0–4)
SP GR UR STRIP: 1.02 (ref 1–1.03)
SQUAMOUS #/AREA URNS HPF: 1 /HPF
URN SPEC COLLECT METH UR: ABNORMAL
WBC #/AREA URNS HPF: 1 /HPF (ref 0–5)

## 2024-08-14 PROCEDURE — 3078F DIAST BP <80 MM HG: CPT | Mod: HCNC,CPTII,S$GLB, | Performed by: PHYSICIAN ASSISTANT

## 2024-08-14 PROCEDURE — 1101F PT FALLS ASSESS-DOCD LE1/YR: CPT | Mod: HCNC,CPTII,S$GLB, | Performed by: PHYSICIAN ASSISTANT

## 2024-08-14 PROCEDURE — 1125F AMNT PAIN NOTED PAIN PRSNT: CPT | Mod: HCNC,CPTII,S$GLB, | Performed by: PHYSICIAN ASSISTANT

## 2024-08-14 PROCEDURE — 1160F RVW MEDS BY RX/DR IN RCRD: CPT | Mod: HCNC,CPTII,S$GLB, | Performed by: PHYSICIAN ASSISTANT

## 2024-08-14 PROCEDURE — 1159F MED LIST DOCD IN RCRD: CPT | Mod: HCNC,CPTII,S$GLB, | Performed by: PHYSICIAN ASSISTANT

## 2024-08-14 PROCEDURE — 99214 OFFICE O/P EST MOD 30 MIN: CPT | Mod: HCNC,S$GLB,, | Performed by: PHYSICIAN ASSISTANT

## 2024-08-14 PROCEDURE — 99999 PR PBB SHADOW E&M-EST. PATIENT-LVL V: CPT | Mod: PBBFAC,HCNC,, | Performed by: PHYSICIAN ASSISTANT

## 2024-08-14 PROCEDURE — 87086 URINE CULTURE/COLONY COUNT: CPT | Mod: HCNC | Performed by: PHYSICIAN ASSISTANT

## 2024-08-14 PROCEDURE — 3288F FALL RISK ASSESSMENT DOCD: CPT | Mod: HCNC,CPTII,S$GLB, | Performed by: PHYSICIAN ASSISTANT

## 2024-08-14 PROCEDURE — 3074F SYST BP LT 130 MM HG: CPT | Mod: HCNC,CPTII,S$GLB, | Performed by: PHYSICIAN ASSISTANT

## 2024-08-14 PROCEDURE — 81000 URINALYSIS NONAUTO W/SCOPE: CPT | Mod: HCNC,PO | Performed by: PHYSICIAN ASSISTANT

## 2024-08-14 RX ORDER — GABAPENTIN 300 MG/1
300 CAPSULE ORAL 3 TIMES DAILY
COMMUNITY

## 2024-08-14 NOTE — PATIENT INSTRUCTIONS
A few reminders from today:    Complete urine sample today  Continue with Dr. Olivera and Dr. Wallace  Tylenol (acetaminophen) as needed for additional pain control- max daily intake is 3000mg in 24 hours (aka one 500mg pills every 4-6 hours)    Follow up with me if needed.   Please go to ER/urgent care if after hours or symptoms persist/worsen.     Do not hesitate to get in touch with me should you have any further questions.     Thank you for trusting me with your care!  I wish you health and happiness.    -Shital Grubbs PA-C

## 2024-08-15 LAB — BACTERIA UR CULT: NO GROWTH

## 2024-08-20 ENCOUNTER — TELEPHONE (OUTPATIENT)
Dept: FAMILY MEDICINE | Facility: CLINIC | Age: 79
End: 2024-08-20
Payer: MEDICARE

## 2024-08-20 NOTE — TELEPHONE ENCOUNTER
----- Message from Shital Grubbs PA-C sent at 8/16/2024  7:42 AM CDT -----  Please let patient know that there was no growth of bacteria on her urine culture!

## 2024-08-20 NOTE — TELEPHONE ENCOUNTER
Attempted to reach out to patient in regards of her results from her urine. Patient did not answer and was unable to let a voicemail.

## 2024-08-21 ENCOUNTER — TELEPHONE (OUTPATIENT)
Dept: FAMILY MEDICINE | Facility: CLINIC | Age: 79
End: 2024-08-21
Payer: MEDICARE

## 2024-08-21 NOTE — TELEPHONE ENCOUNTER
Spoke with the patient, informed her of Shital Grubbs's message. Patient verbalized understanding.

## 2024-08-21 NOTE — TELEPHONE ENCOUNTER
----- Message from Danielle Thayer sent at 8/20/2024  3:20 PM CDT -----  Regarding: Returning phone call  Contact: pt  Type:  Patient Returning Call    Who Called:pt    Who Left Message for Patient: adis's office    Does the patient know what this is regarding?: UTI results    Would the patient rather a call back or a response via [facility]? Call back    Best Call Back Number:[phone]    Additional Information:  pt calling for UTI results.

## 2024-09-13 ENCOUNTER — LAB VISIT (OUTPATIENT)
Dept: LAB | Facility: HOSPITAL | Age: 79
End: 2024-09-13
Attending: INTERNAL MEDICINE
Payer: MEDICARE

## 2024-09-13 DIAGNOSIS — M05.741 RHEUMATOID ARTHRITIS INVOLVING RIGHT HAND WITH POSITIVE RHEUMATOID FACTOR: ICD-10-CM

## 2024-09-13 PROCEDURE — 84460 ALANINE AMINO (ALT) (SGPT): CPT | Mod: HCNC | Performed by: INTERNAL MEDICINE

## 2024-09-13 PROCEDURE — 36415 COLL VENOUS BLD VENIPUNCTURE: CPT | Mod: HCNC,PO | Performed by: INTERNAL MEDICINE

## 2024-09-13 PROCEDURE — 82565 ASSAY OF CREATININE: CPT | Mod: HCNC | Performed by: INTERNAL MEDICINE

## 2024-09-13 PROCEDURE — 85651 RBC SED RATE NONAUTOMATED: CPT | Mod: HCNC,PO | Performed by: INTERNAL MEDICINE

## 2024-09-13 PROCEDURE — 86140 C-REACTIVE PROTEIN: CPT | Mod: HCNC | Performed by: INTERNAL MEDICINE

## 2024-09-13 PROCEDURE — 84450 TRANSFERASE (AST) (SGOT): CPT | Mod: HCNC | Performed by: INTERNAL MEDICINE

## 2024-09-13 PROCEDURE — 85025 COMPLETE CBC W/AUTO DIFF WBC: CPT | Mod: HCNC | Performed by: INTERNAL MEDICINE

## 2024-09-14 LAB
ALT SERPL W/O P-5'-P-CCNC: 17 U/L (ref 10–44)
AST SERPL-CCNC: 20 U/L (ref 10–40)
BASOPHILS # BLD AUTO: 0.04 K/UL (ref 0–0.2)
BASOPHILS NFR BLD: 0.3 % (ref 0–1.9)
CREAT SERPL-MCNC: 0.8 MG/DL (ref 0.5–1.4)
CRP SERPL-MCNC: 2.3 MG/L (ref 0–8.2)
DIFFERENTIAL METHOD BLD: ABNORMAL
EOSINOPHIL # BLD AUTO: 0.1 K/UL (ref 0–0.5)
EOSINOPHIL NFR BLD: 0.8 % (ref 0–8)
ERYTHROCYTE [DISTWIDTH] IN BLOOD BY AUTOMATED COUNT: 13 % (ref 11.5–14.5)
ERYTHROCYTE [SEDIMENTATION RATE] IN BLOOD BY PHOTOMETRIC METHOD: 6 MM/HR (ref 0–36)
EST. GFR  (NO RACE VARIABLE): >60 ML/MIN/1.73 M^2
HCT VFR BLD AUTO: 44.8 % (ref 37–48.5)
HGB BLD-MCNC: 14.2 G/DL (ref 12–16)
IMM GRANULOCYTES # BLD AUTO: 0.1 K/UL (ref 0–0.04)
IMM GRANULOCYTES NFR BLD AUTO: 0.9 % (ref 0–0.5)
LYMPHOCYTES # BLD AUTO: 2.1 K/UL (ref 1–4.8)
LYMPHOCYTES NFR BLD: 18.2 % (ref 18–48)
MCH RBC QN AUTO: 30 PG (ref 27–31)
MCHC RBC AUTO-ENTMCNC: 31.7 G/DL (ref 32–36)
MCV RBC AUTO: 95 FL (ref 82–98)
MONOCYTES # BLD AUTO: 1 K/UL (ref 0.3–1)
MONOCYTES NFR BLD: 8.8 % (ref 4–15)
NEUTROPHILS # BLD AUTO: 8.1 K/UL (ref 1.8–7.7)
NEUTROPHILS NFR BLD: 71 % (ref 38–73)
NRBC BLD-RTO: 0 /100 WBC
PLATELET # BLD AUTO: 457 K/UL (ref 150–450)
PMV BLD AUTO: 10 FL (ref 9.2–12.9)
RBC # BLD AUTO: 4.74 M/UL (ref 4–5.4)
WBC # BLD AUTO: 11.45 K/UL (ref 3.9–12.7)

## 2024-09-18 ENCOUNTER — OFFICE VISIT (OUTPATIENT)
Dept: RHEUMATOLOGY | Facility: CLINIC | Age: 79
End: 2024-09-18
Payer: MEDICARE

## 2024-09-18 VITALS
SYSTOLIC BLOOD PRESSURE: 115 MMHG | DIASTOLIC BLOOD PRESSURE: 61 MMHG | HEART RATE: 72 BPM | HEIGHT: 62 IN | WEIGHT: 120.38 LBS | BODY MASS INDEX: 22.15 KG/M2

## 2024-09-18 DIAGNOSIS — Z79.899 HIGH RISK MEDICATIONS (NOT ANTICOAGULANTS) LONG-TERM USE: Primary | ICD-10-CM

## 2024-09-18 DIAGNOSIS — D84.9 IMMUNOSUPPRESSION: ICD-10-CM

## 2024-09-18 DIAGNOSIS — M47.25 OSTEOARTHRITIS OF SPINE WITH RADICULOPATHY, THORACOLUMBAR REGION: ICD-10-CM

## 2024-09-18 DIAGNOSIS — M06.042 RHEUMATOID ARTHRITIS INVOLVING BOTH HANDS WITH NEGATIVE RHEUMATOID FACTOR: ICD-10-CM

## 2024-09-18 DIAGNOSIS — M05.741 RHEUMATOID ARTHRITIS INVOLVING BOTH HANDS WITH POSITIVE RHEUMATOID FACTOR: ICD-10-CM

## 2024-09-18 DIAGNOSIS — M05.742 RHEUMATOID ARTHRITIS INVOLVING BOTH HANDS WITH POSITIVE RHEUMATOID FACTOR: ICD-10-CM

## 2024-09-18 DIAGNOSIS — M06.041 RHEUMATOID ARTHRITIS INVOLVING BOTH HANDS WITH NEGATIVE RHEUMATOID FACTOR: ICD-10-CM

## 2024-09-18 PROCEDURE — 1125F AMNT PAIN NOTED PAIN PRSNT: CPT | Mod: HCNC,CPTII,S$GLB, | Performed by: INTERNAL MEDICINE

## 2024-09-18 PROCEDURE — 1101F PT FALLS ASSESS-DOCD LE1/YR: CPT | Mod: HCNC,CPTII,S$GLB, | Performed by: INTERNAL MEDICINE

## 2024-09-18 PROCEDURE — 99999 PR PBB SHADOW E&M-EST. PATIENT-LVL IV: CPT | Mod: PBBFAC,HCNC,, | Performed by: INTERNAL MEDICINE

## 2024-09-18 PROCEDURE — 3078F DIAST BP <80 MM HG: CPT | Mod: HCNC,CPTII,S$GLB, | Performed by: INTERNAL MEDICINE

## 2024-09-18 PROCEDURE — 99214 OFFICE O/P EST MOD 30 MIN: CPT | Mod: HCNC,S$GLB,, | Performed by: INTERNAL MEDICINE

## 2024-09-18 PROCEDURE — 1159F MED LIST DOCD IN RCRD: CPT | Mod: HCNC,CPTII,S$GLB, | Performed by: INTERNAL MEDICINE

## 2024-09-18 PROCEDURE — 3288F FALL RISK ASSESSMENT DOCD: CPT | Mod: HCNC,CPTII,S$GLB, | Performed by: INTERNAL MEDICINE

## 2024-09-18 PROCEDURE — 3074F SYST BP LT 130 MM HG: CPT | Mod: HCNC,CPTII,S$GLB, | Performed by: INTERNAL MEDICINE

## 2024-09-18 RX ORDER — FOLIC ACID 1 MG/1
2000 TABLET ORAL DAILY
Qty: 180 TABLET | Refills: 3 | Status: SHIPPED | OUTPATIENT
Start: 2024-09-18

## 2024-09-18 RX ORDER — METHOTREXATE 2.5 MG/1
TABLET ORAL
Qty: 48 TABLET | Refills: 3 | Status: SHIPPED | OUTPATIENT
Start: 2024-09-18

## 2024-09-18 RX ORDER — HYDROCODONE BITARTRATE AND ACETAMINOPHEN 7.5; 325 MG/1; MG/1
1 TABLET ORAL
Qty: 20 TABLET | Refills: 0 | Status: SHIPPED | OUTPATIENT
Start: 2024-09-18 | End: 2024-10-18

## 2024-09-18 ASSESSMENT — ROUTINE ASSESSMENT OF PATIENT INDEX DATA (RAPID3)
TOTAL RAPID3 SCORE: 1.28
PAIN SCORE: 2
MDHAQ FUNCTION SCORE: 0.1
FATIGUE SCORE: 0
PSYCHOLOGICAL DISTRESS SCORE: 0
PATIENT GLOBAL ASSESSMENT SCORE: 1.5

## 2024-09-18 NOTE — PROGRESS NOTES
Subjective:          Chief Complaint: Fabby Guerrero is a 78 y.o. female who had concerns including Disease Management.    HPI:    Rheumatoid Arthritis  Patient is an 78 y.o. female here for follow up seropostive Rheumatoid Arthritis, secondary Sjogrens, and OA  Serologies are: RF+, CCP HIGH titer +.      Symptoms have been present for several years.   Symptoms include eye symptoms, joint pain, joint swelling, sleep difficulties and weakness of hands and are of moderate severity. Morning stiffness: 25 minute Patient denies joint pain, joint swelling and morning stiffness. Symptoms are made worse by: movement, overuse and resting.  Symptoms are helped by arthritis medications.  Associated symptoms include arthralgia and fatigue and secondary Sjogrens. Previously on Restasis, now systane/Refresh.   Diclofenac for 1 year with decreased urination and stable creatinine; now off.     Seen w/ Pain Management : new Dr. Wallace.   Patient with known cervical stenosis: hx of C7-T1 in 2021 followed by   Using Hydrocodone rarely.   She received an L4-5 epidural steroid injections 12/09/2021 with 50% relief she has some low back pain and pain in her calves and spasms at night sometimes during the day.  She did note the numbness and tingling has improved.      Seen with Ortho, Dr. Agudelo/Romeo,  received Euflexxa in past.  5/2021 call with flare of left knee, depomedrol 40IM and repeat Euflexxa.     C/o knee pain chronic and daily. No swelling, no instability. - knees stable and manageable.     8/2024: last visit patient was advised by pharmacy to stop MTX as it would interact with her Celebrex. Rheum dosing of MTX is already adjusted for NSAIDs. Advised her to restart as in that time memory did NOT have any appreciable improvement. Review of last note with PCP appears she continued holding MTX until recently.   She was able to see Neuro, rec sleep eval which she has order. Dr. Gordon has continued memantine.     9/2024: patient  here today for f/u regarding last visit with RA flare, new worsening of cervical spine is under care of Dr. Wallace.   She once again is not taking her Methotrexate that I have advised her to do. She is taking celebrex because she was advised by Ms. Grubbs at an urgent visit for pain to do so after my visi9t.    She did follow with Dr. Wallace- he felt shoulder, sent to Dr. Nelson bilateral shoulder injection and she is doing remarkably better.   She is pending to see       Current medications  Celebrex 100mg BID  Tylenol 650 2-3 daily  Methotrexate - Problems: none-- 4 tabs weekly -still off and not under control for RA - she is having some memory issues and repeatedly not taking after I have reassured her to take with Celebrex.     Hydrocodone rarely for pain- I filled #30 ok for 1 tab per day.       No hx of HCQ-  No personal hx of malignancy.    C/o feet /toes painful more but improved with recent celebrex.     Labs dated 12/2019 at [facility] for methotrexate monitoring show everything is within normal limits.      MRI of the shoulder some musculature is of the rotator cuff is grossly intact mild tendinopathy subacromial and glenohumeral narrowing but no active bursitis was noticed she has some hypertrophic changes at the AC joint    MRI of her cervical  spine however showed multilevel foraminal stenosis most prominent at C5-6 moderate to severe left > than right.  She also had a few disc osteophyte complexes which could even  a facet mediated pain causing muscle spasms radiating along the shoulder girdle in the upper trapezius region.     Dr Edmonds from pain management with some injections    Component      Latest Ref Rng & Units 4/17/2018 5/26/2015   Anti Sm/RNP Antibody      0.00 - 19.99 EU  0.35   Anti-Sm/RNP Interpretation      Negative  Negative   B27 Testing Date        06/16/2015 11:48 AM   HLA B27 Result        Negative   ds DNA Ab      Negative 1:10  Negative 1:10   CHON Screen      Negative <1:160  Negative  <1:160   Rheumatoid Factor      <14 IU/mL 128 (H) 34.0 (H)   CCP Antibodies      <5.0 U/mL  6.1 (H)   Sed Rate      < OR = 30 mm/h 11    Cyclic Citrullinated Peptide (CCP) Ab (IgG)      UNITS >250 (H)    CRP      <8.0 mg/L 1.4      REVIEW OF SYSTEMS:    Review of Systems   Constitutional:  Positive for malaise/fatigue. Negative for fever and weight loss.   HENT:  Negative for sore throat.    Eyes:  Positive for discharge and redness. Negative for double vision and photophobia.   Respiratory:  Negative for cough, shortness of breath and wheezing.    Cardiovascular:  Negative for chest pain, palpitations and orthopnea.   Gastrointestinal:  Negative for abdominal pain, constipation and diarrhea.   Genitourinary:  Negative for dysuria, hematuria and urgency.   Musculoskeletal:  Positive for joint pain. Negative for back pain and myalgias.   Skin:  Negative for rash.   Neurological:  Negative for dizziness, tingling, focal weakness and headaches.   Endo/Heme/Allergies:  Does not bruise/bleed easily.   Psychiatric/Behavioral:  Negative for depression, hallucinations and suicidal ideas.          Objective:        Past Medical History:   Diagnosis Date    Chronic neck pain     Chronic shoulder pain     GERD (gastroesophageal reflux disease)     Personal history of colonic polyps 2024    Keo Ortiz MD    Rheumatoid arthritis      Family History   Problem Relation Name Age of Onset    Cancer Mother          lung    Cancer Father          colon and lung    Cancer Daughter          melanoma     Social History     Tobacco Use    Smoking status: Former     Current packs/day: 0.00     Average packs/day: 1 pack/day for 14.0 years (14.0 ttl pk-yrs)     Types: Cigarettes     Start date: 1962     Quit date: 1976     Years since quittin.4     Passive exposure: Never    Smokeless tobacco: Never   Substance Use Topics    Alcohol use: Yes     Alcohol/week: 3.0 standard drinks of alcohol     Types: 3 Glasses of  wine per week     Comment: occasional    Drug use: No         Current Outpatient Medications on File Prior to Visit   Medication Sig Dispense Refill    acetaminophen (TYLENOL) 650 MG TbSR Take 650 mg by mouth every 8 (eight) hours as needed.      atorvastatin (LIPITOR) 10 MG tablet Take 1 tablet (10 mg total) by mouth once daily. 90 tablet 3    b complex vitamins tablet Take 1 tablet by mouth once daily. B100      celecoxib (CELEBREX) 100 MG capsule Take 1 capsule (100 mg total) by mouth 2 (two) times daily. 180 capsule 2    cyanocobalamin (VITAMIN B-12) 100 MCG tablet Take 100 mcg by mouth once daily.      cyclobenzaprine (FLEXERIL) 10 MG tablet Take 10 mg by mouth 3 (three) times daily.      fish oil-omega-3 fatty acids 300-1,000 mg capsule Take 2 g by mouth once daily.      gabapentin (NEURONTIN) 300 MG capsule Take 300 mg by mouth 3 (three) times daily.      GEMTESA 75 mg Tab Take 75 mg by mouth once daily. 30 tablet 11    ginkgo biloba 60 mg Cap Take by mouth.      glucosamine-chondroitin 500-400 mg tablet Take 2 tablets by mouth once daily.       levothyroxine (SYNTHROID) 88 MCG tablet TAKE ONE TABLET BY MOUTH EVERY MORNING 30 MINUTES before BREAKFAST 90 tablet 2    melatonin 5 mg Cap Take by mouth as needed.       memantine (NAMENDA) 5 MG Tab Take 5 mg by mouth 2 (two) times daily.      multivitamin (THERAGRAN) per tablet Take 1 tablet by mouth once daily.      TURMERIC (CURCUMIN MISC) 750 mg by Misc.(Non-Drug; Combo Route) route once daily.       UNABLE TO FIND medication name: CBD gummies pt takes 1/2 gummy every day      UNABLE TO FIND medication name: CBD pain relief gummy 33 mg - 1 daily      VITAMIN A-C-D3-COD LIVER OIL ORAL Take by mouth once daily.       vitamin B complex-folic acid 0.4 mg Tab Take by mouth once daily.       folic acid (FOLVITE) 1 MG tablet Take 2 tablets (2,000 mcg total) by mouth once daily. (Patient not taking: Reported on 9/18/2024) 180 tablet 3    HYDROcodone-acetaminophen  (NORCO)  mg per tablet Take 1 tablet by mouth every 24 hours as needed for Pain. 30 tablet 0    methotrexate 2.5 MG Tab TAKE 4 TABLETS (10MG) EVERY 7 DAYS (Patient not taking: Reported on 9/18/2024) 48 tablet 3     No current facility-administered medications on file prior to visit.       Vitals:    09/18/24 1525   BP: 115/61   Pulse: 72       Physical Exam:    Physical Exam  Constitutional:       Appearance: Normal appearance. She is well-developed.   HENT:      Nose: No septal deviation.      Mouth/Throat:      Mouth: No oral lesions.   Eyes:      Conjunctiva/sclera:      Right eye: Right conjunctiva is not injected.      Left eye: Left conjunctiva is not injected.      Pupils: Pupils are equal, round, and reactive to light.   Neck:      Thyroid: No thyroid mass or thyromegaly.      Vascular: No JVD.   Cardiovascular:      Rate and Rhythm: Normal rate and regular rhythm.      Pulses: Normal pulses.      Comments: No edema  Pulmonary:      Effort: Pulmonary effort is normal.      Breath sounds: Normal breath sounds.   Abdominal:      Palpations: Abdomen is soft.   Musculoskeletal:      Right shoulder: No swelling or tenderness. Normal range of motion.      Left shoulder: No swelling or tenderness. Normal range of motion.      Right elbow: No swelling. Normal range of motion. No tenderness.      Left elbow: No swelling. Normal range of motion. No tenderness.      Right wrist: Tenderness present. No swelling. Decreased range of motion.      Left wrist: Tenderness present. No swelling. Decreased range of motion.      Right hand: Tenderness present. Decreased range of motion.      Left hand: Tenderness present. Decreased range of motion.      Right hip: Normal range of motion. Normal strength.      Left hip: No tenderness. Normal range of motion.      Right knee: No swelling. Normal range of motion. No tenderness.      Left knee: No swelling. Normal range of motion. No tenderness.      Right ankle: No swelling.  No tenderness. Normal range of motion.      Left ankle: No swelling. No tenderness. Normal range of motion.      Comments: 5/5 upper and lower extremity bilateral muscle strength   Lymphadenopathy:      Cervical: No cervical adenopathy.   Skin:     General: Skin is dry.   Neurological:      Deep Tendon Reflexes: Reflexes are normal and symmetric.               Assessment:       Encounter Diagnoses   Name Primary?    Rheumatoid arthritis involving both hands with positive rheumatoid factor     Rheumatoid arthritis involving both hands with negative rheumatoid factor     Osteoarthritis of spine with radiculopathy, thoracolumbar region                   Plan:        Rheumatoid arthritis involving both hands with positive rheumatoid factor  -     methotrexate 2.5 MG Tab; TAKE 4 TABLETS (10MG) EVERY 7 DAYS  Dispense: 48 tablet; Refill: 3  -     folic acid (FOLVITE) 1 MG tablet; Take 2 tablets (2,000 mcg total) by mouth once daily.  Dispense: 180 tablet; Refill: 3  -     HYDROcodone-acetaminophen (NORCO) 7.5-325 mg per tablet; Take 1 tablet by mouth every 24 hours as needed for Pain (arthritis).  Dispense: 20 tablet; Refill: 0    Rheumatoid arthritis involving both hands with negative rheumatoid factor  -     methotrexate 2.5 MG Tab; TAKE 4 TABLETS (10MG) EVERY 7 DAYS  Dispense: 48 tablet; Refill: 3    Osteoarthritis of spine with radiculopathy, thoracolumbar region  -     folic acid (FOLVITE) 1 MG tablet; Take 2 tablets (2,000 mcg total) by mouth once daily.  Dispense: 180 tablet; Refill: 3  -     HYDROcodone-acetaminophen (NORCO) 7.5-325 mg per tablet; Take 1 tablet by mouth every 24 hours as needed for Pain (arthritis).  Dispense: 20 tablet; Refill: 0        MTX continue 4 tabs weekly   Folic acid keep at 2mg weekly.   Celebrex 100mg BID will likely help with her joints as well-I see very little synovitis at this time and suspect this is residual damage that is causing bulk of pain in toes.   Prednisone 5-10-mg daily  PRN-not using.   Hydrocodone for flare days as she is using infrequently  checked.       1. Keep Methotrexate for RA- last testing with PCP liver ok, will repeat labs at [facility] in 3 [facility]- pharmacy apparent told her to not take Celebrex with MTX but this is ok. She is in a flare with her RA as off MTX x 2 months.     2. Right shoulder and various joints painful- going to give IM depomedrol today.     3. Continue with PRN Hydrocodone.- filled for her today. I did decrease from 10 to 7.5mg and we will see if this is sedating for her.     4. Provided her with numbers for Neurology today-she has a referral and I do not think MTX is contributing to memory loss as she is worse today and has not been taking MTX for 2-3 months.     Follow up in about 4 months (around 1/18/2025).         30min consultation with greater than 50% of that time included Preparing to see the patient (review records, tests), Obtaining and/or reviewing separately obtained historical data, Performing a medically appropriate examination and/or evaluation , Ordering medications, tests, and/or procedures, Referring and communicating with other healthcare professionals , Documenting clinical information in the electronic or other health record and Independently interpreting results  (as warranted) & communicating results to the patient/family/caregiver. All questions answered.

## 2024-09-18 NOTE — PATIENT INSTRUCTIONS
We are going to start Methotrexate 4 tabs weekly  Folic acid 2 tablets daily.   You are OK to continue taking Celebrex as well as the methotrexate.     Labs at [facility] or the [facility]  1 week prior to next visit.

## 2024-10-22 ENCOUNTER — LAB VISIT (OUTPATIENT)
Dept: LAB | Facility: HOSPITAL | Age: 79
End: 2024-10-22
Attending: INTERNAL MEDICINE
Payer: MEDICARE

## 2024-10-22 DIAGNOSIS — M05.741 RHEUMATOID ARTHRITIS INVOLVING RIGHT HAND WITH POSITIVE RHEUMATOID FACTOR: ICD-10-CM

## 2024-10-22 DIAGNOSIS — E78.2 MIXED HYPERLIPIDEMIA: ICD-10-CM

## 2024-10-22 DIAGNOSIS — E03.9 ACQUIRED HYPOTHYROIDISM: ICD-10-CM

## 2024-10-22 LAB
ALBUMIN SERPL BCP-MCNC: 3.4 G/DL (ref 3.5–5.2)
ALP SERPL-CCNC: 70 U/L (ref 40–150)
ALT SERPL W/O P-5'-P-CCNC: 12 U/L (ref 10–44)
ANION GAP SERPL CALC-SCNC: 10 MMOL/L (ref 8–16)
AST SERPL-CCNC: 17 U/L (ref 10–40)
BASOPHILS # BLD AUTO: 0.04 K/UL (ref 0–0.2)
BASOPHILS NFR BLD: 0.4 % (ref 0–1.9)
BILIRUB SERPL-MCNC: 0.4 MG/DL (ref 0.1–1)
BUN SERPL-MCNC: 10 MG/DL (ref 8–23)
CALCIUM SERPL-MCNC: 9.6 MG/DL (ref 8.7–10.5)
CHLORIDE SERPL-SCNC: 100 MMOL/L (ref 95–110)
CHOLEST SERPL-MCNC: 204 MG/DL (ref 120–199)
CHOLEST/HDLC SERPL: 4.3 {RATIO} (ref 2–5)
CO2 SERPL-SCNC: 26 MMOL/L (ref 23–29)
CREAT SERPL-MCNC: 0.8 MG/DL (ref 0.5–1.4)
DIFFERENTIAL METHOD BLD: ABNORMAL
EOSINOPHIL # BLD AUTO: 0.1 K/UL (ref 0–0.5)
EOSINOPHIL NFR BLD: 0.9 % (ref 0–8)
ERYTHROCYTE [DISTWIDTH] IN BLOOD BY AUTOMATED COUNT: 12.8 % (ref 11.5–14.5)
EST. GFR  (NO RACE VARIABLE): >60 ML/MIN/1.73 M^2
GLUCOSE SERPL-MCNC: 109 MG/DL (ref 70–110)
HCT VFR BLD AUTO: 43.3 % (ref 37–48.5)
HDLC SERPL-MCNC: 48 MG/DL (ref 40–75)
HDLC SERPL: 23.5 % (ref 20–50)
HGB BLD-MCNC: 13.8 G/DL (ref 12–16)
IMM GRANULOCYTES # BLD AUTO: 0.06 K/UL (ref 0–0.04)
IMM GRANULOCYTES NFR BLD AUTO: 0.7 % (ref 0–0.5)
LDLC SERPL CALC-MCNC: 122 MG/DL (ref 63–159)
LYMPHOCYTES # BLD AUTO: 1.2 K/UL (ref 1–4.8)
LYMPHOCYTES NFR BLD: 13.4 % (ref 18–48)
MCH RBC QN AUTO: 28.8 PG (ref 27–31)
MCHC RBC AUTO-ENTMCNC: 31.9 G/DL (ref 32–36)
MCV RBC AUTO: 90 FL (ref 82–98)
MONOCYTES # BLD AUTO: 0.7 K/UL (ref 0.3–1)
MONOCYTES NFR BLD: 7.3 % (ref 4–15)
NEUTROPHILS # BLD AUTO: 6.9 K/UL (ref 1.8–7.7)
NEUTROPHILS NFR BLD: 77.3 % (ref 38–73)
NONHDLC SERPL-MCNC: 156 MG/DL
NRBC BLD-RTO: 0 /100 WBC
PLATELET # BLD AUTO: 409 K/UL (ref 150–450)
PMV BLD AUTO: 10.1 FL (ref 9.2–12.9)
POTASSIUM SERPL-SCNC: 4.7 MMOL/L (ref 3.5–5.1)
PROT SERPL-MCNC: 7.2 G/DL (ref 6–8.4)
RBC # BLD AUTO: 4.79 M/UL (ref 4–5.4)
SODIUM SERPL-SCNC: 136 MMOL/L (ref 136–145)
T4 FREE SERPL-MCNC: 0.93 NG/DL (ref 0.71–1.51)
TRIGL SERPL-MCNC: 170 MG/DL (ref 30–150)
TSH SERPL DL<=0.005 MIU/L-ACNC: 5.24 UIU/ML (ref 0.4–4)
WBC # BLD AUTO: 8.9 K/UL (ref 3.9–12.7)

## 2024-10-22 PROCEDURE — 36415 COLL VENOUS BLD VENIPUNCTURE: CPT | Mod: HCNC,PO | Performed by: INTERNAL MEDICINE

## 2024-10-22 PROCEDURE — 85025 COMPLETE CBC W/AUTO DIFF WBC: CPT | Mod: HCNC | Performed by: INTERNAL MEDICINE

## 2024-10-22 PROCEDURE — 80061 LIPID PANEL: CPT | Mod: HCNC | Performed by: INTERNAL MEDICINE

## 2024-10-22 PROCEDURE — 80053 COMPREHEN METABOLIC PANEL: CPT | Mod: HCNC | Performed by: INTERNAL MEDICINE

## 2024-10-22 PROCEDURE — 84443 ASSAY THYROID STIM HORMONE: CPT | Mod: HCNC | Performed by: INTERNAL MEDICINE

## 2024-10-22 PROCEDURE — 84439 ASSAY OF FREE THYROXINE: CPT | Mod: HCNC | Performed by: INTERNAL MEDICINE

## 2024-10-25 ENCOUNTER — OFFICE VISIT (OUTPATIENT)
Dept: FAMILY MEDICINE | Facility: CLINIC | Age: 79
End: 2024-10-25
Payer: MEDICARE

## 2024-10-25 VITALS
HEIGHT: 62 IN | SYSTOLIC BLOOD PRESSURE: 116 MMHG | DIASTOLIC BLOOD PRESSURE: 64 MMHG | HEART RATE: 72 BPM | BODY MASS INDEX: 22.23 KG/M2 | OXYGEN SATURATION: 97 % | WEIGHT: 120.81 LBS

## 2024-10-25 DIAGNOSIS — E03.9 ACQUIRED HYPOTHYROIDISM: Primary | ICD-10-CM

## 2024-10-25 DIAGNOSIS — R41.89 COGNITIVE IMPAIRMENT: ICD-10-CM

## 2024-10-25 DIAGNOSIS — M05.741 RHEUMATOID ARTHRITIS INVOLVING RIGHT HAND WITH POSITIVE RHEUMATOID FACTOR: ICD-10-CM

## 2024-10-25 DIAGNOSIS — E78.2 MIXED HYPERLIPIDEMIA: ICD-10-CM

## 2024-10-25 PROCEDURE — 99999 PR PBB SHADOW E&M-EST. PATIENT-LVL V: CPT | Mod: PBBFAC,HCNC,, | Performed by: INTERNAL MEDICINE

## 2024-10-25 NOTE — PROGRESS NOTES
Patient ID: Fabby Guerrero is a 79 y.o. female.    Chief Complaint: Follow-up     Assessment and Plan     1. Acquired hypothyroidism  - TSH; Future  - Ambulatory referral/consult to Home Health; Future    2. Rheumatoid arthritis involving right hand with positive rheumatoid factor  - Ambulatory referral/consult to Home Health; Future    3. Cognitive impairment  - Ambulatory referral/consult to Home Health; Future    4. Mixed hyperlipidemia     Home health referral to go over medications and make sure she is taking them appropriately.  Follow-up with Rheumatology.  Follow-up in 3 months with me     HPI     Six-month follow-up    1. Acquired hypothyroidism   - TSH elevated slightly.  Not high enough to cause memory loss in my opinion.  Not sure she is taking levothyroxine properly.   2. Rheumatoid arthritis involving right hand with positive rheumatoid factor - she is having pain in the hands and shoulders.  She and  are wondering if she is eligible Humira or another biologic.  She should be taking methotrexate and folic acid.  She is not sure if she is taking methotrexate   3. Cognitive impairment -  is worried about her memory.  She seems like her memory loss has progressed a little.  She is taking memantine   4. Mixed hyperlipidemia -triglycerides elevated, need to make sure she is taking atorvastatin 10 mg        Review of Systems   Constitutional:  Negative for fever.   Respiratory:  Negative for shortness of breath.    Cardiovascular:  Negative for chest pain.   Gastrointestinal:  Negative for abdominal pain.       I personally reviewed past medical, family and social history.     Objective    Vitals:    10/25/24 0955   BP: 116/64   Pulse: 72      Wt Readings from Last 3 Encounters:   10/25/24 0955 54.8 kg (120 lb 13 oz)   09/18/24 1525 54.6 kg (120 lb 5.9 oz)   08/14/24 0853 56.6 kg (124 lb 12.5 oz)      Body mass index is 22.1 kg/m².     Physical Exam  Cardiovascular:      Rate and Rhythm:  Normal rate and regular rhythm.      Heart sounds: No murmur heard.     No gallop.   Pulmonary:      Breath sounds: Normal breath sounds. No wheezing or rhonchi.   Abdominal:      Palpations: Abdomen is soft.      Tenderness: There is no abdominal tenderness.        Reference     : Visit today included increased complexity associated with the care of the episodic problem Acquired hypothyroidism [E03.9] addressed and managing the longitudinal care of the patient due to the serious and/or complex managed problem(s)     Active Problem List with Overview Notes    Diagnosis Date Noted    Osteopenia of multiple sites 04/24/2024    DIMITRIS (obstructive sleep apnea) 03/21/2024    Cognitive impairment 10/27/2022     MOCA:  21/30 per PCP 9/2022  24+1/30 March 2024    MRI brain 10/2022 - mild gen atrophy and microangiopathy, normal for age       Immunosuppression 02/21/2022    Mixed hyperlipidemia 11/19/2020    Rheumatoid arthritis of hand 04/21/2016    Acquired hypothyroidism 04/13/2012    Primary insomnia 10/27/2022    Lumbar radiculopathy 12/09/2021    Cervical radiculopathy 11/04/2021    Abnormal electrocardiogram (ECG) (EKG) 11/24/2020    Osteoarthritis of spine with radiculopathy, thoracolumbar region 04/21/2016    Osteoarthritis of neck 04/21/2016    Family history of colon cancer 04/13/2012     Father age 70                Hyperlipidemia Medications               atorvastatin (LIPITOR) 10 MG tablet Take 1 tablet (10 mg total) by mouth once daily.    fish oil-omega-3 fatty acids 300-1,000 mg capsule Take 2 g by mouth once daily.           Medication List with Changes/Refills   Current Medications    ACETAMINOPHEN (TYLENOL) 650 MG TBSR    Take 650 mg by mouth every 8 (eight) hours as needed.    ATORVASTATIN (LIPITOR) 10 MG TABLET    Take 1 tablet (10 mg total) by mouth once daily.    B COMPLEX VITAMINS TABLET    Take 1 tablet by mouth once daily. B100    CELECOXIB (CELEBREX) 100 MG CAPSULE    Take 1 capsule (100 mg  total) by mouth 2 (two) times daily.    CYANOCOBALAMIN (VITAMIN B-12) 100 MCG TABLET    Take 100 mcg by mouth once daily.    CYCLOBENZAPRINE (FLEXERIL) 10 MG TABLET    Take 10 mg by mouth 3 (three) times daily.    FISH OIL-OMEGA-3 FATTY ACIDS 300-1,000 MG CAPSULE    Take 2 g by mouth once daily.    FOLIC ACID (FOLVITE) 1 MG TABLET    Take 2 tablets (2,000 mcg total) by mouth once daily.    GINKGO BILOBA 60 MG CAP    Take by mouth.    GLUCOSAMINE-CHONDROITIN 500-400 MG TABLET    Take 2 tablets by mouth once daily.     LEVOTHYROXINE (SYNTHROID) 88 MCG TABLET    TAKE ONE TABLET BY MOUTH EVERY MORNING 30 MINUTES before BREAKFAST    MELATONIN 5 MG CAP    Take by mouth as needed.     MEMANTINE (NAMENDA) 5 MG TAB    Take 5 mg by mouth 2 (two) times daily.    METHOTREXATE 2.5 MG TAB    TAKE 4 TABLETS (10MG) EVERY 7 DAYS    MULTIVITAMIN (THERAGRAN) PER TABLET    Take 1 tablet by mouth once daily.    TURMERIC (CURCUMIN MISC)    750 mg by Misc.(Non-Drug; Combo Route) route once daily.     UNABLE TO FIND    medication name: CBD gummies pt takes 1/2 gummy every day    UNABLE TO FIND    medication name: CBD pain relief gummy 33 mg - 1 daily    VITAMIN A-C-D3-COD LIVER OIL ORAL    Take by mouth once daily.     VITAMIN B COMPLEX-FOLIC ACID 0.4 MG TAB    Take by mouth once daily.    Discontinued Medications    GABAPENTIN (NEURONTIN) 300 MG CAPSULE    Take 300 mg by mouth 3 (three) times daily.    GEMTESA 75 MG TAB    Take 75 mg by mouth once daily.

## 2024-11-04 ENCOUNTER — TELEPHONE (OUTPATIENT)
Dept: RHEUMATOLOGY | Facility: CLINIC | Age: 79
End: 2024-11-04
Payer: MEDICARE

## 2024-11-04 NOTE — TELEPHONE ENCOUNTER
----- Message from Harika sent at 11/4/2024  8:26 AM CST -----  Regarding: appointment  Contact: Waldo spouse  Type:  Sooner Appointment Request    Caller is requesting a sooner appointment.  Caller declined first available appointment listed below.  Caller will not accept being placed on the waitlist and is requesting a message be sent to doctor.    Name of Caller:  Waldo spouse  When is the first available appointment?  12/18/24  Symptoms:  a lot of pain  Would the patient rather a call back or a response via [facility]? call  Best Call Back Number: [phone]  Additional Information:  Please call patient to advise.  Thanks!

## 2024-11-04 NOTE — TELEPHONE ENCOUNTER
Mr Haas states that Ms Sequeira has been dealing with severe joint pain and asking to be seen this week. Pr request scheduled for Friday 11/8/24. Confirmed

## 2024-11-04 NOTE — TELEPHONE ENCOUNTER
Attempted to contact pt's spouse to schedule appt for pt and rec'd no answer, and unable to leave a message.    Shante Jaramillo MA  [facility]  11/4/2024

## 2024-11-04 NOTE — TELEPHONE ENCOUNTER
----- Message from Carolina sent at 11/4/2024 12:05 PM CST -----  Regarding: Missed call  Type:  Patient Returning Call         Who Called:  Waldo(Spouse)         Who Left Message for Patient: Shante Jaramillo MA         Does the patient know what this is regarding?:yes           Would the patient rather a call back or a response via [facility]?  Call Back          Best Call Back Number:[phone]  or [phone]         Additional Information:Please call twice . Pt is waiting by phone

## 2024-11-08 ENCOUNTER — TELEPHONE (OUTPATIENT)
Dept: RHEUMATOLOGY | Facility: CLINIC | Age: 79
End: 2024-11-08

## 2024-11-08 ENCOUNTER — OFFICE VISIT (OUTPATIENT)
Dept: RHEUMATOLOGY | Facility: CLINIC | Age: 79
End: 2024-11-08
Payer: MEDICARE

## 2024-11-08 VITALS
HEART RATE: 76 BPM | WEIGHT: 121.5 LBS | BODY MASS INDEX: 22.36 KG/M2 | SYSTOLIC BLOOD PRESSURE: 125 MMHG | HEIGHT: 62 IN | DIASTOLIC BLOOD PRESSURE: 71 MMHG

## 2024-11-08 DIAGNOSIS — M06.041 RHEUMATOID ARTHRITIS INVOLVING BOTH HANDS WITH NEGATIVE RHEUMATOID FACTOR: ICD-10-CM

## 2024-11-08 DIAGNOSIS — M47.25 OSTEOARTHRITIS OF SPINE WITH RADICULOPATHY, THORACOLUMBAR REGION: ICD-10-CM

## 2024-11-08 DIAGNOSIS — M05.741 RHEUMATOID ARTHRITIS INVOLVING BOTH HANDS WITH POSITIVE RHEUMATOID FACTOR: ICD-10-CM

## 2024-11-08 DIAGNOSIS — M05.742 RHEUMATOID ARTHRITIS INVOLVING BOTH HANDS WITH POSITIVE RHEUMATOID FACTOR: ICD-10-CM

## 2024-11-08 DIAGNOSIS — M06.042 RHEUMATOID ARTHRITIS INVOLVING BOTH HANDS WITH NEGATIVE RHEUMATOID FACTOR: ICD-10-CM

## 2024-11-08 PROCEDURE — 1159F MED LIST DOCD IN RCRD: CPT | Mod: HCNC,CPTII,S$GLB, | Performed by: INTERNAL MEDICINE

## 2024-11-08 PROCEDURE — 3074F SYST BP LT 130 MM HG: CPT | Mod: HCNC,CPTII,S$GLB, | Performed by: INTERNAL MEDICINE

## 2024-11-08 PROCEDURE — 3078F DIAST BP <80 MM HG: CPT | Mod: HCNC,CPTII,S$GLB, | Performed by: INTERNAL MEDICINE

## 2024-11-08 PROCEDURE — 1125F AMNT PAIN NOTED PAIN PRSNT: CPT | Mod: HCNC,CPTII,S$GLB, | Performed by: INTERNAL MEDICINE

## 2024-11-08 PROCEDURE — 99215 OFFICE O/P EST HI 40 MIN: CPT | Mod: HCNC,S$GLB,, | Performed by: INTERNAL MEDICINE

## 2024-11-08 PROCEDURE — 99999 PR PBB SHADOW E&M-EST. PATIENT-LVL IV: CPT | Mod: PBBFAC,HCNC,, | Performed by: INTERNAL MEDICINE

## 2024-11-08 PROCEDURE — 3288F FALL RISK ASSESSMENT DOCD: CPT | Mod: HCNC,CPTII,S$GLB, | Performed by: INTERNAL MEDICINE

## 2024-11-08 PROCEDURE — 1101F PT FALLS ASSESS-DOCD LE1/YR: CPT | Mod: HCNC,CPTII,S$GLB, | Performed by: INTERNAL MEDICINE

## 2024-11-08 RX ORDER — METHOTREXATE 2.5 MG/1
TABLET ORAL
Qty: 48 TABLET | Refills: 3 | Status: SHIPPED | OUTPATIENT
Start: 2024-11-08

## 2024-11-08 RX ORDER — FOLIC ACID 1 MG/1
2000 TABLET ORAL DAILY
Qty: 180 TABLET | Refills: 3 | Status: SHIPPED | OUTPATIENT
Start: 2024-11-08

## 2024-11-08 RX ORDER — HYDROCODONE BITARTRATE AND ACETAMINOPHEN 7.5; 325 MG/1; MG/1
1 TABLET ORAL EVERY 12 HOURS PRN
Qty: 20 TABLET | Refills: 0 | Status: SHIPPED | OUTPATIENT
Start: 2024-11-08 | End: 2024-12-08

## 2024-11-08 ASSESSMENT — ROUTINE ASSESSMENT OF PATIENT INDEX DATA (RAPID3)
MDHAQ FUNCTION SCORE: 0.8
TOTAL RAPID3 SCORE: 3.22
PATIENT GLOBAL ASSESSMENT SCORE: 3
PAIN SCORE: 4
FATIGUE SCORE: 1.1
PSYCHOLOGICAL DISTRESS SCORE: 0

## 2024-11-08 NOTE — TELEPHONE ENCOUNTER
----- Message from [place] sent at 11/8/2024  3:25 PM CST -----  Contact: Donnie [phone]  Type:  Pharmacy Calling to Clarify an RX    Name of Caller:  Donnie   Pharmacy Name:  [facility]   Prescription Name:  HYDROcodone-acetaminophen (NORCO) 7.5-325 mg per tablet   What do they need to clarify?:  pt also taking tramadol   Best Call Back Number:  [phone]  Additional Information:  pls call back

## 2024-11-08 NOTE — PROGRESS NOTES
Subjective:          Chief Complaint: Fabby Guerrero is a 79 y.o. female who had concerns including Pain.    HPI:    Rheumatoid Arthritis  Patient is an 79 y.o. female here for follow up seropostive Rheumatoid Arthritis, secondary Sjogrens, and OA  Serologies are: RF+, CCP HIGH titer +.      Symptoms have been present for several years.   Symptoms include eye symptoms, joint pain, joint swelling, sleep difficulties and weakness of hands and are of moderate severity. Morning stiffness: 25 minute Patient denies joint pain, joint swelling and morning stiffness. Symptoms are made worse by: movement, overuse and resting.  Symptoms are helped by arthritis medications.  Associated symptoms include arthralgia and fatigue and secondary Sjogrens. Previously on Restasis, now systane/Refresh.   Diclofenac for 1 year with decreased urination and stable creatinine; now off.     Seen w/ Pain Management : new Dr. Wallace.   Patient with known cervical stenosis: hx of C7-T1 in 2021 followed by   Using Hydrocodone rarely.   She received an L4-5 epidural steroid injections 12/09/2021 with 50% relief she has some low back pain and pain in her calves and spasms at night sometimes during the day.  She did note the numbness and tingling has improved.      Seen with Ortho, Dr. Agudelo/Romeo,  received Euflexxa in past.  5/2021 call with flare of left knee, depomedrol 40IM and repeat Euflexxa.     C/o knee pain chronic and daily. No swelling, no instability. - knees stable and manageable.     8/2024: last visit patient was advised by pharmacy to stop MTX as it would interact with her Celebrex. Rheum dosing of MTX is already adjusted for NSAIDs. Advised her to restart as in that time memory did NOT have any appreciable improvement. Review of last note with PCP appears she continued holding MTX until recently.   She was able to see Neuro, rec sleep eval which she has order. Dr. Gordon has continued memantine.     9/2024: patient here today for  f/u regarding last visit with RA flare, new worsening of cervical spine is under care of Dr. Wallace.   She once again is not taking her Methotrexate that I have advised her to do. She is taking celebrex because she was advised by Ms. Grubbs at an urgent visit for pain to do so after my visi9t.    She did follow with Dr. Wallace- he felt shoulder, sent to Dr. Nelson bilateral shoulder injection and she is doing remarkably better.     11/2024: patient returns today has not started her methotrexate since last visit. She is now off DMARD therapy since about March of 2024. I cannot get her to restart. She did f/u with Dr Nelson few weeks ago and cannot repeat injection just yet.   Patient did see new Pain doc appears around she was schedule with Dr. Lujan (pain) 9/2024, and 10/2024- given tramadol she is not recalling if this was helpful. But is clear she feels better on Hydrocodone. MRI lumbar was updated but she and her  state no procedures offered.   Patient with cotinued joint pain hands, shouders worst but can have days of stiffness diffusely.       Current medications  Celebrex 100mg BID  Tylenol 650 2-3 daily  Methotrexate - Problems: none-- 4 tabs weekly -still off and not under control for RA - she is having some memory issues and repeatedly not taking after I have reassured her to take with Celebrex.     Hydrocodone rarely for pain- I filled #30 ok for 1 tab per day.       No hx of HCQ-  No personal hx of malignancy.    C/o feet /toes painful more but improved with recent celebrex.     [facility] monitoring show everything is within normal limits.      MRI of the shoulder some musculature is of the rotator cuff is grossly intact mild tendinopathy subacromial and glenohumeral narrowing but no active bursitis was noticed she has some hypertrophic changes at the AC joint    MRI of her cervical  spine however showed multilevel foraminal stenosis most prominent at C5-6 moderate to  severe left > than right.  She also had a few disc osteophyte complexes which could even  a facet mediated pain causing muscle spasms radiating along the shoulder girdle in the upper trapezius region.     Dr Edmonds from pain management with some injections    Component      Latest Ref Rng & Units 4/17/2018 5/26/2015   Anti Sm/RNP Antibody      0.00 - 19.99 EU  0.35   Anti-Sm/RNP Interpretation      Negative  Negative   B27 Testing Date        06/16/2015 11:48 AM   HLA B27 Result        Negative   ds DNA Ab      Negative 1:10  Negative 1:10   CHON Screen      Negative <1:160  Negative <1:160   Rheumatoid Factor      <14 IU/mL 128 (H) 34.0 (H)   CCP Antibodies      <5.0 U/mL  6.1 (H)   Sed Rate      < OR = 30 mm/h 11    Cyclic Citrullinated Peptide (CCP) Ab (IgG)      UNITS >250 (H)    CRP      <8.0 mg/L 1.4      REVIEW OF SYSTEMS:    Review of Systems   Constitutional:  Positive for malaise/fatigue. Negative for fever and weight loss.   HENT:  Negative for sore throat.    Eyes:  Positive for discharge and redness. Negative for double vision and photophobia.   Respiratory:  Negative for cough, shortness of breath and wheezing.    Cardiovascular:  Negative for chest pain, palpitations and orthopnea.   Gastrointestinal:  Negative for abdominal pain, constipation and diarrhea.   Genitourinary:  Negative for dysuria, hematuria and urgency.   Musculoskeletal:  Positive for joint pain. Negative for back pain and myalgias.   Skin:  Negative for rash.   Neurological:  Negative for dizziness, tingling, focal weakness and headaches.   Endo/Heme/Allergies:  Does not bruise/bleed easily.   Psychiatric/Behavioral:  Negative for depression, hallucinations and suicidal ideas.          Objective:        Past Medical History:   Diagnosis Date    Chronic neck pain     Chronic shoulder pain     GERD (gastroesophageal reflux disease)     Personal history of colonic polyps 04/30/2024    Keo Ortzi MD    Rheumatoid arthritis       Family History   Problem Relation Name Age of Onset    Cancer Mother          lung    Cancer Father          colon and lung    Cancer Daughter          melanoma     Social History     Tobacco Use    Smoking status: Former     Current packs/day: 0.00     Average packs/day: 1 pack/day for 14.0 years (14.0 ttl pk-yrs)     Types: Cigarettes     Start date: 1962     Quit date: 1976     Years since quittin.6     Passive exposure: Never    Smokeless tobacco: Never   Substance Use Topics    Alcohol use: Yes     Alcohol/week: 3.0 standard drinks of alcohol     Types: 3 Glasses of wine per week     Comment: occasional    Drug use: No         Current Outpatient Medications on File Prior to Visit   Medication Sig Dispense Refill    acetaminophen (TYLENOL) 650 MG TbSR Take 650 mg by mouth every 8 (eight) hours as needed.      b complex vitamins tablet Take 1 tablet by mouth once daily. B100      celecoxib (CELEBREX) 100 MG capsule Take 1 capsule (100 mg total) by mouth 2 (two) times daily. 180 capsule 2    cyanocobalamin (VITAMIN B-12) 100 MCG tablet Take 100 mcg by mouth once daily.      cyclobenzaprine (FLEXERIL) 10 MG tablet Take 10 mg by mouth 3 (three) times daily.      fish oil-omega-3 fatty acids 300-1,000 mg capsule Take 2 g by mouth once daily.      ginkgo biloba 60 mg Cap Take by mouth.      glucosamine-chondroitin 500-400 mg tablet Take 2 tablets by mouth once daily.       levothyroxine (SYNTHROID) 88 MCG tablet TAKE ONE TABLET BY MOUTH EVERY MORNING 30 MINUTES before BREAKFAST 90 tablet 2    melatonin 5 mg Cap Take by mouth as needed.       memantine (NAMENDA) 5 MG Tab Take 5 mg by mouth 2 (two) times daily.      multivitamin (THERAGRAN) per tablet Take 1 tablet by mouth once daily.      TURMERIC (CURCUMIN MISC) 750 mg by Misc.(Non-Drug; Combo Route) route once daily.       UNABLE TO FIND medication name: CBD gummies pt takes 1/2 gummy every day      UNABLE TO FIND medication name: CBD pain relief  gummy 33 mg - 1 daily      VITAMIN A-C-D3-COD LIVER OIL ORAL Take by mouth once daily.       vitamin B complex-folic acid 0.4 mg Tab Take by mouth once daily.       atorvastatin (LIPITOR) 10 MG tablet Take 1 tablet (10 mg total) by mouth once daily. 90 tablet 3    folic acid (FOLVITE) 1 MG tablet Take 2 tablets (2,000 mcg total) by mouth once daily. (Patient not taking: Reported on 11/8/2024) 180 tablet 3    methotrexate 2.5 MG Tab TAKE 4 TABLETS (10MG) EVERY 7 DAYS (Patient not taking: Reported on 11/8/2024) 48 tablet 3     No current facility-administered medications on file prior to visit.       Vitals:    11/08/24 1127   BP: 125/71   Pulse: 76       Physical Exam:    Physical Exam  Constitutional:       Appearance: Normal appearance. She is well-developed.   HENT:      Nose: No septal deviation.      Mouth/Throat:      Mouth: No oral lesions.   Eyes:      Conjunctiva/sclera:      Right eye: Right conjunctiva is not injected.      Left eye: Left conjunctiva is not injected.      Pupils: Pupils are equal, round, and reactive to light.   Neck:      Thyroid: No thyroid mass or thyromegaly.      Vascular: No JVD.   Cardiovascular:      Rate and Rhythm: Normal rate and regular rhythm.      Pulses: Normal pulses.      Comments: No edema  Pulmonary:      Effort: Pulmonary effort is normal.      Breath sounds: Normal breath sounds.   Abdominal:      Palpations: Abdomen is soft.   Musculoskeletal:      Right shoulder: No swelling or tenderness. Normal range of motion.      Left shoulder: No swelling or tenderness. Normal range of motion.      Right elbow: No swelling. Normal range of motion. No tenderness.      Left elbow: No swelling. Normal range of motion. No tenderness.      Right wrist: Tenderness present. No swelling. Decreased range of motion.      Left wrist: Tenderness present. No swelling. Decreased range of motion.      Right hand: Tenderness present. Decreased range of motion.      Left hand: Tenderness  present. Decreased range of motion.      Right hip: Normal range of motion. Normal strength.      Left hip: No tenderness. Normal range of motion.      Right knee: No swelling. Normal range of motion. No tenderness.      Left knee: No swelling. Normal range of motion. No tenderness.      Right ankle: No swelling. No tenderness. Normal range of motion.      Left ankle: No swelling. No tenderness. Normal range of motion.      Comments: 5/5 upper and lower extremity bilateral muscle strength   Lymphadenopathy:      Cervical: No cervical adenopathy.   Skin:     General: Skin is dry.   Neurological:      Deep Tendon Reflexes: Reflexes are normal and symmetric.               Assessment:       Encounter Diagnoses   Name Primary?    Rheumatoid arthritis involving both hands with positive rheumatoid factor     Osteoarthritis of spine with radiculopathy, thoracolumbar region     Rheumatoid arthritis involving both hands with negative rheumatoid factor                     Plan:        Rheumatoid arthritis involving both hands with positive rheumatoid factor  -     folic acid (FOLVITE) 1 MG tablet; Take 2 tablets (2,000 mcg total) by mouth once daily.  Dispense: 180 tablet; Refill: 3  -     methotrexate 2.5 MG Tab; TAKE 4 TABLETS (10MG) EVERY 7 DAYS  Dispense: 48 tablet; Refill: 3  -     HYDROcodone-acetaminophen (NORCO) 7.5-325 mg per tablet; Take 1 tablet by mouth every 12 (twelve) hours as needed (severe pain).  Dispense: 20 tablet; Refill: 0    Osteoarthritis of spine with radiculopathy, thoracolumbar region  -     folic acid (FOLVITE) 1 MG tablet; Take 2 tablets (2,000 mcg total) by mouth once daily.  Dispense: 180 tablet; Refill: 3  -     HYDROcodone-acetaminophen (NORCO) 7.5-325 mg per tablet; Take 1 tablet by mouth every 12 (twelve) hours as needed (severe pain).  Dispense: 20 tablet; Refill: 0    Rheumatoid arthritis involving both hands with negative rheumatoid factor  -     methotrexate 2.5 MG Tab; TAKE 4 TABLETS  (10MG) EVERY 7 DAYS  Dispense: 48 tablet; Refill: 3      MTX continue 4 tabs weekly   Folic acid keep at 2mg weekly.   Celebrex 100mg BID will likely help with her joints as well-I see very little synovitis at this time and suspect this is residual damage that is causing bulk of pain in toes.   Prednisone 5-10-mg daily PRN-not using.   Hydrocodone for flare days as she is using infrequently  checked. I will have staff call pharmacy to discontinue and no longer dispense any Tramadol she is not taking. See Tel note .       1. Keep Methotrexate for RA- last testing with PCP liver ok, will repeat labs at [facility] in 3 motnhs- pharmacy apparent told her to not take Celebrex with MTX but this is ok. She is in a flare with her RA as off MTX x 2 months.    -I will not prescribed biologic until she takes the MTX. The risk here is too great given we seen to have a breakdown in understanding the instructions for therapy from visit to visit. Aware of her memory,  agree [facility] will be likely needed to ensure MTX is being taken correctly.     Printed instructions for patient : Start Methotrexate 4 tablets all at ONCE every Sunday.   Take folic acid 2 tablets every day.   Take Celebrex 100mg twice daily    This will absolutely help your joint pains.     Ok for Hydrocodone as needed for severe pain.   No follow-ups on file.         40min consultation with greater than 50% of that time included Preparing to see the patient (review records, tests), Obtaining and/or reviewing separately obtained historical data, Performing a medically appropriate examination and/or evaluation , Ordering medications, tests, and/or procedures, Referring and communicating with other healthcare professionals , Documenting clinical information in the electronic or other health record and Independently interpreting results  (as warranted) & communicating results to the patient/family/caregiver. All questions answered.

## 2024-11-08 NOTE — TELEPHONE ENCOUNTER
Spoke to Van Pharm D at [facility] to inform that patient reported no longer taking  Tramadol.

## 2024-11-08 NOTE — PATIENT INSTRUCTIONS
Start Methotrexate 4 tablets all at ONCE every Sunday.   Take folic acid 2 tablets every day.   Take Celebrex 100mg twice daily    This will absolutely help your joint pains.     Ok for Hydrocodone as needed for severe pain.

## 2024-12-11 ENCOUNTER — LAB VISIT (OUTPATIENT)
Dept: LAB | Facility: HOSPITAL | Age: 79
End: 2024-12-11
Attending: INTERNAL MEDICINE
Payer: MEDICARE

## 2024-12-11 DIAGNOSIS — M05.742 RHEUMATOID ARTHRITIS INVOLVING BOTH HANDS WITH POSITIVE RHEUMATOID FACTOR: ICD-10-CM

## 2024-12-11 DIAGNOSIS — M05.741 RHEUMATOID ARTHRITIS INVOLVING BOTH HANDS WITH POSITIVE RHEUMATOID FACTOR: ICD-10-CM

## 2024-12-11 DIAGNOSIS — E03.9 ACQUIRED HYPOTHYROIDISM: ICD-10-CM

## 2024-12-11 DIAGNOSIS — Z79.899 HIGH RISK MEDICATIONS (NOT ANTICOAGULANTS) LONG-TERM USE: ICD-10-CM

## 2024-12-11 DIAGNOSIS — D84.9 IMMUNOSUPPRESSION: ICD-10-CM

## 2024-12-11 LAB
ALT SERPL W/O P-5'-P-CCNC: 13 U/L (ref 10–44)
AST SERPL-CCNC: 20 U/L (ref 10–40)
BASOPHILS # BLD AUTO: 0.04 K/UL (ref 0–0.2)
BASOPHILS NFR BLD: 0.4 % (ref 0–1.9)
CREAT SERPL-MCNC: 0.8 MG/DL (ref 0.5–1.4)
CRP SERPL-MCNC: 14 MG/L (ref 0–8.2)
DIFFERENTIAL METHOD BLD: NORMAL
EOSINOPHIL # BLD AUTO: 0.1 K/UL (ref 0–0.5)
EOSINOPHIL NFR BLD: 1.4 % (ref 0–8)
ERYTHROCYTE [DISTWIDTH] IN BLOOD BY AUTOMATED COUNT: 13.6 % (ref 11.5–14.5)
ERYTHROCYTE [SEDIMENTATION RATE] IN BLOOD BY PHOTOMETRIC METHOD: 18 MM/HR (ref 0–36)
EST. GFR  (NO RACE VARIABLE): >60 ML/MIN/1.73 M^2
HCT VFR BLD AUTO: 39.1 % (ref 37–48.5)
HGB BLD-MCNC: 12.5 G/DL (ref 12–16)
IMM GRANULOCYTES # BLD AUTO: 0.04 K/UL (ref 0–0.04)
IMM GRANULOCYTES NFR BLD AUTO: 0.4 % (ref 0–0.5)
LYMPHOCYTES # BLD AUTO: 1.8 K/UL (ref 1–4.8)
LYMPHOCYTES NFR BLD: 18.6 % (ref 18–48)
MCH RBC QN AUTO: 29.1 PG (ref 27–31)
MCHC RBC AUTO-ENTMCNC: 32 G/DL (ref 32–36)
MCV RBC AUTO: 91 FL (ref 82–98)
MONOCYTES # BLD AUTO: 0.8 K/UL (ref 0.3–1)
MONOCYTES NFR BLD: 8.5 % (ref 4–15)
NEUTROPHILS # BLD AUTO: 6.9 K/UL (ref 1.8–7.7)
NEUTROPHILS NFR BLD: 70.7 % (ref 38–73)
NRBC BLD-RTO: 0 /100 WBC
PLATELET # BLD AUTO: 390 K/UL (ref 150–450)
PMV BLD AUTO: 10 FL (ref 9.2–12.9)
RBC # BLD AUTO: 4.29 M/UL (ref 4–5.4)
TSH SERPL DL<=0.005 MIU/L-ACNC: 2.27 UIU/ML (ref 0.4–4)
WBC # BLD AUTO: 9.7 K/UL (ref 3.9–12.7)

## 2024-12-11 PROCEDURE — 84460 ALANINE AMINO (ALT) (SGPT): CPT | Mod: HCNC | Performed by: INTERNAL MEDICINE

## 2024-12-11 PROCEDURE — 84443 ASSAY THYROID STIM HORMONE: CPT | Mod: HCNC | Performed by: INTERNAL MEDICINE

## 2024-12-11 PROCEDURE — 86140 C-REACTIVE PROTEIN: CPT | Mod: HCNC | Performed by: INTERNAL MEDICINE

## 2024-12-11 PROCEDURE — 84450 TRANSFERASE (AST) (SGOT): CPT | Mod: HCNC | Performed by: INTERNAL MEDICINE

## 2024-12-11 PROCEDURE — 85652 RBC SED RATE AUTOMATED: CPT | Mod: HCNC | Performed by: INTERNAL MEDICINE

## 2024-12-11 PROCEDURE — 85025 COMPLETE CBC W/AUTO DIFF WBC: CPT | Mod: HCNC | Performed by: INTERNAL MEDICINE

## 2024-12-11 PROCEDURE — 36415 COLL VENOUS BLD VENIPUNCTURE: CPT | Mod: HCNC,PO | Performed by: INTERNAL MEDICINE

## 2024-12-11 PROCEDURE — 82565 ASSAY OF CREATININE: CPT | Mod: HCNC | Performed by: INTERNAL MEDICINE

## 2024-12-18 ENCOUNTER — OFFICE VISIT (OUTPATIENT)
Dept: RHEUMATOLOGY | Facility: CLINIC | Age: 79
End: 2024-12-18
Payer: MEDICARE

## 2024-12-18 VITALS
BODY MASS INDEX: 22.82 KG/M2 | DIASTOLIC BLOOD PRESSURE: 62 MMHG | SYSTOLIC BLOOD PRESSURE: 118 MMHG | WEIGHT: 124.75 LBS | OXYGEN SATURATION: 97 % | HEART RATE: 73 BPM

## 2024-12-18 DIAGNOSIS — R41.3 MEMORY LOSS: ICD-10-CM

## 2024-12-18 DIAGNOSIS — M06.041 RHEUMATOID ARTHRITIS INVOLVING BOTH HANDS WITH NEGATIVE RHEUMATOID FACTOR: ICD-10-CM

## 2024-12-18 DIAGNOSIS — Z79.899 HIGH RISK MEDICATIONS (NOT ANTICOAGULANTS) LONG-TERM USE: ICD-10-CM

## 2024-12-18 DIAGNOSIS — M47.25 OSTEOARTHRITIS OF SPINE WITH RADICULOPATHY, THORACOLUMBAR REGION: ICD-10-CM

## 2024-12-18 DIAGNOSIS — M05.742 RHEUMATOID ARTHRITIS INVOLVING BOTH HANDS WITH POSITIVE RHEUMATOID FACTOR: Primary | ICD-10-CM

## 2024-12-18 DIAGNOSIS — D84.9 IMMUNOSUPPRESSION: ICD-10-CM

## 2024-12-18 DIAGNOSIS — M06.042 RHEUMATOID ARTHRITIS INVOLVING BOTH HANDS WITH NEGATIVE RHEUMATOID FACTOR: ICD-10-CM

## 2024-12-18 DIAGNOSIS — M54.10 RADICULOPATHY, UNSPECIFIED SPINAL REGION: ICD-10-CM

## 2024-12-18 DIAGNOSIS — M47.812 SPONDYLOSIS OF CERVICAL REGION WITHOUT MYELOPATHY OR RADICULOPATHY: ICD-10-CM

## 2024-12-18 DIAGNOSIS — M05.741 RHEUMATOID ARTHRITIS INVOLVING BOTH HANDS WITH POSITIVE RHEUMATOID FACTOR: Primary | ICD-10-CM

## 2024-12-18 PROCEDURE — 99214 OFFICE O/P EST MOD 30 MIN: CPT | Mod: HCNC,S$GLB,, | Performed by: INTERNAL MEDICINE

## 2024-12-18 PROCEDURE — 3074F SYST BP LT 130 MM HG: CPT | Mod: HCNC,CPTII,S$GLB, | Performed by: INTERNAL MEDICINE

## 2024-12-18 PROCEDURE — 99999 PR PBB SHADOW E&M-EST. PATIENT-LVL IV: CPT | Mod: PBBFAC,HCNC,, | Performed by: INTERNAL MEDICINE

## 2024-12-18 PROCEDURE — 3288F FALL RISK ASSESSMENT DOCD: CPT | Mod: HCNC,CPTII,S$GLB, | Performed by: INTERNAL MEDICINE

## 2024-12-18 PROCEDURE — 3078F DIAST BP <80 MM HG: CPT | Mod: HCNC,CPTII,S$GLB, | Performed by: INTERNAL MEDICINE

## 2024-12-18 PROCEDURE — 1101F PT FALLS ASSESS-DOCD LE1/YR: CPT | Mod: HCNC,CPTII,S$GLB, | Performed by: INTERNAL MEDICINE

## 2024-12-18 PROCEDURE — 1159F MED LIST DOCD IN RCRD: CPT | Mod: HCNC,CPTII,S$GLB, | Performed by: INTERNAL MEDICINE

## 2024-12-18 PROCEDURE — 1125F AMNT PAIN NOTED PAIN PRSNT: CPT | Mod: HCNC,CPTII,S$GLB, | Performed by: INTERNAL MEDICINE

## 2024-12-18 RX ORDER — CELECOXIB 100 MG/1
100 CAPSULE ORAL 2 TIMES DAILY
Qty: 180 CAPSULE | Refills: 2 | Status: SHIPPED | OUTPATIENT
Start: 2024-12-18

## 2024-12-18 RX ORDER — HYDROCODONE BITARTRATE AND ACETAMINOPHEN 7.5; 325 MG/1; MG/1
1 TABLET ORAL EVERY 12 HOURS PRN
Qty: 20 TABLET | Refills: 0 | Status: SHIPPED | OUTPATIENT
Start: 2024-12-18 | End: 2025-01-17

## 2024-12-18 RX ORDER — METHOTREXATE 2.5 MG/1
TABLET ORAL
Qty: 48 TABLET | Refills: 3 | Status: SHIPPED | OUTPATIENT
Start: 2024-12-18

## 2024-12-18 RX ORDER — FOLIC ACID 1 MG/1
2000 TABLET ORAL DAILY
Qty: 180 TABLET | Refills: 3 | Status: SHIPPED | OUTPATIENT
Start: 2024-12-18

## 2024-12-18 NOTE — PROGRESS NOTES
Subjective:          Chief Complaint: Fabby Guerrero is a 79 y.o. female who had concerns including Follow-up (3 month follow up visit).    HPI:    Rheumatoid Arthritis  Patient is an 79 y.o. female here for follow up seropostive Rheumatoid Arthritis, secondary Sjogrens, and OA  Serologies are: RF+, CCP HIGH titer +.      Symptoms have been present for several years.   Symptoms include eye symptoms, joint pain, joint swelling, sleep difficulties and weakness of hands and are of moderate severity. Morning stiffness: 25 minute Patient denies joint pain, joint swelling and morning stiffness. Symptoms are made worse by: movement, overuse and resting.  Symptoms are helped by arthritis medications.  Associated symptoms include arthralgia and fatigue and secondary Sjogrens. Previously on Restasis, now systane/Refresh.   Diclofenac for 1 year with decreased urination and stable creatinine; now off.     Seen w/ Pain Management : new Dr. Wallace.   Patient with known cervical stenosis: hx of C7-T1 in 2021 followed by   Using Hydrocodone rarely.   She received an L4-5 epidural steroid injections 12/09/2021 with 50% relief she has some low back pain and pain in her calves and spasms at night sometimes during the day.  She did note the numbness and tingling has improved.      Seen with Ortho, Dr. Agudelo/Romeo,  received Euflexxa in past.  5/2021 call with flare of left knee, depomedrol 40IM and repeat Euflexxa.     C/o knee pain chronic and daily. No swelling, no instability. - knees stable and manageable.     8/2024: last visit patient was advised by pharmacy to stop MTX as it would interact with her Celebrex. Rheum dosing of MTX is already adjusted for NSAIDs. Advised her to restart as in that time memory did NOT have any appreciable improvement. Review of last note with PCP appears she continued holding MTX until recently.   She was able to see Neuro, rec sleep eval which she has order. Dr. Gordon has continued memantine.      9/2024: patient here today for f/u regarding last visit with RA flare, new worsening of cervical spine is under care of Dr. Wallace.   She once again is not taking her Methotrexate that I have advised her to do. She is taking celebrex because she was advised by Ms. Grubbs at an urgent visit for pain to do so after my visi9t.    She did follow with Dr. Wallace- he felt shoulder, sent to Dr. Nelson bilateral shoulder injection and she is doing remarkably better.     11/2024: patient returns today has not started her methotrexate since last visit. She is now off DMARD therapy since about March of 2024. I cannot get her to restart. She did f/u with Dr Nelson few weeks ago and cannot repeat injection just yet.   Patient did see new Pain doc appears around she was schedule with Dr. Lujan (pain) 9/2024, and 10/2024- given tramadol she is not recalling if this was helpful. But is clear she feels better on Hydrocodone. MRI lumbar was updated but she and her  state no procedures offered.   Patient with cotinued joint pain hands, shouders worst but can have days of stiffness diffusely.       Current medications  Celebrex 100mg BID  Tylenol 650 2-3 daily  Methotrexate - Problems: none-- 4 tabs weekly -still off and not under control for RA - she is having some memory issues and repeatedly not taking after I have reassured her to take with Celebrex.     Hydrocodone rarely for pain- I filled #30 ok for 1 tab per day.       No hx of HCQ-  No personal hx of malignancy.    C/o feet /toes painful more but improved with recent celebrex.     [facility] monitoring show everything is within normal limits.      MRI of the shoulder some musculature is of the rotator cuff is grossly intact mild tendinopathy subacromial and glenohumeral narrowing but no active bursitis was noticed she has some hypertrophic changes at the AC joint    MRI of her cervical  spine however showed multilevel foraminal stenosis most  prominent at C5-6 moderate to severe left > than right.  She also had a few disc osteophyte complexes which could even  a facet mediated pain causing muscle spasms radiating along the shoulder girdle in the upper trapezius region.     Dr Edmonds from pain management with some injections    Component      Latest Ref Rng & Units 4/17/2018 5/26/2015   Anti Sm/RNP Antibody      0.00 - 19.99 EU  0.35   Anti-Sm/RNP Interpretation      Negative  Negative   B27 Testing Date        06/16/2015 11:48 AM   HLA B27 Result        Negative   ds DNA Ab      Negative 1:10  Negative 1:10   CHON Screen      Negative <1:160  Negative <1:160   Rheumatoid Factor      <14 IU/mL 128 (H) 34.0 (H)   CCP Antibodies      <5.0 U/mL  6.1 (H)   Sed Rate      < OR = 30 mm/h 11    Cyclic Citrullinated Peptide (CCP) Ab (IgG)      UNITS >250 (H)    CRP      <8.0 mg/L 1.4      REVIEW OF SYSTEMS:    Review of Systems   Constitutional:  Positive for malaise/fatigue. Negative for fever and weight loss.   HENT:  Negative for sore throat.    Eyes:  Positive for discharge and redness. Negative for double vision and photophobia.   Respiratory:  Negative for cough, shortness of breath and wheezing.    Cardiovascular:  Negative for chest pain, palpitations and orthopnea.   Gastrointestinal:  Negative for abdominal pain, constipation and diarrhea.   Genitourinary:  Negative for dysuria, hematuria and urgency.   Musculoskeletal:  Positive for joint pain. Negative for back pain and myalgias.   Skin:  Negative for rash.   Neurological:  Negative for dizziness, tingling, focal weakness and headaches.   Endo/Heme/Allergies:  Does not bruise/bleed easily.   Psychiatric/Behavioral:  Negative for depression, hallucinations and suicidal ideas.          Objective:        Past Medical History:   Diagnosis Date    Chronic neck pain     Chronic shoulder pain     GERD (gastroesophageal reflux disease)     Personal history of colonic polyps 04/30/2024    Keo Ortiz MD     Rheumatoid arthritis      Family History   Problem Relation Name Age of Onset    Cancer Mother          lung    Cancer Father          colon and lung    Cancer Daughter          melanoma     Social History     Tobacco Use    Smoking status: Former     Current packs/day: 0.00     Average packs/day: 1 pack/day for 14.0 years (14.0 ttl pk-yrs)     Types: Cigarettes     Start date: 1962     Quit date: 1976     Years since quittin.7     Passive exposure: Never    Smokeless tobacco: Never   Substance Use Topics    Alcohol use: Yes     Alcohol/week: 3.0 standard drinks of alcohol     Types: 3 Glasses of wine per week     Comment: occasional    Drug use: No         Current Outpatient Medications on File Prior to Visit   Medication Sig Dispense Refill    acetaminophen (TYLENOL) 650 MG TbSR Take 650 mg by mouth every 8 (eight) hours as needed.      b complex vitamins tablet Take 1 tablet by mouth once daily. B100      celecoxib (CELEBREX) 100 MG capsule Take 1 capsule (100 mg total) by mouth 2 (two) times daily. 180 capsule 2    cyanocobalamin (VITAMIN B-12) 100 MCG tablet Take 100 mcg by mouth once daily.      fish oil-omega-3 fatty acids 300-1,000 mg capsule Take 2 g by mouth once daily.      folic acid (FOLVITE) 1 MG tablet Take 2 tablets (2,000 mcg total) by mouth once daily. 180 tablet 3    ginkgo biloba 60 mg Cap Take by mouth.      glucosamine-chondroitin 500-400 mg tablet Take 2 tablets by mouth once daily.       levothyroxine (SYNTHROID) 88 MCG tablet TAKE ONE TABLET BY MOUTH EVERY MORNING 30 MINUTES before BREAKFAST 90 tablet 2    melatonin 5 mg Cap Take by mouth as needed.       memantine (NAMENDA) 5 MG Tab Take 5 mg by mouth 2 (two) times daily.      methotrexate 2.5 MG Tab TAKE 4 TABLETS (10MG) EVERY 7 DAYS 48 tablet 3    multivitamin (THERAGRAN) per tablet Take 1 tablet by mouth once daily.      TURMERIC (CURCUMIN MISC) 750 mg by Misc.(Non-Drug; Combo Route) route once daily.       UNABLE TO FIND  medication name: CBD gummies pt takes 1/2 gummy every day      UNABLE TO FIND medication name: CBD pain relief gummy 33 mg - 1 daily      VITAMIN A-C-D3-COD LIVER OIL ORAL Take by mouth once daily.       vitamin B complex-folic acid 0.4 mg Tab Take by mouth once daily.       atorvastatin (LIPITOR) 10 MG tablet Take 1 tablet (10 mg total) by mouth once daily. 90 tablet 3     No current facility-administered medications on file prior to visit.       Vitals:    12/18/24 1328   BP: 118/62   Pulse: 73       Physical Exam:    Physical Exam  Constitutional:       Appearance: Normal appearance. She is well-developed.   HENT:      Nose: No septal deviation.      Mouth/Throat:      Mouth: No oral lesions.   Eyes:      Conjunctiva/sclera:      Right eye: Right conjunctiva is not injected.      Left eye: Left conjunctiva is not injected.      Pupils: Pupils are equal, round, and reactive to light.   Neck:      Thyroid: No thyroid mass or thyromegaly.      Vascular: No JVD.   Cardiovascular:      Rate and Rhythm: Normal rate and regular rhythm.      Pulses: Normal pulses.      Comments: No edema  Pulmonary:      Effort: Pulmonary effort is normal.      Breath sounds: Normal breath sounds.   Abdominal:      Palpations: Abdomen is soft.   Musculoskeletal:      Right shoulder: No swelling or tenderness. Normal range of motion.      Left shoulder: No swelling or tenderness. Normal range of motion.      Right elbow: No swelling. Normal range of motion. No tenderness.      Left elbow: No swelling. Normal range of motion. No tenderness.      Right wrist: Tenderness present. No swelling. Decreased range of motion.      Left wrist: Tenderness present. No swelling. Decreased range of motion.      Right hand: Tenderness present. Decreased range of motion.      Left hand: Tenderness present. Decreased range of motion.      Right hip: Normal range of motion. Normal strength.      Left hip: No tenderness. Normal range of motion.      Right  knee: No swelling. Normal range of motion. No tenderness.      Left knee: No swelling. Normal range of motion. No tenderness.      Right ankle: No swelling. No tenderness. Normal range of motion.      Left ankle: No swelling. No tenderness. Normal range of motion.      Comments: 5/5 upper and lower extremity bilateral muscle strength   Lymphadenopathy:      Cervical: No cervical adenopathy.   Skin:     General: Skin is dry.   Neurological:      Deep Tendon Reflexes: Reflexes are normal and symmetric.               Assessment:       No diagnosis found.       Plan:        Rheumatoid arthritis involving both hands with positive rheumatoid factor  -     celecoxib (CELEBREX) 100 MG capsule; Take 1 capsule (100 mg total) by mouth 2 (two) times daily.  Dispense: 180 capsule; Refill: 2  -     folic acid (FOLVITE) 1 MG tablet; Take 2 tablets (2,000 mcg total) by mouth once daily.  Dispense: 180 tablet; Refill: 3  -     methotrexate 2.5 MG Tab; TAKE 4 TABLETS (10MG) EVERY 7 DAYS  Dispense: 48 tablet; Refill: 3    High risk medications (not anticoagulants) long-term use    Immunosuppression    Memory loss  -     Ambulatory referral/consult to Neurology; Future; Expected date: 12/25/2024    Osteoarthritis of spine with radiculopathy, thoracolumbar region  -     celecoxib (CELEBREX) 100 MG capsule; Take 1 capsule (100 mg total) by mouth 2 (two) times daily.  Dispense: 180 capsule; Refill: 2  -     folic acid (FOLVITE) 1 MG tablet; Take 2 tablets (2,000 mcg total) by mouth once daily.  Dispense: 180 tablet; Refill: 3    Spondylosis of cervical region without myelopathy or radiculopathy  -     celecoxib (CELEBREX) 100 MG capsule; Take 1 capsule (100 mg total) by mouth 2 (two) times daily.  Dispense: 180 capsule; Refill: 2    Radiculopathy, unspecified spinal region  -     celecoxib (CELEBREX) 100 MG capsule; Take 1 capsule (100 mg total) by mouth 2 (two) times daily.  Dispense: 180 capsule; Refill: 2    Rheumatoid arthritis  involving both hands with negative rheumatoid factor  -     methotrexate 2.5 MG Tab; TAKE 4 TABLETS (10MG) EVERY 7 DAYS  Dispense: 48 tablet; Refill: 3        MTX continue 4 tabs weekly   Folic acid keep at 2mg weekly.   Celebrex 100mg BID will likely help with her joints as well-I see very little synovitis at this time and suspect this is residual damage that is causing bulk of pain in toes.   Prednisone 5-10-mg daily PRN-not using.   Hydrocodone for flare days as she is using infrequently  checked. I will have staff call pharmacy to discontinue and no longer dispense any Tramadol she is not taking. See Tel note .       1. Keep Methotrexate for RA- last testing with PCP liver ok, will repeat labs at [facility] in 3 motnhs- pharmacy apparent told her to not take Celebrex with MTX but this is ok. She is in a flare with her RA as off MTX x 2 months.    -I will not prescribed biologic until she takes the MTX. The risk here is too great given we seen to have a breakdown in understanding the instructions for therapy from visit to visit. Aware of her memory,  agree C will be likely needed to ensure MTX is being taken correctly.     Printed instructions for patient : Start Methotrexate 4 tablets all at ONCE every Sunday.   Take folic acid 2 tablets every day.   Take Celebrex 100mg twice daily    This will absolutely help your joint pains.     Ok for Hydrocodone as needed for severe pain.   No follow-ups on file.         40min consultation with greater than 50% of that time included Preparing to see the patient (review records, tests), Obtaining and/or reviewing separately obtained historical data, Performing a medically appropriate examination and/or evaluation , Ordering medications, tests, and/or procedures, Referring and communicating with other healthcare professionals , Documenting clinical information in the electronic or other health record and Independently interpreting results  (as warranted) & communicating  results to the patient/family/caregiver. All questions answered.

## 2025-01-10 ENCOUNTER — TELEPHONE (OUTPATIENT)
Dept: RHEUMATOLOGY | Facility: CLINIC | Age: 80
End: 2025-01-10
Payer: MEDICARE

## 2025-01-24 ENCOUNTER — TELEPHONE (OUTPATIENT)
Dept: FAMILY MEDICINE | Facility: CLINIC | Age: 80
End: 2025-01-24
Payer: MEDICARE

## 2025-02-10 ENCOUNTER — LAB VISIT (OUTPATIENT)
Dept: LAB | Facility: HOSPITAL | Age: 80
End: 2025-02-10
Attending: INTERNAL MEDICINE
Payer: MEDICARE

## 2025-02-10 DIAGNOSIS — Z79.899 HIGH RISK MEDICATIONS (NOT ANTICOAGULANTS) LONG-TERM USE: ICD-10-CM

## 2025-02-10 DIAGNOSIS — M05.741 RHEUMATOID ARTHRITIS INVOLVING BOTH HANDS WITH POSITIVE RHEUMATOID FACTOR: ICD-10-CM

## 2025-02-10 DIAGNOSIS — D84.9 IMMUNOSUPPRESSION: ICD-10-CM

## 2025-02-10 DIAGNOSIS — M05.742 RHEUMATOID ARTHRITIS INVOLVING BOTH HANDS WITH POSITIVE RHEUMATOID FACTOR: ICD-10-CM

## 2025-02-10 LAB
ALT SERPL W/O P-5'-P-CCNC: 13 U/L (ref 10–44)
AST SERPL-CCNC: 22 U/L (ref 10–40)
CREAT SERPL-MCNC: 0.7 MG/DL (ref 0.5–1.4)
CRP SERPL-MCNC: 1.2 MG/L (ref 0–8.2)
ERYTHROCYTE [SEDIMENTATION RATE] IN BLOOD BY PHOTOMETRIC METHOD: 7 MM/HR (ref 0–36)
EST. GFR  (NO RACE VARIABLE): >60 ML/MIN/1.73 M^2

## 2025-02-10 PROCEDURE — 84460 ALANINE AMINO (ALT) (SGPT): CPT | Performed by: INTERNAL MEDICINE

## 2025-02-10 PROCEDURE — 82565 ASSAY OF CREATININE: CPT | Performed by: INTERNAL MEDICINE

## 2025-02-10 PROCEDURE — 85652 RBC SED RATE AUTOMATED: CPT | Performed by: INTERNAL MEDICINE

## 2025-02-10 PROCEDURE — 84450 TRANSFERASE (AST) (SGOT): CPT | Performed by: INTERNAL MEDICINE

## 2025-02-10 PROCEDURE — 85025 COMPLETE CBC W/AUTO DIFF WBC: CPT | Performed by: INTERNAL MEDICINE

## 2025-02-10 PROCEDURE — 36415 COLL VENOUS BLD VENIPUNCTURE: CPT | Mod: PO | Performed by: INTERNAL MEDICINE

## 2025-02-10 PROCEDURE — 86140 C-REACTIVE PROTEIN: CPT | Performed by: INTERNAL MEDICINE

## 2025-02-11 ENCOUNTER — OFFICE VISIT (OUTPATIENT)
Dept: FAMILY MEDICINE | Facility: CLINIC | Age: 80
End: 2025-02-11
Payer: MEDICARE

## 2025-02-11 VITALS
SYSTOLIC BLOOD PRESSURE: 120 MMHG | WEIGHT: 125.69 LBS | HEART RATE: 84 BPM | DIASTOLIC BLOOD PRESSURE: 66 MMHG | OXYGEN SATURATION: 96 % | BODY MASS INDEX: 23.13 KG/M2 | HEIGHT: 62 IN

## 2025-02-11 DIAGNOSIS — G47.33 OSA (OBSTRUCTIVE SLEEP APNEA): ICD-10-CM

## 2025-02-11 DIAGNOSIS — R41.89 COGNITIVE IMPAIRMENT: ICD-10-CM

## 2025-02-11 DIAGNOSIS — M06.041 RHEUMATOID ARTHRITIS INVOLVING BOTH HANDS WITH NEGATIVE RHEUMATOID FACTOR: ICD-10-CM

## 2025-02-11 DIAGNOSIS — M85.89 OSTEOPENIA OF MULTIPLE SITES: ICD-10-CM

## 2025-02-11 DIAGNOSIS — E78.2 MIXED HYPERLIPIDEMIA: ICD-10-CM

## 2025-02-11 DIAGNOSIS — E03.9 ACQUIRED HYPOTHYROIDISM: Primary | ICD-10-CM

## 2025-02-11 DIAGNOSIS — Z91.148 HISTORY OF MEDICATION NONCOMPLIANCE: ICD-10-CM

## 2025-02-11 DIAGNOSIS — D84.9 IMMUNOSUPPRESSION: ICD-10-CM

## 2025-02-11 DIAGNOSIS — M06.042 RHEUMATOID ARTHRITIS INVOLVING BOTH HANDS WITH NEGATIVE RHEUMATOID FACTOR: ICD-10-CM

## 2025-02-11 LAB
BASOPHILS # BLD AUTO: 0.04 K/UL (ref 0–0.2)
BASOPHILS NFR BLD: 0.5 % (ref 0–1.9)
DIFFERENTIAL METHOD BLD: ABNORMAL
EOSINOPHIL # BLD AUTO: 0.1 K/UL (ref 0–0.5)
EOSINOPHIL NFR BLD: 1.5 % (ref 0–8)
ERYTHROCYTE [DISTWIDTH] IN BLOOD BY AUTOMATED COUNT: 15.2 % (ref 11.5–14.5)
HCT VFR BLD AUTO: 40 % (ref 37–48.5)
HGB BLD-MCNC: 12.8 G/DL (ref 12–16)
IMM GRANULOCYTES # BLD AUTO: 0.05 K/UL (ref 0–0.04)
IMM GRANULOCYTES NFR BLD AUTO: 0.6 % (ref 0–0.5)
LYMPHOCYTES # BLD AUTO: 1.4 K/UL (ref 1–4.8)
LYMPHOCYTES NFR BLD: 16.7 % (ref 18–48)
MCH RBC QN AUTO: 29.9 PG (ref 27–31)
MCHC RBC AUTO-ENTMCNC: 32 G/DL (ref 32–36)
MCV RBC AUTO: 94 FL (ref 82–98)
MONOCYTES # BLD AUTO: 0.7 K/UL (ref 0.3–1)
MONOCYTES NFR BLD: 8.6 % (ref 4–15)
NEUTROPHILS # BLD AUTO: 6.2 K/UL (ref 1.8–7.7)
NEUTROPHILS NFR BLD: 72.1 % (ref 38–73)
NRBC BLD-RTO: 0 /100 WBC
PLATELET # BLD AUTO: 339 K/UL (ref 150–450)
PMV BLD AUTO: 10.3 FL (ref 9.2–12.9)
RBC # BLD AUTO: 4.28 M/UL (ref 4–5.4)
WBC # BLD AUTO: 8.61 K/UL (ref 3.9–12.7)

## 2025-02-11 PROCEDURE — 1126F AMNT PAIN NOTED NONE PRSNT: CPT | Mod: CPTII,S$GLB,, | Performed by: INTERNAL MEDICINE

## 2025-02-11 PROCEDURE — 1160F RVW MEDS BY RX/DR IN RCRD: CPT | Mod: CPTII,S$GLB,, | Performed by: INTERNAL MEDICINE

## 2025-02-11 PROCEDURE — 99999 PR PBB SHADOW E&M-EST. PATIENT-LVL V: CPT | Mod: PBBFAC,,, | Performed by: INTERNAL MEDICINE

## 2025-02-11 PROCEDURE — G2211 COMPLEX E/M VISIT ADD ON: HCPCS | Mod: S$GLB,,, | Performed by: INTERNAL MEDICINE

## 2025-02-11 PROCEDURE — 1159F MED LIST DOCD IN RCRD: CPT | Mod: CPTII,S$GLB,, | Performed by: INTERNAL MEDICINE

## 2025-02-11 PROCEDURE — 99214 OFFICE O/P EST MOD 30 MIN: CPT | Mod: S$GLB,,, | Performed by: INTERNAL MEDICINE

## 2025-02-11 PROCEDURE — 1101F PT FALLS ASSESS-DOCD LE1/YR: CPT | Mod: CPTII,S$GLB,, | Performed by: INTERNAL MEDICINE

## 2025-02-11 PROCEDURE — 3288F FALL RISK ASSESSMENT DOCD: CPT | Mod: CPTII,S$GLB,, | Performed by: INTERNAL MEDICINE

## 2025-02-11 PROCEDURE — 3074F SYST BP LT 130 MM HG: CPT | Mod: CPTII,S$GLB,, | Performed by: INTERNAL MEDICINE

## 2025-02-11 PROCEDURE — 3078F DIAST BP <80 MM HG: CPT | Mod: CPTII,S$GLB,, | Performed by: INTERNAL MEDICINE

## 2025-02-11 RX ORDER — CALCIUM CARBONATE 500(1250)
1 TABLET,CHEWABLE ORAL DAILY
Qty: 90 TABLET | Refills: 3 | Status: SHIPPED | OUTPATIENT
Start: 2025-02-11 | End: 2026-02-11

## 2025-02-11 NOTE — PROGRESS NOTES
Patient ID: Fabby Guerrero is a 79 y.o. female.    Chief Complaint: Follow-up       Problem  HPI / physical exam Plan   Hyperlipidemia She is unsure if taking statin   Lab Results   Component Value Date    LDLCALC 122.0 10/22/2024    [facility] nurse for med adherence   Monitor    Hypothyroidism TSH 5.2, she is unsure if taking levothyroxine   nurse for med adherence   DIMITRIS Has not had CPAP in years.  witnesses apneas. She does not really like the mask or nasal mask.  Repeat sleep study.    Osteopenia FRAX of 16/4.3%  She wants hold off on bisphosphonate due to possible sfx. Recommend vitamin D3 1000 units daily and calcium.    Rheumatoid arthritis On MTX and folic acid and feeling much better. Less hand pain. Also taking celebrex BID   Following with Rheumatology Follow up with rheum. Make sure to take folic acid. With methotrexate   Immunosuppression Stable  Monitor    Cognitive impairment Following with Dr. Montoya. Did MRI, EEG Continue memantine.. treatment of sleep apnea may help.                      Review of Systems   Constitutional:  Negative for fever.   Respiratory:  Negative for shortness of breath.    Cardiovascular:  Negative for chest pain.   Gastrointestinal:  Negative for abdominal pain.   Musculoskeletal:         Hand pain      Orders     1. Rheumatoid arthritis involving both hands with negative rheumatoid factor  - Ambulatory referral/consult to Home Health; Future    2. Acquired hypothyroidism    3. Cognitive impairment  - Ambulatory referral/consult to Home Health; Future    4. Immunosuppression    5. Mixed hyperlipidemia    6. DIMITRIS (obstructive sleep apnea)  - Home Sleep Study; Future    7. Osteopenia of multiple sites  - calcium carbonate (CALCIUM 500) 500 mg calcium (1,250 mg) chewable tablet; Take 1 tablet (500 mg total) by mouth once daily.  Dispense: 90 tablet; Refill: 3    8. History of medication noncompliance  - Ambulatory referral/consult to Home Health; Future       I  personally reviewed past medical, family and social history.   Objective    Vitals:    02/11/25 1355   BP: 120/66   Pulse: 84      Wt Readings from Last 3 Encounters:   02/11/25 1355 57 kg (125 lb 10.6 oz)   12/18/24 1328 56.6 kg (124 lb 12.5 oz)   11/08/24 1127 55.1 kg (121 lb 7.6 oz)      Body mass index is 22.98 kg/m².     Physical Exam  Cardiovascular:      Rate and Rhythm: Normal rate and regular rhythm.      Heart sounds: No murmur heard.     No gallop.   Pulmonary:      Breath sounds: Normal breath sounds. No wheezing or rhonchi.   Abdominal:      Palpations: Abdomen is soft.      Tenderness: There is no abdominal tenderness.        Reference     : Visit today included increased complexity associated with the care of the episodic problem Acquired hypothyroidism [E03.9] addressed and managing the longitudinal care of the patient due to the serious and/or complex managed problem(s)     Active Problem List with Overview Notes    Diagnosis Date Noted    Osteopenia of multiple sites 04/24/2024    DIMITRIS (obstructive sleep apnea) 03/21/2024    Cognitive impairment 10/27/2022     MOCA:  21/30 per PCP 9/2022  24+1/30 March 2024    MRI brain 10/2022 - mild gen atrophy and microangiopathy, normal for age       Immunosuppression 02/21/2022    Mixed hyperlipidemia 11/19/2020    Rheumatoid arthritis of hand 04/21/2016    Acquired hypothyroidism 04/13/2012    Primary insomnia 10/27/2022    Lumbar radiculopathy 12/09/2021    Cervical radiculopathy 11/04/2021    Abnormal electrocardiogram (ECG) (EKG) 11/24/2020    Osteoarthritis of spine with radiculopathy, thoracolumbar region 04/21/2016    Osteoarthritis of neck 04/21/2016    Family history of colon cancer 04/13/2012     Father age 70                Hyperlipidemia Medications               fish oil-omega-3 fatty acids 300-1,000 mg capsule Take 2 g by mouth once daily.    atorvastatin (LIPITOR) 10 MG tablet Take 1 tablet (10 mg total) by mouth once daily.            Medication List with Changes/Refills   New Medications    CALCIUM CARBONATE (CALCIUM 500) 500 MG CALCIUM (1,250 MG) CHEWABLE TABLET    Take 1 tablet (500 mg total) by mouth once daily.   Current Medications    ACETAMINOPHEN (TYLENOL) 650 MG TBSR    Take 650 mg by mouth every 8 (eight) hours as needed.    ATORVASTATIN (LIPITOR) 10 MG TABLET    Take 1 tablet (10 mg total) by mouth once daily.    B COMPLEX VITAMINS TABLET    Take 1 tablet by mouth once daily. B100    CELECOXIB (CELEBREX) 100 MG CAPSULE    Take 1 capsule (100 mg total) by mouth 2 (two) times daily.    CYANOCOBALAMIN (VITAMIN B-12) 100 MCG TABLET    Take 100 mcg by mouth once daily.    FISH OIL-OMEGA-3 FATTY ACIDS 300-1,000 MG CAPSULE    Take 2 g by mouth once daily.    FOLIC ACID (FOLVITE) 1 MG TABLET    Take 2 tablets (2,000 mcg total) by mouth once daily.    GINKGO BILOBA 60 MG CAP    Take by mouth.    GLUCOSAMINE-CHONDROITIN 500-400 MG TABLET    Take 2 tablets by mouth once daily.     LEVOTHYROXINE (SYNTHROID) 88 MCG TABLET    TAKE ONE TABLET BY MOUTH EVERY MORNING 30 MINUTES before BREAKFAST    MELATONIN 5 MG CAP    Take by mouth as needed.     MEMANTINE (NAMENDA) 5 MG TAB    Take 5 mg by mouth 2 (two) times daily.    METHOTREXATE 2.5 MG TAB    TAKE 4 TABLETS (10MG) EVERY 7 DAYS    MULTIVITAMIN (THERAGRAN) PER TABLET    Take 1 tablet by mouth once daily.    TURMERIC (CURCUMIN MISC)    750 mg by Misc.(Non-Drug; Combo Route) route once daily.     UNABLE TO FIND    medication name: CBD gummies pt takes 1/2 gummy every day    UNABLE TO FIND    medication name: CBD pain relief gummy 33 mg - 1 daily    VITAMIN A-C-D3-COD LIVER OIL ORAL    Take by mouth once daily.     VITAMIN B COMPLEX-FOLIC ACID 0.4 MG TAB    Take by mouth once daily.

## 2025-02-13 ENCOUNTER — OFFICE VISIT (OUTPATIENT)
Dept: RHEUMATOLOGY | Facility: CLINIC | Age: 80
End: 2025-02-13
Payer: MEDICARE

## 2025-02-13 VITALS
WEIGHT: 123.88 LBS | HEART RATE: 61 BPM | SYSTOLIC BLOOD PRESSURE: 149 MMHG | DIASTOLIC BLOOD PRESSURE: 82 MMHG | BODY MASS INDEX: 22.8 KG/M2 | HEIGHT: 62 IN

## 2025-02-13 DIAGNOSIS — Z79.899 HIGH RISK MEDICATIONS (NOT ANTICOAGULANTS) LONG-TERM USE: ICD-10-CM

## 2025-02-13 DIAGNOSIS — M05.741 RHEUMATOID ARTHRITIS INVOLVING RIGHT HAND WITH POSITIVE RHEUMATOID FACTOR: Primary | ICD-10-CM

## 2025-02-13 DIAGNOSIS — Z79.899 IMMUNOSUPPRESSION DUE TO DRUG THERAPY: ICD-10-CM

## 2025-02-13 DIAGNOSIS — M54.10 RADICULOPATHY, UNSPECIFIED SPINAL REGION: ICD-10-CM

## 2025-02-13 DIAGNOSIS — D84.821 IMMUNOSUPPRESSION DUE TO DRUG THERAPY: ICD-10-CM

## 2025-02-13 DIAGNOSIS — M47.812 SPONDYLOSIS OF CERVICAL REGION WITHOUT MYELOPATHY OR RADICULOPATHY: ICD-10-CM

## 2025-02-13 DIAGNOSIS — M47.25 OSTEOARTHRITIS OF SPINE WITH RADICULOPATHY, THORACOLUMBAR REGION: ICD-10-CM

## 2025-02-13 PROCEDURE — 99999 PR PBB SHADOW E&M-EST. PATIENT-LVL III: CPT | Mod: PBBFAC,,, | Performed by: INTERNAL MEDICINE

## 2025-02-13 RX ORDER — METHOTREXATE 2.5 MG/1
15 TABLET ORAL
Qty: 72 TABLET | Refills: 1 | Status: SHIPPED | OUTPATIENT
Start: 2025-02-13 | End: 2026-02-13

## 2025-02-13 RX ORDER — FOLIC ACID 1 MG/1
2000 TABLET ORAL DAILY
Qty: 180 TABLET | Refills: 3 | Status: SHIPPED | OUTPATIENT
Start: 2025-02-13 | End: 2026-02-13

## 2025-02-13 RX ORDER — CELECOXIB 100 MG/1
100 CAPSULE ORAL 2 TIMES DAILY
Qty: 180 CAPSULE | Refills: 2 | Status: SHIPPED | OUTPATIENT
Start: 2025-02-13

## 2025-02-13 ASSESSMENT — ROUTINE ASSESSMENT OF PATIENT INDEX DATA (RAPID3)
PAIN SCORE: 3
PSYCHOLOGICAL DISTRESS SCORE: 0
MDHAQ FUNCTION SCORE: 0.5
PATIENT GLOBAL ASSESSMENT SCORE: 3
FATIGUE SCORE: 1.1
TOTAL RAPID3 SCORE: 2.56

## 2025-02-13 NOTE — PATIENT INSTRUCTIONS
Methotrexate 6 tablets all at ONCE every Sunday. Increase as of 2/13/2025.  Take folic acid 2 tablets every day.   Take Celebrex 100mg twice daily    Labs every 3 months 1 week prior to your appointment with Dr. Olivera

## 2025-02-13 NOTE — PROGRESS NOTES
Subjective:          Chief Complaint: Fabby Guerrero is a 79 y.o. female who had concerns including Disease Management.    HPI:    Rheumatoid Arthritis  Patient is an 79 y.o. female here for follow up seropostive Rheumatoid Arthritis, secondary Sjogrens, and OA  Serologies are: RF+, CCP HIGH titer +.      Symptoms have been present for several years.   Symptoms include eye symptoms, joint pain, joint swelling, sleep difficulties and weakness of hands and are of moderate severity. Morning stiffness: 25 minute Patient denies joint pain, joint swelling and morning stiffness. Symptoms are made worse by: movement, overuse and resting.  Symptoms are helped by arthritis medications.  Associated symptoms include arthralgia and fatigue and secondary Sjogrens. Previously on Restasis, now systane/Refresh.   Diclofenac for 1 year with decreased urination and stable creatinine; now off.     Seen w/ Pain Management : new Dr. Wallace.   Patient with known cervical stenosis: hx of C7-T1 in 2021 followed by   Using Hydrocodone rarely.   She received an L4-5 epidural steroid injections 12/09/2021 with 50% relief she has some low back pain and pain in her calves and spasms at night sometimes during the day.  She did note the numbness and tingling has improved.      Seen with Ortho, Dr. Agudelo/Romeo,  received Euflexxa in past.  5/2021 call with flare of left knee, depomedrol 40IM and repeat Euflexxa.     C/o knee pain chronic and daily. No swelling, no instability. - knees stable and manageable.     8/2024: last visit patient was advised by pharmacy to stop MTX as it would interact with her Celebrex. Rheum dosing of MTX is already adjusted for NSAIDs. Advised her to restart as in that time memory did NOT have any appreciable improvement. Review of last note with PCP appears she continued holding MTX until recently.   She was able to see Neuro, rec sleep eval which she has order. Dr. Gordon has continued memantine.     9/2024: patient  here today for f/u regarding last visit with RA flare, new worsening of cervical spine is under care of Dr. Wallace.   She once again is not taking her Methotrexate that I have advised her to do. She is taking celebrex because she was advised by Ms. Grubbs at an urgent visit for pain to do so after my visi9t.    She did follow with Dr. Wallace- he felt shoulder, sent to Dr. Nelson bilateral shoulder injection and she is doing remarkably better.     11/2024: patient returns today has not started her methotrexate since last visit. She is now off DMARD therapy since about March of 2024. I cannot get her to restart. She did f/u with Dr Nelson few weeks ago and cannot repeat injection just yet.   Patient did see new Pain doc appears around she was schedule with Dr. Lujan (pain) 9/2024, and 10/2024- given tramadol she is not recalling if this was helpful. But is clear she feels better on Hydrocodone. MRI lumbar was updated but she and her  state no procedures offered.   Patient with cotinued joint pain hands, shouders worst but can have days of stiffness diffusely.     2/2025: patient was able to meet with Neurology: Dr. Montoya. MRI, EEG w/o worrisome findings. Some suspect for Parkinson's finishing work up  Pending [facility] soon,   Taking methotrexate and doing much better.       Current medications  Celebrex 100mg BID  Tylenol 650 2-3 daily  Methotrexate - Problems: none-- 4 tabs weekly -still off and not under control for RA - she is having some memory issues and repeatedly not taking after I have reassured her to take with Celebrex.     Hydrocodone rarely for pain- I filled #30 ok for 1 tab per day.       No hx of HCQ-  No personal hx of malignancy.    C/o feet /toes painful more but improved with recent celebrex.     [facility] monitoring show everything is within normal limits.      MRI of the shoulder some musculature is of the rotator cuff is grossly intact mild tendinopathy subacromial and  glenohumeral narrowing but no active bursitis was noticed she has some hypertrophic changes at the AC joint    MRI of her cervical  spine however showed multilevel foraminal stenosis most prominent at C5-6 moderate to severe left > than right.  She also had a few disc osteophyte complexes which could even  a facet mediated pain causing muscle spasms radiating along the shoulder girdle in the upper trapezius region.     Dr Edmonds from pain management with some injections    Component      Latest Ref Rng & Units 4/17/2018 5/26/2015   Anti Sm/RNP Antibody      0.00 - 19.99 EU  0.35   Anti-Sm/RNP Interpretation      Negative  Negative   B27 Testing Date        06/16/2015 11:48 AM   HLA B27 Result        Negative   ds DNA Ab      Negative 1:10  Negative 1:10   CHON Screen      Negative <1:160  Negative <1:160   Rheumatoid Factor      <14 IU/mL 128 (H) 34.0 (H)   CCP Antibodies      <5.0 U/mL  6.1 (H)   Sed Rate      < OR = 30 mm/h 11    Cyclic Citrullinated Peptide (CCP) Ab (IgG)      UNITS >250 (H)    CRP      <8.0 mg/L 1.4      REVIEW OF SYSTEMS:    Review of Systems   Constitutional:  Positive for malaise/fatigue. Negative for fever and weight loss.   HENT:  Negative for sore throat.    Eyes:  Positive for discharge and redness. Negative for double vision and photophobia.   Respiratory:  Negative for cough, shortness of breath and wheezing.    Cardiovascular:  Negative for chest pain, palpitations and orthopnea.   Gastrointestinal:  Negative for abdominal pain, constipation and diarrhea.   Genitourinary:  Negative for dysuria, hematuria and urgency.   Musculoskeletal:  Positive for joint pain. Negative for back pain and myalgias.   Skin:  Negative for rash.   Neurological:  Negative for dizziness, tingling, focal weakness and headaches.   Endo/Heme/Allergies:  Does not bruise/bleed easily.   Psychiatric/Behavioral:  Negative for depression, hallucinations and suicidal ideas.          Objective:        Past Medical  History:   Diagnosis Date    Chronic neck pain     Chronic shoulder pain     GERD (gastroesophageal reflux disease)     Personal history of colonic polyps 2024    Keo Ortiz MD    Rheumatoid arthritis      Family History   Problem Relation Name Age of Onset    Cancer Mother          lung    Cancer Father          colon and lung    Cancer Daughter          melanoma     Social History     Tobacco Use    Smoking status: Former     Current packs/day: 0.00     Average packs/day: 1 pack/day for 14.0 years (14.0 ttl pk-yrs)     Types: Cigarettes     Start date: 1962     Quit date: 1976     Years since quittin.8     Passive exposure: Never    Smokeless tobacco: Never   Substance Use Topics    Alcohol use: Yes     Alcohol/week: 3.0 standard drinks of alcohol     Types: 3 Glasses of wine per week     Comment: occasional    Drug use: No         Current Outpatient Medications on File Prior to Visit   Medication Sig Dispense Refill    acetaminophen (TYLENOL) 650 MG TbSR Take 650 mg by mouth every 8 (eight) hours as needed.      b complex vitamins tablet Take 1 tablet by mouth once daily. B100      calcium carbonate (CALCIUM 500) 500 mg calcium (1,250 mg) chewable tablet Take 1 tablet (500 mg total) by mouth once daily. 90 tablet 3    celecoxib (CELEBREX) 100 MG capsule Take 1 capsule (100 mg total) by mouth 2 (two) times daily. 180 capsule 2    cyanocobalamin (VITAMIN B-12) 100 MCG tablet Take 100 mcg by mouth once daily.      fish oil-omega-3 fatty acids 300-1,000 mg capsule Take 2 g by mouth once daily.      folic acid (FOLVITE) 1 MG tablet Take 2 tablets (2,000 mcg total) by mouth once daily. 180 tablet 3    ginkgo biloba 60 mg Cap Take by mouth.      glucosamine-chondroitin 500-400 mg tablet Take 2 tablets by mouth once daily.       levothyroxine (SYNTHROID) 88 MCG tablet TAKE ONE TABLET BY MOUTH EVERY MORNING 30 MINUTES before BREAKFAST 90 tablet 2    melatonin 5 mg Cap Take by mouth as needed.        memantine (NAMENDA) 5 MG Tab Take 5 mg by mouth 2 (two) times daily.      methotrexate 2.5 MG Tab TAKE 4 TABLETS (10MG) EVERY 7 DAYS 48 tablet 3    multivitamin (THERAGRAN) per tablet Take 1 tablet by mouth once daily.      TURMERIC (CURCUMIN MISC) 750 mg by Misc.(Non-Drug; Combo Route) route once daily.       UNABLE TO FIND medication name: CBD gummies pt takes 1/2 gummy every day      UNABLE TO FIND medication name: CBD pain relief gummy 33 mg - 1 daily      VITAMIN A-C-D3-COD LIVER OIL ORAL Take by mouth once daily.       vitamin B complex-folic acid 0.4 mg Tab Take by mouth once daily.       atorvastatin (LIPITOR) 10 MG tablet Take 1 tablet (10 mg total) by mouth once daily. 90 tablet 3     No current facility-administered medications on file prior to visit.       Vitals:    02/13/25 0949   BP: (!) 149/82   Pulse: 61       Physical Exam:    Physical Exam  Constitutional:       Appearance: Normal appearance. She is well-developed.   HENT:      Nose: No septal deviation.      Mouth/Throat:      Mouth: No oral lesions.   Eyes:      Conjunctiva/sclera:      Right eye: Right conjunctiva is not injected.      Left eye: Left conjunctiva is not injected.      Pupils: Pupils are equal, round, and reactive to light.   Neck:      Thyroid: No thyroid mass or thyromegaly.      Vascular: No JVD.   Cardiovascular:      Rate and Rhythm: Normal rate and regular rhythm.      Pulses: Normal pulses.      Comments: No edema  Pulmonary:      Effort: Pulmonary effort is normal.      Breath sounds: Normal breath sounds.   Abdominal:      Palpations: Abdomen is soft.   Musculoskeletal:      Right shoulder: No swelling or tenderness. Normal range of motion.      Left shoulder: No swelling or tenderness. Normal range of motion.      Right elbow: No swelling. Normal range of motion. No tenderness.      Left elbow: No swelling. Normal range of motion. No tenderness.      Right wrist: Tenderness present. No swelling. Decreased range of  motion.      Left wrist: Tenderness present. No swelling. Decreased range of motion.      Right hand: Tenderness present. Decreased range of motion.      Left hand: Tenderness present. Decreased range of motion.      Right hip: Normal range of motion. Normal strength.      Left hip: No tenderness. Normal range of motion.      Right knee: No swelling. Normal range of motion. No tenderness.      Left knee: No swelling. Normal range of motion. No tenderness.      Right ankle: No swelling. No tenderness. Normal range of motion.      Left ankle: No swelling. No tenderness. Normal range of motion.      Comments: 5/5 upper and lower extremity bilateral muscle strength   Lymphadenopathy:      Cervical: No cervical adenopathy.   Skin:     General: Skin is dry.   Neurological:      Deep Tendon Reflexes: Reflexes are normal and symmetric.               Assessment:       Encounter Diagnoses   Name Primary?    Rheumatoid arthritis involving right hand with positive rheumatoid factor Yes    Immunosuppression due to drug therapy     High risk medications (not anticoagulants) long-term use     Osteoarthritis of spine with radiculopathy, thoracolumbar region     Spondylosis of cervical region without myelopathy or radiculopathy     Radiculopathy, unspecified spinal region           Plan:        Rheumatoid arthritis involving right hand with positive rheumatoid factor  -     methotrexate 2.5 MG Tab; Take 6 tablets (15 mg total) by mouth every 7 days.  Dispense: 72 tablet; Refill: 1  -     folic acid (FOLVITE) 1 MG tablet; Take 2 tablets (2,000 mcg total) by mouth once daily.  Dispense: 180 tablet; Refill: 3  -     celecoxib (CELEBREX) 100 MG capsule; Take 1 capsule (100 mg total) by mouth 2 (two) times daily.  Dispense: 180 capsule; Refill: 2  -     ALT (SGPT); Future; Expected date: 02/13/2025  -     AST (SGOT); Future; Expected date: 02/13/2025  -     CBC Auto Differential; Future; Expected date: 02/13/2025  -     C-Reactive  Protein; Future; Expected date: 02/13/2025  -     Creatinine, Serum; Future; Expected date: 02/13/2025  -     Sedimentation rate; Future; Expected date: 02/13/2025    Immunosuppression due to drug therapy  -     ALT (SGPT); Future; Expected date: 02/13/2025  -     AST (SGOT); Future; Expected date: 02/13/2025  -     CBC Auto Differential; Future; Expected date: 02/13/2025  -     C-Reactive Protein; Future; Expected date: 02/13/2025  -     Creatinine, Serum; Future; Expected date: 02/13/2025  -     Sedimentation rate; Future; Expected date: 02/13/2025    High risk medications (not anticoagulants) long-term use  -     ALT (SGPT); Future; Expected date: 02/13/2025  -     AST (SGOT); Future; Expected date: 02/13/2025  -     CBC Auto Differential; Future; Expected date: 02/13/2025  -     C-Reactive Protein; Future; Expected date: 02/13/2025  -     Creatinine, Serum; Future; Expected date: 02/13/2025  -     Sedimentation rate; Future; Expected date: 02/13/2025    Osteoarthritis of spine with radiculopathy, thoracolumbar region  -     folic acid (FOLVITE) 1 MG tablet; Take 2 tablets (2,000 mcg total) by mouth once daily.  Dispense: 180 tablet; Refill: 3  -     celecoxib (CELEBREX) 100 MG capsule; Take 1 capsule (100 mg total) by mouth 2 (two) times daily.  Dispense: 180 capsule; Refill: 2    Spondylosis of cervical region without myelopathy or radiculopathy  -     celecoxib (CELEBREX) 100 MG capsule; Take 1 capsule (100 mg total) by mouth 2 (two) times daily.  Dispense: 180 capsule; Refill: 2    Radiculopathy, unspecified spinal region  -     celecoxib (CELEBREX) 100 MG capsule; Take 1 capsule (100 mg total) by mouth 2 (two) times daily.  Dispense: 180 capsule; Refill: 2      MTX continue 4 tabs weekly   Folic acid keep at 2mg weekly.   Celebrex 100mg BID will likely help with her joints as well-I see very little synovitis at this time and suspect this is residual damage that is causing bulk of pain in toes.   Prednisone  5-10-mg daily PRN-not using.   Hydrocodone for flare days as she is using infrequently  checked. I will have staff call pharmacy to discontinue and no longer dispense any Tramadol she is not taking. See Tel note .       1. Keep Methotrexate for RA- last testing with PCP liver ok, will [facility]- pharmacy apparent told her to not take Celebrex with MTX but this is ok. She is in a flare with her RA as off MTX x 2 months.    -I will not prescribed biologic until she takes the MTX. The risk here is too great given we seen to have a breakdown in understanding the instructions for therapy from visit to visit. Aware of her memory,  agree [facility] will be likely needed to ensure MTX is being taken correctly.     Methotrexate 6 tablets all at ONCE every Sunday. Increase as of 2/13/2025.  Take folic acid 2 tablets every day.   Take Celebrex 100mg twice daily      Follow up in about 3 months (around 5/13/2025).         30min consultation with greater than 50% of that time included Preparing to see the patient (review records, tests), Obtaining and/or reviewing separately obtained historical data, Performing a medically appropriate examination and/or evaluation , Ordering medications, tests, and/or procedures, Referring and communicating with other healthcare professionals , Documenting clinical information in the electronic or other health record and Independently interpreting results  (as warranted) & communicating results to the patient/family/caregiver. All questions answered.

## 2025-02-14 DIAGNOSIS — E03.9 ACQUIRED HYPOTHYROIDISM: ICD-10-CM

## 2025-02-14 RX ORDER — LEVOTHYROXINE SODIUM 88 UG/1
88 TABLET ORAL
Qty: 90 TABLET | Refills: 2 | Status: SHIPPED | OUTPATIENT
Start: 2025-02-14

## 2025-02-14 NOTE — TELEPHONE ENCOUNTER
----- Message from Cathy sent at 2/14/2025 10:08 AM CST -----  Type:  RX Refill Request    Who Called: pt       Refill or New Rx:refill     RX Name and Strength:levothyroxine (SYNTHROID) 88 MCG tablet    How is the patient currently taking it? (ex. 1XDay):as directed     Is this a 30 day or 90 day RX:90    Preferred Pharmacy with phone number:  [id number]  [place] - [address] [facility]  [address] [address]  Phone: [phone] Fax: [phone]       Local or Mail Order:local     Ordering Provider:lemuex     Would the patient rather a call back or a response via [facility]? Call back     Best Call Back Number:[phone]     Additional Information:    Please call back to advise. Thank you.

## 2025-02-14 NOTE — TELEPHONE ENCOUNTER
No care due was identified.  [facility] Embedded Care Due Messages. Reference number: [id number].   2/14/2025 10:37:41 AM CST

## 2025-03-05 ENCOUNTER — RESULTS FOLLOW-UP (OUTPATIENT)
Dept: FAMILY MEDICINE | Facility: CLINIC | Age: 80
End: 2025-03-05
Payer: MEDICARE

## 2025-03-05 DIAGNOSIS — G47.30 SLEEP APNEA, UNSPECIFIED TYPE: Primary | ICD-10-CM

## 2025-03-06 NOTE — TELEPHONE ENCOUNTER
----- Message from Terry Gordon DO sent at 3/5/2025  6:12 PM CST -----  Please call patient and let her know that she has severe central and sleep apnea.  We need to get her in with pulmonology to discuss a different type of sleep apnea machine called a BiPAP.  A   referral has been placed to [place] [facility] for treatment and management.  ----- Message -----  From: Interface, Lab In [facility]  Sent: 3/5/2025   5:22 PM CST  To: Terry Gordon DO

## 2025-03-19 ENCOUNTER — TELEPHONE (OUTPATIENT)
Dept: FAMILY MEDICINE | Facility: CLINIC | Age: 80
End: 2025-03-19
Payer: MEDICARE

## 2025-03-19 ENCOUNTER — TELEPHONE (OUTPATIENT)
Dept: RHEUMATOLOGY | Facility: CLINIC | Age: 80
End: 2025-03-19
Payer: MEDICARE

## 2025-03-19 NOTE — TELEPHONE ENCOUNTER
----- Message from Jadyn sent at 3/19/2025 12:20 PM CDT -----  Contact: Fabby Guerrero  Type:  Patient Returning CallWho Called: PatientWho Left Message for Patient:  Doesn't knowDoes the patient know what this is regarding?:  Doesn't knowWould the patient rather a call back or a response via [facility]?   Call [facility] Call Back Number:  [phone] Information:   States she is returning a missed call she believes is from this office - please call back - thank you

## 2025-03-19 NOTE — TELEPHONE ENCOUNTER
Patient was concerned of someone calling her regarding a sleep study. Dr. Vieyra's office did not attempt to contact the patient. The patient was notified.

## 2025-03-19 NOTE — TELEPHONE ENCOUNTER
----- Message from Tech Juve sent at 3/19/2025  1:29 PM CDT -----  Type:  Needs Medical Advice Who Called: pt Symptoms (please be specific): na How long has patient had these symptoms:  chacha Pharmacy name and phone #:  na Would the patient rather a call back or a response via [facility]? Call back Best Call Back Number: [phone]  Additional Information: Pt is calling inquiring about information she received from Dr. Olivera's [facility] that she needs to schedule a sleep study. Pt is confused as to why.   Please call Back to advise. Thanks!

## 2025-03-19 NOTE — TELEPHONE ENCOUNTER
Attempted to call pt back - no answer and unable to leave a voice mail.  No one from our office contacted pt recently.    Michelle Jacquet, MA [facility]  3/19/2025

## 2025-03-24 DIAGNOSIS — Z00.00 ENCOUNTER FOR MEDICARE ANNUAL WELLNESS EXAM: ICD-10-CM

## 2025-03-25 NOTE — ADDENDUM NOTE
Addended by: VILMA GOODWIN on: 7/22/2019 06:05 PM     Modules accepted: Orders     Normal vision: sees adequately in most situations; can see medication labels, newsprint

## 2025-03-26 RX ORDER — MEMANTINE HYDROCHLORIDE 5 MG/1
5 TABLET ORAL 2 TIMES DAILY
Qty: 180 TABLET | Refills: 1 | Status: SHIPPED | OUTPATIENT
Start: 2025-03-26

## 2025-04-07 ENCOUNTER — EXTERNAL HOME HEALTH (OUTPATIENT)
Dept: HOME HEALTH SERVICES | Facility: HOSPITAL | Age: 80
End: 2025-04-07
Payer: MEDICARE

## 2025-04-08 ENCOUNTER — DOCUMENT SCAN (OUTPATIENT)
Dept: HOME HEALTH SERVICES | Facility: HOSPITAL | Age: 80
End: 2025-04-08
Payer: MEDICARE

## 2025-04-25 ENCOUNTER — OFFICE VISIT (OUTPATIENT)
Dept: FAMILY MEDICINE | Facility: CLINIC | Age: 80
End: 2025-04-25
Payer: MEDICARE

## 2025-04-25 VITALS
HEIGHT: 62 IN | DIASTOLIC BLOOD PRESSURE: 70 MMHG | BODY MASS INDEX: 23.82 KG/M2 | WEIGHT: 129.44 LBS | OXYGEN SATURATION: 97 % | HEART RATE: 72 BPM | SYSTOLIC BLOOD PRESSURE: 124 MMHG

## 2025-04-25 DIAGNOSIS — R35.0 URINARY FREQUENCY: ICD-10-CM

## 2025-04-25 DIAGNOSIS — E78.2 MIXED HYPERLIPIDEMIA: ICD-10-CM

## 2025-04-25 DIAGNOSIS — M05.741 RHEUMATOID ARTHRITIS INVOLVING RIGHT HAND WITH POSITIVE RHEUMATOID FACTOR: ICD-10-CM

## 2025-04-25 DIAGNOSIS — M85.89 OSTEOPENIA OF MULTIPLE SITES: ICD-10-CM

## 2025-04-25 DIAGNOSIS — G47.30 SLEEP APNEA, UNSPECIFIED TYPE: ICD-10-CM

## 2025-04-25 DIAGNOSIS — E03.9 ACQUIRED HYPOTHYROIDISM: Primary | ICD-10-CM

## 2025-04-25 DIAGNOSIS — R41.89 COGNITIVE IMPAIRMENT: ICD-10-CM

## 2025-04-25 PROCEDURE — 99999 PR PBB SHADOW E&M-EST. PATIENT-LVL V: CPT | Mod: PBBFAC,,, | Performed by: INTERNAL MEDICINE

## 2025-04-25 RX ORDER — ATORVASTATIN CALCIUM 10 MG/1
10 TABLET, FILM COATED ORAL DAILY
Qty: 90 TABLET | Refills: 3 | Status: SHIPPED | OUTPATIENT
Start: 2025-04-25 | End: 2026-04-25

## 2025-04-25 NOTE — PROGRESS NOTES
Patient ID: Fabby Guerrero is a 79 y.o. female.    Chief Complaint: Follow-up       Assessment and plan   Acquired hypothyroidism  -     TSH; Future; Expected date: 04/25/2025    Cognitive impairment    Mixed hyperlipidemia  -     Lipid Panel; Future; Expected date: 04/25/2025  -     atorvastatin (LIPITOR) 10 MG tablet; Take 1 tablet (10 mg total) by mouth once daily.  Dispense: 90 tablet; Refill: 3    Osteopenia of multiple sites    Rheumatoid arthritis involving right hand with positive rheumatoid factor  -     CBC Auto Differential; Future; Expected date: 04/25/2025  -     Comprehensive Metabolic Panel; Future; Expected date: 04/25/2025    Sleep apnea, unspecified type    Urinary frequency  -     Urinalysis, Reflex to Urine Culture; Future; Expected date: 04/25/2025       Assessment & Plan  Acquired hypothyroidism  Continue levothyroxine  Cognitive impairment  Agree with 2nd opinion  Mixed hyperlipidemia  Recommend to start atorvastatin  Osteopenia of multiple sites  Discussed bisphosphonate, holding off on that for now  Rheumatoid arthritis involving right hand with positive rheumatoid factor  Following with Rheumatology, continue methotrexate  Sleep apnea, unspecified type  Appreciate recommendations and management from pulmonology  Urinary frequency  Check UA        History of present illness     Six-month follow-up    Memory continues to be an issue. Seeing Dr. Montoya. MRI/ EEG was fine.  states Dr. Montoya said she may have early onset parkinsons. Tremor has occurred for the past couple of years. A friend recommended for her to see Dr. Dashawn Friedman in [place] for a second opinion.     RA stable     DIMITRIS- central and onstructive- has appointment with pulm in July.     Neck pain.     Review of Systems   Constitutional:  Negative for fever.   Respiratory:  Negative for shortness of breath.    Cardiovascular:  Negative for chest pain.   Gastrointestinal:  Negative for abdominal pain.   Genitourinary:   Positive for dysuria.       I personally reviewed past medical, family and social history.     Objective    Vitals:    04/25/25 1350   BP: 124/70   Pulse: 72      Wt Readings from Last 3 Encounters:   04/25/25 1350 58.7 kg (129 lb 6.6 oz)   02/13/25 0949 56.2 kg (123 lb 14.4 oz)   02/11/25 1355 57 kg (125 lb 10.6 oz)      Body mass index is 23.67 kg/m².     Physical Exam  Cardiovascular:      Rate and Rhythm: Normal rate and regular rhythm.      Heart sounds: No murmur heard.     No gallop.   Pulmonary:      Breath sounds: Normal breath sounds. No wheezing or rhonchi.   Abdominal:      Palpations: Abdomen is soft.      Tenderness: There is no abdominal tenderness.        Reference     : Visit today included increased complexity associated with the care of the episodic problem Acquired hypothyroidism [E03.9] addressed and managing the longitudinal care of the patient due to the serious and/or complex managed problem(s)     Active Problem List with Overview Notes    Diagnosis Date Noted    Osteopenia of multiple sites 04/24/2024    Sleep apnea- central and obstructive 03/21/2024    Cognitive impairment 10/27/2022     MOCA:  21/30 per PCP 9/2022  24+1/30 March 2024    MRI brain 10/2022 - mild gen atrophy and microangiopathy, normal for age       Immunosuppression 02/21/2022    Mixed hyperlipidemia 11/19/2020    Rheumatoid arthritis of hand 04/21/2016    Acquired hypothyroidism 04/13/2012    Primary insomnia 10/27/2022    Lumbar radiculopathy 12/09/2021    Cervical radiculopathy 11/04/2021    Abnormal electrocardiogram (ECG) (EKG) 11/24/2020    Osteoarthritis of spine with radiculopathy, thoracolumbar region 04/21/2016    Osteoarthritis of neck 04/21/2016    Family history of colon cancer 04/13/2012     Father age 70                Hyperlipidemia Medications              fish oil-omega-3 fatty acids 300-1,000 mg capsule Take 2 g by mouth once daily.    atorvastatin (LIPITOR) 10 MG tablet Take 1 tablet (10 mg total)  by mouth once daily.           Medication List with Changes/Refills   Current Medications    ACETAMINOPHEN (TYLENOL) 650 MG TBSR    Take 650 mg by mouth every 8 (eight) hours as needed.    B COMPLEX VITAMINS TABLET    Take 1 tablet by mouth once daily. B100    CALCIUM CARBONATE (CALCIUM 500) 500 MG CALCIUM (1,250 MG) CHEWABLE TABLET    Take 1 tablet (500 mg total) by mouth once daily.    CELECOXIB (CELEBREX) 100 MG CAPSULE    Take 1 capsule (100 mg total) by mouth 2 (two) times daily.    CYANOCOBALAMIN (VITAMIN B-12) 100 MCG TABLET    Take 100 mcg by mouth once daily.    FISH OIL-OMEGA-3 FATTY ACIDS 300-1,000 MG CAPSULE    Take 2 g by mouth once daily.    FOLIC ACID (FOLVITE) 1 MG TABLET    Take 2 tablets (2,000 mcg total) by mouth once daily.    GINKGO BILOBA 60 MG CAP    Take by mouth.    GLUCOSAMINE-CHONDROITIN 500-400 MG TABLET    Take 2 tablets by mouth once daily.     LEVOTHYROXINE (SYNTHROID) 88 MCG TABLET    Take 1 tablet (88 mcg total) by mouth before breakfast.    MELATONIN 5 MG CAP    Take by mouth as needed.     MEMANTINE (NAMENDA) 5 MG TAB    Take 1 tablet by mouth twice daily    METHOTREXATE 2.5 MG TAB    Take 6 tablets (15 mg total) by mouth every 7 days.    MULTIVITAMIN (THERAGRAN) PER TABLET    Take 1 tablet by mouth once daily.    TURMERIC (CURCUMIN MISC)    750 mg by Misc.(Non-Drug; Combo Route) route once daily.     UNABLE TO FIND    medication name: CBD gummies pt takes 1/2 gummy every day    UNABLE TO FIND    medication name: CBD pain relief gummy 33 mg - 1 daily    VITAMIN A-C-D3-COD LIVER OIL ORAL    Take by mouth once daily.     VITAMIN B COMPLEX-FOLIC ACID 0.4 MG TAB    Take by mouth once daily.    Changed and/or Refilled Medications    Modified Medication Previous Medication    ATORVASTATIN (LIPITOR) 10 MG TABLET atorvastatin (LIPITOR) 10 MG tablet       Take 1 tablet (10 mg total) by mouth once daily.    Take 1 tablet (10 mg total) by mouth once daily.

## 2025-05-09 ENCOUNTER — LAB VISIT (OUTPATIENT)
Dept: LAB | Facility: HOSPITAL | Age: 80
End: 2025-05-09
Attending: INTERNAL MEDICINE
Payer: MEDICARE

## 2025-05-09 DIAGNOSIS — Z79.899 IMMUNOSUPPRESSION DUE TO DRUG THERAPY: ICD-10-CM

## 2025-05-09 DIAGNOSIS — D84.821 IMMUNOSUPPRESSION DUE TO DRUG THERAPY: ICD-10-CM

## 2025-05-09 DIAGNOSIS — M05.741 RHEUMATOID ARTHRITIS INVOLVING RIGHT HAND WITH POSITIVE RHEUMATOID FACTOR: ICD-10-CM

## 2025-05-09 DIAGNOSIS — Z79.899 HIGH RISK MEDICATIONS (NOT ANTICOAGULANTS) LONG-TERM USE: ICD-10-CM

## 2025-05-09 LAB
ABSOLUTE EOSINOPHIL (OHS): 0.1 K/UL
ABSOLUTE MONOCYTE (OHS): 0.59 K/UL (ref 0.3–1)
ABSOLUTE NEUTROPHIL COUNT (OHS): 5.65 K/UL (ref 1.8–7.7)
ALT SERPL W/O P-5'-P-CCNC: 5 UNIT/L (ref 10–44)
AST SERPL-CCNC: 17 UNIT/L (ref 11–45)
BASOPHILS # BLD AUTO: 0.03 K/UL
BASOPHILS NFR BLD AUTO: 0.4 %
CREAT SERPL-MCNC: 0.8 MG/DL (ref 0.5–1.4)
CRP SERPL-MCNC: 0.3 MG/L
ERYTHROCYTE [DISTWIDTH] IN BLOOD BY AUTOMATED COUNT: 13.9 % (ref 11.5–14.5)
ERYTHROCYTE [SEDIMENTATION RATE] IN BLOOD BY PHOTOMETRIC METHOD: 3 MM/HR
GFR SERPLBLD CREATININE-BSD FMLA CKD-EPI: >60 ML/MIN/1.73/M2
HCT VFR BLD AUTO: 39.6 % (ref 37–48.5)
HGB BLD-MCNC: 12.8 GM/DL (ref 12–16)
IMM GRANULOCYTES # BLD AUTO: 0.04 K/UL (ref 0–0.04)
IMM GRANULOCYTES NFR BLD AUTO: 0.5 % (ref 0–0.5)
LYMPHOCYTES # BLD AUTO: 1.56 K/UL (ref 1–4.8)
MCH RBC QN AUTO: 31.1 PG (ref 27–31)
MCHC RBC AUTO-ENTMCNC: 32.3 G/DL (ref 32–36)
MCV RBC AUTO: 96 FL (ref 82–98)
NUCLEATED RBC (/100WBC) (OHS): 0 /100 WBC
PLATELET # BLD AUTO: 360 K/UL (ref 150–450)
PMV BLD AUTO: 9.9 FL (ref 9.2–12.9)
RBC # BLD AUTO: 4.12 M/UL (ref 4–5.4)
RELATIVE EOSINOPHIL (OHS): 1.3 %
RELATIVE LYMPHOCYTE (OHS): 19.6 % (ref 18–48)
RELATIVE MONOCYTE (OHS): 7.4 % (ref 4–15)
RELATIVE NEUTROPHIL (OHS): 70.8 % (ref 38–73)
WBC # BLD AUTO: 7.97 K/UL (ref 3.9–12.7)

## 2025-05-09 PROCEDURE — 84450 TRANSFERASE (AST) (SGOT): CPT

## 2025-05-09 PROCEDURE — 85652 RBC SED RATE AUTOMATED: CPT

## 2025-05-09 PROCEDURE — 84460 ALANINE AMINO (ALT) (SGPT): CPT

## 2025-05-09 PROCEDURE — 85025 COMPLETE CBC W/AUTO DIFF WBC: CPT

## 2025-05-09 PROCEDURE — 86140 C-REACTIVE PROTEIN: CPT

## 2025-05-09 PROCEDURE — 36415 COLL VENOUS BLD VENIPUNCTURE: CPT | Mod: PO

## 2025-05-09 PROCEDURE — 82565 ASSAY OF CREATININE: CPT

## 2025-05-13 ENCOUNTER — OFFICE VISIT (OUTPATIENT)
Dept: RHEUMATOLOGY | Facility: CLINIC | Age: 80
End: 2025-05-13
Payer: MEDICARE

## 2025-05-13 VITALS
DIASTOLIC BLOOD PRESSURE: 56 MMHG | HEIGHT: 62 IN | WEIGHT: 127.63 LBS | BODY MASS INDEX: 23.49 KG/M2 | HEART RATE: 64 BPM | SYSTOLIC BLOOD PRESSURE: 103 MMHG

## 2025-05-13 DIAGNOSIS — D84.9 IMMUNOSUPPRESSION: ICD-10-CM

## 2025-05-13 DIAGNOSIS — Z79.899 HIGH RISK MEDICATIONS (NOT ANTICOAGULANTS) LONG-TERM USE: ICD-10-CM

## 2025-05-13 DIAGNOSIS — M05.741 RHEUMATOID ARTHRITIS INVOLVING RIGHT HAND WITH POSITIVE RHEUMATOID FACTOR: Primary | ICD-10-CM

## 2025-05-13 PROCEDURE — 99214 OFFICE O/P EST MOD 30 MIN: CPT | Mod: S$GLB,,, | Performed by: INTERNAL MEDICINE

## 2025-05-13 PROCEDURE — 3074F SYST BP LT 130 MM HG: CPT | Mod: CPTII,S$GLB,, | Performed by: INTERNAL MEDICINE

## 2025-05-13 PROCEDURE — 99999 PR PBB SHADOW E&M-EST. PATIENT-LVL IV: CPT | Mod: PBBFAC,,, | Performed by: INTERNAL MEDICINE

## 2025-05-13 PROCEDURE — 3078F DIAST BP <80 MM HG: CPT | Mod: CPTII,S$GLB,, | Performed by: INTERNAL MEDICINE

## 2025-05-13 PROCEDURE — 1159F MED LIST DOCD IN RCRD: CPT | Mod: CPTII,S$GLB,, | Performed by: INTERNAL MEDICINE

## 2025-05-13 PROCEDURE — 1125F AMNT PAIN NOTED PAIN PRSNT: CPT | Mod: CPTII,S$GLB,, | Performed by: INTERNAL MEDICINE

## 2025-05-13 ASSESSMENT — ROUTINE ASSESSMENT OF PATIENT INDEX DATA (RAPID3)
MDHAQ FUNCTION SCORE: 0.6
PSYCHOLOGICAL DISTRESS SCORE: 1.1
TOTAL RAPID3 SCORE: 2.17
PATIENT GLOBAL ASSESSMENT SCORE: 2.5
PAIN SCORE: 2
FATIGUE SCORE: 1.1

## 2025-05-13 NOTE — PROGRESS NOTES
Subjective:          Chief Complaint: Fabby Guerrero is a 79 y.o. female who had concerns including Disease Management.    HPI:    Rheumatoid Arthritis  Patient is an 79 y.o. female here for follow up seropostive Rheumatoid Arthritis, secondary Sjogrens, and OA  Serologies are: RF+, CCP HIGH titer +.      Symptoms have been present for several years.   Symptoms include eye symptoms, joint pain, joint swelling, sleep difficulties and weakness of hands and are of moderate severity. Morning stiffness: 25 minute Patient denies joint pain, joint swelling and morning stiffness. Symptoms are made worse by: movement, overuse and resting.  Symptoms are helped by arthritis medications.  Associated symptoms include arthralgia and fatigue and secondary Sjogrens. Previously on Restasis, now systane/Refresh.   Diclofenac for 1 year with decreased urination and stable creatinine; now off.     Seen w/ Pain Management : new Dr. Wallace.   Patient with known cervical stenosis: hx of C7-T1 in 2021 followed by   Using Hydrocodone rarely.   She received an L4-5 epidural steroid injections 12/09/2021 with 50% relief she has some low back pain and pain in her calves and spasms at night sometimes during the day.  She did note the numbness and tingling has improved.      Seen with Ortho, Dr. Agudelo/Romeo,  received Euflexxa in past.  5/2021 call with flare of left knee, depomedrol 40IM and repeat Euflexxa.     C/o knee pain chronic and daily. No swelling, no instability. - knees stable and manageable.     8/2024: last visit patient was advised by pharmacy to stop MTX as it would interact with her Celebrex. Rheum dosing of MTX is already adjusted for NSAIDs. Advised her to restart as in that time memory did NOT have any appreciable improvement. Review of last note with PCP appears she continued holding MTX until recently.   She was able to see Neuro, rec sleep eval which she has order. Dr. Gordon has continued memantine.     9/2024: patient  here today for f/u regarding last visit with RA flare, new worsening of cervical spine is under care of Dr. Wallace.   She once again is not taking her Methotrexate that I have advised her to do. She is taking celebrex because she was advised by Ms. Grubbs at an urgent visit for pain to do so after my visi9t.    She did follow with Dr. Wallace- he felt shoulder, sent to Dr. Nelson bilateral shoulder injection and she is doing remarkably better.     11/2024: patient returns today has not started her methotrexate since last visit. She is now off DMARD therapy since about March of 2024. I cannot get her to restart. She did f/u with Dr Nelson few weeks ago and cannot repeat injection just yet.   Patient did see new Pain doc appears around she was schedule with Dr. Lujan (pain) 9/2024, and 10/2024- given tramadol she is not recalling if this was helpful. But is clear she feels better on Hydrocodone. MRI lumbar was updated but she and her  state no procedures offered.   Patient with cotinued joint pain hands, shouders worst but can have days of stiffness diffusely.     2/2025: patient was able to meet with Neurology: Dr. Montoya. MRI, EEG w/o worrisome findings. Some suspect for Parkinson's finishing work up  Pending [facility] soon,   Taking methotrexate and doing much better.       Current medications  Celebrex 100mg BID  Tylenol 650 2-3 daily  Methotrexate - Problems: none-- 4 tabs weekly -still off and not under control for RA - she is having some memory issues and repeatedly not taking after I have reassured her to take with Celebrex.     Hydrocodone rarely for pain- I filled #30 ok for 1 tab per day.       No hx of HCQ-  No personal hx of malignancy.    C/o feet /toes painful more but improved with recent celebrex.     [facility] monitoring show everything is within normal limits.      MRI of the shoulder some musculature is of the rotator cuff is grossly intact mild tendinopathy subacromial and  glenohumeral narrowing but no active bursitis was noticed she has some hypertrophic changes at the AC joint    MRI of her cervical  spine however showed multilevel foraminal stenosis most prominent at C5-6 moderate to severe left > than right.  She also had a few disc osteophyte complexes which could even  a facet mediated pain causing muscle spasms radiating along the shoulder girdle in the upper trapezius region.     Dr Edmonds from pain management with some injections    Component      Latest Ref Rng & Units 4/17/2018 5/26/2015   Anti Sm/RNP Antibody      0.00 - 19.99 EU  0.35   Anti-Sm/RNP Interpretation      Negative  Negative   B27 Testing Date        06/16/2015 11:48 AM   HLA B27 Result        Negative   ds DNA Ab      Negative 1:10  Negative 1:10   CHON Screen      Negative <1:160  Negative <1:160   Rheumatoid Factor      <14 IU/mL 128 (H) 34.0 (H)   CCP Antibodies      <5.0 U/mL  6.1 (H)   Sed Rate      < OR = 30 mm/h 11    Cyclic Citrullinated Peptide (CCP) Ab (IgG)      UNITS >250 (H)    CRP      <8.0 mg/L 1.4      REVIEW OF SYSTEMS:    Review of Systems   Constitutional:  Positive for malaise/fatigue. Negative for fever and weight loss.   HENT:  Negative for sore throat.    Eyes:  Positive for discharge and redness. Negative for double vision and photophobia.   Respiratory:  Negative for cough, shortness of breath and wheezing.    Cardiovascular:  Negative for chest pain, palpitations and orthopnea.   Gastrointestinal:  Negative for abdominal pain, constipation and diarrhea.   Genitourinary:  Negative for dysuria, hematuria and urgency.   Musculoskeletal:  Positive for joint pain. Negative for back pain and myalgias.   Skin:  Negative for rash.   Neurological:  Negative for dizziness, tingling, focal weakness and headaches.   Endo/Heme/Allergies:  Does not bruise/bleed easily.   Psychiatric/Behavioral:  Negative for depression, hallucinations and suicidal ideas.          Objective:        Past Medical  History:   Diagnosis Date    Chronic neck pain     Chronic shoulder pain     GERD (gastroesophageal reflux disease)     Personal history of colonic polyps 2024    Keo Ortiz MD    Rheumatoid arthritis      Family History   Problem Relation Name Age of Onset    Cancer Mother          lung    Cancer Father          colon and lung    Cancer Daughter          melanoma     Social History     Tobacco Use    Smoking status: Former     Current packs/day: 0.00     Average packs/day: 1 pack/day for 14.0 years (14.0 ttl pk-yrs)     Types: Cigarettes     Start date: 1962     Quit date: 1976     Years since quittin.1     Passive exposure: Never    Smokeless tobacco: Never   Substance Use Topics    Alcohol use: Yes     Alcohol/week: 3.0 standard drinks of alcohol     Types: 3 Glasses of wine per week     Comment: occasional    Drug use: No         Current Outpatient Medications on File Prior to Visit   Medication Sig Dispense Refill    acetaminophen (TYLENOL) 650 MG TbSR Take 650 mg by mouth every 8 (eight) hours as needed.      atorvastatin (LIPITOR) 10 MG tablet Take 1 tablet (10 mg total) by mouth once daily. 90 tablet 3    b complex vitamins tablet Take 1 tablet by mouth once daily. B100      calcium carbonate (CALCIUM 500) 500 mg calcium (1,250 mg) chewable tablet Take 1 tablet (500 mg total) by mouth once daily. 90 tablet 3    celecoxib (CELEBREX) 100 MG capsule Take 1 capsule (100 mg total) by mouth 2 (two) times daily. 180 capsule 2    cyanocobalamin (VITAMIN B-12) 100 MCG tablet Take 100 mcg by mouth once daily.      fish oil-omega-3 fatty acids 300-1,000 mg capsule Take 2 g by mouth once daily.      folic acid (FOLVITE) 1 MG tablet Take 2 tablets (2,000 mcg total) by mouth once daily. 180 tablet 3    ginkgo biloba 60 mg Cap Take by mouth.      glucosamine-chondroitin 500-400 mg tablet Take 2 tablets by mouth once daily.       levothyroxine (SYNTHROID) 88 MCG tablet Take 1 tablet (88 mcg total)  by mouth before breakfast. 90 tablet 2    melatonin 5 mg Cap Take by mouth as needed.       memantine (NAMENDA) 5 MG Tab Take 1 tablet by mouth twice daily 180 tablet 1    methotrexate 2.5 MG Tab Take 6 tablets (15 mg total) by mouth every 7 days. 72 tablet 1    multivitamin (THERAGRAN) per tablet Take 1 tablet by mouth once daily.      TURMERIC (CURCUMIN MISC) 750 mg by Misc.(Non-Drug; Combo Route) route once daily.       UNABLE TO FIND medication name: CBD gummies pt takes 1/2 gummy every day      UNABLE TO FIND medication name: CBD pain relief gummy 33 mg - 1 daily      VITAMIN A-C-D3-COD LIVER OIL ORAL Take by mouth once daily.       vitamin B complex-folic acid 0.4 mg Tab Take by mouth once daily.        No current facility-administered medications on file prior to visit.       Vitals:    05/13/25 1423   BP: (!) 103/56   Pulse: 64       Physical Exam:    Physical Exam  Constitutional:       Appearance: Normal appearance. She is well-developed.   HENT:      Nose: No septal deviation.      Mouth/Throat:      Mouth: No oral lesions.   Eyes:      Conjunctiva/sclera:      Right eye: Right conjunctiva is not injected.      Left eye: Left conjunctiva is not injected.      Pupils: Pupils are equal, round, and reactive to light.   Neck:      Thyroid: No thyroid mass or thyromegaly.      Vascular: No JVD.   Cardiovascular:      Rate and Rhythm: Normal rate and regular rhythm.      Pulses: Normal pulses.      Comments: No edema  Pulmonary:      Effort: Pulmonary effort is normal.      Breath sounds: Normal breath sounds.   Abdominal:      Palpations: Abdomen is soft.   Musculoskeletal:      Right shoulder: No swelling or tenderness. Normal range of motion.      Left shoulder: No swelling or tenderness. Normal range of motion.      Right elbow: No swelling. Normal range of motion. No tenderness.      Left elbow: No swelling. Normal range of motion. No tenderness.      Right wrist: Tenderness present. No swelling. Decreased  range of motion.      Left wrist: Tenderness present. No swelling. Decreased range of motion.      Right hand: Tenderness present. Decreased range of motion.      Left hand: Tenderness present. Decreased range of motion.      Right hip: Normal range of motion. Normal strength.      Left hip: No tenderness. Normal range of motion.      Right knee: No swelling. Normal range of motion. No tenderness.      Left knee: No swelling. Normal range of motion. No tenderness.      Right ankle: No swelling. No tenderness. Normal range of motion.      Left ankle: No swelling. No tenderness. Normal range of motion.      Comments: 5/5 upper and lower extremity bilateral muscle strength   Lymphadenopathy:      Cervical: No cervical adenopathy.   Skin:     General: Skin is dry.   Neurological:      Deep Tendon Reflexes: Reflexes are normal and symmetric.               Assessment:       Encounter Diagnoses   Name Primary?    Rheumatoid arthritis involving right hand with positive rheumatoid factor Yes    Immunosuppression     High risk medications (not anticoagulants) long-term use             Plan:        Rheumatoid arthritis involving right hand with positive rheumatoid factor  -     ALT (SGPT); Future; Expected date: 05/13/2025  -     AST (SGOT); Future; Expected date: 05/13/2025  -     CBC Auto Differential; Future; Expected date: 05/13/2025  -     C-Reactive Protein; Future; Expected date: 05/13/2025  -     Creatinine, Serum; Future; Expected date: 05/13/2025  -     Sedimentation rate; Future; Expected date: 05/13/2025    Immunosuppression  -     ALT (SGPT); Future; Expected date: 05/13/2025  -     AST (SGOT); Future; Expected date: 05/13/2025  -     CBC Auto Differential; Future; Expected date: 05/13/2025  -     C-Reactive Protein; Future; Expected date: 05/13/2025  -     Creatinine, Serum; Future; Expected date: 05/13/2025  -     Sedimentation rate; Future; Expected date: 05/13/2025    High risk medications (not anticoagulants)  long-term use  -     ALT (SGPT); Future; Expected date: 05/13/2025  -     AST (SGOT); Future; Expected date: 05/13/2025  -     CBC Auto Differential; Future; Expected date: 05/13/2025  -     C-Reactive Protein; Future; Expected date: 05/13/2025  -     Creatinine, Serum; Future; Expected date: 05/13/2025  -     Sedimentation rate; Future; Expected date: 05/13/2025      MTX continue 6 tabs weekly   Folic acid keep at 2mg weekly.   Celebrex 100mg BID will likely help with her joints as well-I see very little synovitis at this time and suspect this is residual damage that is causing bulk of pain in toes.   Prednisone 5-10-mg daily PRN-not using.   Not using Hydrocodone     Doing kailyn much better last 2 visits.   1. Keep Methotrexate for RA- last testing with PCP liver ok, will repeat labs at [facility] in [facility]- pharmacy apparent told her to not take Celebrex with MTX but this is ok. She is in a flare with her RA as off MTX x 2 months.    -I will not prescribed biologic until she takes the MTX. The risk here is too great given we seen to have a breakdown in understanding the instructions for therapy from visit to visit. Aware of her memory,  agree [facility] will be likely needed to ensure MTX is being taken correctly.     Methotrexate 6 tablets all at ONCE every Sunday. Increase as of 2/13/2025.  Take folic acid 2 tablets every day.   Take Celebrex 100mg twice daily      No follow-ups on file.         30min consultation with greater than 50% of that time included Preparing to see the patient (review records, tests), Obtaining and/or reviewing separately obtained historical data, Performing a medically appropriate examination and/or evaluation , Ordering medications, tests, and/or procedures, Referring and communicating with other healthcare professionals , Documenting clinical information in the electronic or other health record and Independently interpreting results  (as warranted) & communicating results to the  patient/family/caregiver. All questions answered.

## 2025-05-19 ENCOUNTER — TELEPHONE (OUTPATIENT)
Dept: FAMILY MEDICINE | Facility: CLINIC | Age: 80
End: 2025-05-19
Payer: MEDICARE

## 2025-05-19 DIAGNOSIS — R41.89 COGNITIVE IMPAIRMENT: Primary | ICD-10-CM

## 2025-05-19 NOTE — TELEPHONE ENCOUNTER
----- Message from Ira sent at 5/19/2025  2:56 PM CDT -----  Regarding: Patient Requesting Referral  Contact: patient's  at [phone]  Type:  Patient Requesting ReferralWho Called:  patient's  at [phone] to What Specialty:  neurologyReason for Referral:  memory issuesAdditional Information:   Patient is requesting a referral with [facility]. Please call and advise. Thank you

## 2025-05-19 NOTE — TELEPHONE ENCOUNTER
Referral to neurology has been placed.  She saw them a little over a year ago and may still be able to schedule an appointment as well.

## 2025-05-20 ENCOUNTER — TELEPHONE (OUTPATIENT)
Dept: NEUROLOGY | Facility: CLINIC | Age: 80
End: 2025-05-20
Payer: MEDICARE

## 2025-05-20 NOTE — TELEPHONE ENCOUNTER
May someone please contact the patient to schedule neurology appointment? Referral already ordered. I am not able to schedule.

## 2025-05-20 NOTE — TELEPHONE ENCOUNTER
Spoke with patient and scheduled appointment from referral for memory loss. Patient seen by Mildred Davalos on 3/21/24 for memory loss. Appointments scheduled for 1:45 on 6/25/25. Time/date/location provided.

## 2025-06-06 ENCOUNTER — TELEPHONE (OUTPATIENT)
Dept: NEUROLOGY | Facility: CLINIC | Age: 80
End: 2025-06-06
Payer: MEDICARE

## 2025-06-18 ENCOUNTER — TELEPHONE (OUTPATIENT)
Dept: NEUROLOGY | Facility: CLINIC | Age: 80
End: 2025-06-18
Payer: MEDICARE

## 2025-06-23 ENCOUNTER — TELEPHONE (OUTPATIENT)
Dept: NEUROLOGY | Facility: CLINIC | Age: 80
End: 2025-06-23
Payer: MEDICARE

## 2025-06-23 NOTE — TELEPHONE ENCOUNTER
Spoke to the pt and her , they are very upset that the appt had to be rescheduled.  They cannot do a VV.  Appt rescheduled from 6/25/25 to 7/2/25 at 1430.

## 2025-07-02 ENCOUNTER — OFFICE VISIT (OUTPATIENT)
Dept: NEUROLOGY | Facility: CLINIC | Age: 80
End: 2025-07-02
Payer: MEDICARE

## 2025-07-02 VITALS
HEIGHT: 62 IN | HEART RATE: 70 BPM | BODY MASS INDEX: 23.93 KG/M2 | RESPIRATION RATE: 14 BRPM | DIASTOLIC BLOOD PRESSURE: 71 MMHG | WEIGHT: 130.06 LBS | SYSTOLIC BLOOD PRESSURE: 116 MMHG

## 2025-07-02 DIAGNOSIS — R41.89 COGNITIVE IMPAIRMENT: ICD-10-CM

## 2025-07-02 DIAGNOSIS — G31.84 MILD NEUROCOGNITIVE DISORDER: Primary | ICD-10-CM

## 2025-07-02 DIAGNOSIS — G47.30 SLEEP APNEA, UNSPECIFIED TYPE: ICD-10-CM

## 2025-07-02 PROCEDURE — 99999 PR PBB SHADOW E&M-EST. PATIENT-LVL V: CPT | Mod: PBBFAC,,, | Performed by: NURSE PRACTITIONER

## 2025-07-02 RX ORDER — DONEPEZIL HYDROCHLORIDE 5 MG/1
5 TABLET, FILM COATED ORAL NIGHTLY
Qty: 30 TABLET | Refills: 2 | Status: SHIPPED | OUTPATIENT
Start: 2025-07-02 | End: 2025-07-02

## 2025-07-02 NOTE — PROGRESS NOTES
"NEUROLOGY  Outpatient Visit     [facility]  [address], [place] [address]  [phone] (office) / [phone] (fax)    Patient Name:  Fabby Guerrero  :  1945  MR #:  [id number]  Acct #:  [id number]    Date of  Visit: 2025    Other Physicians:  Terry Gordon DO (Primary Care Physician)      CHIEF COMPLAINT: Memory Loss (Follow up / re establishing care )      Interval history:  25:  Fabby Guerrero is a 79 y.o. R-handed female seen in f/u for memory loss     Medical history is significant for OA, lumbar radiculopathy, cervical radiculopathy, cervical spine stenosis, HLD, RA, Sjogren's,  immunosuppression, hypothyroidism, insomnia, DIMITRIS not on CPAP    Here today with her , who assists with history    Last visit was 3/2024. MoCA was 24+. PSG ordered. Recommended dc OTC sleep aid. Serologies were unremarkable.       is concerned due to ongoing decline in ST memory. She asked 3x on the way here where they were going. She has quit driving as a precaution. He fears she would get lost, but she thinks she could drive. She remains independent with cooking, cleaning, using the telephone. She reports that she manages most of her medications, but her  has to remind her to take her weekly methotrexate. She cannot tell me names of her medications and when asked about certain ones, she becomes confused. She denies ED. She denies language issues. She denies VS issues.     She does have hearing impairment. She wears hearing aids when she goes out but not daily.     She eventually completed sleep testing via [facility], which showed severe sleep apnea (both central and obstructive). She is having an in lab study tomorrow. She indicates that she doesn't think that she can tolerate wearing a mask for treatment.     She saw Dr. Montoya for evaluation of her memory in 2025.. She had an EEG and brain imaging, which were "fine." There was some concern for " emergent PD due to a tremor in the RH.    notes her RH shaking at night when they are watching TV. She doesn't note tremor in day to day activities, like eating or applying her makeup. She also doesn't note tremor at rest. She denies gait change or falls.  notes that she seems more unsure of her footing.     She has not had any hallucinations. She finds that she is more irritable.     Med list indicates that she is still on memantine 5 mg BID. She isn't sure.  thinks she was given a memory med and had GI side effects.     HISTORY OF PRESENT ILLNESS 3/2024:  Fabby Guerrero is a 79 y.o. R-handed female seen in consultation for memory loss per Terry Gordon DO    Medical history is significant for OA, lumbar radiculopathy, cervical radiculopathy, cervical spine stenosis, HLD, RA, Sjogren's,  immunosuppression, hypothyroidism, insomnia, DIMITRIS not on CPAP    Here today with her , who assists with history    She has a 10th grade level education. She worked as a homemaker and a .     She notes ST memory loss. Onset was about 2 years ago with progression over time. She rapidly forgets conversations, shows that she has watched on TV. No trouble cooking, driving. Her  manages their finances. She loves to read and is still able to typically recall stories, etc. No trouble managing her medications. She doesn't note any executive dysfunction.     She is [place] and has hearing aids.     She has DIMITRIS, but stopped CPAP many years ago. She wakes up several times with nocturia. She snores. No RBD behaviors. Feels tired during the day. Takes melatonin in the middle of the night if she wakes up. May take a Tylenol PM sometimes to help sleep (approx 1x per week).     Denies anxiety or depression. She has not had any hallucinations.     Denies any physical concerns, like tremor or gait change.     Has chronic OAB, recently established with urology. Prescribed Gemtesa, but doesn't seem to  be taking it.     Established with Dr. Olivera in rheum. Immunosuppressed with MTX, PRN prednisone. Trialed off of MTX for 2-3 months to see if this could be related to cognitive changes, but has worsened rather than improved.     Saw Dr. Montoya for memory loss in 2022. She did not get any specific diagnosis from this, but did not follow up.     MOCA was 21/30 per Dr. Gordon 9/2022. Started on B12 supplementation and donepezil. Donepezil caused urinary frequency per his notes, but she doesn't recall this and still has this issue. Currently on memantine 5 mg once per day. She thinks that this is to help her sleep.     She is not aware of any FH of memory loss.     Former smoker, quit 40+ yrs ago. Has an occasional glass of wine.       Allergies:  Review of patient's allergies indicates:  No Known Allergies    Current Medications:  Current Outpatient Medications   Medication Sig Dispense Refill    acetaminophen (TYLENOL) 650 MG TbSR Take 650 mg by mouth every 8 (eight) hours as needed.      atorvastatin (LIPITOR) 10 MG tablet Take 1 tablet (10 mg total) by mouth once daily. 90 tablet 3    b complex vitamins tablet Take 1 tablet by mouth once daily. B100      calcium carbonate (CALCIUM 500) 500 mg calcium (1,250 mg) chewable tablet Take 1 tablet (500 mg total) by mouth once daily. 90 tablet 3    celecoxib (CELEBREX) 100 MG capsule Take 1 capsule (100 mg total) by mouth 2 (two) times daily. 180 capsule 2    cyanocobalamin (VITAMIN B-12) 100 MCG tablet Take 100 mcg by mouth once daily.      fish oil-omega-3 fatty acids 300-1,000 mg capsule Take 2 g by mouth once daily.      folic acid (FOLVITE) 1 MG tablet Take 2 tablets (2,000 mcg total) by mouth once daily. 180 tablet 3    ginkgo biloba 60 mg Cap Take by mouth.      glucosamine-chondroitin 500-400 mg tablet Take 2 tablets by mouth once daily.       levothyroxine (SYNTHROID) 88 MCG tablet Take 1 tablet (88 mcg total) by mouth before breakfast. 90 tablet 2    melatonin 5  mg Cap Take by mouth as needed.       memantine (NAMENDA) 5 MG Tab Take 1 tablet by mouth twice daily 180 tablet 1    methotrexate 2.5 MG Tab Take 6 tablets (15 mg total) by mouth every 7 days. 72 tablet 1    multivitamin (THERAGRAN) per tablet Take 1 tablet by mouth once daily.      TURMERIC (CURCUMIN MISC) 750 mg by Misc.(Non-Drug; Combo Route) route once daily.       UNABLE TO FIND medication name: CBD gummies pt takes 1/2 gummy every day      UNABLE TO FIND medication name: CBD pain relief gummy 33 mg - 1 daily      VITAMIN A-C-D3-COD LIVER OIL ORAL Take by mouth once daily.       vitamin B complex-folic acid 0.4 mg Tab Take by mouth once daily.        No current facility-administered medications for this visit.       Past Medical History:  Past Medical History:   Diagnosis Date    Chronic neck pain     Chronic shoulder pain     GERD (gastroesophageal reflux disease)     Personal history of colonic polyps 2024    Keo Ortiz MD    Rheumatoid arthritis        Past Surgical History:  Past Surgical History:   Procedure Laterality Date    CARPAL TUNNEL RELEASE      bilateral     SECTION      x3    COLONOSCOPY W/ BIOPSIES  2024    Keo Ortiz MD    EPIDURAL STEROID INJECTION INTO CERVICAL SPINE N/A 2021    Procedure: Injection-steroid-epidural-cervical;  Surgeon: Ernesto Loya MD;  Location: [facility] OR;  Service: Pain Management;  Laterality: N/A;    EYE SURGERY      bilat cataracts with implant    HYSTERECTOMY      TRANSFORAMINAL EPIDURAL INJECTION OF STEROID Bilateral 2021    Procedure: Injection,steroid,epidural,transforaminal approach L4/5;  Surgeon: Ernesto Loya MD;  Location: [facility] OR;  Service: Pain Management;  Laterality: Bilateral;    TUBAL LIGATION         Family History:  family history includes Cancer in her daughter, father, and mother.    Social History:   reports that she quit smoking about 49 years ago. Her smoking use included cigarettes. She started  "smoking about 63 years ago. She has a 14 pack-year smoking history. She has never been exposed to tobacco smoke. She has never used smokeless tobacco. She reports current alcohol use of about 3.0 standard drinks of alcohol per week. She reports that she does not use drugs.      REVIEW OF SYSTEMS:  As per HPI    PHYSICAL EXAM:  /71 (BP Location: Right arm, Patient Position: Sitting)   Pulse 70   Resp 14   Ht 5' 2" (1.575 m)   Wt 59 kg (130 lb 1.1 oz)   BMI 23.79 kg/m²     General: Well groomed. No acute distress.  Pulmonary: Normal effort and rate.   Musculoskeletal: Arthritic hands, moves all extremities well.  Extremities: No clubbing, cyanosis or edema.     Neurological exam:  Mental status: Awake and alert.  Oriented to person, place, and situation. Disoriented to time. Recent memory impaired in conversation and on DR. Fund of knowledge abnormal (cannot name upcoming holiday or current president). Decreased attention and concentration (cannot do serial 7 or spell backwards). MMSE 19/30   Speech/Language: Fluent and appropriate. No dysarthria or aphasia on conversation. Able to follow complex commands.   Cranial nerves (II-XII): Visual fields full. Pupils equal round and reactive to light, extraocular movements intact, no ptosis, no nystagmus. Facial sensation and symmetry intact bilaterally. Hearing grossly intact. Palate mobile and midline. Shoulder shrug normal bilaterally. Normal tongue protrusion.   Motor: 5 out of 5 strength throughout the upper and lower extremities bilaterally. Paratonic in UE. No abnormal movements or drift.   Sensation: Intact to light touch and temperature bilaterally.   DTR: 2+ at the knees and biceps bilaterally.  Coordination: Finger-nose-finger testing intact bilaterally. STEFANIE normal bilaterally (FT, P/S, TT). Mild RUE tremor with posture > intent. No rest tremor.   Gait: Rises independently. Slightly reduced R swing with R tremor. No shuffle. Pivots to turn. "     DIAGNOSTIC DATA:  I have personally reviewed provider notes, labs and imaging made available to me today.     Imaging:  MRI brain wo 10/18/2022:    FINDINGS:  Mild prominence of the ventricles and sulci compatible with generalized cerebral volume loss.  No hydrocephalus.     Scattered foci of T2/FLAIR hyperintense signal within the supratentorial white matter, nonspecific and may reflect mild sequelae of chronic microvascular ischemic change.    Diffusion-weighted images demonstrate no evidence of an acute infarct.   Susceptibility weighted images demonstrate no evidence of acute or chronic hemorrhage. No mass effect or midline shift.     Normal vascular flow voids are preserved.     Bone marrow signal intensity is normal. The paranasal sinuses are normal.  The visualized portions of the mastoids are unremarkable.  Bilateral lens replacements are noted. Otherwise, the orbits appear within normal limits.     Impression:     1. No evidence of an acute intracranial abnormality.  2.  Mild generalized cerebral volume loss and scattered T2/FLAIR hyperintense foci within the supratentorial white matter, nonspecific but likely reflecting sequelae of chronic microvascular ischemic change.    Cardiac:  Results for orders placed or performed in visit on 11/19/20   IN OFFICE EKG 12-LEAD (to [employer])    Collection Time: 11/19/20  2:09 PM    Narrative    Test Reason : R07.89,    Vent. Rate : 073 BPM     Atrial Rate : 073 BPM     P-R Int : 136 ms          QRS Dur : 092 ms      QT Int : 390 ms       P-R-T Axes : 015 028 059 degrees     QTc Int : 429 ms    Normal sinus rhythm  Low voltage QRS  ST and T wave abnormality, consider anterior ischemia  Abnormal ECG  No previous ECGs available  Confirmed by MARIAJOSE CARO MD ([id number]) on 11/20/2020 11:03:22 AM    Referred By: MD IZAGUIRRE           Confirmed By:MARIAJOSE CARO MD         Labs:  Lab Results   Component Value Date    WBC 7.97 05/09/2025    HGB 12.8 05/09/2025    HCT 39.6  "05/09/2025     05/09/2025    MCV 96 05/09/2025    RDW 13.9 05/09/2025     Lab Results   Component Value Date     10/22/2024    K 4.7 10/22/2024     10/22/2024    CO2 26 10/22/2024    BUN 10 10/22/2024    CREATININE 0.8 05/09/2025     10/22/2024    CALCIUM 9.6 10/22/2024    MG 2.2 08/30/2019    PHOS 4.7 (H) 08/30/2019     Lab Results   Component Value Date    PROT 7.2 10/22/2024    ALBUMIN 3.4 (L) 10/22/2024    BILITOT 0.4 10/22/2024    AST 17 05/09/2025    ALKPHOS 70 10/22/2024    ALT 5 (L) 05/09/2025     No results found for: "INR", "PROTIME", "PTT"  Lab Results   Component Value Date    CHOL 204 (H) 10/22/2024    HDL 48 10/22/2024    LDLCALC 122.0 10/22/2024    TRIG 170 (H) 10/22/2024    CHOLHDL 23.5 10/22/2024     Lab Results   Component Value Date    HGBA1C 5.3 03/01/2022      Lab Results   Component Value Date    ETCAOTHP56 434 03/21/2024     No results found for: "FOLATE"  Lab Results   Component Value Date    TSH 2.269 02/27/2025     Lab Results   Component Value Date    CRP 0.3 05/09/2025     Lab Results   Component Value Date    SEDRATE 3 05/09/2025     Component      Latest Ref Rng 3/21/2024   Ammonia      9 - 30 umol/L <9 !    Methylmalonic Acid      0.00 - 0.40 umol/L <0.10    RPR      Non-reactive  Non-reactive    Thiamine      70 - 180 nmol/L 215 (H)         ASSESSMENT & PLAN:  Fabby Guerrero is a 79 y.o. R-handed female seen in f/u for memory loss.     Problem List Items Addressed This Visit          Neuro    Mild neurocognitive disorder - Primary    Overview   MOCA:  21/30 per PCP 9/2022  24+1/30 March 2024    MMSE:  19/30 July 2025    MRI brain 10/2022 - mild gen atrophy and microangiopathy, normal for age          Current Assessment & Plan   Discussed normal aging / MCI / dementia with pt and family  Pt meets criteria for MCI vs mild dementia --- I suspect that she needs more iADL assist than indicated   Despite RH ambulation tremor, no other cardinal parkinsonism on " exam today. Other features not suggestive of DLB / other PP syndrome. Could be mixed etiology. Request records from [facility] neurology.   Discussed definite role of untreated severe central / obs sleep apnea and recommend that she at least f/u with PSG and pulm consult. I do not suspect that this is the primary etiology of her cognitive symptoms, however.   Reviewed role of Rx in this setting -- needs to clarify if taking memantine and which AChEi she most recently tried   Discussed vascular RF and importance of continued efforts to maximize vascular health  Medication list reviewed and discussed -- dc OTC sleep aid use   Discussed NP testing -- she isn't sure about doing it. Not sure that it would change her mgmt at this point, can defer.     Plan:  Records  Short interval f/u to review med options   F/u as planned regarding sleep apnea     Cognition and function were assessed as documented above. The functional assessment staging test (FAST) score is 4. The patient is felt to have limited decision making capacity. Medications were reconciled and reviewed for high-risk medications. The patient's behavior and psychiatric health were reviewed and addressed. PHQ-2 score was 0. The patient and family were counseled on safety in the home and with regards to the operation of vehicles. She was advised to continue not to drive. Discussed caregiver needs and social support. Advance Care Plan was reviewed. Written care plan and support information provided to the patient and/or caregiver.                Other    Sleep apnea- central and obstructive       Follow up: 2 months, 30 min visit     No LOS data to display    This includes face to face time with the patient, as well as non-face to face time preparing for and completing the visit (review of prior diagnostic testing and clinical notes, obtaining or reviewing history, documenting clinical information in the EMR, independently interpreting and communicating results to  the patient/family and coordinating ongoing care).       I appreciate the opportunity to participate in the care of this patient. Please feel free to contact me with any concerns or questions.       Mildred Davalos, [facility]  [facility] [address] [place] [address]

## 2025-07-02 NOTE — PROGRESS NOTES
Caregiver name: Louie  Relationship to the patient:   Does the patient have a living will? Yes  Does the patient have a designated healthcare POA? Yes  Does the patient have a designated general POA? Yes    Have educational materials/resources been provided? No      Activities of Daily Living    Bathing: Independent  Dressing: Independent  Grooming: Independent  Mouth Care: Independent  Toileting: Independent  Transferring Bed/Chair: Independent  Walking: Independent  Climbing: Independent  Eating: Independent      Instrumental Activities of Daily Living    Shopping: Needs Help  Cooking: Needs Help  Managing Medications: Needs Help  Using the phone and looking up numbers: Independent  Doing Housework: Independent  Doing Laundry: Independent  Driving or using public transportation: Cannot Do  Managing finances: Dependent    Functional Assessment Staging  4   Decreased ability to perform complex tasks, e.g. planning dinner for guests, handling personal finances (such as forgetting to pay bills), difficulty marketing, etc.*             7/2/2025     2:21 PM 2/11/2025     1:56 PM 4/24/2024     9:26 AM 3/20/2023     9:33 AM 2/21/2022     2:39 PM 8/19/2021    11:07 AM 1/24/2019     3:00 PM   Depression Patient Health Questionnaire   Over the last two weeks how often have you been bothered by little interest or pleasure in doing things Not at all Not at all Not at all Not at all  Not at all  Not at all  Nearly every day    Over the last two weeks how often have you been bothered by feeling down, depressed or hopeless Not at all Not at all Not at all Not at all Not at all Not at all  More than half the days    PHQ-2 Total Score 0 0 0 0 0 0 5   Over the last two weeks how often have you been bothered by trouble falling or staying asleep, or sleeping too much       Several days    Over the last two weeks how often have you been bothered by feeling tired or having little energy       More than half the days    Over  the last two weeks how often have you been bothered by a poor appetite or overeating       Several days    Over the last two weeks how often have you been bothered by feeling bad about yourself - or that you are a failure or have let yourself or your family down       Not at all    Over the last two weeks how often have you been bothered by trouble concentrating on things, such as reading the newspaper or watching television       Several days    Over the last two weeks how often have you been bothered by moving or speaking so slowly that other people could have noticed. Or the opposite - being so fidgety or restless that you have been moving around a lot more than usual.       Not at all    Over the last two weeks how often have you been bothered by thoughts that you would be better off dead, or of hurting yourself       Not at all    If you checked off any problems, how difficult have these problems made it for you to do your work, take care of things at home or get along with other people?       Somewhat difficult    PHQ-9 Score       10        Data saved with a previous flowsheet row definition

## 2025-07-02 NOTE — PATIENT INSTRUCTIONS
We need to request records from the prior neurologist     Untreated sleep apnea is likely contributing to your symptoms. I do recommend that you have the in lab study and see a sleep specialist     No new testing to be done today given that you have recently had labs and repeat imaging     Follow up with me in 2-3 months -- we can revisit medication to possibly help with some of the memory issues based on what you previously tried     Please call our clinic at [phone] or send a message on the [facility] portal if there are any changes to the plan discussed today. For example, if you are not contacted for the requested tests, referral(s) within one week, if you are unable to receive the medications prescribed, or if you feel you need to change the treatment course for any reason.     While memory loss may not be reversible in many cases, we do know that there are several steps that you can take to prevent further memory loss:  Control of chronic vascular risk factors:  For longevity and cognitive health, you should work closely with your PCP and other specialists to aggressively control vascular risk factors, including hypertension, hyperlipidemia, obesity and diabetes.   Cigarette smoking and other tobacco use greatly increase long term vascular risk (risk for heart attacks, strokes, or other vascular diseases).       Diet:  We recommend the MIND diet, a combination of two healthy diets: the Mediterranean diet and the DASH (Dietary Approaches to Stop Hypertension) diet. It includes a variety of brain-friendly foods to optimize cognitive health and longevity. This diet typically includes fresh fruits/vegetables, whole grains, fish and poultry.   Research indicates certain nutrients may aid in brain function, such as B vitamins (especially B6, B12, and folic acid), antioxidants (such as vitamins C and E, and beta carotene), and Omega-3 fatty acids.  Minimize or eliminate the use of alcohol     Medications:  Discuss  any prescription or over the counter medications you might be taking with your doctor to avoid those that can cause sedation or worsen cognitive function, such as sleep aids, benzodiazepines, and antihistamines.     Socialization and Exercise:  30-45 minutes of brisk physical activity 5 days/week has been shown to improve function in vascular dementias, lower the risk of stroke and slow the progression of memory loss  Activities that are engaging or mentally stimulating, such as word puzzles, jigsaw puzzles and Sudoku, are also beneficial to cognitive health  Regular interaction with friends, family and community are also known to be helpful.     Sleep:  Establish a regular, consistent sleep pattern and practice good sleep hygiene.    Avoid screen time (computer, TV, smartphones or tablets) or heavy meals for at least an hour before bedtime.   Avoid caffeine or stimulants after 2 PM.   Exercise earlier in the day or mornings and keep your sleeping environment comfortable. Bedtime and wake-up times should be consistent every night and morning so the body becomes used to a single routine, even on the weekends.   Having a wind down routine (e.g., soft lights in the house, bath before bed, reduced fluid intake, songs, reading, less noise) also helps to promote sleep readiness.   Untreated obstructive sleep apnea can lead to an increased risk for stroke and further memory loss      STRATEGIES TO COPE WITH YOUR COGNITIVE DEFICITS:  Pay Attention!  Reduce distractions in the area  Look at the person speaking to you & paraphrase what they are saying  Write down important things  Ask them to repeat themselves if you zone out  Ask people to simplify or reduce information if you need to  Processing Speed  Using multiple methods to learn new information, such as listening, taking notes, and recording, can be helpful  Give yourself enough time to complete certain tasks to reduce frustration  Executive Functioning  Don't try to  multi-task. Do tasks separately to ensure that each gets completed.   Use a calendar or planner to keep you on track  Write down steps to complicated tasks in case you forget  Storing Information  Immediately after you learn something, try to recall it. Repeat this and gradually lengthen the interval between your recalls  Recalling information   Jog your memory! If you loose something, try to think back to when you last had it. Mentally walk yourself through the steps of your day to prod your memory.   Use clues, timers, memos, notes, etc.  Stay organized - keep your keys in a certain place, put your important papers in a certain place, etc.   Having a routine helps to anchor memories as well

## 2025-07-02 NOTE — ASSESSMENT & PLAN NOTE
Discussed normal aging / MCI / dementia with pt and family  Pt meets criteria for MCI vs mild dementia --- I suspect that she needs more iADL assist than indicated   Despite RH ambulation tremor, no other cardinal parkinsonism on exam today. Other features not suggestive of DLB / other PP syndrome. Could be mixed etiology. Request records from [facility] neurology.   Discussed definite role of untreated severe central / obs sleep apnea and recommend that she at least f/u with PSG and pulm consult. I do not suspect that this is the primary etiology of her cognitive symptoms, however.   Reviewed role of Rx in this setting -- needs to clarify if taking memantine and which AChEi she most recently tried   Discussed vascular RF and importance of continued efforts to maximize vascular health  Medication list reviewed and discussed -- dc OTC sleep aid use   Discussed NP testing -- she isn't sure about doing it. Not sure that it would change her mgmt at this point, can defer.     Plan:  Records  Short interval f/u to review med options   F/u as planned regarding sleep apnea     Cognition and function were assessed as documented above. The functional assessment staging test (FAST) score is 4. The patient is felt to have limited decision making capacity. Medications were reconciled and reviewed for high-risk medications. The patient's behavior and psychiatric health were reviewed and addressed. PHQ-2 score was 0. The patient and family were counseled on safety in the home and with regards to the operation of vehicles. She was advised to continue not to drive. Discussed caregiver needs and social support. Advance Care Plan was reviewed. Written care plan and support information provided to the patient and/or caregiver.

## 2025-07-03 PROBLEM — G47.34 NOCTURNAL HYPOXEMIA: Status: ACTIVE | Noted: 2025-07-03

## 2025-07-03 PROBLEM — G47.31 CENTRAL SLEEP APNEA: Status: ACTIVE | Noted: 2025-07-03

## 2025-07-07 ENCOUNTER — TELEPHONE (OUTPATIENT)
Dept: NEUROLOGY | Facility: CLINIC | Age: 80
End: 2025-07-07
Payer: MEDICARE

## (undated) DEVICE — TOWEL OR DISP STRL BLUE 4/PK

## (undated) DEVICE — NDL TUOHY EPIDURAL 20G X 3.5

## (undated) DEVICE — APPLICATOR CHLORAPREP CLR 10.5

## (undated) DEVICE — NDL SPINAL SPINOCAN 22GX3.5

## (undated) DEVICE — MARKER SKIN STND TIP BLUE BARR

## (undated) DEVICE — TRAY NERVE BLOCK

## (undated) DEVICE — GLOVE SURGICAL LATEX SZ 7

## (undated) DEVICE — SYR GLASS 5CC LUER LOK